# Patient Record
Sex: FEMALE | Race: BLACK OR AFRICAN AMERICAN | NOT HISPANIC OR LATINO | Employment: UNEMPLOYED | URBAN - METROPOLITAN AREA
[De-identification: names, ages, dates, MRNs, and addresses within clinical notes are randomized per-mention and may not be internally consistent; named-entity substitution may affect disease eponyms.]

---

## 2017-02-28 DIAGNOSIS — G43.109 MIGRAINE WITH AURA AND WITHOUT STATUS MIGRAINOSUS, NOT INTRACTABLE: ICD-10-CM

## 2017-02-28 NOTE — TELEPHONE ENCOUNTER
Request for medication refill:    Date of last visit at clinic: 5/12/16    Please complete refill if appropriate and CLOSE ENCOUNTER.    Closing the encounter signifies the refill is complete.    If refill has been denied, please complete the smart phrase .smirefuse and route it to the [facility] RN TRIAGE pool to inform the patient and the pharmacy.    Arianna Koch, CMA

## 2017-02-28 NOTE — TELEPHONE ENCOUNTER
[facility] phone call message- patient requesting a refill:    Full Medication Name: SUMAtriptan (IMITREX) 25 MG tablet        Pharmacy confirmed as   [facility] [id number] [address] AT SEC OF [address] [address]  Phone: [phone] Fax: [phone]    : Yes    Additional Comments: None     OK to leave a message on voice mail? Yes    Primary language: Nepalese      needed? No    Call taken on February 28, 2017 at 9:15 AM by Tamie Rocha

## 2017-03-01 RX ORDER — SUMATRIPTAN 25 MG/1
25-50 TABLET, FILM COATED ORAL
Qty: 30 TABLET | Refills: 3 | Status: SHIPPED | OUTPATIENT
Start: 2017-03-01 | End: 2017-12-04

## 2017-03-03 ENCOUNTER — OFFICE VISIT (OUTPATIENT)
Dept: FAMILY MEDICINE | Facility: CLINIC | Age: 32
End: 2017-03-03

## 2017-03-03 VITALS
SYSTOLIC BLOOD PRESSURE: 107 MMHG | HEIGHT: 64 IN | BODY MASS INDEX: 22.13 KG/M2 | TEMPERATURE: 98 F | WEIGHT: 129.6 LBS | RESPIRATION RATE: 20 BRPM | HEART RATE: 106 BPM | DIASTOLIC BLOOD PRESSURE: 73 MMHG | OXYGEN SATURATION: 100 %

## 2017-03-03 DIAGNOSIS — K21.9 GASTROESOPHAGEAL REFLUX DISEASE WITHOUT ESOPHAGITIS: Primary | ICD-10-CM

## 2017-03-03 DIAGNOSIS — R07.89 CHEST WALL PAIN: ICD-10-CM

## 2017-03-03 ASSESSMENT — ANXIETY QUESTIONNAIRES
2. NOT BEING ABLE TO STOP OR CONTROL WORRYING: NEARLY EVERY DAY
7. FEELING AFRAID AS IF SOMETHING AWFUL MIGHT HAPPEN: SEVERAL DAYS
6. BECOMING EASILY ANNOYED OR IRRITABLE: NEARLY EVERY DAY
GAD7 TOTAL SCORE: 17
3. WORRYING TOO MUCH ABOUT DIFFERENT THINGS: NEARLY EVERY DAY
5. BEING SO RESTLESS THAT IT IS HARD TO SIT STILL: MORE THAN HALF THE DAYS
1. FEELING NERVOUS, ANXIOUS, OR ON EDGE: NEARLY EVERY DAY

## 2017-03-03 ASSESSMENT — PATIENT HEALTH QUESTIONNAIRE - PHQ9: 5. POOR APPETITE OR OVEREATING: MORE THAN HALF THE DAYS

## 2017-03-03 NOTE — MR AVS SNAPSHOT
After Visit Summary   3/3/2017    Leticia Morales    MRN: [id number]           Patient Information     Date Of Birth          1985        Visit Information        Provider Department      3/3/2017 3:20 PM Henry Frost MD [facility]        Today's Diagnoses     Gastroesophageal reflux disease without esophagitis    -  1    Chest wall pain          Care Instructions    Here is the plan from today's visit    1. Gastroesophageal reflux disease without esophagitis  Use as needed for stomach symptoms  - ranitidine (ZANTAC) 150 MG tablet; Take 1 tablet (150 mg) by mouth 2 times daily  Dispense: 60 tablet; Refill: 5    2. Chest wall pain  Use ice 20 minutes three times a day as needed for 3 days  Use heat 20 minutes three times a day as needed after that  Use tylenol as needed for pain    Please call or return to clinic if your symptoms don't go away.    Thank you for coming to [facility] today.  Lab Testing:  **If you had lab testing today and your results are reassuring or normal they will be mailed to you or sent through [employer]wilr within 7 days.   **If the lab tests need quick action we will call you with the results.  The phone number we will call with results is # [phone] (home) . If this is not the best number please call our clinic and change the number.  Medication Refills:  If you need any refills please call your pharmacy and they will contact us.   If you need to  your refill at a new pharmacy, please contact the new pharmacy directly. The new pharmacy will help you get your medications transferred faster.   Scheduling:  If you have any concerns about today's visit or wish to schedule another appointment please call our office during normal business hours [phone] (8-5:00 M-F)  If a referral was made to a [facility] and you don't get a call from central scheduling please call [phone].  If a Mammogram was ordered for  you at [facility] call [phone] to schedule or change your appointment.  If you had an XRay/CT/Ultrasound/MRI ordered the number is [phone] to schedule or change your radiology appointment.   Medical Concerns:  If you have urgent medical concerns please call [phone] at any time of the day.       Heartburn/GERD   What is heartburn?   Heartburn refers to the symptoms you feel when acids in your stomach flow back into the esophagus. (The esophagus is the tube that carries food from your throat to your stomach.) This backward movement of stomach acid is called reflux. The acid can burn and irritate the esophagus, throat, and vocal cords.   Heartburn is a common problem. Despite its name, it has nothing to do with the heart.   When you have heartburn often, you may have a condition called gastroesophageal reflux disease, or GERD.   How does it occur?   At the bottom of the esophagus there is a ring of muscle called a sphincter. It acts like a valve. When you swallow food, the sphincter opens to let the food pass into the stomach. The ring then closes to keep the stomach contents from going back into the esophagus. If the sphincter is weak or too relaxed, stomach acid and food flow backward into the esophagus. Because the esophagus does not have the protective lining that the stomach has, the acid causes pain.   The sphincter muscle sometimes does not work properly if:   You are overweight.   You are pregnant.   You have a hiatal hernia (a condition in which part of the stomach protrudes through the diaphragm into the chest).   You eat too much.   You lie down soon after eating.   You wear tight clothes that push on your stomach.   Foods that may make heartburn worse are:   foods high in fat   sugar   chocolate   peppermint   onions   citrus foods such as orange juice   tomato-based foods   spicy foods   coffee and other drinks with caffeine, such as tea and ike   alcohol.   Heartburn can also be  made worse by:   taking certain medicines, such as aspirin   smoking cigarettes.   Anyone can have an attack of heartburn from overeating or eating foods that are high in acid. Most of the time heartburn is mild and lasts for a short time. There is usually not a problem when heartburn occurs just once in a while. You should see your healthcare provider if:   You have heartburn nearly every day for 2 weeks.   The heartburn comes back when the antacid wears off.   Heartburn wakes you up at night.   What are the symptoms?   The main symptom of heartburn is a burning pain in the lower chest, usually close to the bottom of the breastbone. Other symptoms you may have are:   acid or sour taste in your mouth   belching   a feeling of bloating or fullness in the stomach.   These symptoms tend to happen after very large meals and especially with activity such as bending or lifting after meals. The symptoms may be made worse by lying down or by wearing tight clothing.   Heartburn is very common during the last few months of pregnancy. The weight of the baby pushes on the stomach and can cause the sphincter to relax and let acid to flow back into the esophagus.   How is it diagnosed?   Usually heartburn can be diagnosed from your medical history.   If there is any question about the diagnosis, you may have the following tests to check for ulcers or other problems that might cause your symptoms:   barium swallow X-ray study of the esophagus   complete upper GI (gastrointestinal) barium X-ray study of the esophagus, stomach, and upper intestine   endoscopy, a procedure in which a thin flexible tube with a tiny camera is placed in your mouth and down into your stomach so your provider can see your esophagus and stomach.   How is it treated?   To help reduce the symptoms of heartburn you can:   Try not to put a lot of pressure on the sphincter muscle. Eating light meals and wearing loose clothing will help.   Lose weight if you are  overweight.   Take nonprescription antacids (tablets or liquid) after meals and at bedtime.   Raise the head of your bed or use more than one pillow so your head is higher than your stomach. This may allow gravity to help keep food from backing up.   If you find that certain foods or drinks seem to cause your symptoms or make them worse, avoid those foods.   If the simple measures described above do not relieve the symptoms, your healthcare provider may prescribe medicine. The prescription medicines help reduce stomach acid. They also help stomach emptying. A very few people who are not helped with medicines may need surgery.   Get emergency care if the following symptoms occur with the heartburn and do not go away within 15 minutes of treatment for heartburn: shortness of breath; sweating; light-headedness, weakness; or jaw, arm, back, or chest pain.   How long will the effects last?   Heartburn symptoms are usually relieved by treatment in just a few hours or less. If you are having heartburn every day, starting treatment will usually relieve the symptoms in a few days. However, the symptoms may come back from time to time, especially if you gain weight.   Heartburn can sometimes make asthma worse. If you have asthma, preventing or controlling heartburn may help control your asthma symptoms.   How can I help prevent heartburn?   The best prevention is to:   Keep a healthy weight. Lose weight if you are overweight.   Sleep with your head elevated at least 4 to 6 inches. (It's usually most comfortable to put the head of your bed on blocks.)   It may also help if you:   Wait an hour or longer after eating before you lie down. If you have to lie down after a meal, lie on your left side. Keep your head and shoulders slightly higher than the rest of your body. It's best to not eat for 2 to 3 hours before you go to bed.   Eat smaller, more frequent meals.   Avoid wearing tight clothing or belts.   Don't smoke. Smoking  "relaxes the sphincter leading to your stomach.   Avoid foods and other things that seem to cause heartburn or make it worse.   Developed by [employer].   Published by [employer].   Last modified: 2009   Last reviewed: 2008             Follow-ups after your visit        Who to contact     Please call your clinic at [phone] to:    Ask questions about your health    Make or cancel appointments    Discuss your medicines    Learn about your test results    Speak to your doctor   If you have compliments or concerns about an experience at your clinic, or if you wish to file a complaint, please contact [facility] Patient Relations at [phone] or email us at [email or web address]         Additional Information About Your Visit        [email or web address] Information     [employer] is an electronic gateway that provides easy, online access to your medical records. With [employer], you can request a clinic appointment, read your test results, renew a prescription or communicate with your care team.     To sign up for [employer] visit the website at [email or web address]   You will be asked to enter the access code listed below, as well as some personal information. Please follow the directions to create your username and password.     Your access code is: [email or web address]  Expires: 2017  4:11 PM     Your access code will  in 90 days. If you need help or a new code, please contact your [facility] or call [phone] for assistance.        Care EveryWhere ID     This is your Care EveryWhere ID. This could be used by other organizations to access your [place] medical records  [id number]        Your Vitals Were     Pulse Temperature Respirations Height Pulse Oximetry BMI (Body Mass Index)    106 98  F (36.7  C) (Oral) 20 5' 4\" (162.6 cm) 100% 22.25 kg/m2       Blood Pressure from Last 3 Encounters:   17 107/73   16 114/90   16 " 124/83    Weight from Last 3 Encounters:   03/03/17 129 lb 9.6 oz (58.8 kg)   11/24/16 133 lb (60.3 kg)   05/12/16 127 lb 3.2 oz (57.7 kg)              Today, you had the following     No orders found for display         Today's Medication Changes          These changes are accurate as of: 3/3/17  4:11 PM.  If you have any questions, ask your nurse or doctor.               Start taking these medicines.        Dose/Directions    ranitidine 150 MG tablet   Commonly known as:  ZANTAC   Used for:  Gastroesophageal reflux disease without esophagitis   Started by:  Henry Frost MD        Dose:  150 mg   Take 1 tablet (150 mg) by mouth 2 times daily   Quantity:  60 tablet   Refills:  5            Where to get your medicines      These medications were sent to [facility] [id number] [address] AT [facility]  [address] [place] [address]     Phone:  [phone]     ranitidine 150 MG tablet                Primary Care Provider Office Phone # Fax #    Mary Sargent [phone]       [facility] [address] [facility] [address]        Thank you!     Thank you for choosing [facility]  for your care. Our goal is always to provide you with excellent care. Hearing back from our patients is one way we can continue to improve our services. Please take a few minutes to complete the written survey that you may receive in the mail after your visit with us. Thank you!             Your Updated Medication List - Protect others around you: Learn how to safely use, store and throw away your medicines at www.disposemymeds.org.          This list is accurate as of: 3/3/17  4:11 PM.  Always use your most recent med list.                   Brand Name Dispense Instructions for use    benzonatate 100 MG capsule    TESSALON    30 capsule    Take 1 capsule (100 mg) by mouth 3 times daily as needed for cough       EXCEDRIN MIGRAINE PO           folic acid 1 MG tablet    FOLVITE    100 tablet    Take 1 tablet (1 mg) by mouth daily       levofloxacin 750 MG tablet    LEVAQUIN    10 tablet    Take 1 tablet (750 mg) by mouth daily       MUCINEX PO          order for DME     1 Device    Equipment being ordered: humidifier       ranitidine 150 MG tablet    ZANTAC    60 tablet    Take 1 tablet (150 mg) by mouth 2 times daily       SUMAtriptan 25 MG tablet    IMITREX    30 tablet    Take 1-2 tablets (25-50 mg) by mouth at onset of headache for migraine May repeat dose in 2 hours.  Do not exceed 200 mg in 24 hours

## 2017-03-03 NOTE — PROGRESS NOTES
"      HPI:       Leticia Morales is a 31 year old who presents for the following  Patient presents with:  Pain: pt states able to swollow with difficulty, back pain, chest, unable to breath  Rib Pain: right side, dull,pressure      ABDOMINAL PAIN     Onset:  Approximately 1 week ago    Description:   Character: Dull ache and pressure  Location: epigastric region  Radiation:  chest    Intensity: mild    Progression of Symptoms:  same and intermittent    Accompanying Signs & Symptoms:  Fever/Chills?: No   Gas/Bloating:  YES   Dysuria:No   Hematuria: No   Nausea:  YES   Vomiting:  YES  One time  Diarrhea: YES - prior to symptoms in stomach - \"food poisoning\"  Constipation: NO      History:            Trauma: No   Previous similar pain: No    Previous tests done: none    Precipitating factors:   Does the pain change with:     Food?: YES- hot and cold temps    BM?: no     Urination:no     What makes it better?:  Resolves on own with time and returns    Therapies Tried and outcome:  Honey with tea, Details:nothing    LMP:   1 week ago - within normal limits     Some caffeine use daily.  No tobacco currently, but quit.  No EtOH.       Problem, Medication and Allergy Lists were reviewed and are current.  Patient is an established patient of this clinic.         Review of Systems:   Review of Systems          Physical Exam:   Patient Vitals for the past 24 hrs:   BP Temp Temp src Pulse Resp SpO2 Height Weight   03/03/17 1540 107/73 98  F (36.7  C) Oral 106 20 100 % 5' 4\" (162.6 cm) 129 lb 9.6 oz (58.8 kg)     Body mass index is 22.25 kg/(m^2).  Vitals were reviewed and were normal     Physical Exam   Constitutional: She appears well-developed and well-nourished.   HENT:   Head: Normocephalic and atraumatic.   Eyes: Conjunctivae are normal. No scleral icterus.   Neck: Neck supple.   Cardiovascular: Normal rate, regular rhythm and normal heart sounds.    No murmur heard.  Pulmonary/Chest: Effort normal and breath sounds " normal. She exhibits tenderness (right sided anterior chest wall).   Abdominal: Soft. Normal appearance and bowel sounds are normal. She exhibits no distension and no mass. There is no hepatosplenomegaly. There is no tenderness. There is no rigidity, no rebound, no guarding and no CVA tenderness.   Lymphadenopathy:     She has no cervical adenopathy.   Skin: Skin is warm and dry. No rash noted.   Psychiatric: She has a normal mood and affect.   Nursing note and vitals reviewed.        Results:     None     Assessment and Plan     1. Gastroesophageal reflux disease without esophagitis  Likely due to eating spicey food and drinking Coke.  Recommend life style changes and trial of ranitidine (ZANTAC) 150 MG tablet; Take 1 tablet (150 mg) by mouth 2 times daily  Dispense: 60 tablet; Refill: 5    2. Chest wall pain  Unclear etiology.  Recommend ice, heat, tylenol.  Discussed natural history and expected course of condition.  Follow up if not responding as anticipated.    Options for treatment and follow-up care were reviewed with the patient. Leticia Morales  engaged in the decision making process and verbalized understanding of the options discussed and agreed with the final plan.    Henry Frost MD

## 2017-03-03 NOTE — PATIENT INSTRUCTIONS
Here is the plan from today's visit    1. Gastroesophageal reflux disease without esophagitis  Use as needed for stomach symptoms  - ranitidine (ZANTAC) 150 MG tablet; Take 1 tablet (150 mg) by mouth 2 times daily  Dispense: 60 tablet; Refill: 5    2. Chest wall pain  Use ice 20 minutes three times a day as needed for 3 days  Use heat 20 minutes three times a day as needed after that  Use tylenol as needed for pain    Please call or return to clinic if your symptoms don't go away.    Thank you for coming to [facility] today.  Lab Testing:  **If you had lab testing today and your results are reassuring or normal they will be mailed to you or sent through [employer] within 7 days.   **If the lab tests need quick action we will call you with the results.  The phone number we will call with results is # [phone] (home) . If this is not the best number please call our clinic and change the number.  Medication Refills:  If you need any refills please call your pharmacy and they will contact us.   If you need to  your refill at a new pharmacy, please contact the new pharmacy directly. The new pharmacy will help you get your medications transferred faster.   Scheduling:  If you have any concerns about today's visit or wish to schedule another appointment please call our office during normal business hours [phone] (8-5:00 M-F)  If a referral was made to a [facility] and you don't get a call from central scheduling please call [phone].  If a Mammogram was ordered for you at [facility] call [phone] to schedule or change your appointment.  If you had an XRay/CT/Ultrasound/MRI ordered the number is [phone] to schedule or change your radiology appointment.   Medical Concerns:  If you have urgent medical concerns please call [phone] at any time of the day.       Heartburn/GERD   What is heartburn?   Heartburn refers to the symptoms you feel when acids in your  stomach flow back into the esophagus. (The esophagus is the tube that carries food from your throat to your stomach.) This backward movement of stomach acid is called reflux. The acid can burn and irritate the esophagus, throat, and vocal cords.   Heartburn is a common problem. Despite its name, it has nothing to do with the heart.   When you have heartburn often, you may have a condition called gastroesophageal reflux disease, or GERD.   How does it occur?   At the bottom of the esophagus there is a ring of muscle called a sphincter. It acts like a valve. When you swallow food, the sphincter opens to let the food pass into the stomach. The ring then closes to keep the stomach contents from going back into the esophagus. If the sphincter is weak or too relaxed, stomach acid and food flow backward into the esophagus. Because the esophagus does not have the protective lining that the stomach has, the acid causes pain.   The sphincter muscle sometimes does not work properly if:   You are overweight.   You are pregnant.   You have a hiatal hernia (a condition in which part of the stomach protrudes through the diaphragm into the chest).   You eat too much.   You lie down soon after eating.   You wear tight clothes that push on your stomach.   Foods that may make heartburn worse are:   foods high in fat   sugar   chocolate   peppermint   onions   citrus foods such as orange juice   tomato-based foods   spicy foods   coffee and other drinks with caffeine, such as tea and ike   alcohol.   Heartburn can also be made worse by:   taking certain medicines, such as aspirin   smoking cigarettes.   Anyone can have an attack of heartburn from overeating or eating foods that are high in acid. Most of the time heartburn is mild and lasts for a short time. There is usually not a problem when heartburn occurs just once in a while. You should see your healthcare provider if:   You have heartburn nearly every day for 2 weeks.   The  heartburn comes back when the antacid wears off.   Heartburn wakes you up at night.   What are the symptoms?   The main symptom of heartburn is a burning pain in the lower chest, usually close to the bottom of the breastbone. Other symptoms you may have are:   acid or sour taste in your mouth   belching   a feeling of bloating or fullness in the stomach.   These symptoms tend to happen after very large meals and especially with activity such as bending or lifting after meals. The symptoms may be made worse by lying down or by wearing tight clothing.   Heartburn is very common during the last few months of pregnancy. The weight of the baby pushes on the stomach and can cause the sphincter to relax and let acid to flow back into the esophagus.   How is it diagnosed?   Usually heartburn can be diagnosed from your medical history.   If there is any question about the diagnosis, you may have the following tests to check for ulcers or other problems that might cause your symptoms:   barium swallow X-ray study of the esophagus   complete upper GI (gastrointestinal) barium X-ray study of the esophagus, stomach, and upper intestine   endoscopy, a procedure in which a thin flexible tube with a tiny camera is placed in your mouth and down into your stomach so your provider can see your esophagus and stomach.   How is it treated?   To help reduce the symptoms of heartburn you can:   Try not to put a lot of pressure on the sphincter muscle. Eating light meals and wearing loose clothing will help.   Lose weight if you are overweight.   Take nonprescription antacids (tablets or liquid) after meals and at bedtime.   Raise the head of your bed or use more than one pillow so your head is higher than your stomach. This may allow gravity to help keep food from backing up.   If you find that certain foods or drinks seem to cause your symptoms or make them worse, avoid those foods.   If the simple measures described above do not relieve  the symptoms, your healthcare provider may prescribe medicine. The prescription medicines help reduce stomach acid. They also help stomach emptying. A very few people who are not helped with medicines may need surgery.   Get emergency care if the following symptoms occur with the heartburn and do not go away within 15 minutes of treatment for heartburn: shortness of breath; sweating; light-headedness, weakness; or jaw, arm, back, or chest pain.   How long will the effects last?   Heartburn symptoms are usually relieved by treatment in just a few hours or less. If you are having heartburn every day, starting treatment will usually relieve the symptoms in a few days. However, the symptoms may come back from time to time, especially if you gain weight.   Heartburn can sometimes make asthma worse. If you have asthma, preventing or controlling heartburn may help control your asthma symptoms.   How can I help prevent heartburn?   The best prevention is to:   Keep a healthy weight. Lose weight if you are overweight.   Sleep with your head elevated at least 4 to 6 inches. (It's usually most comfortable to put the head of your bed on blocks.)   It may also help if you:   Wait an hour or longer after eating before you lie down. If you have to lie down after a meal, lie on your left side. Keep your head and shoulders slightly higher than the rest of your body. It's best to not eat for 2 to 3 hours before you go to bed.   Eat smaller, more frequent meals.   Avoid wearing tight clothing or belts.   Don't smoke. Smoking relaxes the sphincter leading to your stomach.   Avoid foods and other things that seem to cause heartburn or make it worse.   Developed by [employer].   Published by [employer].   Last modified: 2009-01-14   Last reviewed: 2008-12-02

## 2017-03-04 ASSESSMENT — PATIENT HEALTH QUESTIONNAIRE - PHQ9: SUM OF ALL RESPONSES TO PHQ QUESTIONS 1-9: 21

## 2017-03-04 ASSESSMENT — ANXIETY QUESTIONNAIRES: GAD7 TOTAL SCORE: 17

## 2017-03-19 ENCOUNTER — HOSPITAL ENCOUNTER (EMERGENCY)
Facility: CLINIC | Age: 32
Discharge: HOME | End: 2017-03-19
Attending: EMERGENCY MEDICINE | Admitting: EMERGENCY MEDICINE
Payer: COMMERCIAL

## 2017-03-19 VITALS
SYSTOLIC BLOOD PRESSURE: 112 MMHG | DIASTOLIC BLOOD PRESSURE: 74 MMHG | HEART RATE: 78 BPM | BODY MASS INDEX: 21.28 KG/M2 | RESPIRATION RATE: 16 BRPM | TEMPERATURE: 97.8 F | OXYGEN SATURATION: 100 % | WEIGHT: 124 LBS

## 2017-03-19 DIAGNOSIS — K21.9 GASTROESOPHAGEAL REFLUX DISEASE WITHOUT ESOPHAGITIS: ICD-10-CM

## 2017-03-19 LAB
ALBUMIN SERPL-MCNC: 3.4 G/DL (ref 3.4–5)
ALP SERPL-CCNC: 64 U/L (ref 40–150)
ALT SERPL W P-5'-P-CCNC: 12 U/L (ref 0–50)
ANION GAP SERPL CALCULATED.3IONS-SCNC: 7 MMOL/L (ref 3–14)
AST SERPL W P-5'-P-CCNC: 13 U/L (ref 0–45)
BASOPHILS # BLD AUTO: 0.1 10E9/L (ref 0–0.2)
BASOPHILS NFR BLD AUTO: 0.8 %
BILIRUB SERPL-MCNC: 0.2 MG/DL (ref 0.2–1.3)
BUN SERPL-MCNC: 11 MG/DL (ref 7–30)
CALCIUM SERPL-MCNC: 8.9 MG/DL (ref 8.5–10.1)
CHLORIDE SERPL-SCNC: 111 MMOL/L (ref 94–109)
CO2 SERPL-SCNC: 25 MMOL/L (ref 20–32)
CREAT SERPL-MCNC: 0.57 MG/DL (ref 0.52–1.04)
DIFFERENTIAL METHOD BLD: ABNORMAL
EOSINOPHIL # BLD AUTO: 0.2 10E9/L (ref 0–0.7)
EOSINOPHIL NFR BLD AUTO: 3.2 %
ERYTHROCYTE [DISTWIDTH] IN BLOOD BY AUTOMATED COUNT: 18.8 % (ref 10–15)
GFR SERPL CREATININE-BSD FRML MDRD: ABNORMAL ML/MIN/1.7M2
GLUCOSE SERPL-MCNC: 87 MG/DL (ref 70–99)
HCG UR QL: NEGATIVE
HCT VFR BLD AUTO: 31.6 % (ref 35–47)
HGB BLD-MCNC: 9.9 G/DL (ref 11.7–15.7)
IMM GRANULOCYTES # BLD: 0 10E9/L (ref 0–0.4)
IMM GRANULOCYTES NFR BLD: 0 %
INTERNAL QC OK POCT: YES
LIPASE SERPL-CCNC: 218 U/L (ref 73–393)
LYMPHOCYTES # BLD AUTO: 2.9 10E9/L (ref 0.8–5.3)
LYMPHOCYTES NFR BLD AUTO: 44.3 %
MCH RBC QN AUTO: 21.4 PG (ref 26.5–33)
MCHC RBC AUTO-ENTMCNC: 31.3 G/DL (ref 31.5–36.5)
MCV RBC AUTO: 68 FL (ref 78–100)
MONOCYTES # BLD AUTO: 0.9 10E9/L (ref 0–1.3)
MONOCYTES NFR BLD AUTO: 14 %
NEUTROPHILS # BLD AUTO: 2.5 10E9/L (ref 1.6–8.3)
NEUTROPHILS NFR BLD AUTO: 37.7 %
NRBC # BLD AUTO: 0 10*3/UL
NRBC BLD AUTO-RTO: 0 /100
PLATELET # BLD AUTO: 294 10E9/L (ref 150–450)
POTASSIUM SERPL-SCNC: 3.9 MMOL/L (ref 3.4–5.3)
PROT SERPL-MCNC: 7.9 G/DL (ref 6.8–8.8)
RBC # BLD AUTO: 4.63 10E12/L (ref 3.8–5.2)
SODIUM SERPL-SCNC: 143 MMOL/L (ref 133–144)
WBC # BLD AUTO: 6.6 10E9/L (ref 4–11)

## 2017-03-19 PROCEDURE — 81025 URINE PREGNANCY TEST: CPT | Performed by: EMERGENCY MEDICINE

## 2017-03-19 PROCEDURE — 25000132 ZZH RX MED GY IP 250 OP 250 PS 637: Performed by: EMERGENCY MEDICINE

## 2017-03-19 PROCEDURE — 25000125 ZZHC RX 250: Performed by: EMERGENCY MEDICINE

## 2017-03-19 PROCEDURE — 83690 ASSAY OF LIPASE: CPT | Performed by: EMERGENCY MEDICINE

## 2017-03-19 PROCEDURE — 99284 EMERGENCY DEPT VISIT MOD MDM: CPT | Mod: Z6 | Performed by: EMERGENCY MEDICINE

## 2017-03-19 PROCEDURE — 25000125 ZZHC RX 250

## 2017-03-19 PROCEDURE — 85025 COMPLETE CBC W/AUTO DIFF WBC: CPT | Performed by: EMERGENCY MEDICINE

## 2017-03-19 PROCEDURE — 36415 COLL VENOUS BLD VENIPUNCTURE: CPT | Performed by: EMERGENCY MEDICINE

## 2017-03-19 PROCEDURE — 99283 EMERGENCY DEPT VISIT LOW MDM: CPT | Performed by: EMERGENCY MEDICINE

## 2017-03-19 PROCEDURE — 80053 COMPREHEN METABOLIC PANEL: CPT | Performed by: EMERGENCY MEDICINE

## 2017-03-19 RX ORDER — PANTOPRAZOLE SODIUM 40 MG/1
40 TABLET, DELAYED RELEASE ORAL ONCE
Status: COMPLETED | OUTPATIENT
Start: 2017-03-19 | End: 2017-03-19

## 2017-03-19 RX ORDER — ONDANSETRON 4 MG/1
TABLET, ORALLY DISINTEGRATING ORAL
Status: COMPLETED
Start: 2017-03-19 | End: 2017-03-19

## 2017-03-19 RX ORDER — PANTOPRAZOLE SODIUM 40 MG/1
40 TABLET, DELAYED RELEASE ORAL DAILY
Qty: 30 TABLET | Refills: 0 | Status: SHIPPED | OUTPATIENT
Start: 2017-03-19 | End: 2017-03-29 | Stop reason: SINTOL

## 2017-03-19 RX ORDER — ONDANSETRON 4 MG/1
4 TABLET, ORALLY DISINTEGRATING ORAL EVERY 8 HOURS PRN
Qty: 10 TABLET | Refills: 0 | Status: SHIPPED | OUTPATIENT
Start: 2017-03-19 | End: 2017-03-22

## 2017-03-19 RX ORDER — ONDANSETRON 4 MG/1
4 TABLET, ORALLY DISINTEGRATING ORAL ONCE
Status: COMPLETED | OUTPATIENT
Start: 2017-03-19 | End: 2017-03-19

## 2017-03-19 RX ADMIN — PANTOPRAZOLE SODIUM 40 MG: 40 TABLET, DELAYED RELEASE ORAL at 10:33

## 2017-03-19 RX ADMIN — LIDOCAINE HYDROCHLORIDE 30 ML: 20 SOLUTION ORAL; TOPICAL at 10:08

## 2017-03-19 RX ADMIN — ONDANSETRON 4 MG: 4 TABLET, ORALLY DISINTEGRATING ORAL at 10:08

## 2017-03-19 ASSESSMENT — ENCOUNTER SYMPTOMS
NAUSEA: 1
BACK PAIN: 0
CHILLS: 1
BLOOD IN STOOL: 0
DIZZINESS: 1
ABDOMINAL PAIN: 1
VOMITING: 1

## 2017-03-19 NOTE — ED PROVIDER NOTES
History     Chief Complaint   Patient presents with     Nausea & Vomiting     Nausea and vomiting since 2/28. Saw MD on 3/3 and told she had GERD. Started on Zantac, but not getting better. Throat feels better, but pain down middle of chest not getting better. Also complains of diffuse abdominal cramping. Just started period yesterday. Menses is normal. Emesis x 3 since [place], clear. Feels dizzy. Fainted this am.      HPI  Leticia Morales is a 31 year old female with a history of chronic migraines who presents to the Emergency Department for evaluation of persistent nausea and vomiting. Patient states she has been experiencing these symptoms since 2/28/2017 and saw a primary care provider on 3/3/2017 at which point she was diagnosed with GERD and prescribed Ranitidine 150mg PO BID. Patient reports minimal improvement in condition with dedicated use of this therapy. Patient states that she is progressively becoming more dizzy due to the inability to keep meals down. Patient is also experiencing chills, mild abdominal pain and upper GI discomfort. Patient denies dark or bloody stools, back pain, or history of ulcers. Patient also denies a  history of abdominal surgeries. Patient denies sick contacts or current pregnancy. Patient is currently menstruating.     PAST MEDICAL HISTORY  Past Medical History:   Diagnosis Date     ARDS (adult respiratory distress syndrome) (H) 2010    related to H1N1 infection     H1N1 influenza 2010    prolonged ICU stay, medically induced coma     Iron deficiency anemia      Migraines      PAST SURGICAL HISTORY  Past Surgical History:   Procedure Laterality Date     THORACIC SURGERY      trach     FAMILY HISTORY  Family History   Problem Relation Age of Onset     DIABETES Father      Hypertension Father      CANCER Father      SOCIAL HISTORY  Social History   Substance Use Topics     Smoking status: Current Some Day Smoker     Packs/day: 0.03     Types: Cigarettes     Smokeless tobacco:  Never Used     Alcohol use No     MEDICATIONS  No current facility-administered medications for this encounter.      Current Outpatient Prescriptions   Medication     ondansetron (ZOFRAN ODT) 4 MG ODT tab     pantoprazole (PROTONIX) 40 MG EC tablet     ranitidine (ZANTAC) 150 MG tablet     SUMAtriptan (IMITREX) 25 MG tablet     Aspirin-Acetaminophen-Caffeine (EXCEDRIN MIGRAINE PO)     order for DME     ALLERGIES  Allergies   Allergen Reactions     Macrodantin [Nitrofurantoin Macrocrystal] Other (See Comments)     itching       I have reviewed the Medications, Allergies, Past Medical and Surgical History, and Social History in the Epic system.    Review of Systems   Constitutional: Positive for chills.   Gastrointestinal: Positive for abdominal pain, nausea and vomiting. Negative for blood in stool.   Musculoskeletal: Negative for back pain.   Neurological: Positive for dizziness.   All other systems reviewed and are negative.      Physical Exam   BP: 119/71  Heart Rate: 78  Temp: 97.8  F (36.6  C)  Resp: 16  Weight: 56.2 kg (124 lb)  SpO2: 100 %  Physical Exam   Constitutional: She is oriented to person, place, and time. She appears well-developed and well-nourished. No distress.   HENT:   Head: Normocephalic and atraumatic.   Right Ear: External ear normal.   Left Ear: External ear normal.   Nose: Nose normal.   Mouth/Throat: Oropharynx is clear and moist. No oropharyngeal exudate.   Eyes: Conjunctivae and EOM are normal. Pupils are equal, round, and reactive to light. No scleral icterus.   Neck: Normal range of motion. Neck supple.   Cardiovascular: Normal rate, regular rhythm and normal heart sounds.    Pulmonary/Chest: Effort normal and breath sounds normal.   Abdominal: Soft. Bowel sounds are normal. She exhibits no distension and no mass. There is no tenderness. There is no rebound and no guarding.   Musculoskeletal: Normal range of motion.   Neurological: She is alert and oriented to person, place, and time.    Skin: Skin is warm and dry. She is not diaphoretic.   Psychiatric: She has a normal mood and affect. Her behavior is normal. Judgment and thought content normal.   Nursing note and vitals reviewed.        ED Course     ED Course     9:59 AM  The patient was seen and examined by Dr. Tapia in Room 5.     Procedures             Labs Ordered and Resulted from Time of ED Arrival Up to the Time of Departure from the ED   CBC WITH PLATELETS DIFFERENTIAL - Abnormal; Notable for the following:        Result Value    Hemoglobin 9.9 (*)     Hematocrit 31.6 (*)     MCV 68 (*)     MCH 21.4 (*)     MCHC 31.3 (*)     RDW 18.8 (*)     All other components within normal limits   COMPREHENSIVE METABOLIC PANEL - Abnormal; Notable for the following:     Chloride 111 (*)     All other components within normal limits   HCG QUAL URINE POCT - Normal   LIPASE     No results found for this or any previous visit (from the past 24 hour(s)).    Assessments & Plan (with Medical Decision Making)   The patient presents with nausea, vomiting since 2/28/17.  She states she is not pregnant. She says Kathryn's dx her with GERD and prescribed zantac which is not helping.  She appears well.  Abdomen soft and benign.  Labs were ordered and reviewed.  Her hemoglobin is 9.9.  Records show a low hemoglobin in the past and then improved.  She denies dark or blood stools.  Her menses last for 7 days.  Comp met and lipase were normal.  UPT negative.  She was given zofran and then gi cocktail and felt better.  I am going to switch her med to protonix and stop zantac.  I also prescribed zofran. I asked that she f/u with [facility] and if not better, discuss testing for H. Pylori.  I also asked that she have her hemoglobin rechecked in clinic this week.     I have reviewed the nursing notes.    I have reviewed the findings, diagnosis, plan and need for follow up with the patient.    Discharge Medication List as of 3/19/2017 12:20 PM      START taking these  medications    Details   ondansetron (ZOFRAN ODT) 4 MG ODT tab Take 1 tablet (4 mg) by mouth every 8 hours as needed for nausea, Disp-10 tablet, R-0, Local Print      pantoprazole (PROTONIX) 40 MG EC tablet Take 1 tablet (40 mg) by mouth daily for 30 doses, Disp-30 tablet, R-0, Local Print             Final diagnoses:   Gastroesophageal reflux disease without esophagitis     [facility], am serving as a trained medical scribe to document services personally performed by Kriss Tapia MD, based on the provider's statements to me.   I, Kriss Tapia MD, was physically present and have reviewed and verified the accuracy of this note documented by Kiara Castaneda.      3/19/2017   [facility], [place], EMERGENCY DEPARTMENT     Kriss Tapia MD  03/22/17 [id number]       Kriss Tapia MD  03/22/17 7017

## 2017-03-19 NOTE — DISCHARGE INSTRUCTIONS
Stop taking zantac.     Begin taking protonix. Your first at home dose is tomorrow - you were given a dose here today.     You can take zofran for nausea and vomiting.     Please follow up with your primary care doctor for a recheck this week.  Discuss what medications you should continue to take.  Have them recheck your hemoglobin.    Seek medical attention if worsening.

## 2017-03-19 NOTE — ED AVS SNAPSHOT
[facility], Emergency Department    [address]    [facility] [address]    Phone:  [phone]    Fax:  [phone]                                       Leticia Morales   MRN: [id number]    Department:  [facility], Emergency Department   Date of Visit:  3/19/2017           After Visit Summary Signature Page     I have received my discharge instructions, and my questions have been answered. I have discussed any challenges I see with this plan with the nurse or doctor.    ..........................................................................................................................................  Patient/Patient Representative Signature      ..........................................................................................................................................  Patient Representative Print Name and Relationship to Patient    ..................................................               ................................................  Date                                            Time    ..........................................................................................................................................  Reviewed by Signature/Title    ...................................................              ..............................................  Date                                                            Time

## 2017-03-19 NOTE — ED AVS SNAPSHOT
[facility], Emergency Department    [address]    [place] [address]    Phone:  [phone]    Fax:  [phone]                                       Leticia Morales   MRN: [id number]    Department:  [facility], Emergency Department   Date of Visit:  3/19/2017           Patient Information     Date Of Birth          1985        Your diagnoses for this visit were:     Gastroesophageal reflux disease without esophagitis        You were seen by Kriss Tapia MD.        Discharge Instructions       Stop taking zantac.     Begin taking protonix. Your first at home dose is tomorrow - you were given a dose here today.     You can take zofran for nausea and vomiting.     Please follow up with your primary care doctor for a recheck this week.  Discuss what medications you should continue to take.  Have them recheck your hemoglobin.    Seek medical attention if worsening.      24 Hour Appointment Hotline       To make an appointment at any [facility] clinic, call [phone] ([phone]). If you don't have a family doctor or clinic, we will help you find one. [facility] clinics are conveniently located to serve the needs of you and your family.             Review of your medicines      START taking        Dose / Directions Last dose taken    ondansetron 4 MG ODT tab   Commonly known as:  ZOFRAN ODT   Dose:  4 mg   Quantity:  10 tablet        Take 1 tablet (4 mg) by mouth every 8 hours as needed for nausea   Refills:  0        pantoprazole 40 MG EC tablet   Commonly known as:  PROTONIX   Dose:  40 mg   Quantity:  30 tablet        Take 1 tablet (40 mg) by mouth daily for 30 doses   Refills:  0          Our records show that you are taking the medicines listed below. If these are incorrect, please call your family doctor or clinic.        Dose / Directions Last dose taken    EXCEDRIN MIGRAINE PO        Refills:  0        order for DME   Quantity:  1 Device        Equipment being ordered:  "humidifier   Refills:  0        ranitidine 150 MG tablet   Commonly known as:  ZANTAC   Dose:  150 mg   Quantity:  60 tablet        Take 1 tablet (150 mg) by mouth 2 times daily   Refills:  5        SUMAtriptan 25 MG tablet   Commonly known as:  IMITREX   Dose:  25-50 mg   Quantity:  30 tablet        Take 1-2 tablets (25-50 mg) by mouth at onset of headache for migraine May repeat dose in 2 hours.  Do not exceed 200 mg in 24 hours   Refills:  3                Prescriptions were sent or printed at these locations (2 Prescriptions)                   Other Prescriptions                Printed at Department/Unit printer (2 of 2)         ondansetron (ZOFRAN ODT) 4 MG ODT tab               pantoprazole (PROTONIX) 40 MG EC tablet                Procedures and tests performed during your visit     CBC with platelets differential    Comprehensive metabolic panel    Lipase    hCG qual urine POCT      Orders Needing Specimen Collection     None      Pending Results     No orders found from 3/17/2017 to 3/20/2017.            Pending Culture Results     No orders found from 3/17/2017 to 3/20/2017.            Thank you for choosing [facility]       Thank you for choosing [facility] for your care. Our goal is always to provide you with excellent care. Hearing back from our patients is one way we can continue to improve our services. Please take a few minutes to complete the written survey that you may receive in the mail after you visit with us. Thank you!        [employer] Information     [employer] lets you send messages to your doctor, view your test results, renew your prescriptions, schedule appointments and more. To sign up, go to [email or web address] . Click on \"Log in\" on the left side of the screen, which will take you to the Welcome page. Then click on \"Sign up Now\" on the right side of the page.     You will be asked to enter the access code listed below, as well as some personal information. Please follow the directions to " create your username and password.     Your access code is: [email or web address]  Expires: 2017  5:11 PM     Your access code will  in 90 days. If you need help or a new code, please call your [facility] or [phone].        Care EveryWhere ID     This is your Care EveryWhere ID. This could be used by other organizations to access your [facility] medical records  [id number]        After Visit Summary       This is your record. Keep this with you and show to your community pharmacist(s) and doctor(s) at your next visit.

## 2017-03-29 ENCOUNTER — OFFICE VISIT (OUTPATIENT)
Dept: FAMILY MEDICINE | Facility: CLINIC | Age: 32
End: 2017-03-29

## 2017-03-29 VITALS
WEIGHT: 127 LBS | TEMPERATURE: 98.1 F | DIASTOLIC BLOOD PRESSURE: 68 MMHG | BODY MASS INDEX: 21.8 KG/M2 | SYSTOLIC BLOOD PRESSURE: 100 MMHG | HEART RATE: 78 BPM | RESPIRATION RATE: 16 BRPM | OXYGEN SATURATION: 100 %

## 2017-03-29 DIAGNOSIS — D50.8 IRON DEFICIENCY ANEMIA SECONDARY TO INADEQUATE DIETARY IRON INTAKE: ICD-10-CM

## 2017-03-29 DIAGNOSIS — R10.13 EPIGASTRIC PAIN: Primary | ICD-10-CM

## 2017-03-29 DIAGNOSIS — K21.9 GASTROESOPHAGEAL REFLUX DISEASE WITHOUT ESOPHAGITIS: ICD-10-CM

## 2017-03-29 NOTE — PATIENT INSTRUCTIONS
Here is the plan from today's visit    1. Epigastric pain  - Please collect stool samples at home and bring back as directed  - H Pylori antigen stool; Future    2. Gastroesophageal reflux disease without esophagitis  - Continue taking Ranitidine  - H Pylori antigen stool; Future    3. Iron deficiency anemia secondary to inadequate dietary iron intake  - Please collect stool samples at home and bring back as directed  - We will begin an iron supplement once we get the results back   - Fecal colorectal cancer screen FITT; Future     Please call or return to clinic if your symptoms don't go away.    Follow up plan  Please make a clinic appointment for follow up with me ([facility]) after samples are returned  for GERD.    Thank you for coming to [facility] today.  Lab Testing:  **If you had lab testing today and your results are reassuring or normal they will be mailed to you or sent through [facility] within 7 days.   **If the lab tests need quick action we will call you with the results.  The phone number we will call with results is # [phone] (home) . If this is not the best number please call our clinic and change the number.  Medication Refills:  If you need any refills please call your pharmacy and they will contact us.   If you need to  your refill at a new pharmacy, please contact the new pharmacy directly. The new pharmacy will help you get your medications transferred faster.   Scheduling:  If you have any concerns about today's visit or wish to schedule another appointment please call our office during normal business hours [phone] (8-5:00 M-F)  If a referral was made to a [facility] and you don't get a call from central scheduling please call [phone].  If a Mammogram was ordered for you at [facility] call [phone] to schedule or change your appointment.  If you had an XRay/CT/Ultrasound/MRI ordered the number is [phone] to schedule or  change your radiology appointment.   Medical Concerns:  If you have urgent medical concerns please call [phone] at any time of the day.  If you have a medical emergency please call [phone].           Iron Deficiency Anemia  What is iron deficiency anemia?  Iron deficiency anemia is a condition in which your blood doesn t have enough red blood cells and hemoglobin. Hemoglobin is the protein in red blood cells that carries oxygen to body tissues.  How does it occur?   Iron deficiency anemia can happen if you do not have enough iron in your diet. Iron is a mineral that is important to all body cells. It is particularly important for blood cells because iron is needed to make hemoglobin. Iron is also needed to help certain chemical processes in the body.  Iron deficiency anemia can be caused by:  not having enough iron in your diet   the iron in your diet not being absorbed properly due to a problem with your digestive system   blood loss (blood loss can be obvious, for example, due to an injury or menstruation, or you can lose small amounts from a hidden source of bleeding within your body, such as an ulcer or a tumor).  Women 19 to 50 years old need 18 milligrams (mg) of iron per day. Men need 8 mg a day. Men and women over age 50 need about 8 mg a day. Women of childbearing age need twice as much iron in their diet as older women because they lose blood during menstruation. Pregnant women need extra iron for the development of the baby, so 27 mg a day is recommended for them. Most prenatal vitamin pills contain the extra iron that a pregnant woman needs.  What are the symptoms?   Iron deficiency often causes no symptoms, but when symptoms are present, they may include:  tiredness and lack of energy   headaches   sore mouth or tongue   brittle nails   shortness of breath   pale skin, gums, and nail beds   pain in the chest.  How is it diagnosed?   Your healthcare provider will probably suspect iron deficiency anemia  from your medical history and symptoms. A blood test will confirm the diagnosis. If blood loss is a possibility, your healthcare provider may check your stools for blood, or you may have special tests of your stomach and bowel.  How is it treated?   Iron supplements can be prescribed that will build up your body stores of iron. However, you may need to change your diet so that you get more iron from the food you eat. Your healthcare provider may refer you to a dietitian for advice.  If you have a bleeding problem, you will need special treatment.  Meat, fish, and poultry are excellent sources of dietary iron. It is also present in liver, eggs, green leaf vegetables, nuts, peas, beans, and whole-grain bread. A well-balanced diet contains enough iron for your daily needs.  Iron tablets may have side effects such as abdominal cramps; nausea; constipation; and dark stools. To lessen side effects, your healthcare provider will start you on a low dose of iron and slowly increase your dose to the necessary amount. He or she may suggest that you take vitamin C with the iron pills to help your body absorb the iron. Taking the iron at mealtimes can help prevent stomach and intestinal upset.  Do not take antacids and do not eat or drink any dairy products at the same time you take the iron pills. Antacids and dairy products prevent the body from absorbing iron.  Only rarely are iron shots needed.  How long will the effects last?   The symptoms will respond quickly to treatment and improve in just a few days.  How can I take care of myself?   Follow your healthcare provider's or nutritionist's advice for treating iron deficiency anemia.   Eat a well-balanced, varied diet. Eat regularly at least 3 times each day.   See your healthcare provider if you feel tired all of the time or notice any of the other symptoms of iron deficiency anemia.  How can I help prevent iron deficiency anemia?  Eating foods rich in iron and/or taking an  iron supplement will help to prevent a recurrence.  Iron in the Diet  What is iron?   Iron is a mineral that is important to all body cells. Blood cells especially need iron to make hemoglobin. The hemoglobin in blood cells brings oxygen to the body cells.  You can get iron deficiency anemia if you don t get enough iron. This means that your blood has less hemoglobin than normal. People who have iron deficiency anemia are often tired and don t have much energy.  Iron deficiency anemia may result from:  not getting enough iron from your diet (older adults with poor appetites or people on a very tight food budget are at especially high risk)   losing a lot of blood   changes during pregnancy.  How much iron do I need?   How much iron you need depends on your age and whether you are a man or a woman. The recommendations are:    GROUP                          MILLIGRAMS (mg) IRON PER DAY   ------------------------------------------------------   Children 7 to 12 months old                      11 mg   Children 1 to 3 years old                         7 mg   Children 4 to 8 years old                        10 mg   Children 9 to 13 years old                        8 mg   Females 14 to 18 years old                       15 mg   Males 14 to 18 years old                         11 mg   Males over 18 years old                           8 mg   Females 19 to 50 years old                       18 mg   Females over 50 years old                         8 mg   Pregnant females                                 27 mg   Breast-feeding females 14 to 18 years old        10 mg   Breast-feeding females 19 to 50 years old         9 mg   ------------------------------------------------------      What foods are good sources of iron?   The best way to get enough iron is to eat a healthy, well-balanced diet. Iron is found in a variety of foods. There are 2 types of iron: heme and nonheme. Heme iron is found in meat, poultry, and fish. Nonheme  iron is found in fruits, vegetables, grains, nuts, legumes, eggs, dairy products, and iron-enriched foods. The body absorbs heme iron better than nonheme iron.    FOOD               SERVING SIZE   MG IRON (APPROXIMATE)   -------------------------------------------------------   Sources of heme iron   liver, chicken         3 oz                7.2   liver, beef            3 oz                5.8   beef                   3 oz                3.0   shrimp                 3 oz                2.8   turkey, dark           3 oz                2.0   ground beef            3 oz                1.8   lamb                   3 oz                1.5   chicken, dark          3 oz                1.3   chicken, white         3 oz                1.1   turkey, white          3 oz                1.1   fish                   3 oz                1.1   pork, shoulder         3 oz                1.0   pork, loin             3 oz                0.8   tuna, white,     water packed         3 oz                0.8   Sources of nonheme iron   fortified breakfast     cereals*             1 cup       4.5 to 18   soy beans, cooked      1/2 cup             4.7   pumpkin seeds          1 oz                4.2   molasses,     blackstrap           1 tablespoon        3.5   lentils                1/2 cup             3.3   spinach, cooked        1/2 cup             3.2   bagel                  1 bagel             3.2   tofu, extra firm       3 oz                2.7   prune juice            8 oz                2.7   potato, baked     with skin            1 potato            2.7   red kidney beans       1/2 cup             2.6   green peas             1 cup               2.5   navy beans             1/2 cup             2.3   garbanzo beans         1/2 cup             2.3   black-eyed peas        1/2 cup             2.2   asparagus, cooked      1 cup               2.2   avocado                1 avocado           2.0   macaroni, enriched,     cooked                1 cup               2.0   green beans, cooked    1 cup               1.6   enriched rice,     cooked               1/2 cup             1.4   apricots, dried        6 apricots          1.2   dates                  10 dates            1.0   wheat germ, toasted    2 tablespoons       1.0   whole wheat bread      1 slice             0.9   raisins                1/4 cup             0.8   --------------------------------------------------------   * Many cereals and breads are fortified with extra iron.     Check the labels.      Heme foods that are very high in iron, such as beef and chicken liver, are also very high in cholesterol. Eat these foods in limited amounts.  Do I need an iron supplement?   If you get enough iron in your diet you don't need a supplement. Taking supplements you don t need may be harmful. Too much iron in the body can damage organs like the heart and liver.  If you have anemia, your healthcare provider may recommend an iron supplement. Iron pills can have side effects such as cramps, nausea, and constipation. To lessen side effects, your healthcare provider may start you on a low dosage of iron and slowly increase your dosage. Your provider may suggest taking vitamin C with the iron pills to help your body absorb the iron. Taking the iron at mealtimes can help prevent stomach upset. To help prevent constipation, make sure you drink enough fluid and have enough fiber in your diet. Do not take antacids or eat or drink any dairy products at the same time you take iron pills. Antacids and dairy products keep the body from absorbing all of the iron.   Extra iron may increase nausea during the first 3 months of pregnancy. If your blood count is normal, you may not need the extra iron during this time. If you are taking supplements and feel nauseated after taking the pills in the morning, try taking the pills at night before bedtime.   What foods affect the way the body absorbs iron?   Vitamin C  helps the body absorb nonheme iron. There is a lot of vitamin C in citrus fruits, tomatoes, and bell peppers. High-acid foods, such as tomato sauce, can also make it easier for the body to absorb iron. To help your body absorb nonheme iron, try combinations like spinach salad with mandarin oranges or a glass of grapefruit juice with your cereal. Also, eating heme iron with nonheme iron rich foods helps increase absorption. It is especially important to include foods that improve nonheme iron absorption if:  You have a condition that makes you lose iron (such as may happen with heavy menstruation).   You need more iron, as during pregnancy.   You have a condition that causes poor absorption, such as Crohn s and celiac disease.   You eat a vegetarian diet that includes only vegetarian nonheme sources of iron.  Things that can decrease iron absorption are drinking coffee, tea, and carbonated ike (even decaffeinated); eating a lot of dietary fiber; drinking milk; or taking a calcium supplement within 2 hours of eating nonheme iron-rich foods or taking iron supplements.    Published by [employer].  This content is reviewed periodically and is subject to change as new health information becomes available. The information is intended to inform and educate and is not a replacement for medical evaluation, advice, diagnosis or treatment by a healthcare professional.  Written by Dimas Lujan MD, for [employer].    2011 [employer] and/or its affiliates. All rights reserved.

## 2017-03-29 NOTE — MR AVS SNAPSHOT
After Visit Summary   3/29/2017    Leticia Morales    MRN: [id number]           Patient Information     Date Of Birth          1985        Visit Information        Provider Department      3/29/2017 8:00 AM Prem Frost MD [facility]        Today's Diagnoses     Epigastric pain    -  1    Gastroesophageal reflux disease without esophagitis        Iron deficiency anemia secondary to inadequate dietary iron intake          Care Instructions    Here is the plan from today's visit    1. Epigastric pain  - Please collect stool samples at home and bring back as directed  - H Pylori antigen stool; Future    2. Gastroesophageal reflux disease without esophagitis  - Continue taking Ranitidine  - H Pylori antigen stool; Future    3. Iron deficiency anemia secondary to inadequate dietary iron intake  - Please collect stool samples at home and bring back as directed  - We will begin an iron supplement once we get the results back   - Fecal colorectal cancer screen FITT; Future     Please call or return to clinic if your symptoms don't go away.    Follow up plan  Please make a clinic appointment for follow up with me (PREM FROST) after samples are returned  for GERD.    Thank you for coming to [facility] today.  Lab Testing:  **If you had lab testing today and your results are reassuring or normal they will be mailed to you or sent through [facility] within 7 days.   **If the lab tests need quick action we will call you with the results.  The phone number we will call with results is # [phone] (home) . If this is not the best number please call our clinic and change the number.  Medication Refills:  If you need any refills please call your pharmacy and they will contact us.   If you need to  your refill at a new pharmacy, please contact the new pharmacy directly. The new pharmacy will help you get your medications transferred faster.   Scheduling:  If you  have any concerns about today's visit or wish to schedule another appointment please call our office during normal business hours [phone] (8-5:00 M-F)  If a referral was made to a [facility] and you don't get a call from central scheduling please call [phone].  If a Mammogram was ordered for you at [facility] call [phone] to schedule or change your appointment.  If you had an XRay/CT/Ultrasound/MRI ordered the number is [phone] to schedule or change your radiology appointment.   Medical Concerns:  If you have urgent medical concerns please call [phone] at any time of the day.  If you have a medical emergency please call [phone].           Iron Deficiency Anemia  What is iron deficiency anemia?  Iron deficiency anemia is a condition in which your blood doesn t have enough red blood cells and hemoglobin. Hemoglobin is the protein in red blood cells that carries oxygen to body tissues.  How does it occur?   Iron deficiency anemia can happen if you do not have enough iron in your diet. Iron is a mineral that is important to all body cells. It is particularly important for blood cells because iron is needed to make hemoglobin. Iron is also needed to help certain chemical processes in the body.  Iron deficiency anemia can be caused by:  not having enough iron in your diet   the iron in your diet not being absorbed properly due to a problem with your digestive system   blood loss (blood loss can be obvious, for example, due to an injury or menstruation, or you can lose small amounts from a hidden source of bleeding within your body, such as an ulcer or a tumor).  Women 19 to 50 years old need 18 milligrams (mg) of iron per day. Men need 8 mg a day. Men and women over age 50 need about 8 mg a day. Women of childbearing age need twice as much iron in their diet as older women because they lose blood during menstruation. Pregnant women need extra iron for the development of  the baby, so 27 mg a day is recommended for them. Most prenatal vitamin pills contain the extra iron that a pregnant woman needs.  What are the symptoms?   Iron deficiency often causes no symptoms, but when symptoms are present, they may include:  tiredness and lack of energy   headaches   sore mouth or tongue   brittle nails   shortness of breath   pale skin, gums, and nail beds   pain in the chest.  How is it diagnosed?   Your healthcare provider will probably suspect iron deficiency anemia from your medical history and symptoms. A blood test will confirm the diagnosis. If blood loss is a possibility, your healthcare provider may check your stools for blood, or you may have special tests of your stomach and bowel.  How is it treated?   Iron supplements can be prescribed that will build up your body stores of iron. However, you may need to change your diet so that you get more iron from the food you eat. Your healthcare provider may refer you to a dietitian for advice.  If you have a bleeding problem, you will need special treatment.  Meat, fish, and poultry are excellent sources of dietary iron. It is also present in liver, eggs, green leaf vegetables, nuts, peas, beans, and whole-grain bread. A well-balanced diet contains enough iron for your daily needs.  Iron tablets may have side effects such as abdominal cramps; nausea; constipation; and dark stools. To lessen side effects, your healthcare provider will start you on a low dose of iron and slowly increase your dose to the necessary amount. He or she may suggest that you take vitamin C with the iron pills to help your body absorb the iron. Taking the iron at mealtimes can help prevent stomach and intestinal upset.  Do not take antacids and do not eat or drink any dairy products at the same time you take the iron pills. Antacids and dairy products prevent the body from absorbing iron.  Only rarely are iron shots needed.  How long will the effects last?   The  symptoms will respond quickly to treatment and improve in just a few days.  How can I take care of myself?   Follow your healthcare provider's or nutritionist's advice for treating iron deficiency anemia.   Eat a well-balanced, varied diet. Eat regularly at least 3 times each day.   See your healthcare provider if you feel tired all of the time or notice any of the other symptoms of iron deficiency anemia.  How can I help prevent iron deficiency anemia?  Eating foods rich in iron and/or taking an iron supplement will help to prevent a recurrence.  Iron in the Diet  What is iron?   Iron is a mineral that is important to all body cells. Blood cells especially need iron to make hemoglobin. The hemoglobin in blood cells brings oxygen to the body cells.  You can get iron deficiency anemia if you don t get enough iron. This means that your blood has less hemoglobin than normal. People who have iron deficiency anemia are often tired and don t have much energy.  Iron deficiency anemia may result from:  not getting enough iron from your diet (older adults with poor appetites or people on a very tight food budget are at especially high risk)   losing a lot of blood   changes during pregnancy.  How much iron do I need?   How much iron you need depends on your age and whether you are a man or a woman. The recommendations are:    GROUP                          MILLIGRAMS (mg) IRON PER DAY   ------------------------------------------------------   Children 7 to 12 months old                      11 mg   Children 1 to 3 years old                         7 mg   Children 4 to 8 years old                        10 mg   Children 9 to 13 years old                        8 mg   Females 14 to 18 years old                       15 mg   Males 14 to 18 years old                         11 mg   Males over 18 years old                           8 mg   Females 19 to 50 years old                       18 mg   Females over 50 years old                          8 mg   Pregnant females                                 27 mg   Breast-feeding females 14 to 18 years old        10 mg   Breast-feeding females 19 to 50 years old         9 mg   ------------------------------------------------------      What foods are good sources of iron?   The best way to get enough iron is to eat a healthy, well-balanced diet. Iron is found in a variety of foods. There are 2 types of iron: heme and nonheme. Heme iron is found in meat, poultry, and fish. Nonheme iron is found in fruits, vegetables, grains, nuts, legumes, eggs, dairy products, and iron-enriched foods. The body absorbs heme iron better than nonheme iron.    FOOD               SERVING SIZE   MG IRON (APPROXIMATE)   -------------------------------------------------------   Sources of heme iron   liver, chicken         3 oz                7.2   liver, beef            3 oz                5.8   beef                   3 oz                3.0   shrimp                 3 oz                2.8   turkey, dark           3 oz                2.0   ground beef            3 oz                1.8   lamb                   3 oz                1.5   chicken, dark          3 oz                1.3   chicken, white         3 oz                1.1   turkey, white          3 oz                1.1   fish                   3 oz                1.1   pork, shoulder         3 oz                1.0   pork, loin             3 oz                0.8   tuna, white,     water packed         3 oz                0.8   Sources of nonheme iron   fortified breakfast     cereals*             1 cup       4.5 to 18   soy beans, cooked      1/2 cup             4.7   pumpkin seeds          1 oz                4.2   molasses,     blackstrap           1 tablespoon        3.5   lentils                1/2 cup             3.3   spinach, cooked        1/2 cup             3.2   bagel                  1 bagel             3.2   tofu, extra firm       3 oz                 2.7   prune juice            8 oz                2.7   potato, baked     with skin            1 potato            2.7   red kidney beans       1/2 cup             2.6   green peas             1 cup               2.5   navy beans             1/2 cup             2.3   garbanzo beans         1/2 cup             2.3   black-eyed peas        1/2 cup             2.2   asparagus, cooked      1 cup               2.2   avocado                1 avocado           2.0   macaroni, enriched,     cooked               1 cup               2.0   green beans, cooked    1 cup               1.6   enriched rice,     cooked               1/2 cup             1.4   apricots, dried        6 apricots          1.2   dates                  10 dates            1.0   wheat germ, toasted    2 tablespoons       1.0   whole wheat bread      1 slice             0.9   raisins                1/4 cup             0.8   --------------------------------------------------------   * Many cereals and breads are fortified with extra iron.     Check the labels.      Heme foods that are very high in iron, such as beef and chicken liver, are also very high in cholesterol. Eat these foods in limited amounts.  Do I need an iron supplement?   If you get enough iron in your diet you don't need a supplement. Taking supplements you don t need may be harmful. Too much iron in the body can damage organs like the heart and liver.  If you have anemia, your healthcare provider may recommend an iron supplement. Iron pills can have side effects such as cramps, nausea, and constipation. To lessen side effects, your healthcare provider may start you on a low dosage of iron and slowly increase your dosage. Your provider may suggest taking vitamin C with the iron pills to help your body absorb the iron. Taking the iron at mealtimes can help prevent stomach upset. To help prevent constipation, make sure you drink enough fluid and have enough fiber in your diet. Do not take  antacids or eat or drink any dairy products at the same time you take iron pills. Antacids and dairy products keep the body from absorbing all of the iron.   Extra iron may increase nausea during the first 3 months of pregnancy. If your blood count is normal, you may not need the extra iron during this time. If you are taking supplements and feel nauseated after taking the pills in the morning, try taking the pills at night before bedtime.   What foods affect the way the body absorbs iron?   Vitamin C helps the body absorb nonheme iron. There is a lot of vitamin C in citrus fruits, tomatoes, and bell peppers. High-acid foods, such as tomato sauce, can also make it easier for the body to absorb iron. To help your body absorb nonheme iron, try combinations like spinach salad with mandarin oranges or a glass of grapefruit juice with your cereal. Also, eating heme iron with nonheme iron rich foods helps increase absorption. It is especially important to include foods that improve nonheme iron absorption if:  You have a condition that makes you lose iron (such as may happen with heavy menstruation).   You need more iron, as during pregnancy.   You have a condition that causes poor absorption, such as Crohn s and celiac disease.   You eat a vegetarian diet that includes only vegetarian nonheme sources of iron.  Things that can decrease iron absorption are drinking coffee, tea, and carbonated ike (even decaffeinated); eating a lot of dietary fiber; drinking milk; or taking a calcium supplement within 2 hours of eating nonheme iron-rich foods or taking iron supplements.    Published by [employer].  This content is reviewed periodically and is subject to change as new health information becomes available. The information is intended to inform and educate and is not a replacement for medical evaluation, advice, diagnosis or treatment by a healthcare professional.  Written by Dimas Lujan MD, for [employer].    2011  [facility] and/or its affiliates. All rights reserved.                     Follow-ups after your visit        Future tests that were ordered for you today     Open Future Orders        Priority Expected Expires Ordered    H Pylori antigen stool Routine  4/28/2017 3/29/2017    Fecal colorectal cancer screen FITT Routine 4/19/2017 6/21/2017 3/29/2017            Who to contact     Please call your clinic at [phone] to:    Ask questions about your health    Make or cancel appointments    Discuss your medicines    Learn about your test results    Speak to your doctor   If you have compliments or concerns about an experience at your clinic, or if you wish to file a complaint, please contact [facility] Patient Relations at [phone] or email us at [email or web address]         Additional Information About Your Visit        [employer] Healthcare Information     [employer] gives you secure access to your electronic health record. If you see a primary care provider, you can also send messages to your care team and make appointments. If you have questions, please call your primary care clinic.  If you do not have a primary care provider, please call [phone] and they will assist you.      [employer] is an electronic gateway that provides easy, online access to your medical records. With [employer] you can request a clinic appointment, read your test results, renew a prescription or communicate with your care team.     To access your existing account, please contact your [facility] or call [phone] for assistance.        Care EveryWhere ID     This is your Care EveryWhere ID. This could be used by other organizations to access your [facility] medical records  [id number]        Your Vitals Were     Pulse Temperature Respirations Last Period Pulse Oximetry BMI (Body Mass Index)    78 98.1  F (36.7  C) (Oral) 16 03/19/2017 100% 21.8 kg/m2       Blood Pressure from Last  3 Encounters:   03/29/17 100/68   03/19/17 112/74   03/03/17 107/73    Weight from Last 3 Encounters:   03/29/17 127 lb (57.6 kg)   03/19/17 124 lb (56.2 kg)   03/03/17 129 lb 9.6 oz (58.8 kg)                 Today's Medication Changes          These changes are accurate as of: 3/29/17  8:38 AM.  If you have any questions, ask your nurse or doctor.               Stop taking these medicines if you haven't already. Please contact your care team if you have questions.     pantoprazole 40 MG EC tablet   Commonly known as:  PROTONIX   Stopped by:  Henry Frost MD                    Primary Care Provider Office Phone # Fax #    [facility] [phone]       [address] [facility] [address]        Thank you!     Thank you for choosing [facility]  for your care. Our goal is always to provide you with excellent care. Hearing back from our patients is one way we can continue to improve our services. Please take a few minutes to complete the written survey that you may receive in the mail after your visit with us. Thank you!             Your Updated Medication List - Protect others around you: Learn how to safely use, store and throw away your medicines at www.disposemymeds.org.          This list is accurate as of: 3/29/17  8:38 AM.  Always use your most recent med list.                   Brand Name Dispense Instructions for use    EXCEDRIN MIGRAINE PO          order for DME     1 Device    Equipment being ordered: humidifier       ranitidine 150 MG tablet    ZANTAC    60 tablet    Take 1 tablet (150 mg) by mouth 2 times daily       SUMAtriptan 25 MG tablet    IMITREX    30 tablet    Take 1-2 tablets (25-50 mg) by mouth at onset of headache for migraine May repeat dose in 2 hours.  Do not exceed 200 mg in 24 hours

## 2017-03-29 NOTE — PROGRESS NOTES
HPI:       Leticia Morales is a 31 year old who presents for the following  Patient presents with:  Gastrophageal Reflux: ongoing      GERD/Heartburn     Onset: 03/3/16    Description:     Burning in chest:Yes Details: at the start but not anymore     Intensity: moderate    Progression of Symptoms: worsening- nausea    Accompanying Signs & Symptoms:  Does it feel like food gets stuck:no  Nausea: Yes Details: increasing  Vomiting (bloody?): Yes Details: some days  Abdominal Pain: Yes Details: twisting and sharp pain  Black-Tarry stools:Yes- greenish dark  Bloody stools: no   History:   Previous ulcers: {no    Precipitating factors:   Caffeine use: no  Alcohol use: no  NSAID/Aspirin use: no  Tobacco use: no  Worse with unsure.    Alleviating factors:  Nothing  Scared to eat food has been using boost supplement  Feeling fatigued       Therapies Tried and outcome: feels Zantac is better than Protonix    Patient was seen in the ED recently for syncope due to lack of eating. Says that she's not having pain it's more of the nausea and twisting discomfort. Patient says that she's not eating solid food at this time. Discussed anemia. Patient says that she has a bowel movement daily. Says that the medication that she received from the ED gave watery stools, she stopped taking it and the stools became more formed.        Problem, Medication and Allergy Lists were reviewed and are current.  Patient is an established patient of this clinic.         Review of Systems:   Review of Systems          Physical Exam:   Patient Vitals for the past 24 hrs:   BP Temp Temp src Pulse Resp SpO2 Weight   03/29/17 0815 100/68 98.1  F (36.7  C) Oral 78 16 100 % 127 lb (57.6 kg)     Body mass index is 21.8 kg/(m^2).  Vitals were reviewed and were normal     Physical Exam   Constitutional: She appears well-developed and well-nourished.   HENT:   Head: Normocephalic and atraumatic.   Eyes: Conjunctivae are normal. No scleral icterus.    Neck: Neck supple.   Cardiovascular: Normal rate, regular rhythm and normal heart sounds.    No murmur heard.  Pulmonary/Chest: Effort normal and breath sounds normal.   Abdominal: Soft. Normal appearance and bowel sounds are normal. She exhibits no distension and no mass. There is no hepatosplenomegaly. There is no tenderness. There is no rigidity, no rebound, no guarding and no CVA tenderness.   Lymphadenopathy:     She has no cervical adenopathy.   Skin: Skin is warm and dry. No rash noted.   Psychiatric: She has a normal mood and affect.   Nursing note and vitals reviewed.        Results:     Pending    Assessment and Plan     1. Epigastric pain  2. Gastroesophageal reflux disease without esophagitis  If positive for H pylori, treat as appropriate.  In the mean time, try to eat real food and discontinue supplements.  I am concerned some of patient's symptoms are psychiatric in nature and based on fear of nausea and pain with eating.  - H Pylori antigen stool; Future    3. Iron deficiency anemia secondary to inadequate dietary iron intake  Suspect due to poor nutritional intake, but reported a dark stool, so will check for blood.  - Fecal colorectal cancer screen FITT; Future    25 min spent in direct face to face time with this patient, greater than 50% in counseling regarding above.    Options for treatment and follow-up care were reviewed with the patient. Leticia Morales  engaged in the decision making process and verbalized understanding of the options discussed and agreed with the final plan.    Henry Frost MD

## 2017-04-03 ENCOUNTER — APPOINTMENT (OUTPATIENT)
Dept: LAB | Facility: CLINIC | Age: 32
End: 2017-04-03
Attending: PATHOLOGY
Payer: COMMERCIAL

## 2017-04-03 DIAGNOSIS — R10.13 EPIGASTRIC PAIN: ICD-10-CM

## 2017-04-03 DIAGNOSIS — D50.8 IRON DEFICIENCY ANEMIA SECONDARY TO INADEQUATE DIETARY IRON INTAKE: ICD-10-CM

## 2017-04-03 DIAGNOSIS — K21.9 GASTROESOPHAGEAL REFLUX DISEASE WITHOUT ESOPHAGITIS: ICD-10-CM

## 2017-04-04 LAB
H PYLORI AG STL QL IA: NORMAL
HEMOCCULT STL QL IA: NEGATIVE
MICRO REPORT STATUS: NORMAL
SPECIMEN SOURCE: NORMAL

## 2017-04-09 ENCOUNTER — TELEPHONE (OUTPATIENT)
Dept: FAMILY MEDICINE | Facility: CLINIC | Age: 32
End: 2017-04-09

## 2017-04-09 DIAGNOSIS — G43.109 MIGRAINE WITH AURA AND WITHOUT STATUS MIGRAINOSUS, NOT INTRACTABLE: Primary | ICD-10-CM

## 2017-04-09 RX ORDER — RIZATRIPTAN BENZOATE 5 MG/1
5-10 TABLET ORAL
Qty: 18 TABLET | Refills: 1 | Status: SHIPPED | OUTPATIENT
Start: 2017-04-09 | End: 2017-07-03

## 2017-04-10 NOTE — TELEPHONE ENCOUNTER
Patient called after hours pager regarding migraine headache that she has had since Thursday. She states she has run out of her sumatriptan and her insurance will not refill it sooner. She says she has history of migraines with aura and that it usually responds well to sumatriptan. She would like a request of an alternative medication to be filled for her tonight. She states she has taken naproxen without relief.     - Maxalt sent to pharmacy. Discussed if that does not work, patient would need to call clinic in the morning for another alternative.     ,Kendrick De La Cruz MD G3  [facility]  Family Medicine Resident  Pager [phone]

## 2017-06-01 DIAGNOSIS — Z31.69 ENCOUNTER FOR PRECONCEPTION CONSULTATION: ICD-10-CM

## 2017-06-01 RX ORDER — FOLIC ACID 1 MG/1
1 TABLET ORAL DAILY
Qty: 100 TABLET | Refills: 3 | Status: SHIPPED | OUTPATIENT
Start: 2017-06-01 | End: 2019-08-23

## 2017-06-01 NOTE — TELEPHONE ENCOUNTER
Request for medication refill:    Date of last visit at clinic: 3/29/17    Please complete refill if appropriate and CLOSE ENCOUNTER.    Closing the encounter signifies the refill is complete.    If refill has been denied, please complete the smart phrase .smirefuse and route it to the [facility] RN TRIAGE pool to inform the patient and the pharmacy.    Jade Noel

## 2017-07-03 DIAGNOSIS — G43.109 MIGRAINE WITH AURA AND WITHOUT STATUS MIGRAINOSUS, NOT INTRACTABLE: ICD-10-CM

## 2017-07-03 NOTE — TELEPHONE ENCOUNTER
Request for medication refill:    Date of last visit at clinic: 3/29/17    Please complete refill if appropriate and CLOSE ENCOUNTER.    Closing the encounter signifies the refill is complete.    If refill has been denied, please complete the smart phrase .smirefuse and route it to the [facility] RN TRIAGE pool to inform the patient and the pharmacy.    Yuki Haq, CMA

## 2017-07-05 RX ORDER — RIZATRIPTAN BENZOATE 5 MG/1
5-10 TABLET ORAL
Qty: 18 TABLET | Refills: 1 | Status: SHIPPED | OUTPATIENT
Start: 2017-07-05 | End: 2019-05-08

## 2017-10-22 ENCOUNTER — HEALTH MAINTENANCE LETTER (OUTPATIENT)
Age: 32
End: 2017-10-22

## 2017-10-29 ENCOUNTER — APPOINTMENT (OUTPATIENT)
Dept: GENERAL RADIOLOGY | Facility: CLINIC | Age: 32
End: 2017-10-29
Attending: EMERGENCY MEDICINE
Payer: COMMERCIAL

## 2017-10-29 ENCOUNTER — HOSPITAL ENCOUNTER (EMERGENCY)
Facility: CLINIC | Age: 32
Discharge: HOME | End: 2017-10-29
Attending: EMERGENCY MEDICINE | Admitting: EMERGENCY MEDICINE
Payer: COMMERCIAL

## 2017-10-29 VITALS
SYSTOLIC BLOOD PRESSURE: 114 MMHG | OXYGEN SATURATION: 100 % | WEIGHT: 133.2 LBS | TEMPERATURE: 98.6 F | RESPIRATION RATE: 18 BRPM | BODY MASS INDEX: 22.86 KG/M2 | DIASTOLIC BLOOD PRESSURE: 65 MMHG

## 2017-10-29 DIAGNOSIS — R09.81 NASAL SINUS CONGESTION: ICD-10-CM

## 2017-10-29 DIAGNOSIS — J20.9 ACUTE BRONCHITIS, UNSPECIFIED ORGANISM: ICD-10-CM

## 2017-10-29 DIAGNOSIS — R05.9 COUGH: ICD-10-CM

## 2017-10-29 LAB
ALBUMIN SERPL-MCNC: 3.2 G/DL (ref 3.4–5)
ALBUMIN UR-MCNC: 10 MG/DL
ALP SERPL-CCNC: 60 U/L (ref 40–150)
ALT SERPL W P-5'-P-CCNC: 11 U/L (ref 0–50)
ANION GAP SERPL CALCULATED.3IONS-SCNC: 9 MMOL/L (ref 3–14)
APPEARANCE UR: CLEAR
AST SERPL W P-5'-P-CCNC: 9 U/L (ref 0–45)
BASOPHILS # BLD AUTO: 0 10E9/L (ref 0–0.2)
BASOPHILS NFR BLD AUTO: 0.4 %
BILIRUB SERPL-MCNC: 0.2 MG/DL (ref 0.2–1.3)
BILIRUB UR QL STRIP: NEGATIVE
BUN SERPL-MCNC: 7 MG/DL (ref 7–30)
CALCIUM SERPL-MCNC: 8.4 MG/DL (ref 8.5–10.1)
CHLORIDE SERPL-SCNC: 110 MMOL/L (ref 94–109)
CO2 SERPL-SCNC: 23 MMOL/L (ref 20–32)
COLOR UR AUTO: YELLOW
CREAT SERPL-MCNC: 0.65 MG/DL (ref 0.52–1.04)
D DIMER PPP FEU-MCNC: 0.3 UG/ML FEU (ref 0–0.5)
DIFFERENTIAL METHOD BLD: ABNORMAL
EOSINOPHIL # BLD AUTO: 0.3 10E9/L (ref 0–0.7)
EOSINOPHIL NFR BLD AUTO: 3.3 %
ERYTHROCYTE [DISTWIDTH] IN BLOOD BY AUTOMATED COUNT: 18.6 % (ref 10–15)
GFR SERPL CREATININE-BSD FRML MDRD: >90 ML/MIN/1.7M2
GLUCOSE SERPL-MCNC: 82 MG/DL (ref 70–99)
GLUCOSE UR STRIP-MCNC: NEGATIVE MG/DL
HCG UR QL: NEGATIVE
HCT VFR BLD AUTO: 31.4 % (ref 35–47)
HGB BLD-MCNC: 9.2 G/DL (ref 11.7–15.7)
HGB UR QL STRIP: NEGATIVE
IMM GRANULOCYTES # BLD: 0 10E9/L (ref 0–0.4)
IMM GRANULOCYTES NFR BLD: 0.3 %
INR PPP: 1.08 (ref 0.86–1.14)
INTERNAL QC OK POCT: YES
KETONES UR STRIP-MCNC: NEGATIVE MG/DL
LEUKOCYTE ESTERASE UR QL STRIP: NEGATIVE
LYMPHOCYTES # BLD AUTO: 1.9 10E9/L (ref 0.8–5.3)
LYMPHOCYTES NFR BLD AUTO: 24.6 %
MCH RBC QN AUTO: 19.7 PG (ref 26.5–33)
MCHC RBC AUTO-ENTMCNC: 29.3 G/DL (ref 31.5–36.5)
MCV RBC AUTO: 67 FL (ref 78–100)
MONOCYTES # BLD AUTO: 0.9 10E9/L (ref 0–1.3)
MONOCYTES NFR BLD AUTO: 11.8 %
MUCOUS THREADS #/AREA URNS LPF: PRESENT /LPF
NEUTROPHILS # BLD AUTO: 4.7 10E9/L (ref 1.6–8.3)
NEUTROPHILS NFR BLD AUTO: 59.6 %
NITRATE UR QL: NEGATIVE
NRBC # BLD AUTO: 0 10*3/UL
NRBC BLD AUTO-RTO: 0 /100
PH UR STRIP: 6 PH (ref 5–7)
PLATELET # BLD AUTO: 411 10E9/L (ref 150–450)
POTASSIUM SERPL-SCNC: 3.4 MMOL/L (ref 3.4–5.3)
PROCALCITONIN SERPL-MCNC: <0.05 NG/ML
PROT SERPL-MCNC: 7.3 G/DL (ref 6.8–8.8)
RBC # BLD AUTO: 4.66 10E12/L (ref 3.8–5.2)
RBC #/AREA URNS AUTO: 2 /HPF (ref 0–2)
SODIUM SERPL-SCNC: 142 MMOL/L (ref 133–144)
SOURCE: ABNORMAL
SP GR UR STRIP: 1.02 (ref 1–1.03)
SQUAMOUS #/AREA URNS AUTO: <1 /HPF (ref 0–1)
UROBILINOGEN UR STRIP-MCNC: NORMAL MG/DL (ref 0–2)
WBC # BLD AUTO: 7.9 10E9/L (ref 4–11)
WBC #/AREA URNS AUTO: <1 /HPF (ref 0–2)

## 2017-10-29 PROCEDURE — 85379 FIBRIN DEGRADATION QUANT: CPT | Performed by: EMERGENCY MEDICINE

## 2017-10-29 PROCEDURE — 25000128 H RX IP 250 OP 636: Performed by: EMERGENCY MEDICINE

## 2017-10-29 PROCEDURE — 81001 URINALYSIS AUTO W/SCOPE: CPT | Performed by: EMERGENCY MEDICINE

## 2017-10-29 PROCEDURE — 96360 HYDRATION IV INFUSION INIT: CPT | Performed by: EMERGENCY MEDICINE

## 2017-10-29 PROCEDURE — 94640 AIRWAY INHALATION TREATMENT: CPT | Performed by: EMERGENCY MEDICINE

## 2017-10-29 PROCEDURE — 96361 HYDRATE IV INFUSION ADD-ON: CPT | Performed by: EMERGENCY MEDICINE

## 2017-10-29 PROCEDURE — 25000125 ZZHC RX 250: Performed by: EMERGENCY MEDICINE

## 2017-10-29 PROCEDURE — 81025 URINE PREGNANCY TEST: CPT | Performed by: EMERGENCY MEDICINE

## 2017-10-29 PROCEDURE — 99284 EMERGENCY DEPT VISIT MOD MDM: CPT | Mod: 25 | Performed by: EMERGENCY MEDICINE

## 2017-10-29 PROCEDURE — 84145 PROCALCITONIN (PCT): CPT | Performed by: EMERGENCY MEDICINE

## 2017-10-29 PROCEDURE — 85610 PROTHROMBIN TIME: CPT | Performed by: EMERGENCY MEDICINE

## 2017-10-29 PROCEDURE — 99283 EMERGENCY DEPT VISIT LOW MDM: CPT | Mod: Z6 | Performed by: EMERGENCY MEDICINE

## 2017-10-29 PROCEDURE — 71020 XR CHEST 2 VW: CPT

## 2017-10-29 PROCEDURE — 80053 COMPREHEN METABOLIC PANEL: CPT | Performed by: EMERGENCY MEDICINE

## 2017-10-29 PROCEDURE — 85025 COMPLETE CBC W/AUTO DIFF WBC: CPT | Performed by: EMERGENCY MEDICINE

## 2017-10-29 PROCEDURE — 87040 BLOOD CULTURE FOR BACTERIA: CPT | Performed by: EMERGENCY MEDICINE

## 2017-10-29 PROCEDURE — 36415 COLL VENOUS BLD VENIPUNCTURE: CPT | Performed by: EMERGENCY MEDICINE

## 2017-10-29 RX ORDER — CODEINE PHOSPHATE AND GUAIFENESIN 10; 100 MG/5ML; MG/5ML
1 SOLUTION ORAL EVERY 4 HOURS PRN
Qty: 120 ML | Refills: 0 | Status: SHIPPED | OUTPATIENT
Start: 2017-10-29 | End: 2019-05-08

## 2017-10-29 RX ORDER — DOXYCYCLINE HYCLATE 100 MG
100 TABLET ORAL 2 TIMES DAILY
Qty: 14 TABLET | Refills: 0 | Status: SHIPPED | OUTPATIENT
Start: 2017-10-29 | End: 2017-11-05

## 2017-10-29 RX ORDER — ALBUTEROL SULFATE 0.83 MG/ML
2.5 SOLUTION RESPIRATORY (INHALATION)
Status: DISCONTINUED | OUTPATIENT
Start: 2017-10-29 | End: 2017-10-29 | Stop reason: HOSPADM

## 2017-10-29 RX ORDER — BENZONATATE 100 MG/1
100 CAPSULE ORAL 3 TIMES DAILY PRN
Qty: 42 CAPSULE | Refills: 0 | Status: SHIPPED | OUTPATIENT
Start: 2017-10-29 | End: 2019-08-23

## 2017-10-29 RX ORDER — SODIUM CHLORIDE 9 MG/ML
1000 INJECTION, SOLUTION INTRAVENOUS CONTINUOUS
Status: DISCONTINUED | OUTPATIENT
Start: 2017-10-29 | End: 2017-10-29 | Stop reason: HOSPADM

## 2017-10-29 RX ADMIN — SODIUM CHLORIDE 1000 ML: 9 INJECTION, SOLUTION INTRAVENOUS at 19:24

## 2017-10-29 RX ADMIN — ALBUTEROL SULFATE 2.5 MG: 2.5 SOLUTION RESPIRATORY (INHALATION) at 18:31

## 2017-10-29 RX ADMIN — SODIUM CHLORIDE 1000 ML: 9 INJECTION, SOLUTION INTRAVENOUS at 18:30

## 2017-10-29 ASSESSMENT — ENCOUNTER SYMPTOMS
SORE THROAT: 1
COUGH: 1
FATIGUE: 1
SHORTNESS OF BREATH: 1
FEVER: 1
RHINORRHEA: 1
MYALGIAS: 1

## 2017-10-29 NOTE — ED AVS SNAPSHOT
[facility], Emergency Department    [address]    [facility] [place] [address]    Phone:  [phone]    Fax:  [phone]                                       Leticia Morales   MRN: [id number]    Department:  [facility], Emergency Department   Date of Visit:  10/29/2017           Patient Information     Date Of Birth          1985        Your diagnoses for this visit were:     Acute bronchitis, unspecified organism        You were seen by Santa Mott MD.        Discharge Instructions         Take antibiotics and cough medications as prescribed.   Your chest xray and blood tests are all normal.   Please make an appointment to follow up with Your Primary Care Provider in 2-4 days even if entirely better.      Bronchitis, Antibiotic Treatment (Adult)    Bronchitis is an infection of the air passages (bronchial tubes) in your lungs. It often occurs when you have a cold. This illness is contagious during the first few days and is spread through the air by coughing and sneezing, or by direct contact (touching the sick person and then touching your own eyes, nose, or mouth).  Symptoms of bronchitis include cough with mucus (phlegm) and low-grade fever. Bronchitis usually lasts 7 to 14 days. Mild cases can be treated with simple home remedies. More severe infection is treated with an antibiotic.  Home care  Follow these guidelines when caring for yourself at home:    If your symptoms are severe, rest at home for the first 2 to 3 days. When you go back to your usual activities, don't let yourself get too tired.    Do not smoke. Also avoid being exposed to secondhand smoke.    You may use over-the-counter medicines to control fever or pain, unless another medicine was prescribed. (Note: If you have chronic liver or kidney disease or have ever had a stomach ulcer or gastrointestinal bleeding, talk with your healthcare provider before using these medicines. Also talk to your provider if you are  taking medicine to prevent blood clots.) Aspirin should never be given to anyone younger than 18 years of age who is ill with a viral infection or fever. It may cause severe liver or brain damage.    Your appetite may be poor, so a light diet is fine. Avoid dehydration by drinking 6 to 8 glasses of fluids per day (such as water, soft drinks, sports drinks, juices, tea, or soup). Extra fluids will help loosen secretions in the nose and lungs.    Over-the-counter cough, cold, and sore-throat medicines will not shorten the length of the illness, but they may be helpful to reduce symptoms. (Note: Do not use decongestants if you have high blood pressure.)    Finish all antibiotic medicine. Do this even if you are feeling better after only a few days.  Follow-up care  Follow up with your healthcare provider, or as advised. If you had an X-ray or ECG (electrocardiogram), a specialist will review it. You will be notified of any new findings that may affect your care.  Note: If you are age 65 or older, or if you have a chronic lung disease or condition that affects your immune system, or you smoke, talk to your healthcare provider about having pneumococcal vaccinations and a yearly influenza vaccination (flu shot).  When to seek medical advice  Call your healthcare provider right away if any of these occur:    Fever of 100.4 F (38 C) or higher    Coughing up increased amounts of colored sputum    Weakness, drowsiness, headache, facial pain, ear pain, or a stiff neck  Call 911, or get immediate medical care  Contact emergency services right away if any of these occur.    Coughing up blood    Worsening weakness, drowsiness, headache, or stiff neck    Trouble breathing, wheezing, or pain with breathing  Date Last Reviewed: 9/13/2015 2000-2017 [employer]. [address] [place] [address], [place] [address]. All rights reserved. This information is not intended as a substitute for professional medical care. Always follow  your healthcare professional's instructions.          24 Hour Appointment Hotline       To make an appointment at any [facility], call [phone] ([phone]). If you don't have a family doctor or clinic, we will help you find one. [place] clinics are conveniently located to serve the needs of you and your family.             Review of your medicines      START taking        Dose / Directions Last dose taken    benzonatate 100 MG capsule   Commonly known as:  TESSALON   Dose:  100 mg   Quantity:  42 capsule        Take 1 capsule (100 mg) by mouth 3 times daily as needed for cough   Refills:  0        doxycycline 100 MG tablet   Commonly known as:  VIBRA-TABS   Dose:  100 mg   Quantity:  14 tablet        Take 1 tablet (100 mg) by mouth 2 times daily for 7 days   Refills:  0        guaiFENesin-codeine 100-10 MG/5ML Soln solution   Commonly known as:  ROBITUSSIN AC   Dose:  1 tsp.   Quantity:  120 mL        Take 5 mLs by mouth every 4 hours as needed for cough   Refills:  0          Our records show that you are taking the medicines listed below. If these are incorrect, please call your family doctor or clinic.        Dose / Directions Last dose taken    EXCEDRIN MIGRAINE PO        Refills:  0        folic acid 1 MG tablet   Commonly known as:  FOLVITE   Dose:  1 mg   Quantity:  100 tablet        Take 1 tablet (1 mg) by mouth daily   Refills:  3        IBUPROFEN PO   Dose:  800 mg        Take 800 mg by mouth   Refills:  0        order for DME   Quantity:  1 Device        Equipment being ordered: humidifier   Refills:  0        ranitidine 150 MG tablet   Commonly known as:  ZANTAC   Dose:  150 mg   Quantity:  60 tablet        Take 1 tablet (150 mg) by mouth 2 times daily   Refills:  5        rizatriptan 5 MG tablet   Commonly known as:  MAXALT   Dose:  5-10 mg   Quantity:  18 tablet        Take 1-2 tablets (5-10 mg) by mouth at onset of headache for migraine May repeat in 2 hours. Max 6 tablets/24 hours.    Refills:  1        SUMAtriptan 25 MG tablet   Commonly known as:  IMITREX   Dose:  25-50 mg   Quantity:  30 tablet        Take 1-2 tablets (25-50 mg) by mouth at onset of headache for migraine May repeat dose in 2 hours.  Do not exceed 200 mg in 24 hours   Refills:  3                Prescriptions were sent or printed at these locations (3 Prescriptions)                   Other Prescriptions                Printed at Department/Unit printer (3 of 3)         doxycycline (VIBRA-TABS) 100 MG tablet               guaiFENesin-codeine (ROBITUSSIN AC) 100-10 MG/5ML SOLN solution               benzonatate (TESSALON) 100 MG capsule                Procedures and tests performed during your visit     Blood culture    CBC with platelets differential    Comprehensive metabolic panel    D dimer quantitative    INR    Procalcitonin    UA with Microscopic    XR Chest 2 Views    hCG qual urine POCT      Orders Needing Specimen Collection     None      Pending Results     Date and Time Order Name Status Description    10/29/2017 1803 Blood culture Preliminary     10/29/2017 1803 XR Chest 2 Views Preliminary             Pending Culture Results     Date and Time Order Name Status Description    10/29/2017 1803 Blood culture Preliminary             Pending Results Instructions     If you had any lab results that were not finalized at the time of your Discharge, you can call the ED Lab Result RN at [phone]. You will be contacted by this team for any positive Lab results or changes in treatment. The nurses are available 7 days a week from 10A to 6:30P.  You can leave a message 24 hours per day and they will return your call.        Thank you for choosing [facility]       Thank you for choosing [facility] for your care. Our goal is always to provide you with excellent care. Hearing back from our patients is one way we can continue to improve our services. Please take a few minutes to complete the written survey that you may receive in  the mail after you visit with us. Thank you!        [employer] Information     [employer] gives you secure access to your electronic health record. If you see a primary care provider, you can also send messages to your care team and make appointments. If you have questions, please call your primary care clinic.  If you do not have a primary care provider, please call [phone] and they will assist you.        Care EveryWhere ID     This is your Care EveryWhere ID. This could be used by other organizations to access your [place] medical records  [id number]        Equal Access to Services     MARY ALICE OLVERA : Zaki Montesinos, bernard ling, brittney zavala, deven linton. So [facility] [phone].    ATENCIÓN: Si habla español, tiene a fregoso disposición servicios gratuitos de asistencia lingüística. Jessieame al [phone].    We comply with applicable federal civil rights laws and [place] laws. We do not discriminate on the basis of race, color, national origin, age, disability, sex, sexual orientation, or gender identity.            After Visit Summary       This is your record. Keep this with you and show to your community pharmacist(s) and doctor(s) at your next visit.

## 2017-10-29 NOTE — ED AVS SNAPSHOT
[facility], Emergency Department    [address]    [facility] [address]    Phone:  [phone]    Fax:  [phone]                                       Leticia Morales   MRN: [id number]    Department:  [facility], Emergency Department   Date of Visit:  10/29/2017           After Visit Summary Signature Page     I have received my discharge instructions, and my questions have been answered. I have discussed any challenges I see with this plan with the nurse or doctor.    ..........................................................................................................................................  Patient/Patient Representative Signature      ..........................................................................................................................................  Patient Representative Print Name and Relationship to Patient    ..................................................               ................................................  Date                                            Time    ..........................................................................................................................................  Reviewed by Signature/Title    ...................................................              ..............................................  Date                                                            Time

## 2017-10-29 NOTE — ED PROVIDER NOTES
History     Chief Complaint   Patient presents with     Chest Wall Pain     cough x 2 weeks, phlegm dark brown in color, per pt she has noted streaks of blood noted on phlegm.      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 The patient reports two weeks of URI type symptoms including body aches, fatigue, subjective fever, shortness of breath with exertion, rhinorrhea, sore throat and congestion. However, she states over the past four days her cough has worsened and is now associated with chest pain that radiates into her back. In addition, she states her cough has been productive and she has noticed streaks of blood in her brown sputum for the past four days. She has not had the flu vaccine this year. She states she does not smoke tobacco, but she is exposed to tobacco and hookah smoke at home.    Past Medical History:   Diagnosis Date     ARDS (adult respiratory distress syndrome) (H) 2010    related to H1N1 infection     H1N1 influenza 2010    prolonged ICU stay, medically induced coma     Iron deficiency anemia      Migraines        Past Surgical History:   Procedure Laterality Date     THORACIC SURGERY      trach       Family History   Problem Relation Age of Onset     DIABETES Father      Hypertension Father      CANCER Father        Social History   Substance Use Topics     Smoking status: Light Tobacco Smoker     Packs/day: 0.03     Types: Cigarettes     Smokeless tobacco: Never Used     Alcohol use No       Current Facility-Administered Medications   Medication     0.9% sodium chloride BOLUS    Followed by     0.9% sodium chloride infusion     albuterol neb solution 2.5 mg     Current Outpatient Prescriptions   Medication     IBUPROFEN PO     SUMAtriptan (IMITREX) 25 MG tablet      rizatriptan (MAXALT) 5 MG tablet     folic acid (FOLVITE) 1 MG tablet     ranitidine (ZANTAC) 150 MG tablet     Aspirin-Acetaminophen-Caffeine (EXCEDRIN MIGRAINE PO)     order for DME        Allergies   Allergen Reactions     Macrodantin [Nitrofurantoin Macrocrystal] Other (See Comments)     itching         I have reviewed the Medications, Allergies, Past Medical and Surgical History, and Social History in the Epic system.    Review of Systems   Constitutional: Positive for fatigue and fever (subjective).   HENT: Positive for congestion, rhinorrhea and sore throat.    Respiratory: Positive for cough (productive with streaks of blood) and shortness of breath (on exertion).    Cardiovascular: Positive for chest pain (associated with cough).   Musculoskeletal: Positive for myalgias.       Physical Exam   BP: 117/58  Heart Rate: 104  Temp: 98.6  F (37  C)  Resp: 16  Weight: 60.4 kg (133 lb 3.2 oz)  SpO2: 98 %      Physical Exam   Constitutional: She is oriented to person, place, and time. She appears well-developed and well-nourished.   HENT:   Head: Normocephalic and atraumatic.   Neck: Normal range of motion.   Cardiovascular: Normal rate, regular rhythm and normal heart sounds.    Pulmonary/Chest: Effort normal and breath sounds normal. No respiratory distress. She has no wheezes. She has no rales.   Dry cough   Abdominal: Soft. She exhibits no distension. There is no tenderness. There is no rebound.   Musculoskeletal: She exhibits no tenderness.   Neurological: She is alert and oriented to person, place, and time.   Skin: Skin is warm and dry.   Psychiatric: She has a normal mood and affect. Her behavior is normal. Thought content normal.       ED Course     ED Course     Procedures             Critical Care time:  none         Results for orders placed or performed during the hospital encounter of 10/29/17   XR Chest 2 Views    Narrative    CHEST TWO VIEWS  10/29/2017 6:52 PM     COMPARISON: Two view chest x-ray  11/24/2016.    HISTORY: Cough, congestion.    FINDINGS: The cardiac silhouette, pulmonary vasculature, lungs and  pleural spaces are within normal limits.      Impression    IMPRESSION: Clear lungs.   CBC with platelets differential   Result Value Ref Range    WBC 7.9 4.0 - 11.0 10e9/L    RBC Count 4.66 3.8 - 5.2 10e12/L    Hemoglobin 9.2 (L) 11.7 - 15.7 g/dL    Hematocrit 31.4 (L) 35.0 - 47.0 %    MCV 67 (L) 78 - 100 fl    MCH 19.7 (L) 26.5 - 33.0 pg    MCHC 29.3 (L) 31.5 - 36.5 g/dL    RDW 18.6 (H) 10.0 - 15.0 %    Platelet Count 411 150 - 450 10e9/L    Diff Method Automated Method     % Neutrophils 59.6 %    % Lymphocytes 24.6 %    % Monocytes 11.8 %    % Eosinophils 3.3 %    % Basophils 0.4 %    % Immature Granulocytes 0.3 %    Nucleated RBCs 0 0 /100    Absolute Neutrophil 4.7 1.6 - 8.3 10e9/L    Absolute Lymphocytes 1.9 0.8 - 5.3 10e9/L    Absolute Monocytes 0.9 0.0 - 1.3 10e9/L    Absolute Eosinophils 0.3 0.0 - 0.7 10e9/L    Absolute Basophils 0.0 0.0 - 0.2 10e9/L    Abs Immature Granulocytes 0.0 0 - 0.4 10e9/L    Absolute Nucleated RBC 0.0    Comprehensive metabolic panel   Result Value Ref Range    Sodium 142 133 - 144 mmol/L    Potassium 3.4 3.4 - 5.3 mmol/L    Chloride 110 (H) 94 - 109 mmol/L    Carbon Dioxide 23 20 - 32 mmol/L    Anion Gap 9 3 - 14 mmol/L    Glucose 82 70 - 99 mg/dL    Urea Nitrogen 7 7 - 30 mg/dL    Creatinine 0.65 0.52 - 1.04 mg/dL    GFR Estimate >90 >60 mL/min/1.7m2    GFR Estimate If Black >90 >60 mL/min/1.7m2    Calcium 8.4 (L) 8.5 - 10.1 mg/dL    Bilirubin Total 0.2 0.2 - 1.3 mg/dL    Albumin 3.2 (L) 3.4 - 5.0 g/dL    Protein Total 7.3 6.8 - 8.8 g/dL    Alkaline Phosphatase 60 40 - 150 U/L    ALT 11 0 - 50 U/L    AST 9 0 - 45 U/L   Procalcitonin   Result Value Ref Range    Procalcitonin <0.05 ng/ml   UA with Microscopic   Result Value Ref Range    Color Urine Yellow     Appearance Urine Clear     Glucose Urine Negative NEG^Negative mg/dL    Bilirubin Urine Negative NEG^Negative     Ketones Urine Negative NEG^Negative mg/dL    Specific Gravity Urine 1.020 1.003 - 1.035    Blood Urine Negative NEG^Negative    pH Urine 6.0 5.0 - 7.0 pH    Protein Albumin Urine 10 (A) NEG^Negative mg/dL    Urobilinogen mg/dL Normal 0.0 - 2.0 mg/dL    Nitrite Urine Negative NEG^Negative    Leukocyte Esterase Urine Negative NEG^Negative    Source Midstream Urine     WBC Urine <1 0 - 2 /HPF    RBC Urine 2 0 - 2 /HPF    Squamous Epithelial /HPF Urine <1 0 - 1 /HPF    Mucous Urine Present (A) NEG^Negative /LPF   INR   Result Value Ref Range    INR 1.08 0.86 - 1.14   D dimer quantitative   Result Value Ref Range    D Dimer 0.3 0.0 - 0.50 ug/ml FEU   hCG qual urine POCT   Result Value Ref Range    HCG Qual Urine Negative neg    Internal QC OK Yes    Blood culture   Result Value Ref Range    Specimen Description Blood Left Arm     Special Requests Aerobic and anaerobic bottles received     Culture Micro No growth after 1 hour      Medications   0.9% sodium chloride BOLUS (0 mLs Intravenous Stopped 10/29/17 1928)            Labs Ordered and Resulted from Time of ED Arrival Up to the Time of Departure from the ED - No data to display         Assessments & Plan (with Medical Decision Making)  31 year old female who presents to the ER due to two weeks of ongoing cough, congestion, and productive phlegm. Patient states over the past few days her phlegm with red blood. Patient denies any shortness of breath at rest. She states she is a little dyspenic when she walks large amounts. Patient here was well appearing on exam. She did have her persistent cough, but was sating well and was in no acute distress. Patient's lung exam was negative. Due to her prior history of ARDS, we obtained labs that were essentially negative. Patient had a negative pro calcitonin. She had a negative D dimer. Her UA and UCG were negative. Her white count was stable at 7.9. Her hemoglobin was at her baseline of 9. No signs of dehydration in  electrolytes. I obtained a chest x-ray that was also negative. I feel that patient's symptoms are likely from bronchitis. I recommend that she take oral antibiotics and prescription strength cough medicine. I discussed this plan of care with the patient. Patient agrees with plan of care and is stable for discharge.       I have reviewed the nursing notes.    I have reviewed the findings, diagnosis, plan and need for follow up with the patient.    New Prescriptions    No medications on file       Final diagnoses:   Acute bronchitis, unspecified organism   I, Sukhwinder Shaikh, am serving as a trained medical scribe to document services personally performed by Santa Mott MD, based on the provider's statements to me.      ISanta MD, was physically present and have reviewed and verified the accuracy of this note documented by Sukhwinder Shaikh.       10/29/2017   [facility], [place], EMERGENCY DEPARTMENT     Santa Mott MD  10/29/17 [id number]

## 2017-10-30 NOTE — DISCHARGE INSTRUCTIONS
Take antibiotics and cough medications as prescribed.   Your chest xray and blood tests are all normal.   Please make an appointment to follow up with Your Primary Care Provider in 2-4 days even if entirely better.      Bronchitis, Antibiotic Treatment (Adult)    Bronchitis is an infection of the air passages (bronchial tubes) in your lungs. It often occurs when you have a cold. This illness is contagious during the first few days and is spread through the air by coughing and sneezing, or by direct contact (touching the sick person and then touching your own eyes, nose, or mouth).  Symptoms of bronchitis include cough with mucus (phlegm) and low-grade fever. Bronchitis usually lasts 7 to 14 days. Mild cases can be treated with simple home remedies. More severe infection is treated with an antibiotic.  Home care  Follow these guidelines when caring for yourself at home:    If your symptoms are severe, rest at home for the first 2 to 3 days. When you go back to your usual activities, don't let yourself get too tired.    Do not smoke. Also avoid being exposed to secondhand smoke.    You may use over-the-counter medicines to control fever or pain, unless another medicine was prescribed. (Note: If you have chronic liver or kidney disease or have ever had a stomach ulcer or gastrointestinal bleeding, talk with your healthcare provider before using these medicines. Also talk to your provider if you are taking medicine to prevent blood clots.) Aspirin should never be given to anyone younger than 18 years of age who is ill with a viral infection or fever. It may cause severe liver or brain damage.    Your appetite may be poor, so a light diet is fine. Avoid dehydration by drinking 6 to 8 glasses of fluids per day (such as water, soft drinks, sports drinks, juices, tea, or soup). Extra fluids will help loosen secretions in the nose and lungs.    Over-the-counter cough, cold, and sore-throat medicines will not shorten the  length of the illness, but they may be helpful to reduce symptoms. (Note: Do not use decongestants if you have high blood pressure.)    Finish all antibiotic medicine. Do this even if you are feeling better after only a few days.  Follow-up care  Follow up with your healthcare provider, or as advised. If you had an X-ray or ECG (electrocardiogram), a specialist will review it. You will be notified of any new findings that may affect your care.  Note: If you are age 65 or older, or if you have a chronic lung disease or condition that affects your immune system, or you smoke, talk to your healthcare provider about having pneumococcal vaccinations and a yearly influenza vaccination (flu shot).  When to seek medical advice  Call your healthcare provider right away if any of these occur:    Fever of 100.4 F (38 C) or higher    Coughing up increased amounts of colored sputum    Weakness, drowsiness, headache, facial pain, ear pain, or a stiff neck  Call 911, or get immediate medical care  Contact emergency services right away if any of these occur.    Coughing up blood    Worsening weakness, drowsiness, headache, or stiff neck    Trouble breathing, wheezing, or pain with breathing  Date Last Reviewed: 9/13/2015 2000-2017 [employer]. [address] [place] [address], [place] [address]. All rights reserved. This information is not intended as a substitute for professional medical care. Always follow your healthcare professional's instructions.

## 2017-11-04 LAB
BACTERIA SPEC CULT: NO GROWTH
Lab: NORMAL
SPECIMEN SOURCE: NORMAL

## 2017-11-16 DIAGNOSIS — K21.9 GASTROESOPHAGEAL REFLUX DISEASE WITHOUT ESOPHAGITIS: ICD-10-CM

## 2017-11-16 NOTE — TELEPHONE ENCOUNTER
Request for medication refill:    Date of last visit at clinic: 3-29-17    Please complete refill if appropriate and CLOSE ENCOUNTER.    Closing the encounter signifies the refill is complete.    If refill has been denied, please complete the smart phrase .smirefuse and route it to the [facility] RN TRIAGE pool to inform the patient and the pharmacy.    Josefina Lowe MA

## 2017-12-04 DIAGNOSIS — G43.109 MIGRAINE WITH AURA AND WITHOUT STATUS MIGRAINOSUS, NOT INTRACTABLE: ICD-10-CM

## 2017-12-04 RX ORDER — SUMATRIPTAN 25 MG/1
25-50 TABLET, FILM COATED ORAL
Qty: 9 TABLET | Refills: 1 | Status: SHIPPED | OUTPATIENT
Start: 2017-12-04 | End: 2018-08-17

## 2017-12-04 NOTE — TELEPHONE ENCOUNTER
Request for medication refill:    Date of last visit at clinic: 03/29/2017    Please complete refill if appropriate and CLOSE ENCOUNTER.    Closing the encounter signifies the refill is complete.    If refill has been denied, please complete the smart phrase .smirefuse and route it to the [facility] RN TRIAGE pool to inform the patient and the pharmacy.    Alonso Diana, CMA

## 2018-08-17 DIAGNOSIS — G43.109 MIGRAINE WITH AURA AND WITHOUT STATUS MIGRAINOSUS, NOT INTRACTABLE: ICD-10-CM

## 2018-08-17 NOTE — TELEPHONE ENCOUNTER

## 2018-08-20 RX ORDER — SUMATRIPTAN 25 MG/1
25 TABLET, FILM COATED ORAL
Qty: 9 TABLET | Refills: 0 | Status: SHIPPED | OUTPATIENT
Start: 2018-08-20 | End: 2019-05-08

## 2018-08-20 NOTE — TELEPHONE ENCOUNTER
Has not been seen in > 1 year.  Refill Imitrex x 1 only.  Needs to be seen before further refills.

## 2019-05-06 ENCOUNTER — HOSPITAL ENCOUNTER (EMERGENCY)
Facility: CLINIC | Age: 34
Discharge: HOME | End: 2019-05-06
Attending: EMERGENCY MEDICINE | Admitting: EMERGENCY MEDICINE
Payer: COMMERCIAL

## 2019-05-06 ENCOUNTER — APPOINTMENT (OUTPATIENT)
Dept: CT IMAGING | Facility: CLINIC | Age: 34
End: 2019-05-06
Attending: EMERGENCY MEDICINE
Payer: COMMERCIAL

## 2019-05-06 ENCOUNTER — OFFICE VISIT (OUTPATIENT)
Dept: FAMILY MEDICINE | Facility: CLINIC | Age: 34
End: 2019-05-06
Payer: COMMERCIAL

## 2019-05-06 VITALS
WEIGHT: 131.3 LBS | DIASTOLIC BLOOD PRESSURE: 88 MMHG | OXYGEN SATURATION: 98 % | SYSTOLIC BLOOD PRESSURE: 122 MMHG | HEART RATE: 77 BPM | RESPIRATION RATE: 14 BRPM | BODY MASS INDEX: 22.54 KG/M2 | TEMPERATURE: 97 F

## 2019-05-06 DIAGNOSIS — G43.909 MIGRAINE WITHOUT STATUS MIGRAINOSUS, NOT INTRACTABLE, UNSPECIFIED MIGRAINE TYPE: ICD-10-CM

## 2019-05-06 DIAGNOSIS — D50.9 IRON DEFICIENCY ANEMIA, UNSPECIFIED IRON DEFICIENCY ANEMIA TYPE: ICD-10-CM

## 2019-05-06 DIAGNOSIS — G43.119 INTRACTABLE MIGRAINE WITH AURA WITHOUT STATUS MIGRAINOSUS: Primary | ICD-10-CM

## 2019-05-06 DIAGNOSIS — E55.9 VITAMIN D DEFICIENCY: ICD-10-CM

## 2019-05-06 LAB
% GRANULOCYTES: 65.1 %G (ref 40–75)
GRANULOCYTES #: 4.3 K/UL (ref 1.6–8.3)
HCT VFR BLD AUTO: 32.1 % (ref 35–47)
HEMOGLOBIN: 9.3 G/DL (ref 11.7–15.7)
LYMPHOCYTES # BLD AUTO: 1.7 K/UL (ref 0.8–5.3)
LYMPHOCYTES NFR BLD AUTO: 26.4 %L (ref 20–48)
MCH RBC QN AUTO: 20.4 PG (ref 26.5–35)
MCHC RBC AUTO-ENTMCNC: 29 G/DL (ref 32–36)
MCV RBC AUTO: 70.3 FL (ref 78–100)
MID #: 0.6 K/UL (ref 0–2.2)
MID %: 8.5 %M (ref 0–20)
PLATELET # BLD AUTO: 322 K/UL (ref 150–450)
RBC # BLD AUTO: 4.57 M/UL (ref 3.8–5.2)
TSH SERPL DL<=0.005 MIU/L-ACNC: 1.12 MU/L (ref 0.4–4)
WBC # BLD AUTO: 6.6 K/UL (ref 4–11)

## 2019-05-06 PROCEDURE — 25000128 H RX IP 250 OP 636

## 2019-05-06 PROCEDURE — 25000131 ZZH RX MED GY IP 250 OP 636 PS 637: Performed by: EMERGENCY MEDICINE

## 2019-05-06 PROCEDURE — 99284 EMERGENCY DEPT VISIT MOD MDM: CPT | Mod: Z6 | Performed by: EMERGENCY MEDICINE

## 2019-05-06 PROCEDURE — 96372 THER/PROPH/DIAG INJ SC/IM: CPT | Performed by: EMERGENCY MEDICINE

## 2019-05-06 PROCEDURE — 25000132 ZZH RX MED GY IP 250 OP 250 PS 637: Performed by: EMERGENCY MEDICINE

## 2019-05-06 PROCEDURE — 70450 CT HEAD/BRAIN W/O DYE: CPT

## 2019-05-06 PROCEDURE — 99284 EMERGENCY DEPT VISIT MOD MDM: CPT | Mod: 25 | Performed by: EMERGENCY MEDICINE

## 2019-05-06 RX ORDER — KETOROLAC TROMETHAMINE 30 MG/ML
60 INJECTION, SOLUTION INTRAMUSCULAR; INTRAVENOUS ONCE
Status: COMPLETED | OUTPATIENT
Start: 2019-05-06 | End: 2019-05-06

## 2019-05-06 RX ORDER — HYDROXYZINE HYDROCHLORIDE 50 MG/1
50 TABLET, FILM COATED ORAL ONCE
Status: COMPLETED | OUTPATIENT
Start: 2019-05-06 | End: 2019-05-06

## 2019-05-06 RX ORDER — HYDROMORPHONE HYDROCHLORIDE 1 MG/ML
0.5 INJECTION, SOLUTION INTRAMUSCULAR; INTRAVENOUS; SUBCUTANEOUS ONCE
Status: COMPLETED | OUTPATIENT
Start: 2019-05-06 | End: 2019-05-06

## 2019-05-06 RX ORDER — HYDROMORPHONE HYDROCHLORIDE 1 MG/ML
INJECTION, SOLUTION INTRAMUSCULAR; INTRAVENOUS; SUBCUTANEOUS
Status: COMPLETED
Start: 2019-05-06 | End: 2019-05-06

## 2019-05-06 RX ORDER — ONDANSETRON 4 MG/1
4 TABLET, ORALLY DISINTEGRATING ORAL ONCE
Status: COMPLETED | OUTPATIENT
Start: 2019-05-06 | End: 2019-05-06

## 2019-05-06 RX ORDER — SUMATRIPTAN 50 MG/1
100 TABLET, FILM COATED ORAL
Qty: 2 TABLET | Refills: 0 | Status: SHIPPED | OUTPATIENT
Start: 2019-05-06 | End: 2019-05-07

## 2019-05-06 RX ORDER — HYDROXYZINE HYDROCHLORIDE 25 MG/1
25 TABLET, FILM COATED ORAL ONCE
Status: COMPLETED | OUTPATIENT
Start: 2019-05-06 | End: 2019-05-06

## 2019-05-06 RX ADMIN — ONDANSETRON 4 MG: 4 TABLET, ORALLY DISINTEGRATING ORAL at 17:15

## 2019-05-06 RX ADMIN — HYDROXYZINE HYDROCHLORIDE 25 MG: 25 TABLET ORAL at 19:24

## 2019-05-06 RX ADMIN — KETOROLAC TROMETHAMINE 60 MG: 30 INJECTION, SOLUTION INTRAMUSCULAR; INTRAVENOUS at 08:47

## 2019-05-06 RX ADMIN — Medication 1 MG: at 17:29

## 2019-05-06 RX ADMIN — HYDROMORPHONE HYDROCHLORIDE 1 MG: 1 INJECTION, SOLUTION INTRAMUSCULAR; INTRAVENOUS; SUBCUTANEOUS at 17:29

## 2019-05-06 ASSESSMENT — ENCOUNTER SYMPTOMS
CHILLS: 0
HEADACHES: 1
DYSURIA: 0
CONFUSION: 0
SORE THROAT: 0
DIARRHEA: 0
NECK STIFFNESS: 0
FEVER: 0
ABDOMINAL PAIN: 0
MYALGIAS: 0
WHEEZING: 0
ARTHRALGIAS: 0
APPETITE CHANGE: 0
AGITATION: 0
HEADACHES: 1
SHORTNESS OF BREATH: 0
COUGH: 0
PHOTOPHOBIA: 1
NECK PAIN: 0

## 2019-05-06 NOTE — PROGRESS NOTES
Preceptor Attestation:   Patient seen and discussed with the resident. Assessment and plan reviewed with resident and agreed upon.   Supervising Physician:  [facility]

## 2019-05-06 NOTE — PATIENT INSTRUCTIONS
Here is the plan from today's visit    1. Intractable migraine with aura without status migrainosus  - ketorolac (TORADOL) injection 60 mg  - CBC with Diff Plt ([facility])  - Vitamin D Level  - TSH with free T4 reflex  - SUMAtriptan (IMITREX) 50 MG tablet; Take 2 tablets (100 mg) by mouth at onset of headache for migraine  Dispense: 2 tablet; Refill: 0    2. Vitamin D deficiency  3. Iron deficiency anemia, unspecified iron deficiency anemia type  - CBC with Diff Plt ([facility])  - Vitamin D Level  - TSH with free T4 reflex      Please call or return to clinic if your symptoms don't go away.    Follow up plan  Please make a clinic appointment for follow up with me (RONALD MCGREGOR) next week.    Thank you for coming to [facility] today.  Lab Testing:  **If you had lab testing today and your results are reassuring or normal they will be mailed to you or sent through [facility] within 7 days.   **If the lab tests need quick action we will call you with the results.  The phone number we will call with results is # [phone] (home) . If this is not the best number please call our clinic and change the number.  Medication Refills:  If you need any refills please call your pharmacy and they will contact us.   If you need to  your refill at a new pharmacy, please contact the new pharmacy directly. The new pharmacy will help you get your medications transferred faster.   Scheduling:  If you have any concerns about today's visit or wish to schedule another appointment please call our office during normal business hours [phone] (8-5:00 M-F)  If a referral was made to a [facility] and you don't get a call from central scheduling please call [phone].  If a Mammogram was ordered for you at [facility] call [phone] to schedule or change your appointment.  If you had an XRay/CT/Ultrasound/MRI ordered the number is [phone] to schedule or change your radiology  appointment.   Medical Concerns:  If you have urgent medical concerns please call [phone] at any time of the day.    Colin Arzola MD    Neurology Referral  [phone]  Hi there, pt scheduled 5/15 in neuro      Rico Noel [facility]  Care Coordinator

## 2019-05-06 NOTE — PROGRESS NOTES
azeb Kelly Romero Morales is a 33 year old  who presents for   Chief Complaint   Patient presents with     Headache     x 2 months     Migraine Follow-Up    Headache symptoms:  Worsened in terms of frequency    Frequency: weekly     Duration of headaches: lasts 24-72 hours    Able to do normal daily activities/work with headaches: No not during severe migraines    Rescue/Relief medication:sumatriptan (Imitrex)              Effectiveness: minor relief    Preventative medication: None, had been on amitryptiline in past but did not help    Neurologic complications: Has aura with visual changes, when severe she reports her left visual field 'becomes red'    In the past 4 weeks, how often have you gone to Urgent Care or the emergency room because of your headaches?  0     Takes her sister's 50 mg sumatriptan, improves but does not resolve, and worsens again in a few hours.      Used to use hookah regularly, quit 3 months ago    February had upper respiratory symptoms    LMP:  Due in 2 days. Migraines worsen with hormones.    Had major depressive episode from October through march, was not even picking up the phone to speak with family.  No suicidal thoughts but was having difficulty leaving her house.  Has not been on medications as she reports her family/culture views it as a failing and she worries about reactions.      +++++++    Problem, Medication and Allergy Lists were reviewed and updated if needed..    Patient is an established patient of this clinic..         Review of Systems:   Review of Systems   Constitutional: Negative for appetite change, chills and fever.   HENT: Negative for congestion and sore throat.    Eyes: Positive for photophobia and visual disturbance. Negative for redness.   Respiratory: Negative for cough, shortness of breath and wheezing.    Cardiovascular: Negative for chest pain and leg swelling.   Gastrointestinal: Positive for nausea and vomiting. Negative for abdominal pain  and diarrhea.   Genitourinary: Negative for dysuria and vaginal discharge.   Musculoskeletal: Negative for arthralgias and myalgias.   Skin: Negative for rash.   Neurological: Positive for dizziness and headaches. Negative for syncope.   Psychiatric/Behavioral: Positive for dysphoric mood. Negative for agitation, confusion and suicidal ideas.   All other systems reviewed and are negative.           Physical Exam:   There were no vitals filed for this visit.  There is no height or weight on file to calculate BMI.  Vital signs normal except for mild tachycardia, values not entered by PCS     Physical Exam   Constitutional: She is oriented to person, place, and time. She appears well-developed and well-nourished. She appears distressed.   HENT:   Head: Normocephalic and atraumatic.   Right Ear: Tympanic membrane normal.   Left Ear: Tympanic membrane normal.   Mouth/Throat: Uvula is midline, oropharynx is clear and moist and mucous membranes are normal. No oropharyngeal exudate.   Eyes: Pupils are equal, round, and reactive to light. EOM are normal. Right eye exhibits no discharge. Left eye exhibits no discharge.   Some mild horizontal nystagmus on lateral gaze, extinguishable   Neck: Normal range of motion. Neck supple.   Cardiovascular: Normal rate and regular rhythm.   No murmur heard.  Pulmonary/Chest: Effort normal and breath sounds normal. No respiratory distress. She has no wheezes. She has no rales.   Abdominal: Soft. There is no tenderness.   Musculoskeletal: She exhibits no edema.   Neurological: She is alert and oriented to person, place, and time. She displays normal reflexes. No cranial nerve deficit or sensory deficit. She exhibits normal muscle tone. Coordination normal.   Skin: Skin is warm and dry. Capillary refill takes less than 2 seconds. She is not diaphoretic. No erythema.   Psychiatric: Her speech is normal and behavior is normal. Judgment and thought content normal. Cognition and memory are  normal. She exhibits a depressed mood.       Results:      Results from this visit  Results for orders placed or performed in visit on 05/06/19   CBC with Diff Plt ([facility])   Result Value Ref Range    WBC 6.6 4.0 - 11.0 K/uL    Lymphocytes # 1.7 0.8 - 5.3 K/uL    % Lymphocytes 26.4 20.0 - 48.0 %L    Mid # 0.6 0.0 - 2.2 K/uL    Mid % 8.5 0.0 - 20.0 %M    GRANULOCYTES # 4.3 1.6 - 8.3 K/uL    % Granulocytes 65.1 40.0 - 75.0 %G    RBC 4.57 3.80 - 5.20 M/uL    Hemoglobin 9.3 (L) 11.7 - 15.7 g/dL    Hematocrit 32.1 (L) 35.0 - 47.0 %    MCV 70.3 (L) 78.0 - 100.0 fL    MCH 20.4 (L) 26.5 - 35.0 pg    MCHC 29.0 (L) 32.0 - 36.0 g/dL    Platelets 322.0 150.0 - 450.0 K/uL   TSH with free T4 reflex   Result Value Ref Range    TSH 1.12 0.40 - 4.00 mU/L       Assessment and Plan        1. Intractable migraine with aura without status migrainosus  Migraines have increased in frequency and intensity, lasting up to 72 hours with minimal response to triptans.  On no controller medication. She has photo and phonophobia, nausea with some dizziness and vertigo symptoms.  Could have vestibular migraine symptoms as well, but vertigo was mild and will focus on treating migraine itself first, if has these symptoms in future may discuss treatment for vestibular migraines.  She appears to have some hormonal component to migraines as well but overall she has had worsening intensity regardless of menstrual cycle.  No focal deficits, no visual field deficit, eye exam and cranial nerve testing wnl.  She does report some redness and darkening of vision in left eye when migraine is at its worst, but resolves completely when migraine gone.  No need for urgent head imaging but may benefit from head imaging in near future due to worsening migraines and symptoms.  Also likely worsened with fasting for Ramadan, encouraged breaking fast and water intake.  - No relief with 60 mg toradol  - Has not responded to Sumatriptan 50 mg at home, will trial 100 mg  at once,  - If not improved she will go to emergency room for IVF and further treatment, possible head imaging while there as had worsening migraines with new visual changes reported.  - Requested neurology referral  - Discussed controller medications, she was previously on amitryptiline but did not find beneficial.  She does not wish to try any medication that is anti-depressant although she does need depression treatment as well.  - Would start beta blocker therapy for migraine prophylaxis, she declined starting today but will return for further discussion next week she states  - ketorolac (TORADOL) injection 60 mg  - CBC with Diff Plt ([facility])  - Vitamin D Level  - TSH with free T4 reflex  - SUMAtriptan (IMITREX) 50 MG tablet; Take 2 tablets (100 mg) by mouth at onset of headache for migraine  Dispense: 2 tablet; Refill: 0  - NEUROLOGY ADULT REFERRAL - INTERNAL    2. Vitamin D deficiency  3. Iron deficiency anemia, unspecified iron deficiency anemia type  Retesting today, her anemia could likely make migraine symptoms worse.  She reports menses have been regular with no increase in bleeding, she never was treated in the past for her iron deficiency anemia.  - Ferrous gluconate ordered  - CBC with Diff Plt ([facility])  - Vitamin D Level  - TSH with free T4 reflex       There are no discontinued medications.    Options for treatment and follow-up care were reviewed with the patient. Leticia Morales  engaged in the decision making process and verbalized understanding of the options discussed and agreed with the final plan.    Colin Arzola MD

## 2019-05-06 NOTE — ED AVS SNAPSHOT
[facility], [place], Emergency Department  [address]  [facility] [address]  Phone:  [phone]  Fax:  [phone]                                    Leticia Morales   MRN: [id number]    Department:  [facility], Emergency Department   Date of Visit:  5/6/2019           After Visit Summary Signature Page    I have received my discharge instructions, and my questions have been answered. I have discussed any challenges I see with this plan with the nurse or doctor.    ..........................................................................................................................................  Patient/Patient Representative Signature      ..........................................................................................................................................  Patient Representative Print Name and Relationship to Patient    ..................................................               ................................................  Date                                   Time    ..........................................................................................................................................  Reviewed by Signature/Title    ...................................................              ..............................................  Date                                               Time          22EPIC Rev 08/18

## 2019-05-06 NOTE — LETTER
[facility], [place], EMERGENCY DEPARTMENT  [address]  [facility] [place] [address]  [phone]      May 6, 2019    Leitcia Morales  [address]  [place] [address]  [phone] (home)     : 1985      To Whom it may concern:    Leticia Morales was seen in our Emergency Department today, May 6, 2019 for an injury that was reported to be work related.      For the next 1 days she should not work    The employee might be taking medication so that they cannot operate moving machinery or perform activities that require balancing or working above ground.    Sincerely,    Carroll Yoon MD

## 2019-05-06 NOTE — DISCHARGE INSTRUCTIONS
Is very important to follow-up with your primary care doctor to see what you can be getting headaches and to see if any other tests or treatment will be needed if fevers nausea vomiting more symptoms or if any concerns develop please return to emergency department soon as possible      Please make an appointment to follow up with Neurology Clinic (phone: [phone]) as soon as possible even if entirely better.

## 2019-05-06 NOTE — ED PROVIDER NOTES
[facility] [place] EMERGENCY DEPARTMENT ([facility])    5/06/19       History     Chief Complaint   Patient presents with     Headache     went [facility], was given Toradol IM but it was not effective. Pt was then told to come to ER for further migraine med management     The history is provided by the patient and medical records.     Leticia Morales is a 33 year old female who presents to the Emergency Department for evaluation of headache.  Patient reports that she has chronic migraines.  She reports that the timeframe in between her migraines has shortened over time as they used to be once a month and that they are now lasting longer per episode as well, such that they are now nearly continuous.  She states that she has not had a CT in the past and it does not have a preferred pain regimen for dealing with her migraines. The patient preferred the lights off, so some photophobia is evident.  No other complaints at this time.    I have reviewed the Medications, Allergies, Past Medical and Surgical History, and Social History in the [facility] system.    Past Medical History:   Diagnosis Date     ARDS (adult respiratory distress syndrome) (H) 2010    related to H1N1 infection     H1N1 influenza 2010    prolonged ICU stay, medically induced coma     Iron deficiency anemia      Migraines        Past Surgical History:   Procedure Laterality Date     THORACIC SURGERY      trach       Family History   Problem Relation Age of Onset     Diabetes Father      Hypertension Father      Cancer Father        Social History     Tobacco Use     Smoking status: Light Tobacco Smoker     Packs/day: 0.03     Types: Cigarettes     Smokeless tobacco: Never Used   Substance Use Topics     Alcohol use: No       Current Facility-Administered Medications   Medication     ketorolac (TORADOL) injection 60 mg     Current Outpatient Medications   Medication     acetaminophen-codeine (TYLENOL #3) 300-30 MG tablet      benzonatate (TESSALON) 100 MG capsule     folic acid (FOLVITE) 1 MG tablet     guaiFENesin-codeine (ROBITUSSIN AC) 100-10 MG/5ML SOLN solution     Aspirin-Acetaminophen-Caffeine (EXCEDRIN MIGRAINE PO)     IBUPROFEN PO     order for DME     ranitidine (ZANTAC) 150 MG tablet     rizatriptan (MAXALT) 5 MG tablet     SUMAtriptan (IMITREX) 25 MG tablet     SUMAtriptan (IMITREX) 50 MG tablet        Allergies   Allergen Reactions     Macrodantin [Nitrofurantoin Macrocrystal] Other (See Comments)     itching        Review of Systems   Eyes: Positive for photophobia.   Musculoskeletal: Negative for neck pain and neck stiffness.   Neurological: Positive for headaches ( Acute on chronic migraines).   All other systems reviewed and are negative.    Physical Exam   BP: 130/55  Pulse: 80  Heart Rate: 79  Temp: 98.8  F (37.1  C)  Resp: 16  Weight: 59.6 kg (131 lb 4.8 oz)  SpO2: 95 %      Physical Exam   Constitutional: No distress.   HENT:   Head: Atraumatic.   Mouth/Throat: Oropharynx is clear and moist. No oropharyngeal exudate.   Eyes: Pupils are equal, round, and reactive to light. No scleral icterus.   Neck:   There is no meningismus   Cardiovascular: Normal heart sounds and intact distal pulses.   Pulmonary/Chest: Breath sounds normal. No respiratory distress.   Abdominal: Soft. Bowel sounds are normal. There is no tenderness.   Musculoskeletal: She exhibits no edema or tenderness.   Neurological:   Nonfocal exam.  Cranial nerves appear to be intact.  There is mild discomfort however.   Skin: Skin is warm. No rash noted. She is not diaphoretic.   Vitals reviewed.    ED Course   4:50 PM  The patient was seen and examined by Carroll Yoon MD in Room ED17.     History physical examination and based on the fact that she is never had a CT scan before I plan to get a CT to rule out any significant pathology if negative I plan to discharge to home with follow-up    Procedures  NONE       Critical Care time:  none             Labs  Ordered and Resulted from Time of ED Arrival Up to the Time of Departure from the ED - No data to display         Assessments & Plan (with Medical Decision Making)   Is a 33-year-old female that comes into the emergency department complaining of a headache patient does have history of chronic migraines she reports that these headaches have been becoming more frequent.  This is not the worst headache of her life but she does not have a fever and she does not have any neck pain.  Based on history physical examination and presentation I plan to get a CT scan of the brain since she is never been scanned and I plan to disposition with follow-up.    Addendum 1844 hrs.  She is doing well she is in no acute distress her headache has improved dramatically CT scan of the brain is negative for any acute disease and clinically there is no meningitis or any significant abnormality based on history and physical examination I plan to treat as outpatient    I have reviewed the nursing notes.    I have reviewed the findings, diagnosis, plan and need for follow up with the patient.       Medication List      Started    acetaminophen-codeine 300-30 MG tablet  Commonly known as:  TYLENOL #3  1 tablet, Oral, EVERY 6 HOURS PRN            Final diagnoses:   Migraine without status migrainosus, not intractable, unspecified migraine type     I, Iftikhar Hollis, am serving as a trained medical scribe to document services personally performed by Carroll Yoon MD, based on the provider's statements to me.   I, Carroll Yoon MD, was physically present and have reviewed and verified the accuracy of this note documented by Iftikhar Hollis.     5/6/2019   [facility], [place], EMERGENCY DEPARTMENT     Carroll Yoon MD  05/06/19 1127

## 2019-05-07 LAB — DEPRECATED CALCIDIOL+CALCIFEROL SERPL-MC: 14 UG/L (ref 20–75)

## 2019-05-07 RX ORDER — FERROUS GLUCONATE 324(38)MG
324 TABLET ORAL
Qty: 90 TABLET | Refills: 1 | Status: SHIPPED | OUTPATIENT
Start: 2019-05-07 | End: 2019-05-08

## 2019-05-07 ASSESSMENT — ENCOUNTER SYMPTOMS
NAUSEA: 1
VOMITING: 1
DIZZINESS: 1
DYSPHORIC MOOD: 1
EYE REDNESS: 0
PHOTOPHOBIA: 1

## 2019-05-07 NOTE — ED NOTES
Patient was feeling a little dizzy as she was walking ed.  Sat her down and brought her back.  REassessed her.bp 122/80. Hr 77.  Gave her a snack and more fluids.  Feels okay to go home now.

## 2019-05-08 ENCOUNTER — OFFICE VISIT (OUTPATIENT)
Dept: FAMILY MEDICINE | Facility: CLINIC | Age: 34
End: 2019-05-08
Payer: COMMERCIAL

## 2019-05-08 VITALS
HEART RATE: 75 BPM | WEIGHT: 132 LBS | SYSTOLIC BLOOD PRESSURE: 118 MMHG | HEIGHT: 65 IN | DIASTOLIC BLOOD PRESSURE: 81 MMHG | BODY MASS INDEX: 21.99 KG/M2 | OXYGEN SATURATION: 98 %

## 2019-05-08 DIAGNOSIS — G43.111 INTRACTABLE MIGRAINE WITH AURA WITH STATUS MIGRAINOSUS: Primary | ICD-10-CM

## 2019-05-08 DIAGNOSIS — F32.A DEPRESSION, UNSPECIFIED DEPRESSION TYPE: ICD-10-CM

## 2019-05-08 RX ORDER — VENLAFAXINE HYDROCHLORIDE 75 MG/1
75 TABLET, EXTENDED RELEASE ORAL DAILY
Qty: 30 TABLET | Refills: 3 | Status: SHIPPED | OUTPATIENT
Start: 2019-05-08 | End: 2019-08-23

## 2019-05-08 RX ORDER — METOCLOPRAMIDE 10 MG/1
10 TABLET ORAL 2 TIMES DAILY PRN
Qty: 20 TABLET | Refills: 3 | Status: SHIPPED | OUTPATIENT
Start: 2019-05-08 | End: 2020-06-04

## 2019-05-08 RX ORDER — CHOLECALCIFEROL (VITAMIN D3) 50 MCG
1 TABLET ORAL DAILY
Qty: 90 TABLET | Refills: 3 | Status: SHIPPED | OUTPATIENT
Start: 2019-05-08 | End: 2019-10-16

## 2019-05-08 RX ORDER — ZOLMITRIPTAN 5 MG/1
5 TABLET, FILM COATED ORAL
Qty: 10 TABLET | Refills: 3 | Status: SHIPPED | OUTPATIENT
Start: 2019-05-08 | End: 2019-08-23

## 2019-05-08 ASSESSMENT — MIFFLIN-ST. JEOR: SCORE: 1301.69

## 2019-05-08 NOTE — NURSING NOTE
Prior to medication administration, I verified the patient identity using patient's name and date of birth. Patient was instructed to report any adverse reaction to me immediately.    I administered  to Leticia Morales.    For injections only, was entire vial of medication used? Yes    Did the patient bring this medication to the clinic to be administered? No    Name of provider who requested the medication administration: Dr. Arzola  Name of provider on site (faculty or community preceptor) at the time of performing the medication administration: Shae    Date of next administration: NA  Date of next office visit with provider to renew medication plan (must be seen annually):    Rosanna Bolton cma

## 2019-05-13 NOTE — PROGRESS NOTES
ASSESSMENT/PLAN:       ICD-10-CM    1. Intractable migraine with aura with status migrainosus G43.111 metoclopramide (REGLAN) 10 MG tablet     ZOLMitriptan (ZOMIG) 5 MG tablet     venlafaxine (EFFEXOR-ER) 75 MG 24 hr tablet   2. Depression, unspecified depression type F32.9        Will try to adjust medications and see if she sees some benefit.  Will try effexor for prophylaxis and Zomig+Ibuprofen+Reglan as an abortive.  Will be seeing neuro.  Warned about possible bipolar sx's.    >25 minutes spent with pt with >50% talking about migraine and depression.      SUBJECTIVE:   Leticia Morales is a 33 year old female who presents to clinic today for the following health issues:  1.  Migraine--was in the ER.  Has had a continuous migraine for month.  Headache does improve with imitrex and ibuprofen but doesn't resolve.  No unusual symptoms other than how intractable it has been.  Had negative CT scan.  Has neurology appointment but not for a week or so.  Is interested in trying an prophylactic agent and some different medications.  Talked about the various options.  Says she has some underlying depression and anxiety.  Wants to avoid B-blocker is they could make her depression worse.  Did not respond to amitriptyline.  Some FH of possible bipolar as well.  Is interested in Effexor for possibly helping both depression and may benefit migraines.      Problem list and histories reviewed & adjusted, as indicated.  Additional history: as documented    Patient Active Problem List   Diagnosis     Health Care Home     Migraine with aura     Vitamin D deficiency     Schizoaffective disorder (H)     PTSD (post-traumatic stress disorder)     Adjustment disorder with depressed mood     Cognitive deficits     Depression with anxiety     Past Surgical History:   Procedure Laterality Date     THORACIC SURGERY      trach       Social History     Tobacco Use     Smoking status: Light Tobacco Smoker     Packs/day: 0.03     Types:  "Cigarettes     Smokeless tobacco: Never Used   Substance Use Topics     Alcohol use: No     Family History   Problem Relation Age of Onset     Diabetes Father      Hypertension Father      Cancer Father          Current Outpatient Medications   Medication Sig Dispense Refill     Aspirin-Acetaminophen-Caffeine (EXCEDRIN MIGRAINE PO)        ferrous gluconate (FERGON) 324 (38 Fe) MG tablet Take 1 tablet (324 mg) by mouth daily (with breakfast) 90 tablet 1     IBUPROFEN PO Take 800 mg by mouth       metoclopramide (REGLAN) 10 MG tablet Take 1 tablet (10 mg) by mouth 2 times daily as needed (migraine) 20 tablet 3     ranitidine (ZANTAC) 150 MG tablet Take 1 tablet (150 mg) by mouth 2 times daily 60 tablet 11     SUMAtriptan (IMITREX) 50 MG tablet Take 2 tablets (100 mg) by mouth at onset of headache for migraine 2 tablet 0     venlafaxine (EFFEXOR-ER) 75 MG 24 hr tablet Take 1 tablet (75 mg) by mouth daily 30 tablet 3     vitamin D3 (CHOLECALCIFEROL) 2000 units tablet Take 1 tablet by mouth daily 90 tablet 3     ZOLMitriptan (ZOMIG) 5 MG tablet Take 1 tablet (5 mg) by mouth at onset of headache for migraine 10 tablet 3     benzonatate (TESSALON) 100 MG capsule Take 1 capsule (100 mg) by mouth 3 times daily as needed for cough (Patient not taking: Reported on 5/8/2019) 42 capsule 0     folic acid (FOLVITE) 1 MG tablet Take 1 tablet (1 mg) by mouth daily (Patient not taking: Reported on 5/8/2019) 100 tablet 3     Allergies   Allergen Reactions     Macrodantin [Nitrofurantoin Macrocrystal] Other (See Comments)     itching       Reviewed and updated as needed this visit by clinical staff  Tobacco  Allergies       Reviewed and updated as needed this visit by Provider         ROS:  Constitutional, HEENT, cardiovascular, pulmonary, gi and gu systems are negative, except as otherwise noted.    OBJECTIVE:     /81   Pulse 75   Ht 1.638 m (5' 4.5\")   Wt 59.9 kg (132 lb)   LMP 05/08/2019   SpO2 98%   BMI 22.31 kg/m  "   Body mass index is 22.31 kg/m .  GENERAL: healthy, alert and no distress  EYES: Eyes grossly normal to inspection, PERRL and conjunctivae and sclerae normal  CV: regular rate and rhythm, normal S1 S2, no S3 or S4, no murmur, click or rub, no peripheral edema and peripheral pulses strong  MS: no gross musculoskeletal defects noted, no edema  NEURO: Normal strength and tone, mentation intact and speech normal    Diagnostic Test Results:  Results for orders placed or performed during the hospital encounter of 05/06/19   CT Head w/o Contrast    Narrative    CT SCAN OF THE HEAD WITHOUT CONTRAST   5/6/2019 5:59 PM     HISTORY: Headache, acute, normal neuro exam.    TECHNIQUE:  Axial images of the head and coronal reformations without  IV contrast material.  Radiation dose for this scan was reduced using  automated exposure control, adjustment of the mA and/or kV according  to patient size, or iterative reconstruction technique.    COMPARISON: None.    FINDINGS:  The ventricles are normal in size, shape and configuration.   The brain parenchyma and subarachnoid spaces are normal. There is no  evidence of intracranial hemorrhage, mass, acute infarct or anomaly.     The visualized portions of the sinuses and mastoids appear normal.  There is no evidence of trauma.      Impression    IMPRESSION: Normal CT scan of the head.    MD Sinan VELAZQUEZ MD  [facility]

## 2019-05-14 ENCOUNTER — OFFICE VISIT (OUTPATIENT)
Dept: FAMILY MEDICINE | Facility: CLINIC | Age: 34
End: 2019-05-14
Payer: COMMERCIAL

## 2019-05-14 VITALS
OXYGEN SATURATION: 100 % | BODY MASS INDEX: 22.11 KG/M2 | HEART RATE: 69 BPM | DIASTOLIC BLOOD PRESSURE: 64 MMHG | SYSTOLIC BLOOD PRESSURE: 101 MMHG | RESPIRATION RATE: 14 BRPM | TEMPERATURE: 97.9 F | WEIGHT: 130.8 LBS

## 2019-05-14 DIAGNOSIS — F41.8 DEPRESSION WITH ANXIETY: ICD-10-CM

## 2019-05-14 DIAGNOSIS — G43.111 INTRACTABLE MIGRAINE WITH AURA WITH STATUS MIGRAINOSUS: Primary | ICD-10-CM

## 2019-05-14 NOTE — PATIENT INSTRUCTIONS
Try 4-7-8 breathing.  Inhale for 4 seconds, hold for 7 seconds, exhale for 8 seconds.  Talk to the neurologist about daily headache and trying a prophylactic medicine like Lisinopril or Propranolol.

## 2019-05-15 ENCOUNTER — OFFICE VISIT (OUTPATIENT)
Dept: NEUROLOGY | Facility: CLINIC | Age: 34
End: 2019-05-15
Payer: COMMERCIAL

## 2019-05-15 VITALS
RESPIRATION RATE: 15 BRPM | DIASTOLIC BLOOD PRESSURE: 78 MMHG | SYSTOLIC BLOOD PRESSURE: 114 MMHG | HEART RATE: 81 BPM | WEIGHT: 131 LBS | OXYGEN SATURATION: 100 % | BODY MASS INDEX: 21.83 KG/M2 | TEMPERATURE: 98 F | HEIGHT: 65 IN

## 2019-05-15 DIAGNOSIS — D64.9 LOW HEMOGLOBIN: ICD-10-CM

## 2019-05-15 DIAGNOSIS — G43.111 INTRACTABLE MIGRAINE WITH AURA WITH STATUS MIGRAINOSUS: Primary | ICD-10-CM

## 2019-05-15 PROBLEM — G43.709 CHRONIC MIGRAINE WITHOUT AURA WITHOUT STATUS MIGRAINOSUS, NOT INTRACTABLE: Status: ACTIVE | Noted: 2017-07-17

## 2019-05-15 PROBLEM — K21.9 GASTROESOPHAGEAL REFLUX DISEASE WITHOUT ESOPHAGITIS: Status: ACTIVE | Noted: 2017-07-17

## 2019-05-15 RX ORDER — SUMATRIPTAN 20 MG/1
1 SPRAY NASAL PRN
Qty: 6 EACH | Refills: 3 | Status: SHIPPED | OUTPATIENT
Start: 2019-05-15 | End: 2019-07-15

## 2019-05-15 ASSESSMENT — PATIENT HEALTH QUESTIONNAIRE - PHQ9: SUM OF ALL RESPONSES TO PHQ QUESTIONS 1-9: 0

## 2019-05-15 ASSESSMENT — PAIN SCALES - GENERAL: PAINLEVEL: SEVERE PAIN (7)

## 2019-05-15 ASSESSMENT — MIFFLIN-ST. JEOR: SCORE: 1297.15

## 2019-05-15 NOTE — PATIENT INSTRUCTIONS
Plan:  A trial of Emgality for migraine headache prevention. Call our Clinic at [phone] and ask for appointment with General Neurology RN Coordinator to help with Emgality first injection.   Sumatriptan nasal 20 mg in one nostril at intense  migraine onset and may repeat in 2 hours as needed. Max 40 mg in 24 hours. Limit use to no more than 2 days per week  Vitamin B2 (riboflavin) 400 mg daily for headache prevention  Hydration and rest  Headache log for headache frequency and severity and imitrex or other OTC acute analgesics use  Follow up in 3 months after starting Emgality for migraine prevention    Low hemoglobin-follow up with your PCP.   Vitamin B12 and MMA recommended  and can be done per PCP.        Patient Education     Galcanezumab injection  Brand Name: Emgality  What is this medicine?  GALCANEZUMAB (gal ka ANTONY ue mab) is used to prevent migraine headaches.  How should I use this medicine?  This medicine is for injection under the skin. You will be taught how to prepare and give this medicine. Use exactly as directed. Take your medicine at regular intervals. Do not take your medicine more often than directed.  It is important that you put your used needles and syringes in a special sharps container. Do not put them in a trash can. If you do not have a sharps container, call your pharmacist or healthcare provider to get one.  Talk to your pediatrician regarding the use of this medicine in children. Special care may be needed.  What side effects may I notice from receiving this medicine?  Side effects that you should report to your doctor or health care professional as soon as possible:    allergic reactions like skin rash, itching or hives, swelling of the face, lips, or tongue  Side effects that usually do not require medical attention (report these to your doctor or health care professional if they continue or are bothersome):    pain, redness, or irritation at site where injected  What may  interact with this medicine?  Interactions are not expected.  What if I miss a dose?  If you miss a dose, take it as soon as you can. If it is almost time for your next dose, take only that dose. Do not take double or extra doses.  Where should I keep my medicine?  Keep out of the reach of children.    You will be instructed on how to store this medicine. Throw away any unused medicine after the expiration date on the label.  What should I tell my health care provider before I take this medicine?  They need to know if you have any of these conditions:    an unusual or allergic reaction to galcanezumab, other medicines, foods, dyes, or preservatives    pregnant or trying to get pregnant    breast-feeding  What should I watch for while using this medicine?  Tell your doctor or healthcare professional if your symptoms do not start to get better or if they get worse.  NOTE:This sheet is a summary. It may not cover all possible information. If you have questions about this medicine, talk to your doctor, pharmacist, or health care provider. Copyright  2018 [employer]           Patient Education     Patient Education    Sumatriptan Nasal spray, solution    Sumatriptan Succinate Oral tablet    Sumatriptan Succinate Solution for injection    Sumatriptan Transdermal patch - iontophorectic  Sumatriptan Nasal spray, solution  What is this medicine?  SUMATRIPTAN (lane ma TRIP tan) is used to treat migraines with or without aura. An aura is a strange feeling or visual disturbance that warns you of an attack. It is not used to prevent migraines.  This medicine may be used for other purposes; ask your health care provider or pharmacist if you have questions.  What should I tell my health care provider before I take this medicine?  They need to know if you have any of these conditions:    bowel disease or colitis    diabetes    family history of heart disease    fast or irregular heart beat    heart or blood vessel disease, angina  (chest pain), or previous heart attack    high blood pressure    high cholesterol    history of stroke, transient ischemic attacks (TIAs or mini-strokes), or intracranial bleeding    kidney or liver disease    overweight    poor circulation    postmenopausal or surgical removal of uterus and ovaries    Raynaud's disease    seizure disorder    an unusual or allergic reaction to sumatriptan, other medicines, foods, dyes, or preservatives    pregnant or trying to get pregnant    breast-feeding  How should I use this medicine?  This medicine is for use in the nose. Follow the directions on the prescription label. This medicine is taken at the first symptoms of a migraine. It is not for everyday use. Do not take your medicine more often than directed.  Talk to your pediatrician regarding the use of this medicine in children. Special care may be needed.  Overdosage: If you think you have taken too much of this medicine contact a poison control center or emergency room at once.  NOTE: This medicine is only for you. Do not share this medicine with others.  What if I miss a dose?  This does not apply; this medicine is not for regular use.  What may interact with this medicine?  Do not take this medicine with any of the following medicines:    amphetamine or cocaine    dihydroergotamine, ergotamine, ergoloid mesylates, methysergide, or ergot-type medication - do not take within 24 hours of taking sumatriptan.    feverfew    MAOIs like Carbex, Eldepryl, Marplan, Nardil, and Parnate - do not take sumatriptan within 2 weeks of stopping MAOI therapy.    other migraine medicines like almotriptan, eletriptan, naratriptan, rizatriptan, zolmitriptan - do not take within 24 hours of taking sumatriptan.    tryptophan  This medicine may also interact with the following medications:    lithium    medicines for mental depression, anxiety or mood problems    medicines for weight loss such as dexfenfluramine, dextroamphetamine,  fenfluramine, or sibutramine    [place]'s wort  This list may not describe all possible interactions. Give your health care provider a list of all the medicines, herbs, non-prescription drugs, or dietary supplements you use. Also tell them if you smoke, drink alcohol, or use illegal drugs. Some items may interact with your medicine.  What should I watch for while using this medicine?  Only take this medicine for a migraine headache. Take it if you get warning symptoms or at the start of a migraine attack. It is not for regular use to prevent migraine attacks.  You may get drowsy or dizzy. Do not drive, use machinery, or do anything that needs mental alertness until you know how this medicine affects you. To reduce dizzy or fainting spells, do not sit or stand up quickly, especially if you are an older patient. Alcohol can increase drowsiness, dizziness and flushing. Avoid alcoholic drinks.  Smoking cigarettes may increase the risk of heart-related side effects from using this medicine.  If you take migraine medicines for 10 or more days a month, your migraines may get worse. Keep a diary of headache days and medicine use. Contact your healthcare professional if your migraine attacks occur more frequently.  What side effects may I notice from receiving this medicine?  Side effects that you should report to your doctor or health care professional as soon as possible:    allergic reactions like skin rash, itching or hives, swelling of the face, lips, or tongue    fast, slow, or irregular heart beat    hallucinations    increased or decreased blood pressure    seizures    severe stomach pain and cramping, bloody diarrhea    signs and symptoms of a blood clot such as breathing problems; changes in vision; chest pain; severe, sudden headache; pain, swelling, warmth in the leg; trouble speaking; sudden numbness or weakness of the face, arm or leg    tingling, pain, or numbness in the face, hands or feet  Side effects that  usually do not require medical attention (report to your doctor or health care professional if they continue or are bothersome):    bad taste    drowsiness    feeling warm, flushing, or redness of the face    headache    muscle cramps, pain    nausea, vomiting    unusually weak or tired  This list may not describe all possible side effects. Call your doctor for medical advice about side effects. You may report side effects to FDA at 2-244-YXI-0129.  Where should I keep my medicine?  Keep out of the reach of children.  Store at room temperature between 2 and 30 degrees C (36 and 86 degrees F). Throw away any unused medicine after the expiration date.  NOTE:This sheet is a summary. It may not cover all possible information. If you have questions about this medicine, talk to your doctor, pharmacist, or health care provider. Copyright  2016 [employer]

## 2019-05-15 NOTE — PROGRESS NOTES
ASSESSMENT/PLAN:     Patient Instructions   Try 4-7-8 breathing.  Inhale for 4 seconds, hold for 7 seconds, exhale for 8 seconds.  Talk to the neurologist about daily headache and trying a prophylactic medicine like Lisinopril or Propranolol.        ICD-10-CM    1. Intractable migraine with aura with status migrainosus G43.111    2. Depression with anxiety F41.8        Encouraged patient to see neurology and continue to work on relaxation methods and consider therapy for stress/anxiety management. Patient declines at this time.        SUBJECTIVE:   Letciia Morales is a 33 year old female who presents to clinic today for the following health issues:  1. Intractable migraine--patient states she is doing a little better.  Still significant headache but less aura symptoms and less debilitating.  Still taking medications daily with some relief.  Says she doesn't think she can tolerate the Effexor and would like to stop.  Has previously failed Amitriptyline which had significant side effects.  Unable to get Zomig since it was not covered.  Would consider Propranolol but says she will wait to talk to neurology which is coming up.  Is trying yoga for relaxation.  2.  Anxiety--Effexor is making this worse which we talked about being a possibility in the short term.  Patient finds this intolerable and would like to stop.  Denies any history of schizoaffective disorder or any family history of bipolar disorder.  Says she is not sure how these made it into her chart.  Anxiety is particularly bothersome around menstrual  Periods.        Problem list and histories reviewed & adjusted, as indicated.  Additional history: as documented    Patient Active Problem List   Diagnosis     Health Care Home     Migraine with aura     Vitamin D deficiency     Schizoaffective disorder (H)     PTSD (post-traumatic stress disorder)     Adjustment disorder with depressed mood     Cognitive deficits     Depression with anxiety     Chronic  migraine without aura without status migrainosus, not intractable     Gastroesophageal reflux disease without esophagitis     Past Surgical History:   Procedure Laterality Date     THORACIC SURGERY      trach       Social History     Tobacco Use     Smoking status: Former Smoker     Types: Hookah     Smokeless tobacco: Never Used   Substance Use Topics     Alcohol use: No     Family History   Problem Relation Age of Onset     Diabetes Father      Hypertension Father      Cancer Father          Current Outpatient Medications   Medication Sig Dispense Refill     Aspirin-Acetaminophen-Caffeine (EXCEDRIN MIGRAINE PO)        ferrous gluconate (FERGON) 324 (38 Fe) MG tablet Take 1 tablet (324 mg) by mouth daily (with breakfast) 90 tablet 1     IBUPROFEN PO Take 800 mg by mouth       metoclopramide (REGLAN) 10 MG tablet Take 1 tablet (10 mg) by mouth 2 times daily as needed (migraine) 20 tablet 3     ranitidine (ZANTAC) 150 MG tablet Take 1 tablet (150 mg) by mouth 2 times daily 60 tablet 11     SUMAtriptan (IMITREX) 50 MG tablet Take 2 tablets (100 mg) by mouth at onset of headache for migraine 2 tablet 0     venlafaxine (EFFEXOR-ER) 75 MG 24 hr tablet Take 1 tablet (75 mg) by mouth daily (Patient not taking: Reported on 5/15/2019) 30 tablet 3     vitamin D3 (CHOLECALCIFEROL) 2000 units tablet Take 1 tablet by mouth daily 90 tablet 3     ZOLMitriptan (ZOMIG) 5 MG tablet Take 1 tablet (5 mg) by mouth at onset of headache for migraine (Patient not taking: Reported on 5/15/2019) 10 tablet 3     benzonatate (TESSALON) 100 MG capsule Take 1 capsule (100 mg) by mouth 3 times daily as needed for cough (Patient not taking: Reported on 5/8/2019) 42 capsule 0     folic acid (FOLVITE) 1 MG tablet Take 1 tablet (1 mg) by mouth daily (Patient not taking: Reported on 5/8/2019) 100 tablet 3     galcanezumab-gnlm (EMGALITY) 120 MG/ML injection Inject 2 mLs (240 mg) Subcutaneous once for 1 dose 2 mL 0     galcanezumab-gnlm (EMGALITY) 120  MG/ML injection Inject 1 mL (120 mg) Subcutaneous every 28 days 1 mL 11     SUMAtriptan (IMITREX) 20 MG/ACT nasal spray Spray 1 spray in nostril as needed for migraine (may repeat in 2 hours as needed. Max 40 mg in 24 hours) 6 each 3     Allergies   Allergen Reactions     Macrodantin [Nitrofurantoin Macrocrystal] Other (See Comments)     itching       Reviewed and updated as needed this visit by clinical staff  Tobacco  Allergies  Meds       Reviewed and updated as needed this visit by Provider         ROS:  Constitutional, HEENT, cardiovascular, pulmonary, gi and gu systems are negative, except as otherwise noted.    OBJECTIVE:     /64   Pulse 69   Temp 97.9  F (36.6  C)   Resp 14   Wt 59.3 kg (130 lb 12.8 oz)   LMP 05/08/2019   SpO2 100%   BMI 22.11 kg/m    Body mass index is 22.11 kg/m .  GENERAL: healthy, alert and no distress  RESP: lungs clear to auscultation - no rales, rhonchi or wheezes  CV: regular rate and rhythm, normal S1 S2, no S3 or S4, no murmur, click or rub, no peripheral edema and peripheral pulses strong  ABDOMEN: soft, nontender, no hepatosplenomegaly, no masses and bowel sounds normal  MS: no gross musculoskeletal defects noted, no edema    Diagnostic Test Results:  Results for orders placed or performed during the hospital encounter of 05/06/19   CT Head w/o Contrast    Narrative    CT SCAN OF THE HEAD WITHOUT CONTRAST   5/6/2019 5:59 PM     HISTORY: Headache, acute, normal neuro exam.    TECHNIQUE:  Axial images of the head and coronal reformations without  IV contrast material.  Radiation dose for this scan was reduced using  automated exposure control, adjustment of the mA and/or kV according  to patient size, or iterative reconstruction technique.    COMPARISON: None.    FINDINGS:  The ventricles are normal in size, shape and configuration.   The brain parenchyma and subarachnoid spaces are normal. There is no  evidence of intracranial hemorrhage, mass, acute infarct or  anomaly.     The visualized portions of the sinuses and mastoids appear normal.  There is no evidence of trauma.      Impression    IMPRESSION: Normal CT scan of the head.    MD Sinan VELAZQUEZ MD  [facility]

## 2019-05-15 NOTE — LETTER
"5/15/2019       RE: Leticia Morales  [address]  [place] [address]     Dear Colleague,    Thank you for referring your patient, Leticia Morales, to the [facility] at [facility]. Please see a copy of my visit note below.    Re: Leticia Morales      MRN# [id number]  YOB: 1985  Date of Visit:5/15/2019    OUTPATIENT NEUROLOGY VISIT NOTE    Chief Complaint:  Headache evaluation    History of Present Illness  Leticia Morales is a 33-year-old female presents to the clinic today for headache evaluation    Headache History:    Onset History:  For about 8 years. Sister gets \"migraine\" headaches     Current Headache Pattern:      Frequency (How many headache days per month?): mild to moderate headache daily and intense headache weekly for the past 3 months. Prior to that headaches were around patient's menstrual cycle but not currently     Duration of Headache: 8-72 or more hours     Aura: \"wavy lines\" for 20 minutes before headache  and \"dark brown red \" vision with headache     Associated Symptoms:  nausea, light sensitivity, sound sensitivity and vision changes \"one eye goes\" blind, tingling in the fingers, fatigue feeling        Description of Headache Pain & Location:  Left side or right side and throbbing or pulsing or pounding     Level of Pain as headache is startin/10      Level of Pain during the worst headache:  10/10      Do headaches interfere with or prevent usual activities or diminish your productivity at home or work?  Yes - cannot work     Treatments Tried:  prescription medication: sumatriptan takes almost daily -helps with the pain but has to repeat it again  Ibuprofen   metoclopromide as needed for headache  Sumatriptan   venlafaxin has started and stopped last week due to side effects\" emotionally unstable\" and \"crying for no reason\"   Amitriptyline-side effects    Have you needed to utilize the " "Emergency Room to treat your headache symptoms?  If so, how often and when was the last time used:  Yes - 5/6/2019    Received dilaudid, ondansetron, hydroxyzine in the ED -helped but headache reoccurred but lesser intensity    Are Headaches worsening over time?  Yes  And head CT in the ED on 5/6/2019    What makes your headaches worse or triggers your headaches? Menstrual period in the past, weather changes,     Caffeine -limited but drinks soda 1/2-1 can per day, drinks tea at times  Water-\"pleanty\" of water  Sleeping is better now and reports that she sleeps 4-6 hours  Appetite decreased due to headaches and does not eat meat    Denies history of head or neck trauma,  vertigo, loss of consciousness, seizure, double vision, hearing difficulty, speech or swallowing difficulty, weakness or numbness in face, arms or legs, urinary or bowel incontinence, coordination problems or gait difficulty, fever or chills.    History of PTSD and depression -does not see anyone currently and does not feel its getting worse currently-worse in the winter      Neurodiagnostic Testing  CT head on 5/6/2019  CT SCAN OF THE HEAD WITHOUT CONTRAST   5/6/2019 5:59 PM       FINDINGS:  The ventricles are normal in size, shape and configuration.   The brain parenchyma and subarachnoid spaces are normal. There is no  evidence of intracranial hemorrhage, mass, acute infarct or anomaly.      The visualized portions of the sinuses and mastoids appear normal.  There is no evidence of trauma.                                                                      IMPRESSION: Normal CT scan of the head.     Labs reviewed  Results for ANJEL VALVERDE (MRN [id number]) as of 5/15/2019 08:53   Ref. Range 5/6/2019 16:12   TSH Latest Ref Range: 0.40 - 4.00 mU/L 1.12     Results for ANJEL VALVERDE (MRN [id number]) as of 5/15/2019 08:53   Ref. Range 5/6/2019 15:58   WBC Latest Ref Range: 4.0 - 11.0 K/uL 6.6   Hemoglobin Latest Ref Range: 11.7 - " 15.7 g/dL 9.3 (L)   Hematocrit Latest Ref Range: 35.0 - 47.0 % 32.1 (L)   Platelets Latest Ref Range: 150.0 - 450.0 K/uL 322.0   RBC Latest Ref Range: 3.80 - 5.20 M/uL 4.57   MCV Latest Ref Range: 78.0 - 100.0 fL 70.3 (L)   MCH Latest Ref Range: 26.5 - 35.0 pg 20.4 (L)   MCHC Latest Ref Range: 32.0 - 36.0 g/dL 29.0 (L)   % Lymphocytes Latest Ref Range: 20.0 - 48.0 %L 26.4   % Granulocytes Latest Ref Range: 40.0 - 75.0 %G 65.1   Mid % Latest Ref Range: 0.0 - 20.0 %M 8.5   GRANULOCYTES # Latest Ref Range: 1.6 - 8.3 K/uL 4.3   Lymphocytes # Latest Ref Range: 0.8 - 5.3 K/uL 1.7   Mid # Latest Ref Range: 0.0 - 2.2 K/uL 0.6       Past Medical History reviewed and verified with the patient  ICU hospitalization for H1N1 respiratory distress    Past Surgical History reviewed and verified with the patient  Past Surgical History:   Procedure Laterality Date     THORACIC SURGERY      trach     Family History reviewed and verified with the patient  Migraine headache -sister  Social History:  Moved to [place] the age of 12. 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, in process of finishing [employer] Degree in English and History, lives with her family -mom, dad and little sister  Social History     Tobacco Use     Smoking status: Former Smoker     Types: Hookah     Smokeless tobacco: Never Used   Substance Use Topics     Alcohol use: No    reviewed and verified with the patient     Allergies   Allergen Reactions     Macrodantin [Nitrofurantoin Macrocrystal] Other (See Comments)     itching       Current Outpatient Medications   Medication Sig Dispense Refill     ferrous gluconate (FERGON) 324 (38 Fe) MG tablet Take 1 tablet (324 mg) by mouth daily (with breakfast) 90 tablet 1     IBUPROFEN PO Take 800 mg by mouth       metoclopramide (REGLAN) 10 MG tablet Take 1 tablet (10 mg) by mouth 2 times daily as needed (migraine) 20 tablet 3     ranitidine (ZANTAC) 150 MG tablet Take 1 tablet (150 mg) by mouth 2 times daily 60 tablet 11      "SUMAtriptan (IMITREX) 50 MG tablet Take 2 tablets (100 mg) by mouth at onset of headache for migraine 2 tablet 0     vitamin D3 (CHOLECALCIFEROL) 2000 units tablet Take 1 tablet by mouth daily 90 tablet 3     Aspirin-Acetaminophen-Caffeine (EXCEDRIN MIGRAINE PO)        benzonatate (TESSALON) 100 MG capsule Take 1 capsule (100 mg) by mouth 3 times daily as needed for cough (Patient not taking: Reported on 5/8/2019) 42 capsule 0     folic acid (FOLVITE) 1 MG tablet Take 1 tablet (1 mg) by mouth daily (Patient not taking: Reported on 5/8/2019) 100 tablet 3     venlafaxine (EFFEXOR-ER) 75 MG 24 hr tablet Take 1 tablet (75 mg) by mouth daily (Patient not taking: Reported on 5/15/2019) 30 tablet 3     ZOLMitriptan (ZOMIG) 5 MG tablet Take 1 tablet (5 mg) by mouth at onset of headache for migraine (Patient not taking: Reported on 5/15/2019) 10 tablet 3   reviewed and verified with the patient    Review of Systems:  A 12-point ROS including constitutional, eyes, ENT, respiratory, cardiovascular, gastroenterology, genitourinary, integumentary, musculoskeletal, neurology, hematology and psychiatric were all reviewed with the patient and completed at the Neuroscience Services Question kamilla and as mentioned in the HPI.     General Exam:   /78   Pulse 81   Temp 98  F (36.7  C) (Oral)   Resp 15   Ht 1.638 m (5' 4.5\")   Wt 59.4 kg (131 lb)   LMP 05/08/2019   SpO2 100%   BMI 22.14 kg/m     GEN: Awake, NAD; good eye contact, responses appropriately   HEENT: Head atraumatic/Normocephalic. Scalp normal. Pupils equally round, 4 mm, reactive to light and accommodation, sclera and conjunctiva normal. Fundoscopic examination reveals normal vessels no papilledema.   Neck: Easily moveable without resistance  Heart: S1/S2 appreciated, RRR, no m/r/g, no carotid bruits  Lungs:Lungs are clear to auscultation bilaterally, no wheezes or crackles.   Neurological Examination:  The patient is alert and oriented times four. Has good " attention and concentration.  Speech is fluent without dysarthria.   Cranial nerves:  CN I deferred.   CN II: Intact and full visual fields to confrontation bilaterally. CN III, IV, VI: EOM intact. There is no nystagmus. Has conjugated gaze. Intact direct and consensual pupillary light reflexes.   CN V: Intact and symmetrical to facial sensation in the V1 through V3 bilaterally.   CN VII: Intact and symmetrical eyebrow and lid raise and eyelid closure, smiles and frown.   CN VIII: Intact to finger rub bilaterally.   CN IX and X: The palates elevates symmetrical. The uvula is midline.   CN XII: The tongue protrudes midline with no atrophy or fasciculations.   Motor exam: The patient has a normal bulk and tone throughout. There is no atrophy, fasciculations, clonus, or abnormal movements appreciated.   Strength Exam:  5/5 strength at shoulder abduction, elbow flexion or extension, wrist flexion or extension, finger abduction, , hip flexion and extension, knee flexion and extension, and dorsiflexion and plantarflexion bilaterally.   Sensation is intact to light touch  throughout. Reflexes are 2+ and symmetrical at biceps, triceps, brachioradialis, decreased patellar  and Achilles.   Negative Babinski with downgoing toes bilaterally.   Coordination reveals finger-nose-finger, rapid alternating movements, finger tapping, and toe tapping with normal speed and accuracy.   Station and gait is normal. There is no ataxia. Can walk on the toes, heels, and tandem walk without difficulty. Has no drift and a negative Romberg. Kita's negative        Assessment and Plan:  Headache worsening for 3 months. 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   Patient has history of depression and venlafaxine was tried and reported side effects of mood changes and palpitation.   Discussed topiramate trial for migraine prevention but it does not be preferred  due to patient's reported decreased appetite already  Patient already tried amitriptyline but no relief with headaches and side effects of drowsiness.     Plan:  A trial of Emgality for migraine headache prevention. Call our Clinic at [phone] and ask for appointment with General Neurology RN Coordinator to help with Emgality first injection.   Sumatriptan nasal 20 mg in one nostril at intense  migraine onset and may repeat in 2 hours as needed. Max 40 mg in 24 hours. Limit use to no more than 2 days per week  Vitamin B2 (riboflavin) 400 mg daily for headache prevention  Hydration and rest  Headache log for headache frequency and severity and imitrex or other OTC acute analgesics use  Follow up in 3 months after starting Emgality for migraine prevention    Low hemoglobin-follow up with your PCP.   Vitamin B12 and MMA recommended and can be done per PCP.     I discussed all my recommendation with Leticia Morales. The patient verbalizes understanding and comfortable with the plan. The patient has our clinic phone number to call with any questions or concerns. All of the patient's questions were answered from the best of my current knowledge.     Thank you for letting me be a part of the treatment team for Leticia Morales    Time spent with pt answering questions, discussing findings, counseling and coordinating care was more than 50% the appointment time, 58  minutes.     CONNIE Garcia, CNP  [facility]

## 2019-05-15 NOTE — NURSING NOTE
Chief Complaint   Patient presents with     Headache     [facility] NEW HEADACHE- MIGRAINE CONSULTATION       James Olivera, EMT

## 2019-05-20 ENCOUNTER — TELEPHONE (OUTPATIENT)
Dept: NEUROLOGY | Facility: CLINIC | Age: 34
End: 2019-05-20

## 2019-05-20 NOTE — TELEPHONE ENCOUNTER
"Prior Authorization Retail Medication Request    Medication/Dose: Immitrex 20 mg nasal spray  ICD code (if different than what is on RX):  Chronic migraine  Previously Tried and Failed:  Treatments Tried:  prescription medication: sumatriptan takes almost daily -helps with the pain but has to repeat it again  Ibuprofen   metoclopromide as needed for headache  Sumatriptan   venlafaxin has started and stopped last week due to side effects\" emotionally unstable\" and \"crying for no reason\"   Amitriptyline-side effects       Rationale:  Headache worsening for 3 months. 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   Patient has history of depression and venlafaxine was tried and reported side effects of mood changes and palpitation.   Discussed topiramate trial for migraine prevention but it does not be a good due to patient's reported decreased appetite already  Patient already tried amitriptyline but no relief with headaches and side effects of drowsiness      Insurance Name:  [employer] [employer]  Insurance ID:    [id number]   Pharmacy Information (if different than what is on RX)  Name:    Phone:    "

## 2019-05-20 NOTE — TELEPHONE ENCOUNTER
"Prior Authorization Retail Medication Request    Medication/Dose: Emgality 240 loading dose then 120 mg every 28 days  ICD code (if different than what is on RX):  Chronic Migraine  Previously Tried and Failed:  Treatments Tried:  prescription medication: sumatriptan takes almost daily -helps with the pain but has to repeat it again  Ibuprofen   metoclopromide as needed for headache  Sumatriptan   venlafaxin has started and stopped last week due to side effects\" emotionally unstable\" and \"crying for no reason\"   Amitriptyline-side effects    Rationale:  Headache worsening for 3 months. 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   Patient has history of depression and venlafaxine was tried and reported side effects of mood changes and palpitation.   Discussed topiramate trial for migraine prevention but it does not be a good due to patient's reported decreased appetite already  Patient already tried amitriptyline but no relief with headaches and side effects of drowsiness      Insurance Name: [facility]  Insurance ID: [id number]      Pharmacy Information (if different than what is on RX)  Name:    Phone:    "

## 2019-05-21 NOTE — TELEPHONE ENCOUNTER
Central Prior Authorization Team   Phone: [phone]      PA Initiation    Medication: SUMAtriptan (IMITREX) 20 MG/ACT nasal spray-PA Pending  Insurance Company: [employer] - Phone [phone] Fax [phone]  Pharmacy Filling the Rx: [facility] [id number] [address] [place] [facility] [address] [facility] [place]  Filling Pharmacy Phone: [phone]  Filling Pharmacy Fax: [phone]  Start Date: 5/21/2019

## 2019-05-21 NOTE — TELEPHONE ENCOUNTER
Prior Authorization Not Needed per Insurance    Medication: SUMAtriptan (IMITREX) 20 MG/ACT nasal spray-PA Not Needed  Insurance Company: [employer] - Phone [phone] Fax [phone]  Expected CoPay:      Pharmacy Filling the Rx: [facility] [id number] [address] AT [address] [facility]  Pharmacy Notified: Yes  Patient Notified: No    Received call from [phone] stating [facility] pharmacy should be using is [phone]. Called pharmacy and they were able to process script.

## 2019-05-28 ENCOUNTER — TELEPHONE (OUTPATIENT)
Dept: NEUROLOGY | Facility: CLINIC | Age: 34
End: 2019-05-28

## 2019-05-28 NOTE — TELEPHONE ENCOUNTER
Prior Authorization Retail Medication Request    Medication/Dose: Sumatriptan 20MG Nasal Spray  ICD code :  G43.109  Previously Tried and Failed: amitriptyline   Rationale: No relief with headaches and side effects of drowsiness.     Insurance Name:  [facility] [facility] [employer] [facility] Ph: [phone]   Insurance ID:  [id number]       Pharmacy Information  Name: [facility],[address]  Phone: [phone]

## 2019-05-28 NOTE — PROGRESS NOTES
"Re: Leticia Morales      MRN# [id number]  YOB: 1985  Date of Visit:5/15/2019    OUTPATIENT NEUROLOGY VISIT NOTE    Chief Complaint:  Headache evaluation    History of Present Illness  Leticia Morales is a 33-year-old female presents to the clinic today for headache evaluation    Headache History:    Onset History:  For about 8 years. Sister gets \"migraine\" headaches     Current Headache Pattern:      Frequency (How many headache days per month?): mild to moderate headache daily and intense headache weekly for the past 3 months. Prior to that headaches were around patient's menstrual cycle but not currently     Duration of Headache: 8-72 or more hours     Aura: \"wavy lines\" for 20 minutes before headache  and \"dark brown red \" vision with headache     Associated Symptoms:  nausea, light sensitivity, sound sensitivity and vision changes \"one eye goes\" blind, tingling in the fingers, fatigue feeling        Description of Headache Pain & Location:  Left side or right side and throbbing or pulsing or pounding     Level of Pain as headache is startin/10      Level of Pain during the worst headache:  10/10      Do headaches interfere with or prevent usual activities or diminish your productivity at home or work?  Yes - cannot work     Treatments Tried:  prescription medication: sumatriptan takes almost daily -helps with the pain but has to repeat it again  Ibuprofen   metoclopromide as needed for headache  Sumatriptan   venlafaxin has started and stopped last week due to side effects\" emotionally unstable\" and \"crying for no reason\"   Amitriptyline-side effects    Have you needed to utilize the Emergency Room to treat your headache symptoms?  If so, how often and when was the last time used:  Yes - 2019    Received dilaudid, ondansetron, hydroxyzine in the ED -helped but headache reoccurred but lesser intensity    Are Headaches worsening over time?  Yes  And head CT in the ED on " "5/6/2019    What makes your headaches worse or triggers your headaches? Menstrual period in the past, weather changes,     Caffeine -limited but drinks soda 1/2-1 can per day, drinks tea at times  Water-\"pleanty\" of water  Sleeping is better now and reports that she sleeps 4-6 hours  Appetite decreased due to headaches and does not eat meat    Denies history of head or neck trauma,  vertigo, loss of consciousness, seizure, double vision, hearing difficulty, speech or swallowing difficulty, weakness or numbness in face, arms or legs, urinary or bowel incontinence, coordination problems or gait difficulty, fever or chills.    History of PTSD and depression -does not see anyone currently and does not feel its getting worse currently-worse in the winter      Neurodiagnostic Testing  CT head on 5/6/2019  CT SCAN OF THE HEAD WITHOUT CONTRAST   5/6/2019 5:59 PM       FINDINGS:  The ventricles are normal in size, shape and configuration.   The brain parenchyma and subarachnoid spaces are normal. There is no  evidence of intracranial hemorrhage, mass, acute infarct or anomaly.      The visualized portions of the sinuses and mastoids appear normal.  There is no evidence of trauma.                                                                      IMPRESSION: Normal CT scan of the head.     Labs reviewed  Results for ANJEL VALVERDE (MRN [id number]) as of 5/15/2019 08:53   Ref. Range 5/6/2019 16:12   TSH Latest Ref Range: 0.40 - 4.00 mU/L 1.12     Results for ANJEL VALVERDE (MRN [id number]) as of 5/15/2019 08:53   Ref. Range 5/6/2019 15:58   WBC Latest Ref Range: 4.0 - 11.0 K/uL 6.6   Hemoglobin Latest Ref Range: 11.7 - 15.7 g/dL 9.3 (L)   Hematocrit Latest Ref Range: 35.0 - 47.0 % 32.1 (L)   Platelets Latest Ref Range: 150.0 - 450.0 K/uL 322.0   RBC Latest Ref Range: 3.80 - 5.20 M/uL 4.57   MCV Latest Ref Range: 78.0 - 100.0 fL 70.3 (L)   MCH Latest Ref Range: 26.5 - 35.0 pg 20.4 (L)   MCHC Latest Ref Range: " 32.0 - 36.0 g/dL 29.0 (L)   % Lymphocytes Latest Ref Range: 20.0 - 48.0 %L 26.4   % Granulocytes Latest Ref Range: 40.0 - 75.0 %G 65.1   Mid % Latest Ref Range: 0.0 - 20.0 %M 8.5   GRANULOCYTES # Latest Ref Range: 1.6 - 8.3 K/uL 4.3   Lymphocytes # Latest Ref Range: 0.8 - 5.3 K/uL 1.7   Mid # Latest Ref Range: 0.0 - 2.2 K/uL 0.6       Past Medical History reviewed and verified with the patient  ICU hospitalization for H1N1 respiratory distress    Past Surgical History reviewed and verified with the patient  Past Surgical History:   Procedure Laterality Date     THORACIC SURGERY      trach     Family History reviewed and verified with the patient  Migraine headache -sister  Social History:  Moved to [place] the age of 12. 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, in process of finishing B[employer] Degree in English and History, lives with her family -mom, dad and little sister  Social History     Tobacco Use     Smoking status: Former Smoker     Types: Hookah     Smokeless tobacco: Never Used   Substance Use Topics     Alcohol use: No    reviewed and verified with the patient     Allergies   Allergen Reactions     Macrodantin [Nitrofurantoin Macrocrystal] Other (See Comments)     itching       Current Outpatient Medications   Medication Sig Dispense Refill     ferrous gluconate (FERGON) 324 (38 Fe) MG tablet Take 1 tablet (324 mg) by mouth daily (with breakfast) 90 tablet 1     IBUPROFEN PO Take 800 mg by mouth       metoclopramide (REGLAN) 10 MG tablet Take 1 tablet (10 mg) by mouth 2 times daily as needed (migraine) 20 tablet 3     ranitidine (ZANTAC) 150 MG tablet Take 1 tablet (150 mg) by mouth 2 times daily 60 tablet 11     SUMAtriptan (IMITREX) 50 MG tablet Take 2 tablets (100 mg) by mouth at onset of headache for migraine 2 tablet 0     vitamin D3 (CHOLECALCIFEROL) 2000 units tablet Take 1 tablet by mouth daily 90 tablet 3     Aspirin-Acetaminophen-Caffeine (EXCEDRIN MIGRAINE PO)        benzonatate (TESSALON) 100  "MG capsule Take 1 capsule (100 mg) by mouth 3 times daily as needed for cough (Patient not taking: Reported on 5/8/2019) 42 capsule 0     folic acid (FOLVITE) 1 MG tablet Take 1 tablet (1 mg) by mouth daily (Patient not taking: Reported on 5/8/2019) 100 tablet 3     venlafaxine (EFFEXOR-ER) 75 MG 24 hr tablet Take 1 tablet (75 mg) by mouth daily (Patient not taking: Reported on 5/15/2019) 30 tablet 3     ZOLMitriptan (ZOMIG) 5 MG tablet Take 1 tablet (5 mg) by mouth at onset of headache for migraine (Patient not taking: Reported on 5/15/2019) 10 tablet 3   reviewed and verified with the patient    Review of Systems:  A 12-point ROS including constitutional, eyes, ENT, respiratory, cardiovascular, gastroenterology, genitourinary, integumentary, musculoskeletal, neurology, hematology and psychiatric were all reviewed with the patient and completed at the Neuroscience Services Question nary and as mentioned in the HPI.     General Exam:   /78   Pulse 81   Temp 98  F (36.7  C) (Oral)   Resp 15   Ht 1.638 m (5' 4.5\")   Wt 59.4 kg (131 lb)   LMP 05/08/2019   SpO2 100%   BMI 22.14 kg/m    GEN: Awake, NAD; good eye contact, responses appropriately   HEENT: Head atraumatic/Normocephalic. Scalp normal. Pupils equally round, 4 mm, reactive to light and accommodation, sclera and conjunctiva normal. Fundoscopic examination reveals normal vessels no papilledema.   Neck: Easily moveable without resistance  Heart: S1/S2 appreciated, RRR, no m/r/g, no carotid bruits  Lungs:Lungs are clear to auscultation bilaterally, no wheezes or crackles.   Neurological Examination:  The patient is alert and oriented times four. Has good attention and concentration.  Speech is fluent without dysarthria.   Cranial nerves:  CN I deferred.   CN II: Intact and full visual fields to confrontation bilaterally. CN III, IV, VI: EOM intact. There is no nystagmus. Has conjugated gaze. Intact direct and consensual pupillary light reflexes.   CN " V: Intact and symmetrical to facial sensation in the V1 through V3 bilaterally.   CN VII: Intact and symmetrical eyebrow and lid raise and eyelid closure, smiles and frown.   CN VIII: Intact to finger rub bilaterally.   CN IX and X: The palates elevates symmetrical. The uvula is midline.   CN XII: The tongue protrudes midline with no atrophy or fasciculations.   Motor exam: The patient has a normal bulk and tone throughout. There is no atrophy, fasciculations, clonus, or abnormal movements appreciated.   Strength Exam:  5/5 strength at shoulder abduction, elbow flexion or extension, wrist flexion or extension, finger abduction, , hip flexion and extension, knee flexion and extension, and dorsiflexion and plantarflexion bilaterally.   Sensation is intact to light touch  throughout. Reflexes are 2+ and symmetrical at biceps, triceps, brachioradialis, decreased patellar  and Achilles.   Negative Babinski with downgoing toes bilaterally.   Coordination reveals finger-nose-finger, rapid alternating movements, finger tapping, and toe tapping with normal speed and accuracy.   Station and gait is normal. There is no ataxia. Can walk on the toes, heels, and tandem walk without difficulty. Has no drift and a negative Romberg. Kita's negative        Assessment and Plan:  Headache worsening for 3 months. 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   Patient has history of depression and venlafaxine was tried and reported side effects of mood changes and palpitation.   Discussed topiramate trial for migraine prevention but it does not be preferred due to patient's reported decreased appetite already  Patient already tried amitriptyline but no relief with headaches and side effects of drowsiness.     Plan:  A trial of Emgality for migraine headache prevention. Call our Clinic at [phone] and ask for appointment with General Neurology RN  Coordinator to help with Emgality first injection.   Sumatriptan nasal 20 mg in one nostril at intense  migraine onset and may repeat in 2 hours as needed. Max 40 mg in 24 hours. Limit use to no more than 2 days per week  Vitamin B2 (riboflavin) 400 mg daily for headache prevention  Hydration and rest  Headache log for headache frequency and severity and imitrex or other OTC acute analgesics use  Follow up in 3 months after starting Emgality for migraine prevention    Low hemoglobin-follow up with your PCP.   Vitamin B12 and MMA recommended and can be done per PCP.     I discussed all my recommendation with Leticia Morales. The patient verbalizes understanding and comfortable with the plan. The patient has our clinic phone number to call with any questions or concerns. All of the patient's questions were answered from the best of my current knowledge.     Thank you for letting me be a part of the treatment team for Leticia Morales      Time spent with pt answering questions, discussing findings, counseling and coordinating care was more than 50% the appointment time, 58  minutes.         CONNIE Garcia, CNP  [facility]

## 2019-06-03 NOTE — TELEPHONE ENCOUNTER
[facility] Prior Authorization Team   Phone: [phone]  Fax: [phone]      PA Initiation    Medication: Emgality 240 loading dose then 120 mg every 28 days - initiated  Insurance Company: [employer] - Phone [phone] Fax [phone]  Pharmacy Filling the Rx: [facility] - [place], [place] - [facility] [address]  Filling Pharmacy Phone:    Filling Pharmacy Fax:    Start Date: 6/3/2019

## 2019-06-04 NOTE — TELEPHONE ENCOUNTER
Central Prior Authorization Team   Phone: [phone]      PRIOR AUTHORIZATION DENIED    Medication: Emgality 240 loading dose then 120 mg every 28 days - Denied    Denial Date:  06/04/2019    Denial Rational: Must have documentation pt has not tried and failed antidepressant and Topamax since joining [employer] in 2017.     Appeal Information: Will document once received. Denied by phone.

## 2019-06-05 ENCOUNTER — TELEPHONE (OUTPATIENT)
Dept: NEUROLOGY | Facility: CLINIC | Age: 34
End: 2019-06-05

## 2019-06-05 NOTE — TELEPHONE ENCOUNTER
Spoke with pt to discuss the denial of Emgality. She is agreeable to doing a trial of topiramate. Message sent to coordinators to schedule a follow up with Silke to discuss this.

## 2019-07-15 ENCOUNTER — TELEPHONE (OUTPATIENT)
Dept: FAMILY MEDICINE | Facility: CLINIC | Age: 34
End: 2019-07-15

## 2019-07-15 DIAGNOSIS — G43.111 INTRACTABLE MIGRAINE WITH AURA WITH STATUS MIGRAINOSUS: ICD-10-CM

## 2019-07-15 RX ORDER — SUMATRIPTAN 20 MG/1
1 SPRAY NASAL PRN
Qty: 6 EACH | Refills: 3 | Status: SHIPPED | OUTPATIENT
Start: 2019-07-15 | End: 2019-08-23

## 2019-07-15 NOTE — TELEPHONE ENCOUNTER
Refill request routed to [facility]'s prescribing provider to address if appropriate.    Allan Baca RN

## 2019-07-15 NOTE — TELEPHONE ENCOUNTER
Verify that the refill encounter hasn't been started Yes    [facility] phone call message- patient requesting a refill:    Full Medication Name: SUMAtriptan (IMITREX) 20 MG/ACT nasal spray    Dose: Spray 1 spray in nostril as needed for migraine (may repeat in 2 hours as needed. Max 40 mg in 24 hours) - Nasal     Pharmacy confirmed as   [id number] - [address]  [address] [facility] [address]  Phone: [phone] Fax: [phone]    : Yes    Medication tab checked to see if medication has been sent  Yes    Additional Comments: Patient requesting refill of medication, has not had in about two months. Author informed patient that she may need to be seen before medication can be refilled per clinic policy, to which patient stated she did not want to come into clinic. Please advise.      OK to leave a message on voice mail? Yes    Advised patient refill may take up to 2 business days? Yes    Primary language: Beninese      needed? No    Call taken on July 15, 2019 at 4:43 PM by Leatha Posada to P [facility]

## 2019-07-17 NOTE — TELEPHONE ENCOUNTER
Refill request for Imitrex-approved and sent electronically to preferred pharmacy on file, patient notified.    Allan Baca RN

## 2019-08-23 ENCOUNTER — OFFICE VISIT (OUTPATIENT)
Dept: FAMILY MEDICINE | Facility: CLINIC | Age: 34
End: 2019-08-23
Payer: COMMERCIAL

## 2019-08-23 VITALS
TEMPERATURE: 97.8 F | OXYGEN SATURATION: 98 % | RESPIRATION RATE: 16 BRPM | SYSTOLIC BLOOD PRESSURE: 116 MMHG | HEART RATE: 72 BPM | WEIGHT: 134.8 LBS | DIASTOLIC BLOOD PRESSURE: 79 MMHG | BODY MASS INDEX: 23.01 KG/M2 | HEIGHT: 64 IN

## 2019-08-23 DIAGNOSIS — R10.13 ABDOMINAL PAIN, EPIGASTRIC: ICD-10-CM

## 2019-08-23 DIAGNOSIS — G43.109 MIGRAINE WITH AURA AND WITHOUT STATUS MIGRAINOSUS, NOT INTRACTABLE: ICD-10-CM

## 2019-08-23 DIAGNOSIS — K21.9 GASTROESOPHAGEAL REFLUX DISEASE WITHOUT ESOPHAGITIS: ICD-10-CM

## 2019-08-23 DIAGNOSIS — N94.6 DYSMENORRHEA: Primary | ICD-10-CM

## 2019-08-23 RX ORDER — SUMATRIPTAN 50 MG/1
100 TABLET, FILM COATED ORAL
Qty: 2 TABLET | Refills: 0 | Status: SHIPPED | OUTPATIENT
Start: 2019-08-23 | End: 2019-10-16

## 2019-08-23 RX ORDER — OMEGA-3 FATTY ACIDS/FISH OIL 300-1000MG
800 CAPSULE ORAL EVERY 8 HOURS PRN
Qty: 30 CAPSULE | Refills: 3 | Status: SHIPPED | OUTPATIENT
Start: 2019-08-23 | End: 2020-06-04

## 2019-08-23 RX ORDER — ONDANSETRON 4 MG/1
4 TABLET, ORALLY DISINTEGRATING ORAL EVERY 8 HOURS PRN
Qty: 30 TABLET | Refills: 1 | Status: SHIPPED | OUTPATIENT
Start: 2019-08-23 | End: 2020-06-04

## 2019-08-23 ASSESSMENT — MIFFLIN-ST. JEOR: SCORE: 1306.45

## 2019-08-23 NOTE — PROGRESS NOTES
HPI       Leticia Morales is a 33 year old  who presents for   Chief Complaint   Patient presents with     RECHECK     Got Sumatripin for migraine and it is not working at all      Abdominal Pain     Been going on for about 2 weeks, upper stomach pain with fatigue, did throw up last friday but bowels have been good and soft, she does go poop everytime she pees about 4-5 times a day. Would like to get tested for H. pylori, sister was diagnosed 2 months ago.     Refill Request     All vitamins and Ibuprofen and Zantac     Migraines  Sees neurology for migraines. Was prescribed nasal sumatriptan - doesn't help. Becomes nauseaus. Previously on PO sumatriptan. Couldn't get injection mab med as wasn't covered by insurance. OTC meds no longer works.  Now headaches every day. Tried preventative meds - gets abdominal pain, shakes, so stopped. Has not yet picked up B2 per neurology recs. Currently no headache at this moment. Headaches have been same characteristic as before.  Neuro f/u appt to be made - need to do MRI first. Will plan to do.    Abdominal pain  Crampy pain, epigastric. Wonders if she is sensitive to gluten, but other than abdominal pain no other symptoms. Sister had H. Pylori testing positive.    Refills  Ibuprofen, only takes twice every 3 weeks - menstrual cramping.    Problem, Medication and Allergy Lists were reviewed and updated if needed..    Patient is an established patient of this clinic..         Review of Systems:   Review of Systems   HENT: Negative for congestion.    Eyes: Negative for photophobia, redness and visual disturbance.   Gastrointestinal: Positive for abdominal pain and nausea. Negative for vomiting.   Neurological: Positive for headaches. Negative for weakness.            Physical Exam:     Vitals:    08/23/19 1647   BP: 116/79   BP Location: Left arm   Patient Position: Sitting   Cuff Size: Adult Regular   Pulse: 72   Resp: 16   Temp: 97.8  F (36.6  C)   TempSrc: Oral  "  SpO2: 98%   Weight: 61.1 kg (134 lb 12.8 oz)   Height: 1.626 m (5' 4\")     Body mass index is 23.14 kg/m .  Vitals were reviewed and were normal     Physical Exam   Constitutional: She is oriented to person, place, and time. She appears well-developed and well-nourished. No distress.   HENT:   Head: Normocephalic and atraumatic.   Eyes: Conjunctivae and EOM are normal.   Neck: Normal range of motion.   Cardiovascular: Normal rate.   Pulmonary/Chest: Effort normal. No respiratory distress.   Musculoskeletal: Normal range of motion.   Neurological: She is alert and oriented to person, place, and time. No cranial nerve deficit.   Skin: Skin is warm. She is not diaphoretic.   Psychiatric: She has a normal mood and affect. Her behavior is normal. Judgment and thought content normal.         Results:   No testing ordered today    Assessment and Plan        Leticia was seen today for recheck, abdominal pain and refill request.    Diagnoses and all orders for this visit:    Migraine with aura without status migrainosus  Resume prior PO sumatriptan. Advised should look more into prophylactic meds given daily migraines - will call to schedule MRI as recommended by neuro and plan neurology f/u. Advised to  B2. If change in headache characteristics or severity, to call or return and if unable to see neurology in reasonable time we can trial ppx meds here.  -     SUMAtriptan (IMITREX) 50 MG tablet; Take 2 tablets (100 mg) by mouth at onset of headache for migraine  -     ondansetron (ZOFRAN-ODT) 4 MG ODT tab; Take 1 tablet (4 mg) by mouth every 8 hours as needed for nausea    Dysmenorrhea  -     ibuprofen (ADVIL/MOTRIN) 200 MG capsule; Take 4 capsules (800 mg) by mouth every 8 hours as needed for pain    Gastroesophageal reflux disease without esophagitis  -     ranitidine (ZANTAC) 150 MG tablet; Take 1 tablet (150 mg) by mouth 2 times daily    Abdominal pain, epigastric  -     Helicobacter pylori Antigen Stool; " Future           There are no discontinued medications.    Options for treatment and follow-up care were reviewed with the patient. Leticia Morales  engaged in the decision making process and verbalized understanding of the options discussed and agreed with the final plan.    Rosa Beck MD

## 2019-08-23 NOTE — PROGRESS NOTES
Preceptor Attestation:   Patient seen and discussed with the resident. Assessment and plan reviewed with resident and agreed upon.   Supervising Physician:  DO [facility]

## 2019-08-24 ASSESSMENT — ENCOUNTER SYMPTOMS
PHOTOPHOBIA: 0
ABDOMINAL PAIN: 1
EYE REDNESS: 0
VOMITING: 0
WEAKNESS: 0
NAUSEA: 1
HEADACHES: 1

## 2019-08-27 ASSESSMENT — PATIENT HEALTH QUESTIONNAIRE - PHQ9: SUM OF ALL RESPONSES TO PHQ QUESTIONS 1-9: 14

## 2019-10-03 NOTE — PROGRESS NOTES
Physical Note          HPI       Concerns today:     Going to [place], [place], [place]    Emgality - shots not covered  Nasal sumatriptan doesn't work.  Takes PO. Sometimes tingly  Not done B2.    HPV - interested.  Smoke hookah - helps with migraines  Wants to quit.    Patient Active Problem List   Diagnosis     Health Care Home     Migraine with aura     Vitamin D deficiency     History of psychosis in setting of severe medical illness     PTSD (post-traumatic stress disorder)     Adjustment disorder with depressed mood     Cognitive deficits     Depression with anxiety     Chronic migraine without aura without status migrainosus, not intractable     Gastroesophageal reflux disease without esophagitis       Past Medical History:   Diagnosis Date     ARDS (adult respiratory distress syndrome) (H) 2010    related to H1N1 infection     H1N1 influenza 2010    prolonged ICU stay, medically induced coma     Iron deficiency anemia      Migraines        Hx of complicated H1N1 infection in 2010 resulting in ARDS and prolonged ICU stay, medically induced coma.  Onset of migraine, auditory hallucinations, cognitive defects following this episode. AH since resolved - had been given possible schizoaffective dx in past s/p neuropsych testing.  Migraines persisting.    12/21/13 - neuropsych evaluation (scanned media 7/9/2014)       Family History   Problem Relation Age of Onset     Diabetes Father      Hypertension Father      Cancer Father               Review of Systems:     Review of Systems:  CONSTITUTIONAL: NEGATIVE for fever, chills, change in weight  INTEGUMENTARY/SKIN: NEGATIVE for worrisome rashes, moles or lesions  EYES: NEGATIVE for vision changes or irritation  ENT/MOUTH: NEGATIVE for ear, mouth and throat problems  RESP: NEGATIVE for significant cough or SOB  BREAST: NEGATIVE for masses, tenderness or discharge  CV: NEGATIVE for chest pain, palpitations or peripheral edema  GI: NEGATIVE for nausea, abdominal  pain, heartburn, or change in bowel habits  : NEGATIVE for frequency, dysuria, or hematuria  MUSCULOSKELETAL: NEGATIVE for significant arthralgias or myalgia  NEURO: NEGATIVE for weakness, dizziness or paresthesias ***  ENDOCRINE: NEGATIVE for temperature intolerance, skin/hair changes  HEME/ALLERGY: NEGATIVE for bleeding problems  PSYCHIATRIC: NEGATIVE for changes in mood or affect  Sleep:   Do you snore most or the night (as reported by a family member)? No  Do you feel sleepy or extremely tired during most of the day? No             Social History     Social History     Socioeconomic History     Marital status: Single     Spouse name: Not on file     Number of children: Not on file     Years of education: Not on file     Highest education level: Not on file   Occupational History     Not on file   Social Needs     Financial resource strain: Not on file     Food insecurity:     Worry: Not on file     Inability: Not on file     Transportation needs:     Medical: Not on file     Non-medical: Not on file   Tobacco Use     Smoking status: Light Tobacco Smoker     Types: Hookah     Smokeless tobacco: Never Used   Substance and Sexual Activity     Alcohol use: No     Drug use: No     Sexual activity: Never   Lifestyle     Physical activity:     Days per week: Not on file     Minutes per session: Not on file     Stress: Not on file   Relationships     Social connections:     Talks on phone: Not on file     Gets together: Not on file     Attends Restorationism service: Not on file     Active member of club or organization: Not on file     Attends meetings of clubs or organizations: Not on file     Relationship status: Not on file     Intimate partner violence:     Fear of current or ex partner: Not on file     Emotionally abused: Not on file     Physically abused: Not on file     Forced sexual activity: Not on file   Other Topics Concern     Parent/sibling w/ CABG, MI or angioplasty before 65F 55M? Not Asked   Social History  "Narrative     Not on file       Marital Status: Getting    Who lives in your household? Parents    Has anyone hurt you physically, for example by pushing, hitting, slapping or kicking you or forcing you to have sex? {[facility] DENIES:874436::\"Denies\"}  Do you feel threatened or controlled by a partner, ex-partner or anyone in your life? {[facility] DENIES:843440::\"Denies\"}    Sexual Health     Sexual concerns: No   STI History: Neg  Pregnancy History:   LMP Patient's last menstrual period was 10/13/2019 (approximate).  10/9-10/14. Heaviest on 3rd days.   Last Pap Smear Date: None - will consider in future.  Abnormal Pap History: None      Recommended Screening     {[facility] USPSTF LEVEL A:402594}  {[facility] USPSTF LEVEL B:862662}           Physical Exam:     Vitals: /74   Pulse 71   Temp 97.9  F (36.6  C) (Oral)   Ht 1.61 m (5' 3.39\")   Wt 60.6 kg (133 lb 9.6 oz)   LMP 10/13/2019 (Approximate)   SpO2 98%   BMI 23.38 kg/m    BMI= Body mass index is 23.38 kg/m .   {EXAM - FEMALE- zebra stripe:277349::\"GENERAL: healthy, alert and no distress\",\"EYES: Eyes grossly normal to inspection, extraocular movements - intact, and PERRL\",\"HENT: ear canals- normal; TMs- normal; Nose- normal; Mouth- no ulcers, no lesions\",\"NECK: no tenderness, no adenopathy, no asymmetry, no masses, no stiffness; thyroid- normal to palpation\",\"RESP: lungs clear to auscultation - no rales, no rhonchi, no wheezes\",\"BREAST: no masses, no tenderness, no nipple discharge, no palpable axillary masses or adenopathy\",\"CV: regular rates and rhythm, normal S1 S2, no S3 or S4 and no murmur, no click or rub -\",\"ABDOMEN: soft, no tenderness, no  hepatosplenomegaly, no masses, normal bowel sounds\",\"MS: extremities- no gross deformities noted, no edema\",\"SKIN: no suspicious lesions, no rashes\",\"NEURO: strength and tone- normal, sensory exam- grossly normal, mentation- intact, speech- normal, reflexes- symmetric\",\"BACK: no CVA tenderness, no paralumbar " "tenderness\",\"- female: cervix- normal, adnexae- normal; uterus- normal, no masses, no discharge\",\"RECTAL- female: no masses, no hemorrhoids\",\"PSYCH: Alert and oriented times 3; speech- coherent , normal rate and volume; able to articulate logical thoughts, able to abstract reason, no tangential thoughts, no hallucinations or delusions, affect- normal\",\"LYMPHATICS: ant. cervical- normal, post. cervical- normal, axillary- normal, supraclavicular- normal, inguinal- normal\"}      Assessment and Plan      Leticia was seen today for physical and imm/inj.    Diagnoses and all orders for this visit:    Need for prophylactic vaccination and inoculation against influenza  -     Fluzone quad, preserve-free/prefilled  0.5ml, 6+ months [63390]    Gastroesophageal reflux disease without esophagitis    Vitamin D deficiency    Migraine with aura and without status migrainosus, not intractable          {[facility] FEMALE ASSESSMENT:851768::\"1. Health Care Maintenance: Normal Physical Exam\"}      1. {[facility] FEMALE PLAN 1:896311}  2.Contraception: {[facility] CONTRACEPTION:887387}    Options for treatment and follow-up care were reviewed with the patient . Leticia Morales and/or guardian engaged in the decision making process and verbalized understanding of the options discussed and agreed with the final plan.    Rosa Beck MD    "

## 2019-10-04 ENCOUNTER — TELEPHONE (OUTPATIENT)
Dept: FAMILY MEDICINE | Facility: CLINIC | Age: 34
End: 2019-10-04

## 2019-10-04 DIAGNOSIS — K21.9 GASTROESOPHAGEAL REFLUX DISEASE WITHOUT ESOPHAGITIS: ICD-10-CM

## 2019-10-04 NOTE — TELEPHONE ENCOUNTER
[facility] phone call message- medication clarification/question:    Full Medication Name: ranitidine (ZANTAC) 150 MG tablet      Dose:     Question/Clarification needed: Patient is wondering what she should take instead of this. Was hoping to get a call back today, but was told that might not happen.     Pharmacy confirmed as     [id number] - [place], [place] - [address] AT [facility] [address]  [address] [facility] [address]  Phone: [phone] Fax: [phone]  : Yes    Please leave ONLY preferred pharmacy    OK to leave a message on voice mail? Yes    Advised patient that RN would call back within 3 hours, unless emergent.    Primary language: Guinean      needed? No    Call taken on October 4, 2019 at 4:57 PM by Liz Browne    Route to [facility] ELIDA

## 2019-10-04 NOTE — TELEPHONE ENCOUNTER
Called patient to follow [id number] was written on 08/23/2019 with 11 refills, patient was notified to contact the clinic due to Current Ranitidine FDA situation, notified that message will be routed to PCP to address the need to continue or alternative medication similar to Zantac, Patient verbalized understanding, will continue to follow up.    Allan Baca RN

## 2019-10-07 NOTE — TELEPHONE ENCOUNTER
"Called patient to follow and notify, provider message, unable to get hold of patient, left message to call back the clinic.    When patient returns phone call, please relay message, per PCP, \" the reported risk (Ranitidine) is unknown, and the amounts of the substance NDMA is likely small. Pt should ask her pharmacy about where they purchase their medications from and the risk associated. We can switch to a different medication if she wishes, I would be happy to place an order for alternatives\", MD Kiran.    Allan Baca RN    "

## 2019-10-07 NOTE — TELEPHONE ENCOUNTER
Patient returned phone call and was informed of the message. Patient is out of that medication and would like a new medication to be prescribed and to be sent to the [facility] pharmacy on [address].  Mattie Mullen CMA

## 2019-10-16 ENCOUNTER — OFFICE VISIT (OUTPATIENT)
Dept: FAMILY MEDICINE | Facility: CLINIC | Age: 34
End: 2019-10-16
Payer: COMMERCIAL

## 2019-10-16 VITALS
BODY MASS INDEX: 23.67 KG/M2 | HEIGHT: 63 IN | DIASTOLIC BLOOD PRESSURE: 74 MMHG | OXYGEN SATURATION: 98 % | WEIGHT: 133.6 LBS | TEMPERATURE: 97.9 F | SYSTOLIC BLOOD PRESSURE: 114 MMHG | HEART RATE: 71 BPM

## 2019-10-16 DIAGNOSIS — K21.9 GASTROESOPHAGEAL REFLUX DISEASE WITHOUT ESOPHAGITIS: ICD-10-CM

## 2019-10-16 DIAGNOSIS — E55.9 VITAMIN D DEFICIENCY: ICD-10-CM

## 2019-10-16 DIAGNOSIS — G43.109 MIGRAINE WITH AURA AND WITHOUT STATUS MIGRAINOSUS, NOT INTRACTABLE: ICD-10-CM

## 2019-10-16 DIAGNOSIS — H61.23 BILATERAL IMPACTED CERUMEN: ICD-10-CM

## 2019-10-16 DIAGNOSIS — Z23 NEED FOR PROPHYLACTIC VACCINATION AND INOCULATION AGAINST INFLUENZA: ICD-10-CM

## 2019-10-16 DIAGNOSIS — Z71.84 TRAVEL ADVICE ENCOUNTER: ICD-10-CM

## 2019-10-16 DIAGNOSIS — Z00.00 ROUTINE GENERAL MEDICAL EXAMINATION AT A HEALTH CARE FACILITY: Primary | ICD-10-CM

## 2019-10-16 RX ORDER — SUMATRIPTAN 50 MG/1
100 TABLET, FILM COATED ORAL
Qty: 2 TABLET | Refills: 0 | Status: SHIPPED | OUTPATIENT
Start: 2019-10-16 | End: 2019-11-01

## 2019-10-16 RX ORDER — CHOLECALCIFEROL (VITAMIN D3) 50 MCG
1 TABLET ORAL DAILY
Qty: 90 TABLET | Refills: 3 | Status: SHIPPED | OUTPATIENT
Start: 2019-10-16 | End: 2020-07-20

## 2019-10-16 RX ORDER — FAMOTIDINE 20 MG/1
20 TABLET, FILM COATED ORAL 2 TIMES DAILY
Qty: 60 TABLET | Refills: 3 | Status: SHIPPED | OUTPATIENT
Start: 2019-10-16 | End: 2020-02-26

## 2019-10-16 RX ORDER — RIBOFLAVIN (VITAMIN B2) 400 MG
1 TABLET ORAL DAILY
Qty: 90 TABLET | Refills: 3 | Status: SHIPPED | OUTPATIENT
Start: 2019-10-16 | End: 2021-07-15

## 2019-10-16 ASSESSMENT — MIFFLIN-ST. JEOR: SCORE: 1291.26

## 2019-10-16 NOTE — PROGRESS NOTES
Physical Note          HPI       Concerns today:     Going to [place], [place], [place] in Dec/Jan. Wants to make sure shots updated    Migraines ongoing. Tried to be on emgality per neuro but shots not covered by insurance  Nasal sumatriptan doesn't work.  Takes PO sumatriptan. Sometimes tingly feelings with this  Not done B2.    HPV - interested in vaccination. Wants to read more  Never sexually active  Interested in contraceptive options - getting  Nov 15th.    Smoke hookah - helps with migraines  Wants to quit (because getting ).    Patient Active Problem List   Diagnosis     Health Care Home     Migraine with aura     Vitamin D deficiency     History of psychosis in setting of severe medical illness     PTSD (post-traumatic stress disorder)     Adjustment disorder with depressed mood     Cognitive deficits     Depression with anxiety     Chronic migraine without aura without status migrainosus, not intractable     Gastroesophageal reflux disease without esophagitis       Past Medical History:   Diagnosis Date     ARDS (adult respiratory distress syndrome) (H) 2010    related to H1N1 infection     H1N1 influenza 2010    prolonged ICU stay, medically induced coma     Iron deficiency anemia      Migraines             Family History   Problem Relation Age of Onset     Diabetes Father      Hypertension Father      Cancer Father               Review of Systems:     Review of Systems:  CONSTITUTIONAL: NEGATIVE for fever, chills, change in weight  INTEGUMENTARY/SKIN: NEGATIVE for worrisome rashes, moles or lesions  EYES: NEGATIVE for vision changes or irritation  ENT/MOUTH: NEGATIVE for ear, mouth and throat problems  RESP: NEGATIVE for significant cough or SOB  BREAST: NEGATIVE for masses, tenderness or discharge  CV: NEGATIVE for chest pain, palpitations or peripheral edema  GI: NEGATIVE for nausea, abdominal pain, heartburn, or change in bowel habits  : NEGATIVE for frequency, dysuria, or  hematuria  MUSCULOSKELETAL: NEGATIVE for significant arthralgias or myalgia  NEURO: NEGATIVE for weakness, dizziness. Some paresthesias when using sumatriptan  ENDOCRINE: NEGATIVE for temperature intolerance, skin/hair changes  HEME/ALLERGY: NEGATIVE for bleeding problems  PSYCHIATRIC: NEGATIVE for changes in mood or affect  Sleep:   Do you snore most or the night (as reported by a family member)? No  Do you feel sleepy or extremely tired during most of the day? No             Social History     Social History     Socioeconomic History     Marital status: Single     Spouse name: Not on file     Number of children: Not on file     Years of education: Not on file     Highest education level: Not on file   Occupational History     Not on file   Social Needs     Financial resource strain: Not on file     Food insecurity:     Worry: Not on file     Inability: Not on file     Transportation needs:     Medical: Not on file     Non-medical: Not on file   Tobacco Use     Smoking status: Light Tobacco Smoker     Types: Hookah     Smokeless tobacco: Never Used   Substance and Sexual Activity     Alcohol use: No     Drug use: No     Sexual activity: Never   Lifestyle     Physical activity:     Days per week: Not on file     Minutes per session: Not on file     Stress: Not on file   Relationships     Social connections:     Talks on phone: Not on file     Gets together: Not on file     Attends Congregation service: Not on file     Active member of club or organization: Not on file     Attends meetings of clubs or organizations: Not on file     Relationship status: Not on file     Intimate partner violence:     Fear of current or ex partner: Not on file     Emotionally abused: Not on file     Physically abused: Not on file     Forced sexual activity: Not on file   Other Topics Concern     Parent/sibling w/ CABG, MI or angioplasty before 65F 55M? Not Asked   Social History Narrative     Not on file       Marital Status: Getting  "   Who lives in your household? Parents    Has anyone hurt you physically, for example by pushing, hitting, slapping or kicking you or forcing you to have sex? Denies  Do you feel threatened or controlled by a partner, ex-partner or anyone in your life? Denies    Sexual Health     Sexual concerns: No   STI History: Neg  Pregnancy History:   LMP Patient's last menstrual period was 10/09/2019 (exact date).  10/9-10/14. Heaviest on 3rd days.   Last Pap Smear Date: None - will consider in future.  Abnormal Pap History: None      Recommended Screening     Pap/HPV cotest every 5 years for women 30-65   Recommended and patient will consider testing in future.         Physical Exam:     Vitals: /74   Pulse 71   Temp 97.9  F (36.6  C) (Oral)   Ht 1.61 m (5' 3.39\")   Wt 60.6 kg (133 lb 9.6 oz)   LMP 10/09/2019 (Exact Date)   SpO2 98%   BMI 23.38 kg/m    BMI= Body mass index is 23.38 kg/m .   GENERAL: healthy, alert and no distress  EYES: Eyes grossly normal to inspection, extraocular movements - intact, and PERRL  HENT: ear canals- normal; cerumen impaction bilaterally - disimpacted, TMs- normal; Nose- normal; Mouth- no ulcers, no lesions  NECK: no tenderness, no adenopathy, no asymmetry, no masses, no stiffness; thyroid- normal to palpation  RESP: lungs clear to auscultation - no rales, no rhonchi, no wheezes  CV: regular rates and rhythm, normal S1 S2, no S3 or S4 and no murmur, no click or rub -  ABDOMEN: soft, no tenderness, no  hepatosplenomegaly, no masses, normal bowel sounds  MS: extremities- no gross deformities noted, no edema  SKIN: no suspicious lesions, no rashes  NEURO: strength and tone- normal, sensory exam- grossly normal, mentation- intact, speech- normal, reflexes- symmetric  BACK: no CVA tenderness, no paralumbar tenderness  - female: cervix- normal, adnexae- normal; uterus- normal, no masses, no discharge  PSYCH: Alert and oriented times 3; speech- coherent , normal " rate and volume; able to articulate logical thoughts, able to abstract reason, no tangential thoughts, no hallucinations or delusions, affect- normal  LYMPHATICS: ant. cervical- normal, post. cervical- normal,     Assessment and Plan      Leticia was seen today for physical and imm/inj.    Routine general medical examination  Health Care Maintenance: Normal Physical Exam  Wants update on all shots if able.  Hx of complicated H1N1 infection in 2010 resulting in ARDS and prolonged ICU stay, medically induced coma. After this, onset of migraine, auditory hallucinations, cognitive defects, PTSD, OCD. All of these except migraines have since resolved (had been given possible schizoaffective dx in past s/p neuropsych testing.) 12/21/13 - neuropsych evaluation (scanned media 7/9/2014).  Shots given today:  -     Fluzone quad, preserve-free/prefilled  0.5ml, 6+ months [69249]  Shots in future:  - PPSV-23 (would qualify given heavy hookah smoking)  - HPV (patient interested, never sexually active, will read more and return)  - Travel immunizations per below    Travel advice encounter  Travelling to [place], [place], [place] in Dec/Jan.  HepA immune based on past serology.  Other indicated immunizations include cholera, typhoid, rabies, and potentially others.  Referral to  travel clinic to obtain.    Migraine with aura and without status migrainosus, not intractable  Discussed hookah cessation, as well as starting B2 400 mg daily for prophylaxis. PO sumatriptan works well at this time. If ongoing issues would return to neurology.  -     SUMAtriptan (IMITREX) 50 MG tablet; Take 2 tablets (100 mg) by mouth at onset of headache for migraine  -     Riboflavin 400 MG TABS; Take 1 tablet by mouth daily    Gastroesophageal reflux disease without esophagitis  H2RAs effective, and not PPIs. Switched from prior zantac to pepcid given distribution halt.  -     famotidine (PEPCID) 20 MG tablet; Take 1 tablet (20 mg) by mouth 2 times  daily    Vitamin D deficiency  -     vitamin D3 (CHOLECALCIFEROL) 2000 units (50 mcg) tablet; Take 1 tablet (2,000 Units) by mouth daily    Bilateral impacted cerumen  -     REMOVE IMPACTED CERUMEN    Contraception: desired in near future, not needed at this exact moment (abstinence). Getting  Nov 15. Patient desires to talk more about options at next visit.      Options for treatment and follow-up care were reviewed with the patient . Leticia Morales and/or guardian engaged in the decision making process and verbalized understanding of the options discussed and agreed with the final plan.    Rosa Beck MD    RTC in 2 weeks  - Discuss hookah cessation  - PPSV-23, HPV shots  - Contraception

## 2019-10-16 NOTE — PATIENT INSTRUCTIONS
For travel to [place], [place], [place]  Please check travel clinic for the vaccines you may need (typhoid, cholera, rabies)    [facility]  [address] [facility]  [place] [address]  [phone]   Fax: [phone]    [email or web address]    Return to discuss ways to stop smoking hookah, HPV shot, contraception

## 2019-10-16 NOTE — PROGRESS NOTES
Preceptor Attestation:   Patient seen, evaluated and discussed with the resident. I have verified the content of the note, which accurately reflects my assessment of the patient and the plan of care.   Supervising Physician:  Meera Wu MD

## 2019-11-01 DIAGNOSIS — G43.109 MIGRAINE WITH AURA AND WITHOUT STATUS MIGRAINOSUS, NOT INTRACTABLE: ICD-10-CM

## 2019-11-01 NOTE — TELEPHONE ENCOUNTER
Refill request for SUMAtriptan (IMITREX) 50 MG tablet, routed to PCP to address/advise if appropriate, (quantity), last filled on 10/16/2019, Pt last seen in clinic on 10/16/2019, will continue to follow up.    Allan Baca RN

## 2019-11-01 NOTE — TELEPHONE ENCOUNTER
Verify that the refill encounter hasn't been started Yes    [facility] phone call message- patient requesting a refill:    Full Medication Name: SUMAtriptan (IMITREX) 50 MG tablet    Dose: Sig - Route: Take 2 tablets (100 mg) by mouth at onset of headache for migraine - Oral     Pharmacy confirmed as   [id number] - [place], [place] - [address]  [address] [facility] [address]  Phone: [phone] Fax: [phone]      : Yes    Medication tab checked to see if medication has been sent  Yes    Additional Comments: Patient requesting a refill of the medication. Patient is out of medication.     OK to leave a message on voice mail? Yes    Advised patient refill may take up to 2 business days? Yes    Primary language: South African      needed? No    Call taken on November 1, 2019 at 12:27 PM by Janelle Willis    Route to [facility] MED REFILL

## 2019-11-03 RX ORDER — SUMATRIPTAN 50 MG/1
100 TABLET, FILM COATED ORAL
Qty: 15 TABLET | Refills: 0 | Status: SHIPPED | OUTPATIENT
Start: 2019-11-03 | End: 2020-02-26

## 2019-12-26 ENCOUNTER — TELEPHONE (OUTPATIENT)
Dept: FAMILY MEDICINE | Facility: CLINIC | Age: 34
End: 2019-12-26

## 2019-12-26 NOTE — TELEPHONE ENCOUNTER
Left voicemail to call direct line for assistance with scheduling.    Jade Noel CMA  [facility] Coordinator

## 2020-02-24 ENCOUNTER — HEALTH MAINTENANCE LETTER (OUTPATIENT)
Age: 35
End: 2020-02-24

## 2020-02-26 ENCOUNTER — MYC REFILL (OUTPATIENT)
Dept: FAMILY MEDICINE | Facility: CLINIC | Age: 35
End: 2020-02-26

## 2020-02-26 DIAGNOSIS — G43.109 MIGRAINE WITH AURA AND WITHOUT STATUS MIGRAINOSUS, NOT INTRACTABLE: ICD-10-CM

## 2020-02-26 DIAGNOSIS — K21.9 GASTROESOPHAGEAL REFLUX DISEASE WITHOUT ESOPHAGITIS: ICD-10-CM

## 2020-02-26 NOTE — TELEPHONE ENCOUNTER

## 2020-02-26 NOTE — TELEPHONE ENCOUNTER
Received refill request for SUMAtriptan (IMITREX) 50 MG tablet, Rx-script failed clinic refill protocol, request routed to PCP to address/advise if appropriate.    Allan Baca RN     Dr. Blanco ([facility])

## 2020-02-27 ENCOUNTER — TELEPHONE (OUTPATIENT)
Dept: FAMILY MEDICINE | Facility: CLINIC | Age: 35
End: 2020-02-27

## 2020-02-27 RX ORDER — SUMATRIPTAN 50 MG/1
100 TABLET, FILM COATED ORAL
Qty: 15 TABLET | Refills: 0 | Status: SHIPPED | OUTPATIENT
Start: 2020-02-27 | End: 2020-05-31

## 2020-02-27 RX ORDER — FAMOTIDINE 20 MG/1
20 TABLET, FILM COATED ORAL 2 TIMES DAILY
Qty: 60 TABLET | Refills: 3 | Status: SHIPPED | OUTPATIENT
Start: 2020-02-27 | End: 2020-05-31

## 2020-05-31 ENCOUNTER — MYC REFILL (OUTPATIENT)
Dept: FAMILY MEDICINE | Facility: CLINIC | Age: 35
End: 2020-05-31

## 2020-05-31 DIAGNOSIS — G43.109 MIGRAINE WITH AURA AND WITHOUT STATUS MIGRAINOSUS, NOT INTRACTABLE: ICD-10-CM

## 2020-05-31 DIAGNOSIS — K21.9 GASTROESOPHAGEAL REFLUX DISEASE WITHOUT ESOPHAGITIS: ICD-10-CM

## 2020-06-01 RX ORDER — SUMATRIPTAN 50 MG/1
100 TABLET, FILM COATED ORAL
Qty: 15 TABLET | Refills: 0 | Status: SHIPPED | OUTPATIENT
Start: 2020-06-01 | End: 2020-06-02

## 2020-06-01 RX ORDER — FAMOTIDINE 20 MG/1
20 TABLET, FILM COATED ORAL 2 TIMES DAILY
Qty: 60 TABLET | Refills: 3 | Status: SHIPPED | OUTPATIENT
Start: 2020-06-01 | End: 2020-06-02

## 2020-06-02 ENCOUNTER — TELEPHONE (OUTPATIENT)
Dept: FAMILY MEDICINE | Facility: CLINIC | Age: 35
End: 2020-06-02

## 2020-06-02 DIAGNOSIS — G43.109 MIGRAINE WITH AURA AND WITHOUT STATUS MIGRAINOSUS, NOT INTRACTABLE: ICD-10-CM

## 2020-06-02 DIAGNOSIS — K21.9 GASTROESOPHAGEAL REFLUX DISEASE WITHOUT ESOPHAGITIS: ICD-10-CM

## 2020-06-02 RX ORDER — SUMATRIPTAN 50 MG/1
100 TABLET, FILM COATED ORAL
Qty: 15 TABLET | Refills: 0 | Status: SHIPPED | OUTPATIENT
Start: 2020-06-02 | End: 2020-06-02

## 2020-06-02 RX ORDER — FAMOTIDINE 20 MG/1
20 TABLET, FILM COATED ORAL 2 TIMES DAILY
Qty: 60 TABLET | Refills: 3 | Status: SHIPPED | OUTPATIENT
Start: 2020-06-02 | End: 2020-06-02

## 2020-06-02 RX ORDER — FAMOTIDINE 20 MG/1
20 TABLET, FILM COATED ORAL 2 TIMES DAILY
Qty: 60 TABLET | Refills: 3 | Status: SHIPPED | OUTPATIENT
Start: 2020-06-02 | End: 2020-06-04

## 2020-06-02 RX ORDER — SUMATRIPTAN 50 MG/1
100 TABLET, FILM COATED ORAL
Qty: 15 TABLET | Refills: 0 | Status: SHIPPED | OUTPATIENT
Start: 2020-06-02 | End: 2020-07-14

## 2020-06-03 ENCOUNTER — TELEPHONE (OUTPATIENT)
Dept: FAMILY MEDICINE | Facility: CLINIC | Age: 35
End: 2020-06-03

## 2020-06-03 DIAGNOSIS — K21.9 GASTROESOPHAGEAL REFLUX DISEASE WITHOUT ESOPHAGITIS: Primary | ICD-10-CM

## 2020-06-03 NOTE — TELEPHONE ENCOUNTER
[facility] phone call message- medication clarification/question:    Full Medication Name: famotidine (PEPCID) 20 MG tablet      Dose: Route: Take 1 tablet (20 mg) by mouth 2 times daily - Oral     Question/Clarification needed: Demetrius from the pharmacy called stating the medication is on back order and does not know when it will be available. He would like to know what other medication you would like to use.     Pharmacy confirmed as     [place], [place] - [address]  [address]  [address]  [facility] [address]  Phone: [phone] Fax: [phone] Alternate Fax: [phone], [phone], [phone]  : Yes    Please leave ONLY preferred pharmacy    OK to leave a message on voice mail? Yes    Advised patient that RN would call back within 3 hours, unless emergent.    Primary language: [place] Timorese      needed? No    Call taken on Mis 3, 2020 at 2:26 PM by April Alexandra    Route to [facility]

## 2020-06-04 ENCOUNTER — VIRTUAL VISIT (OUTPATIENT)
Dept: FAMILY MEDICINE | Facility: CLINIC | Age: 35
End: 2020-06-04
Payer: COMMERCIAL

## 2020-06-04 ENCOUNTER — TELEPHONE (OUTPATIENT)
Dept: FAMILY MEDICINE | Facility: CLINIC | Age: 35
End: 2020-06-04

## 2020-06-04 DIAGNOSIS — G44.201 INTRACTABLE TENSION-TYPE HEADACHE, UNSPECIFIED CHRONICITY PATTERN: ICD-10-CM

## 2020-06-04 DIAGNOSIS — Z20.822 COVID-19 RULED OUT: Primary | ICD-10-CM

## 2020-06-04 DIAGNOSIS — K21.9 GASTROESOPHAGEAL REFLUX DISEASE WITHOUT ESOPHAGITIS: ICD-10-CM

## 2020-06-04 DIAGNOSIS — R50.9 FEVER AND CHILLS: ICD-10-CM

## 2020-06-04 DIAGNOSIS — R05.9 COUGH: ICD-10-CM

## 2020-06-04 PROCEDURE — 99213 OFFICE O/P EST LOW 20 MIN: CPT | Mod: 95 | Performed by: FAMILY MEDICINE

## 2020-06-04 RX ORDER — IBUPROFEN 800 MG/1
800 TABLET, FILM COATED ORAL EVERY 8 HOURS PRN
Qty: 90 TABLET | Refills: 0 | Status: SHIPPED | OUTPATIENT
Start: 2020-06-04 | End: 2021-07-16

## 2020-06-04 RX ORDER — ACETAMINOPHEN 500 MG
1000 TABLET ORAL EVERY 6 HOURS PRN
Qty: 180 TABLET | Refills: 0 | Status: SHIPPED | OUTPATIENT
Start: 2020-06-04
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
## 2020-06-04 NOTE — PATIENT INSTRUCTIONS
Your symptoms show that you may have coronavirus (COVID-19). This illness can cause fever, cough and trouble breathing. Many people get a mild case and get better on their own. Some people can get very sick.     Not all patients are tested for COVID-19. If you need to be tested, your care team will let you know.     How can I protect others?    Without a test, we can't know for sure that you have COVID-19. For safety, it's very important to follow these rules.    Stay home and away from others (self-isolate) until:    At least 10 days have passed since your symptoms started. And     You've had no fever--and no medicine that reduces fever--for 3 full days (72 hours). And      Your other symptoms have resolved (gotten better).     During this time:    Stay in your own room (and use your own bathroom), if you can.    Stay away from others in your home. No hugging, kissing or shaking hands.    No visitors.    Don't go to work, school or anywhere else.     Clean  high touch  surfaces often (doorknobs, counters, handles, etc.). Use a household cleaning spray or wipes.    Cover your mouth and nose with a mask, tissue or wash cloth to avoid spreading germs.    Wash your hands and face often. Use soap and water.    For more tips, go to https://www.cdc.gov/coronavirus/2019-ncov/downloads/10Things.pdf.    How can I take care of myself?    1. Get lots of rest. Drink extra fluids (unless a doctor has told you not to).     2. Take Tylenol (acetaminophen) for fever or pain. If you have liver or kidney problems, ask your family doctor if it's okay to take Tylenol.     Adults can take either:     650 mg (two 325 mg pills) every 4 to 6 hours, or     1,000 mg (two 500 mg pills) every 8 hours as needed.     Note: Don't take more than 3,000 mg in one day.   Acetaminophen is found in many medicines (both prescribed and over-the-counter medicines). Read all labels to be sure you don't take too much.   For children, check the Tylenol  bottle for the right dose. The dose is based on the child's age or weight.    3. If you have other health problems (like cancer, heart failure, an organ transplant or severe kidney disease): Call your specialty clinic if you don't feel better in the next 2 days.    4. Know when to call 911: If your breathing is so bad that it keeps you from doing normal activities, call 911 or go to the emergency room. Tell them that you've been staying home and may have COVID-19.      What are the symptoms of COVID-19?     The most common symptoms are cough, fever and trouble breathing.     Less common symptoms include body aches, chills, diarrhea (loose, watery poops), fatigue (feeling very tired), headache, runny nose, sore throat and loss of smell.     COVID-19 can cause severe coughing (bronchitis) and lung infection (pneumonia).    How does it spread?     The virus may spread when a person coughs or sneezes into the air. The virus can travel about 6 feet this way, and it can live on surfaces.      Common  (household disinfectants) will kill the virus.    Who is at risk?  Anyone can catch COVID-19 if they're around someone who has the virus.    How can others protect themselves?     Stay away from people who have COVID-19 (or symptoms of COVID-19).    Wash hands often with soap and water. Or, use hand  with at least 60% alcohol.    Avoid touching the eyes, nose or mouth.     Wear a face mask when you go out in public, when sick or when caring for a sick person.      For more about COVID-19 and caring for yourself at home, please visit the CDC website at https://www.cdc.gov/coronavirus/2019-ncov/about/steps-when-sick.html.     To learn about care at [facility], go to [email or web address].    Below are the COVID-19 hotlines at the [place] [facility] ([facility]). Interpreters are available.     For health questions: Call 887-519-0564 or 1-306.197.1339 (7 a.m. to 7  p.m.)    For questions about schools and childcare: Call 017-372-7935 or 1-799.242.2535 (7 a.m. to 7 p.m.)

## 2020-06-04 NOTE — TELEPHONE ENCOUNTER
I see patient did [facility] self triage yesterday, [facility] was advised as she might need covid19 screening.    Symptoms per self triage:  Muscle pain, headache, new loss of taste or smell.    Does not appear she did [facility].    Per demographics, does not require .    Plan to advise virtual visit.    Attempted to call patient at mobile number, rang many times, no answer or voicemail, unable to leave a message.    Attempted to call patient at home number, left message on voicemail; patient was instructed to return call to [facility] RN directly on the RN call back line at [phone]     Sheree Mccrary, RODRIGUEZ  [facility]
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
## 2020-06-04 NOTE — TELEPHONE ENCOUNTER
Appropriate to schedule patient into virtual urgent care as she needs evaluation and they can do that, refer for covid testing if needed, and provide a work note.    Ronak Rosas MD

## 2020-06-04 NOTE — TELEPHONE ENCOUNTER
Called and scheduled patient.    Patient stated understanding and agreeable with the plan of care. Qian Patino,RN,BSN, [facility].

## 2020-06-04 NOTE — PROGRESS NOTES
"Leticia Morales is a 34 year old female who is being evaluated via a billable telephone visit.      The patient has been notified of following:     \"This telephone visit will be conducted via a call between you and your physician/provider. We have found that certain health care needs can be provided without the need for a physical exam.  This service lets us provide the care you need with a short phone conversation.  If a prescription is necessary we can send it directly to your pharmacy.  If lab work is needed we can place an order for that and you can then stop by our lab to have the test done at a later time.    Telephone visits are billed at different rates depending on your insurance coverage. During this emergency period, for some insurers they may be billed the same as an in-person visit.  Please reach out to your insurance provider with any questions.    If during the course of the call the physician/provider feels a telephone visit is not appropriate, you will not be charged for this service.\"    Patient has given verbal consent for Telephone visit?  Yes    What phone number would you like to be contacted at? [phone]    How would you like to obtain your AVS? Misti Mauro     Leticia Morales is a 34 year old female who presents via phone visit today for the following health issues:    HPI  Patient needs to be tested for coronavirus before returning to work.  Patient reports for the past 2 days she has been having fever, chills, headache, feeling fatigued and tired.  She denies cough, denies shortness of breath, denies diarrhea or vomiting she has no skin rashes.  She has no loss of taste or smell.    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  However, she is not aware of being exposed to anyone with COVID symptoms.  She is not aware of anyone sick around her.  No sickness at home.  No history of travel.    Patient reports history of migraine headaches she does take Imitrex " however does not helping her symptoms.    She also reports history of acid reflux.    Patient Active Problem List   Diagnosis     Health Care Home     Migraine with aura     Vitamin D deficiency     History of psychosis in setting of severe medical illness     PTSD (post-traumatic stress disorder)     Adjustment disorder with depressed mood     Cognitive deficits     Depression with anxiety     Chronic migraine without aura without status migrainosus, not intractable     Gastroesophageal reflux disease without esophagitis     Past Surgical History:   Procedure Laterality Date     THORACIC SURGERY      trach       Social History     Tobacco Use     Smoking status: Light Tobacco Smoker     Types: Hookah     Smokeless tobacco: Never Used   Substance Use Topics     Alcohol use: No     Family History   Problem Relation Age of Onset     Diabetes Father      Hypertension Father      Cancer Father          Current Outpatient Medications   Medication Sig Dispense Refill     acetaminophen (TYLENOL) 500 MG tablet Take 2 tablets (1,000 mg) by mouth every 6 hours as needed for mild pain 180 tablet 0     Aspirin-Acetaminophen-Caffeine (EXCEDRIN MIGRAINE PO)        ibuprofen (ADVIL/MOTRIN) 800 MG tablet Take 1 tablet (800 mg) by mouth every 8 hours as needed for moderate pain 90 tablet 0     omeprazole (PRILOSEC) 20 MG DR capsule Take 1 capsule (20 mg) by mouth daily 90 capsule 3     Riboflavin 400 MG TABS Take 1 tablet by mouth daily 90 tablet 3     SUMAtriptan (IMITREX) 50 MG tablet Take 2 tablets (100 mg) by mouth at onset of headache for migraine 15 tablet 0     vitamin D3 (CHOLECALCIFEROL) 2000 units (50 mcg) tablet Take 1 tablet (2,000 Units) by mouth daily 90 tablet 3     Allergies   Allergen Reactions     Macrodantin [Nitrofurantoin Macrocrystal] Other (See Comments)     itching     Recent Labs   Lab Test 05/06/19  1612 10/29/17  1820 03/19/17  1015 04/11/13  1659   ALT  --  11 12 21   CR  --  0.65 0.57 0.68   GFRESTIMATED   --  >90 >90  Non  GFR Calc   >90   GFRESTBLACK  --  >90 >90   GFR Calc   >90   POTASSIUM  --  3.4 3.9 4.2   TSH 1.12  --   --   --         Reviewed and updated as needed this visit by Provider         Review of Systems   Constitutional, HEENT, cardiovascular, pulmonary, gi and gu systems are negative, except as otherwise noted.       Objective   Reported vitals:  There were no vitals taken for this visit.   healthy, alert and no distress  PSYCH: Alert and oriented times 3; coherent speech, normal   rate and volume, able to articulate logical thoughts, able   to abstract reason, no tangential thoughts, no hallucinations   or delusions  Her affect is normal and pleasant  RESP: No cough, no audible wheezing, able to talk in full sentences  Remainder of exam unable to be completed due to telephone visits    Diagnostic Test Results:  Labs reviewed in Epic  Orders Placed This Encounter   Procedures     Symptomatic COVID-19 Virus (Coronavirus) by PCR         Assessment/Plan:  1. COVID-19 ruled out  Advised patient to stay home, isolate herself.  Try to avoid crowds, try to avoid her family.  Try to use a different bathroom and a bedroom, wear a mask all the time.  Discussed with pt. To stay home, off work , until her results comes back.  And if it comes back positive then she should be home until she is 72 hours if no symptom, and at least  10 days been passed since she started having symptoms  - Symptomatic COVID-19 Virus (Coronavirus) by PCR; Future    2. Cough    - Symptomatic COVID-19 Virus (Coronavirus) by PCR; Future  - acetaminophen (TYLENOL) 500 MG tablet; Take 2 tablets (1,000 mg) by mouth every 6 hours as needed for mild pain  Dispense: 180 tablet; Refill: 0  - ibuprofen (ADVIL/MOTRIN) 800 MG tablet; Take 1 tablet (800 mg) by mouth every 8 hours as needed for moderate pain  Dispense: 90 tablet; Refill: 0    3. Intractable tension-type headache, unspecified chronicity pattern    -  Symptomatic COVID-19 Virus (Coronavirus) by PCR; Future  - acetaminophen (TYLENOL) 500 MG tablet; Take 2 tablets (1,000 mg) by mouth every 6 hours as needed for mild pain  Dispense: 180 tablet; Refill: 0  - ibuprofen (ADVIL/MOTRIN) 800 MG tablet; Take 1 tablet (800 mg) by mouth every 8 hours as needed for moderate pain  Dispense: 90 tablet; Refill: 0    4. Gastroesophageal reflux disease without esophagitis    - omeprazole (PRILOSEC) 20 MG DR capsule; Take 1 capsule (20 mg) by mouth daily  Dispense: 90 capsule; Refill: 3    5. Fever and chills    - acetaminophen (TYLENOL) 500 MG tablet; Take 2 tablets (1,000 mg) by mouth every 6 hours as needed for mild pain  Dispense: 180 tablet; Refill: 0  - ibuprofen (ADVIL/MOTRIN) 800 MG tablet; Take 1 tablet (800 mg) by mouth every 8 hours as needed for moderate pain  Dispense: 90 tablet; Refill: 0    No follow-ups on file.      Phone call duration:  15 minutes      Patient Instructions   Your symptoms show that you may have coronavirus (COVID-19). This illness can cause fever, cough and trouble breathing. Many people get a mild case and get better on their own. Some people can get very sick.     Not all patients are tested for COVID-19. If you need to be tested, your care team will let you know.     How can I protect others?    Without a test, we can't know for sure that you have COVID-19. For safety, it's very important to follow these rules.    Stay home and away from others (self-isolate) until:    At least 10 days have passed since your symptoms started. And     You've had no fever--and no medicine that reduces fever--for 3 full days (72 hours). And      Your other symptoms have resolved (gotten better).     During this time:    Stay in your own room (and use your own bathroom), if you can.    Stay away from others in your home. No hugging, kissing or shaking hands.    No visitors.    Don't go to work, school or anywhere else.     Clean  high touch  surfaces often  (doorknobs, counters, handles, etc.). Use a household cleaning spray or wipes.    Cover your mouth and nose with a mask, tissue or wash cloth to avoid spreading germs.    Wash your hands and face often. Use soap and water.    For more tips, go to https://www.cdc.gov/coronavirus/2019-ncov/downloads/10Things.pdf.    How can I take care of myself?    1. Get lots of rest. Drink extra fluids (unless a doctor has told you not to).     2. Take Tylenol (acetaminophen) for fever or pain. If you have liver or kidney problems, ask your family doctor if it's okay to take Tylenol.     Adults can take either:     650 mg (two 325 mg pills) every 4 to 6 hours, or     1,000 mg (two 500 mg pills) every 8 hours as needed.     Note: Don't take more than 3,000 mg in one day.   Acetaminophen is found in many medicines (both prescribed and over-the-counter medicines). Read all labels to be sure you don't take too much.   For children, check the Tylenol bottle for the right dose. The dose is based on the child's age or weight.    3. If you have other health problems (like cancer, heart failure, an organ transplant or severe kidney disease): Call your specialty clinic if you don't feel better in the next 2 days.    4. Know when to call 911: If your breathing is so bad that it keeps you from doing normal activities, call 911 or go to the emergency room. Tell them that you've been staying home and may have COVID-19.      What are the symptoms of COVID-19?     The most common symptoms are cough, fever and trouble breathing.     Less common symptoms include body aches, chills, diarrhea (loose, watery poops), fatigue (feeling very tired), headache, runny nose, sore throat and loss of smell.     COVID-19 can cause severe coughing (bronchitis) and lung infection (pneumonia).    How does it spread?     The virus may spread when a person coughs or sneezes into the air. The virus can travel about 6 feet this way, and it can live on surfaces.       Common  (household disinfectants) will kill the virus.    Who is at risk?  Anyone can catch COVID-19 if they're around someone who has the virus.    How can others protect themselves?     Stay away from people who have COVID-19 (or symptoms of COVID-19).    Wash hands often with soap and water. Or, use hand  with at least 60% alcohol.    Avoid touching the eyes, nose or mouth.     Wear a face mask when you go out in public, when sick or when caring for a sick person.      For more about COVID-19 and caring for yourself at home, please visit the CDC website at https://www.cdc.gov/coronavirus/2019-ncov/about/steps-when-sick.html.     To learn about care at [facility], go to [email or web address].    Below are the COVID-19 hotlines at the [place] Department of Health ([facility]). Interpreters are available.     For health questions: Call 127-597-2574 or 1-849.548.5212 (7 a.m. to 7 p.m.)    For questions about schools and childcare: Call 411-757-2323 or 1-712.251.1324 (7 a.m. to 7 p.m.)      Marc Dye MD

## 2020-06-10 DIAGNOSIS — Z20.822 COVID-19 RULED OUT: ICD-10-CM

## 2020-06-10 DIAGNOSIS — G44.201 INTRACTABLE TENSION-TYPE HEADACHE, UNSPECIFIED CHRONICITY PATTERN: ICD-10-CM

## 2020-06-10 DIAGNOSIS — R05.9 COUGH: ICD-10-CM

## 2020-06-10 PROCEDURE — U0003 INFECTIOUS AGENT DETECTION BY NUCLEIC ACID (DNA OR RNA); SEVERE ACUTE RESPIRATORY SYNDROME CORONAVIRUS 2 (SARS-COV-2) (CORONAVIRUS DISEASE [COVID-19]), AMPLIFIED PROBE TECHNIQUE, MAKING USE OF HIGH THROUGHPUT TECHNOLOGIES AS DESCRIBED BY CMS-2020-01-R: HCPCS | Mod: 90 | Performed by: FAMILY MEDICINE

## 2020-06-10 PROCEDURE — 99000 SPECIMEN HANDLING OFFICE-LAB: CPT | Performed by: FAMILY MEDICINE

## 2020-06-10 NOTE — LETTER
June 12, 2020        Leticia Morales  [address]   [place] [address]    This letter provides a written record that you were tested for COVID-19 on 6/10/20.   Your result was negative.    This means that we didn t find the virus that causes COVID-19 in your sample. A test may show negative when you do actually have the virus. This can happen when the virus is in the early stages of infection, before you feel illness symptoms.    Even if you don t have symptoms, they may still appear. For safety, it s very important to follow these rules.    Keep yourself away from others (self-isolation):      Stay home. Don t go to work, school or anywhere else.     Stay in your own room (and use your own bathroom), if you can.    Stay away from others in your home. No hugging, kissing or shaking hands. No visitors.    Clean  high touch  surfaces often (doorknobs, counters, handles, etc.). Use a household cleaning spray or wipes.    Cover your mouth and nose with a mask, tissue or washcloth to avoid spreading germs.    Wash your hands and face often with soap and water.    Stay in self-isolation until you meet ALL of the guidelines below:    1. You have had no fever for at least 72 hours (that is 3 full days of no fever without the use of medicine that reduces fevers), AND  2. other symptoms (such as cough, shortness of breath) have gotten better, AND  3. at least 10 days have passed since your symptoms first appeared.    Going back to work  Check with your employer for any guidelines to follow for going back to work.    Employers: This document serves as formal notice that your employee tested negative for COVID-19, as of the testing date shown above.    For questions regarding this letter or your Negative COVID-19 result, call [phone] between 8A to 6:30P (M-F) and 10A to 6:30P (weekends).

## 2020-06-11 LAB
SARS-COV-2 RNA SPEC QL NAA+PROBE: NOT DETECTED
SPECIMEN SOURCE: NORMAL

## 2020-07-14 DIAGNOSIS — G43.109 MIGRAINE WITH AURA AND WITHOUT STATUS MIGRAINOSUS, NOT INTRACTABLE: ICD-10-CM

## 2020-07-14 RX ORDER — SUMATRIPTAN 50 MG/1
100 TABLET, FILM COATED ORAL
Qty: 15 TABLET | Refills: 0 | Status: SHIPPED | OUTPATIENT
Start: 2020-07-14 | End: 2020-07-20

## 2020-07-14 NOTE — TELEPHONE ENCOUNTER

## 2020-07-20 ENCOUNTER — MYC REFILL (OUTPATIENT)
Dept: FAMILY MEDICINE | Facility: CLINIC | Age: 35
End: 2020-07-20

## 2020-07-20 DIAGNOSIS — G43.109 MIGRAINE WITH AURA AND WITHOUT STATUS MIGRAINOSUS, NOT INTRACTABLE: ICD-10-CM

## 2020-07-20 DIAGNOSIS — K21.9 GASTROESOPHAGEAL REFLUX DISEASE WITHOUT ESOPHAGITIS: ICD-10-CM

## 2020-07-20 RX ORDER — SUMATRIPTAN 50 MG/1
100 TABLET, FILM COATED ORAL
Qty: 15 TABLET | Refills: 0 | Status: SHIPPED | OUTPATIENT
Start: 2020-07-20 | End: 2020-10-14

## 2020-08-04 ENCOUNTER — NURSE TRIAGE (OUTPATIENT)
Dept: NURSING | Facility: CLINIC | Age: 35
End: 2020-08-04

## 2020-08-04 ENCOUNTER — VIRTUAL VISIT (OUTPATIENT)
Dept: FAMILY MEDICINE | Facility: OTHER | Age: 35
End: 2020-08-04

## 2020-08-04 DIAGNOSIS — Z20.822 SUSPECTED COVID-19 VIRUS INFECTION: Primary | ICD-10-CM

## 2020-08-04 NOTE — PROGRESS NOTES
"Date: 2020 15:26:37  Clinician: Juanito Beltre  Clinician NPI: [id number]  Patient: Leticia Morales  Patient : 1985  Patient Address: [address] [place] [address]  Patient Phone: [phone]  Visit Protocol: URI  Patient Summary:  Leticia is a 34 year old ( : 1985 ) female who initiated a Visit for COVID-19 (Coronavirus) evaluation and screening. When asked the question \"Please sign me up to receive news, health information and promotions. \", Leticia responded \"No\".    When asked when her symptoms started, Leticia reported that she does not have any symptoms.   She denies having recent facial or sinus surgery in the past 60 days and taking antibiotic medication in the past month.    Pertinent COVID-19 (Coronavirus) information  In the past 14 days, Leticia has not worked in a congregate living setting.   She either works or volunteers as a healthcare worker or a , or works or volunteers in a healthcare facility. She does not provide direct patient care. 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 She lives with a healthcare worker.   Leticia has had a close contact with a laboratory-confirmed COVID-19 patient in the last 14 days. She was not exposed at her work. Additional information about contact with COVID-19 (Coronavirus) patient as reported by the patient (free text): My family have tested positive for COVID-19   Pertinent medical history  Leticia does not get yeast infections when she takes antibiotics.   Leticia needs a return to work/school note.   Weight: 126 lbs   Leticia smokes or uses smokeless tobacco.   She denies pregnancy and denies breastfeeding. She has menstruated in the past month.   Weight: 126 lbs    MEDICATIONS: sumatriptan-naproxen oral, ALLERGIES: Microcyn  Clinician Response:  Dear Leticia,   Your symptoms show that you may have coronavirus " "(COVID-19). This illness can cause fever, cough and trouble breathing. Many people get a mild case and get better on their own. Some people can get very sick.  What should I do?  We would like to test you for this virus.   1. Please call [phone] to schedule your visit. Explain that you were [facility] to have a COVID-19 test. Be ready to share your [facility] visit ID number.  The following will serve as your written order for this COVID Test, ordered by me, for the indication of suspected COVID [Z20.828]: The test will be ordered in [facility], our electronic health record, after you are scheduled. It will show as ordered and authorized by Terry Mratinez MD.  Order: COVID-19 (Coronavirus) PCR for SYMPTOMATIC testing from [facility].      2. When it's time for your COVID test:  Stay at least 6 feet away from others. (If someone will drive you to your test, stay in the backseat, as far away from the  as you can.)   Cover your mouth and nose with a mask, tissue or washcloth.  Go straight to the testing site. Don't make any stops on the way there or back.      3.Starting now: Stay home and away from others (self-isolate) until:   You've had no fever---and no medicine that reduces fever---for one full day (24 hours). And...   Your other symptoms have gotten better. For example, your cough or breathing has improved. And...   At least 10 days have passed since your symptoms started.       During this time, don't leave the house except for testing or medical care.   Stay in your own room, even for meals. Use your own bathroom if you can.   Stay away from others in your home. No hugging, kissing or shaking hands. No visitors.  Don't go to work, school or anywhere else.    Clean \"high touch\" surfaces often (doorknobs, counters, handles, etc.). Use a household cleaning spray or wipes. You'll find a full list of  on the EPA website: www.epa.gov/pesticide-registration/list-n-disinfectants-use-against-sars-cov-2.   " Cover your mouth and nose with a mask, tissue or washcloth to avoid spreading germs.  Wash your hands and face often. Use soap and water.  Caregivers in these groups are at risk for severe illness due to COVID-19:  o People 65 years and older  o People who live in a nursing home or long-term care facility  o People with chronic disease (lung, heart, cancer, diabetes, kidney, liver, immunologic)  o People who have a weakened immune system, including those who:   Are in cancer treatment  Take medicine that weakens the immune system, such as corticosteroids  Had a bone marrow or organ transplant  Have an immune deficiency  Have poorly controlled HIV or AIDS  Are obese (body mass index of 40 or higher)  Smoke regularly   o Caregivers should wear gloves while washing dishes, handling laundry and cleaning bedrooms and bathrooms.  o Use caution when washing and drying laundry: Don't shake dirty laundry, and use the warmest water setting that you can.  o For more tips, go to www.cdc.gov/coronavirus/2019-ncov/downloads/10Things.pdf.    4.Sign up for [employer]. We know it's scary to hear that you might have COVID-19. We want to track your symptoms to make sure you're okay over the next 2 weeks. Please look for an email from [employer]---this is a free, online program that we'll use to keep in touch. To sign up, follow the link in the email. Learn more at [email or web address]  How can I take care of myself?   Get lots of rest. Drink extra fluids (unless a doctor has told you not to).   Take Tylenol (acetaminophen) for fever or pain. If you have liver or kidney problems, ask your family doctor if it's okay to take Tylenol.   Adults can take either:    650 mg (two 325 mg pills) every 4 to 6 hours, or...   1,000 mg (two 500 mg pills) every 8 hours as needed.    Note: Don't take more than 3,000 mg in one day. Acetaminophen is found in many medicines (both prescribed and over-the-counter medicines). Read all  labels to be sure you don't take too much.   For children, check the Tylenol bottle for the right dose. The dose is based on the child's age or weight.    If you have other health problems (like cancer, heart failure, an organ transplant or severe kidney disease): Call your specialty clinic if you don't feel better in the next 2 days.       Know when to call 911. Emergency warning signs include:    Trouble breathing or shortness of breath Pain or pressure in the chest that doesn't go away Feeling confused like you haven't felt before, or not being able to wake up Bluish-colored lips or face.  Where can I get more information?   [facility] -- About COVID-19: [email or web address]   CDC -- What to Do If You're Sick: www.cdc.gov/coronavirus/2019-ncov/about/steps-when-sick.html   [facility] -- Ending Home Isolation: www.cdc.gov/coronavirus/2019-ncov/hcp/disposition-in-home-patients.html   [facility] -- Caring for Someone: www.cdc.gov/coronavirus/2019-ncov/if-you-are-sick/care-for-someone.html   [facility] -- Interim Guidance for Hospital Discharge to Home: www.[facility].[place].us/diseases/coronavirus/hcp/hospdischarge.pdf   [facility] clinical trials (COVID-19 research studies): clinicalaffairs.[facility]-clinical-trials    Below are the COVID-19 hotlines at the [facility] ([facility]). Interpreters are available.    For health questions: Call 914-140-9735 or 1-662.453.9362 (7 a.m. to 7 p.m.) For questions about schools and childcare: Call 571-097-8800 or 1-124.887.1507 (7 a.m. to 7 p.m.)    Diagnosis: Contact with and (suspected) exposure to other viral communicable diseases  Diagnosis ICD: Z20.828

## 2020-08-04 NOTE — TELEPHONE ENCOUNTER
Leticia states that her family was tested for covid and is positive.  Leticia is wanting to get tested.   Denies symptoms.  Leticia feels she may be asymptomatic.  [facility]      Reason for Disposition    COVID-19 Testing, questions about    Additional Information    Negative: SEVERE difficulty breathing (e.g., struggling for each breath, speaks in single words)    Negative: Difficult to awaken or acting confused (e.g., disoriented, slurred speech)    Negative: Bluish (or gray) lips or face now    Negative: Shock suspected (e.g., cold/pale/clammy skin, too weak to stand, low BP, rapid pulse)    Negative: Sounds like a life-threatening emergency to the triager    Negative: SEVERE or constant chest pain or pressure (Exception: mild central chest pain, present only when coughing)    Negative: MODERATE difficulty breathing (e.g., speaks in phrases, SOB even at rest, pulse 100-120)    Negative: Patient sounds very sick or weak to the triager    Negative: MILD difficulty breathing (e.g., minimal/no SOB at rest, SOB with walking, pulse <100)    Negative: Chest pain or pressure    Negative: Fever > 103 F (39.4 C)    Negative: [1] Fever > 101 F (38.3 C) AND [2] age > 60    Negative: [1] Fever > 100.0 F (37.8 C) AND [2] bedridden (e.g., nursing home patient, CVA, chronic illness, recovering from surgery)    Negative: HIGH RISK patient (e.g., age > 64 years, diabetes, heart or lung disease, weak immune system)    Negative: Fever present > 3 days (72 hours)    Negative: [1] Fever returns after gone for over 24 hours AND [2] symptoms worse or not improved    Negative: [1] Continuous (nonstop) coughing interferes with work or school AND [2] no improvement using cough treatment per protocol    Negative: [1] COVID-19 infection suspected by caller or triager AND [2] mild symptoms (cough, fever, or others) AND [3] no complications or SOB    Negative: Cough present > 3 weeks    Negative: [1] COVID-19  diagnosed by positive lab test AND [2] mild symptoms (e.g., cough, fever, others) AND [3] no complications or SOB    Negative: [1] COVID-19 diagnosed by HCP (doctor, NP or PA) AND [2] mild symptoms (e.g., cough, fever, others) AND [3] no complications or SOB    Negative: COVID-19 Home Isolation, questions about    Protocols used: CORONAVIRUS (COVID-19) DIAGNOSED OR [id number]-LW 5.16.20

## 2020-08-06 DIAGNOSIS — Z20.822 SUSPECTED COVID-19 VIRUS INFECTION: ICD-10-CM

## 2020-08-06 PROCEDURE — U0003 INFECTIOUS AGENT DETECTION BY NUCLEIC ACID (DNA OR RNA); SEVERE ACUTE RESPIRATORY SYNDROME CORONAVIRUS 2 (SARS-COV-2) (CORONAVIRUS DISEASE [COVID-19]), AMPLIFIED PROBE TECHNIQUE, MAKING USE OF HIGH THROUGHPUT TECHNOLOGIES AS DESCRIBED BY CMS-2020-01-R: HCPCS | Performed by: FAMILY MEDICINE

## 2020-08-07 LAB
SARS-COV-2 RNA SPEC QL NAA+PROBE: ABNORMAL
SPECIMEN SOURCE: ABNORMAL

## 2020-08-08 ENCOUNTER — TELEPHONE (OUTPATIENT)
Dept: LAB | Facility: CLINIC | Age: 35
End: 2020-08-08

## 2020-08-08 NOTE — TELEPHONE ENCOUNTER
Coronavirus (COVID-19) Notification    Reason for call  Notify of POSITIVE  COVID-19 lab result, assess symptoms,  review [facility] recommendations    Lab Result   Lab test for 2019-nCoV rRt-PCR or SARS-COV-2 PCR  Oropharyngeal AND/OR nasopharyngeal swabs were POSITIVE for 2019-nCoV RNA [OR] SARS-COV-2 RNA (COVID-19) RNA     We have been unable to reach Patient by phone at this time to notify of their Positive COVID-19 result.  Left voicemail message requesting a call back to [phone] [facility] for results.        POSITIVE COVID-19 Letter sent.    [Name]  Fidelia Grullon RN

## 2020-08-31 ENCOUNTER — TELEPHONE (OUTPATIENT)
Dept: FAMILY MEDICINE | Facility: CLINIC | Age: 35
End: 2020-08-31
Payer: COMMERCIAL

## 2020-08-31 NOTE — TELEPHONE ENCOUNTER
[facility] phone call message- general phone call:    Reason for call: Patient stated they tested positive for covid on 8/6 and have been quarantined ever since. patient stated that her employer will not let her return to work until she speaks to a nurse at her primary clinic. Please advise.    Action Desired: call back from RN     Return call needed: Yes    OK to leave a message on voice mail? Yes    Advised patient response may take up to 2 business days: Yes    Primary language: Citizen of [place]      needed? No    Call taken on August 31, 2020 at 3:01 PM by Leatha Damon    Route to [facility] TRIAGE

## 2020-08-31 NOTE — TELEPHONE ENCOUNTER
RN reached out to patient to discuss her questions. Patient states she was diagnosed with COVID on 8/6/20 via [facility] and is still having symptoms. Patient states she still has body aches, cough, and loss of taste. Denies fever. Patient's work told her to call PCP to determine when she can return to work. RN offered appointment to discuss symptoms with provider but patient declined.    RN informed patient of current CDC guidelines that patients stay home and away from others (self-isolate) until:  ? At least 10 days have passed since your symptoms started. And   ? You've had no fever--and no medicine that reduces fever--for 1 full day (24 hours). And   ? Your other symptoms have resolved (gotten better).      Patient requesting letter form Dr. Beck with these recommendations be sent to her via [employer] so she can give it to her work. Routing to PCP to advise.   Erin Guy RN

## 2020-09-04 ENCOUNTER — OFFICE VISIT (OUTPATIENT)
Dept: FAMILY MEDICINE | Facility: CLINIC | Age: 35
End: 2020-09-04
Payer: COMMERCIAL

## 2020-09-04 VITALS — OXYGEN SATURATION: 100 % | HEART RATE: 100 BPM | TEMPERATURE: 99 F

## 2020-09-04 DIAGNOSIS — Z20.822 ENCOUNTER FOR LABORATORY TESTING FOR COVID-19 VIRUS: Primary | ICD-10-CM

## 2020-09-04 NOTE — PROGRESS NOTES
Leticia is a 34 year old  who presents for   Patient presents with:  Covid 19 Testing      Assessment and Plan      1. Encounter for laboratory testing for COVID-19 virus  Patient still having some COVID/ post COVID symptoms will inform her of the results  She should not return to work until symptoms are resolved of lessened and COVID negative.    Patient does report using my chart will use that to communicate with her.  - Symptomatic COVID-19 Virus (Coronavirus) by PCR; Future  - Symptomatic COVID-19 Virus (Coronavirus) by PCR   COVID instructions given to patient.  Return if symptoms worsen or fail to improve, for Recheck Depression.    There are no discontinued medications.      Simon Richmond MD         [facility]       Leticia is a 34 year old  who presents for   Patient presents with:  Covid 19 Testing        Visit [facility]  1. Here for COVID retesting  Patient reports that she was diagnosed August 5 and her family was diagnosed before her during her episode of COVID she was reports being quite ill had severe cough fever myalgias and fatigue.  All her symptoms have decreased though she still has some altered taste feels fatigued still some achiness and a mild cough has some shortness of breath with exercise.  She denies productive cough denies recent fever and she does report that her symptoms are improving though slowly.  On history depression though denies active depression at this time.          Problem, Medication and Allergy Lists were reviewed and updated if needed..  Patient is an established patient of this clinic.            Review of Systems:   Review of Systems  6 point review of symptoms was otherwise negative except as noted in HPI         Physical Exam:     Vitals:    09/04/20 1703   Pulse: 100   Temp: 99  F (37.2  C)   SpO2: 100%     There is no height or weight on file to calculate BMI.  Vitals were reviewed and were normal     Physical Exam  Vitals signs and nursing note reviewed.   Constitutional:        General: She is not in acute distress.     Appearance: She is well-developed. She is not diaphoretic.   HENT:      Nose: Nose normal.      Mouth/Throat:      Mouth: Mucous membranes are moist.   Cardiovascular:      Rate and Rhythm: Normal rate.   Skin:     General: Skin is warm and dry.   Neurological:      Mental Status: She is alert and oriented to person, place, and time.   Psychiatric:         Behavior: Behavior normal.         Thought Content: Thought content normal.           Results:   Results are ordered and pending    Options for treatment and follow-up care were reviewed with the patient. Leticia Morales  engaged in the decision making process and verbalized understanding of the options discussed and agreed with the final plan.  Simon Richmond MD  [facility]

## 2020-09-05 LAB
SARS-COV-2 RNA SPEC QL NAA+PROBE: NOT DETECTED
SPECIMEN SOURCE: NORMAL

## 2020-10-01 ENCOUNTER — TELEPHONE (OUTPATIENT)
Dept: FAMILY MEDICINE | Facility: CLINIC | Age: 35
End: 2020-10-01

## 2020-10-01 NOTE — LETTER
[facility]  [address],  [address]  [place] [address]  Phone: [phone]  Fax: [phone]    October 1, 2020        Leticia Morales  [address]  [place] [address]          To whom it may concern:    RE: Leticia Kelly has had a negative COVID-19 testing on 9/4/20, and it has been > 10 days since symptom onset, and > 3 days since significant improvement in symptoms and resolution of fever. In accordance with CDC guidelines, she is able to return to work without restrictions.    Please contact me for questions or concerns.      Sincerely,      Garcia Muñoz, DO

## 2020-10-01 NOTE — TELEPHONE ENCOUNTER
Patient is requesting to have letter written to return to work on 10/12. Patient has been out of work due to their past positive covid test. Patient has only been experiencing exhaustion and thinks she should be okay by 10/12. Please call back with questions, okay to LVM.    Jessica Torres, Senior Patient Representative/

## 2020-10-01 NOTE — LETTER
[facility]  [address],  [address]  [facility] [address]  Phone: [phone]  Fax: [phone]    October 1, 2020        Leticia Morales  [address]  [place] [address]          To whom it may concern:    RE: Leticia Kelly has had a negative COVID-19 testing on 9/4/20, and it has been > 10 days since symptom onset, and > 3 days since significant improvement in symptoms and resolution of fever. In accordance with CDC guidelines, she is able to return to work without restrictions.    Please contact me for questions or concerns.      Sincerely,      Garcia Muñoz, DO

## 2020-10-13 DIAGNOSIS — G43.109 MIGRAINE WITH AURA AND WITHOUT STATUS MIGRAINOSUS, NOT INTRACTABLE: ICD-10-CM

## 2020-10-13 NOTE — TELEPHONE ENCOUNTER
Verify that the refill encounter hasn't been started Yes    [facility] phone call message- patient requesting a refill:    Full Medication Name: SUMAtriptan (IMITREX) 50 MG tablet    Dose: see chart     Pharmacy confirmed as   [id number] - [place], [place] - [address] AT [facility] [address]  [address]  [facility] [address]  Phone: [phone] Fax: [phone]  : Yes    Medication tab checked to see if medication has been sent  Yes    Additional Comments: Patient is out of med.     OK to leave a message on voice mail? Yes    Advised patient refill may take up to 2 business days? Yes    Primary language: Chilean      needed? No    Call taken on October 13, 2020 at 3:55 PM by Orly Cervantes    Route to [facility] MED REFILL

## 2020-10-13 NOTE — TELEPHONE ENCOUNTER
"Patient requesting refill of sumatriptan 50 mg tablet. Patient had office visit 9/4/20 for COVID swabbing otherwise has not been seen since 10/16/19. RN routing to PCP to order if appropriate. Will route high priority as patient is out of medication.   Erin Guy RN      Request for medication refill:    Providers if patient needs an appointment and you are willing to give a one month supply please refill for one month and  send a letter/MyChart using \".SMILLIMITEDREFILL\" .smillimited and route chart to \"P [facility] \" (Giving one month refill in non controlled medications is strongly recommended before denial)    If refill has been denied, meaning absolutely no refills without visit, please complete the smart phrase \".smirxrefuse\" and route it to the \"P [facility] MED REFILLS\"  pool to inform the patient and the pharmacy.    Erin Guy RN        "

## 2020-10-14 RX ORDER — SUMATRIPTAN 50 MG/1
100 TABLET, FILM COATED ORAL
Qty: 15 TABLET | Refills: 0 | Status: SHIPPED | OUTPATIENT
Start: 2020-10-14 | End: 2020-11-06

## 2020-11-06 DIAGNOSIS — G43.109 MIGRAINE WITH AURA AND WITHOUT STATUS MIGRAINOSUS, NOT INTRACTABLE: ICD-10-CM

## 2020-11-06 RX ORDER — SUMATRIPTAN 50 MG/1
100 TABLET, FILM COATED ORAL
Qty: 15 TABLET | Refills: 0 | Status: SHIPPED | OUTPATIENT
Start: 2020-11-06 | End: 2021-01-12

## 2020-11-06 NOTE — TELEPHONE ENCOUNTER
"Request for medication refill:  SUMAtriptan (IMITREX) 50 MG tablet    Providers if patient needs an appointment and you are willing to give a one month supply please refill for one month and  send a letter/MyChart using \".SMILLIMITEDREFILL\" .smillimited and route chart to \"P [facility] \" (Giving one month refill in non controlled medications is strongly recommended before denial)    If refill has been denied, meaning absolutely no refills without visit, please complete the smart phrase \".smirxrefuse\" and route it to the \"P [facility] MED REFILLS\"  pool to inform the patient and the pharmacy.    Dinorah Michaud MA        "

## 2020-12-13 ENCOUNTER — HEALTH MAINTENANCE LETTER (OUTPATIENT)
Age: 35
End: 2020-12-13

## 2020-12-15 ENCOUNTER — OFFICE VISIT (OUTPATIENT)
Dept: FAMILY MEDICINE | Facility: CLINIC | Age: 35
End: 2020-12-15
Payer: COMMERCIAL

## 2020-12-15 VITALS
HEART RATE: 100 BPM | SYSTOLIC BLOOD PRESSURE: 110 MMHG | DIASTOLIC BLOOD PRESSURE: 72 MMHG | TEMPERATURE: 98.6 F | OXYGEN SATURATION: 99 %

## 2020-12-15 DIAGNOSIS — R10.84 ABDOMINAL PAIN, GENERALIZED: ICD-10-CM

## 2020-12-15 DIAGNOSIS — Z20.822 COVID-19 RULED OUT: Primary | ICD-10-CM

## 2020-12-15 DIAGNOSIS — R19.7 DIARRHEA, UNSPECIFIED TYPE: ICD-10-CM

## 2020-12-15 PROCEDURE — 99214 OFFICE O/P EST MOD 30 MIN: CPT | Mod: GC | Performed by: STUDENT IN AN ORGANIZED HEALTH CARE EDUCATION/TRAINING PROGRAM

## 2020-12-15 RX ORDER — LOPERAMIDE HYDROCHLORIDE 2 MG/1
2 TABLET ORAL 4 TIMES DAILY PRN
Qty: 20 TABLET | Refills: 0 | Status: SHIPPED | OUTPATIENT
Start: 2020-12-15 | End: 2021-07-15

## 2020-12-15 RX ORDER — PROMETHAZINE HYDROCHLORIDE 25 MG/1
25 TABLET ORAL EVERY 6 HOURS PRN
Qty: 12 TABLET | Refills: 0 | Status: SHIPPED | OUTPATIENT
Start: 2020-12-15 | End: 2021-07-15

## 2020-12-15 ASSESSMENT — ENCOUNTER SYMPTOMS
BLOOD IN STOOL: 0
DIARRHEA: 1
SHORTNESS OF BREATH: 0
DYSURIA: 0
UNEXPECTED WEIGHT CHANGE: 0
NAUSEA: 1
COUGH: 0
ARTHRALGIAS: 0
MYALGIAS: 0
VOMITING: 1
ABDOMINAL PAIN: 1
WEAKNESS: 0
CONSTIPATION: 0
HEMATURIA: 0
FEVER: 0
HEADACHES: 0
WHEEZING: 0

## 2020-12-15 NOTE — PATIENT INSTRUCTIONS
1. Keep hydrated first before having foods. Diluted powerade or gatorade. Try taking a phenergan before trying to hydrate for the first couple of days.     2. If diarrhea is persisting with phenergan start the imodium     3. If you develop fevers, pain is worsening or you notice blood in your diarrhea call us back     4. A low dose of caffeine (50-100mg) could help if some of this is caffeine withdrawal     5. If not better by Friday we could consider doing a scan on you    Thank you for coming to [facility].    **If you had lab testing today and your results are reassuring or normal they will be be mailed to you or sent to your MyChart and you will be notified by email within 7 days.   **If the lab tests need quick action we will call you with the results.  The phone number we will call with results is # [phone] (home)    (home) . If this is not the best number please call our clinic and change the number.  **If any referrals were ordered today you should be getting a call in the next week.  If you don't hear about this within a week please call one of the following numbers   If your referral is for [facility] please call [phone]  If your referral is to outside [facility] please call the clinic number ([phone]) and ask for your team care coordinator.  If you need any refills please call your pharmacy and they will contact us.  If you have any concerns about today's visit or wish to schedule another appointment  please call our office during normal business hours  [phone] (8-5:00 M-F)  If you have urgent medical concerns please call [phone] at any time of the day.  If you have a medical emergency please call [phone]    Again thank you for choosing [facility] and please let us know how we can best partner with you to improve your and your family's health.

## 2020-12-15 NOTE — PROGRESS NOTES
HPI       Leticia Morales is a 34 year old  who presents for   Chief Complaint   Patient presents with     Diarrhea       5 days or so of progressive bilateral abdominal pain. About 2-3 days in then started having watery diarrhea. Nausea prominent but no vomiting. Thought could been food poisoning but not getting better. Has headaches w zofran in the past.  Has a hx of migraines and has a headache now as well consistent w prior migraines.     Also has cut out coffee this past week      Was COVID + in August.       No chance pregnant, LMP 2.5 weeks ago.  Not sexuallty active In months     Problem, Medication and Allergy Lists were reviewed and updated if needed..    Patient is an established patient of this clinic..         Review of Systems:   Review of Systems   Constitutional: Negative for fever and unexpected weight change.   Respiratory: Negative for cough, shortness of breath and wheezing.    Cardiovascular: Negative for chest pain.   Gastrointestinal: Positive for abdominal pain, diarrhea, nausea and vomiting. Negative for blood in stool and constipation.   Genitourinary: Negative for dysuria and hematuria.   Musculoskeletal: Negative for arthralgias and myalgias.   Neurological: Negative for weakness and headaches.            Physical Exam:     Vitals:    12/15/20 1305   BP: 110/72   Pulse: 100   Temp: 98.6  F (37  C)   SpO2: 99%     There is no height or weight on file to calculate BMI.  Vitals were reviewed and were normal     Physical Exam  Constitutional:       General: She is not in acute distress.     Appearance: Normal appearance. She is well-developed.   HENT:      Head: Normocephalic and atraumatic.      Comments: Dry mucous membranes  Eyes:      Conjunctiva/sclera: Conjunctivae normal.   Cardiovascular:      Rate and Rhythm: Normal rate and regular rhythm.      Heart sounds: Normal heart sounds.      Comments: Near tachycardic at time of exam HR 95  Pulmonary:      Effort: Pulmonary  effort is normal. No respiratory distress.   Abdominal:      General: Bowel sounds are normal. There is no distension.      Palpations: Abdomen is soft.      Tenderness: There is no abdominal tenderness.      Comments: hyderdynamic bowels, tender diffusely in bilateral lower quadrants, no rebound, negative psoas   Musculoskeletal: Normal range of motion.   Skin:     General: Skin is warm.      Capillary Refill: Capillary refill takes less than 2 seconds.      Findings: No rash.   Neurological:      Mental Status: She is alert and oriented to person, place, and time.           Results:   No testing ordered today    Assessment and Plan      Likely gastritis with possible contribution of acute caffeine withdrawal, but abdominal pain out of proportion compared to other symptoms for pure caffeine withdrawal. Signs of mild dehydration on exam. Will treat nausea with phenergan, small doses of caffeine trial, immodium for diarrhea. Push hydration over foods and eating bland. Low concern for STI cause of pain. Bilateral lower quadrant and afebrile make appendicitis less likely. If not able to stay hydrated, feeling worse on Friday would consider labwork/imaging, pelvic exam.       1. COVID-19 ruled out    - Symptomatic COVID-19 Virus (Coronavirus) by PCR; Future  - Symptomatic COVID-19 Virus (Coronavirus) by PCR    2. Abdominal pain, generalized    - promethazine (PHENERGAN) 25 MG tablet; Take 1 tablet (25 mg) by mouth every 6 hours as needed for nausea  Dispense: 12 tablet; Refill: 0  - loperamide (IMODIUM A-D) 2 MG tablet; Take 1 tablet (2 mg) by mouth 4 times daily as needed for diarrhea  Dispense: 20 tablet; Refill: 0    3. Diarrhea, unspecified type    - promethazine (PHENERGAN) 25 MG tablet; Take 1 tablet (25 mg) by mouth every 6 hours as needed for nausea  Dispense: 12 tablet; Refill: 0  - loperamide (IMODIUM A-D) 2 MG tablet; Take 1 tablet (2 mg) by mouth 4 times daily as needed for diarrhea  Dispense: 20 tablet;  Refill: 0       There are no discontinued medications.    Options for treatment and follow-up care were reviewed with the patient. Leticia Morales  engaged in the decision making process and verbalized understanding of the options discussed and agreed with the final plan.    Matty Victoria MD

## 2020-12-15 NOTE — PROGRESS NOTES
Preceptor Attestation:   Patient seen, evaluated and discussed with the resident. I have verified the content of the note, which accurately reflects my assessment of the patient and the plan of care.   Supervising Physician:  Nevin Markham MD

## 2020-12-18 DIAGNOSIS — R10.9 ABDOMINAL PAIN, UNSPECIFIED ABDOMINAL LOCATION: Primary | ICD-10-CM

## 2020-12-18 RX ORDER — DICYCLOMINE HYDROCHLORIDE 10 MG/1
10 CAPSULE ORAL
Qty: 20 CAPSULE | Refills: 0 | Status: SHIPPED | OUTPATIENT
Start: 2020-12-18 | End: 2021-07-15

## 2021-01-11 DIAGNOSIS — G43.109 MIGRAINE WITH AURA AND WITHOUT STATUS MIGRAINOSUS, NOT INTRACTABLE: ICD-10-CM

## 2021-01-11 NOTE — TELEPHONE ENCOUNTER

## 2021-01-12 RX ORDER — SUMATRIPTAN 50 MG/1
100 TABLET, FILM COATED ORAL
Qty: 15 TABLET | Refills: 0 | Status: SHIPPED | OUTPATIENT
Start: 2021-01-12 | End: 2021-02-08

## 2021-02-08 DIAGNOSIS — G43.109 MIGRAINE WITH AURA AND WITHOUT STATUS MIGRAINOSUS, NOT INTRACTABLE: ICD-10-CM

## 2021-02-08 RX ORDER — SUMATRIPTAN 50 MG/1
100 TABLET, FILM COATED ORAL
Qty: 15 TABLET | Refills: 0 | Status: SHIPPED | OUTPATIENT
Start: 2021-02-08 | End: 2021-03-04

## 2021-02-08 NOTE — TELEPHONE ENCOUNTER
"Request for medication refill:    Sumatriptan 50 MG    Providers if patient needs an appointment and you are willing to give a one month supply please refill for one month and  send a letter/MyChart using \".SMILLIMITEDREFILL\" .smillimited and route chart to \"P [facility] \" (Giving one month refill in non controlled medications is strongly recommended before denial)    If refill has been denied, meaning absolutely no refills without visit, please complete the smart phrase \".smirxrefuse\" and route it to the \"P [facility] MED REFILLS\"  pool to inform the patient and the pharmacy.    Laura Bedolla, CMA        "

## 2021-03-04 ENCOUNTER — VIRTUAL VISIT (OUTPATIENT)
Dept: FAMILY MEDICINE | Facility: CLINIC | Age: 36
End: 2021-03-04
Payer: COMMERCIAL

## 2021-03-04 DIAGNOSIS — G43.109 MIGRAINE WITH AURA AND WITHOUT STATUS MIGRAINOSUS, NOT INTRACTABLE: ICD-10-CM

## 2021-03-04 DIAGNOSIS — E55.9 VITAMIN D DEFICIENCY: ICD-10-CM

## 2021-03-04 PROCEDURE — 99441 PR PHYSICIAN TELEPHONE EVALUATION 5-10 MIN: CPT | Mod: TEL | Performed by: FAMILY MEDICINE

## 2021-03-04 RX ORDER — SUMATRIPTAN 50 MG/1
100 TABLET, FILM COATED ORAL
Qty: 15 TABLET | Refills: 0 | Status: SHIPPED | OUTPATIENT
Start: 2021-03-04 | End: 2021-04-12

## 2021-03-04 RX ORDER — CHOLECALCIFEROL (VITAMIN D3) 50 MCG
1 TABLET ORAL DAILY
Qty: 90 TABLET | Refills: 3 | Status: SHIPPED | OUTPATIENT
Start: 2021-03-04 | End: 2021-07-15

## 2021-03-04 RX ORDER — TOPIRAMATE 25 MG/1
25 TABLET, FILM COATED ORAL AT BEDTIME
Qty: 30 TABLET | Refills: 1 | Status: SHIPPED | OUTPATIENT
Start: 2021-03-04 | End: 2021-05-06

## 2021-03-04 NOTE — PROGRESS NOTES
Family Medicine Telephone Visit Note     Chief Complaint   Patient presents with     Medication Follow-up     wants refills on imatrex and vitamin D and was notified from her pharmacy that she had to make an appointment to get reflls      Refill Request     imatrex and vitamin D             HPI   Patients name: Leticia  Appointment start time:  11:50    35y F p/f refill on abortive migraine medication. Has had migraine symptoms since yesterday. +phono/photophobia, nausea, HA. Gets migraines 2-3x/week, sumatriptan has not been enough to cover all migraines in a month. Tried amitriptyline before but experienced drowsiness. Referred to neuro who recommended aimovig but was not covered.      Current Outpatient Medications   Medication Sig Dispense Refill     acetaminophen (TYLENOL) 500 MG tablet Take 2 tablets (1,000 mg) by mouth every 6 hours as needed for mild pain 180 tablet 0     ibuprofen (ADVIL/MOTRIN) 800 MG tablet Take 1 tablet (800 mg) by mouth every 8 hours as needed for moderate pain 90 tablet 0     omeprazole (PRILOSEC) 20 MG DR capsule Take 1 capsule (20 mg) by mouth daily 90 capsule 3     SUMAtriptan (IMITREX) 50 MG tablet Take 2 tablets (100 mg) by mouth at onset of headache for migraine 15 tablet 0     topiramate (TOPAMAX) 25 MG tablet Take 1 tablet (25 mg) by mouth At Bedtime 30 tablet 1     vitamin D3 (CHOLECALCIFEROL) 50 mcg (2000 units) tablet Take 1 tablet (50 mcg) by mouth daily 90 tablet 3     Aspirin-Acetaminophen-Caffeine (EXCEDRIN MIGRAINE PO)        dicyclomine (BENTYL) 10 MG capsule Take 1 capsule (10 mg) by mouth 4 times daily (before meals and nightly) (Patient not taking: Reported on 3/4/2021) 20 capsule 0     loperamide (IMODIUM A-D) 2 MG tablet Take 1 tablet (2 mg) by mouth 4 times daily as needed for diarrhea (Patient not taking: Reported on 3/4/2021) 20 tablet 0     promethazine (PHENERGAN) 25 MG tablet Take 1 tablet (25 mg) by mouth every 6 hours as needed for nausea (Patient not taking:  "Reported on 3/4/2021) 12 tablet 0     Riboflavin 400 MG TABS Take 1 tablet by mouth daily (Patient not taking: Reported on 3/4/2021) 90 tablet 3     Allergies   Allergen Reactions     Macrodantin [Nitrofurantoin Macrocrystal] Other (See Comments)     itching              Review of Systems:     Constitutional, HEENT, cardiovascular, pulmonary, gi and gu systems are negative, except as otherwise noted.         Physical Exam:     There were no vitals taken for this visit.  Estimated body mass index is 23.38 kg/m  as calculated from the following:    Height as of 10/16/19: 1.61 m (5' 3.39\").    Weight as of 10/16/19: 60.6 kg (133 lb 9.6 oz).    Exam:  Constitutional: healthy, alert and mild distress  Psychiatric: mentation appears normal and affect normal/bright          Assessment and Plan   1. Vitamin D deficiency  - vitamin D3 (CHOLECALCIFEROL) 50 mcg (2000 units) tablet; Take 1 tablet (50 mcg) by mouth daily  Dispense: 90 tablet; Refill: 3    2. Migraine with aura and without status migrainosus, not intractable  Refilled sumatriptan  Discussed other supportive care  Will try topamax for prophylactic treatment as migraines are occurring 2-3x/week  - SUMAtriptan (IMITREX) 50 MG tablet; Take 2 tablets (100 mg) by mouth at onset of headache for migraine  Dispense: 15 tablet; Refill: 0  - topiramate (TOPAMAX) 25 MG tablet; Take 1 tablet (25 mg) by mouth At Bedtime  Dispense: 30 tablet; Refill: 1    Refilled medications that would be required in the next 3 months.     After Visit Information:  Patient chose to view AVS via [employer]    No follow-ups on file.    Appointment end time: 11:57  This is a telephone visit that took 7 minutes.      Clinician location:  [facility] RENATA Davis DO    "

## 2021-04-12 ENCOUNTER — TELEPHONE (OUTPATIENT)
Dept: FAMILY MEDICINE | Facility: CLINIC | Age: 36
End: 2021-04-12

## 2021-04-12 DIAGNOSIS — G43.109 MIGRAINE WITH AURA AND WITHOUT STATUS MIGRAINOSUS, NOT INTRACTABLE: ICD-10-CM

## 2021-04-12 NOTE — TELEPHONE ENCOUNTER
[facility] phone call message- medication clarification/question:    Full Medication Name: SUMAtriptan (IMITREX) 50 MG tablet    Question/Clarification needed: prescription was send today patient states pharmacy was closed would you please re send [facility] [address] phone number is [phone]     Pharmacy confirmed as   [id number] - [place], [place] - [address]  [address] [facility] [address]  Phone: [phone] Fax: [phone]    [id number] - [place], [place] - [address]  [address]  [facility] [address]  Phone: [phone] Fax: [phone]    [facility] [place], [place] - [address]  [address]  [address]  [facility] [address]  Phone: [phone] Fax: [phone] Alternate Fax: [phone], [phone], [phone]  : No:    Please leave ONLY preferred pharmacy    OK to leave a message on voice mail? Yes    Advised patient that RN would call back within 3 hours, unless emergent.    Primary language: Hungarian      needed? No    Call taken on April 12, 2021 at 4:29 PM by Lorenzo Butt    Route to [facility]

## 2021-04-13 RX ORDER — SUMATRIPTAN 50 MG/1
100 TABLET, FILM COATED ORAL
Qty: 15 TABLET | Refills: 0 | Status: SHIPPED | OUTPATIENT
Start: 2021-04-13 | End: 2021-04-28

## 2021-04-13 NOTE — TELEPHONE ENCOUNTER
RN attempted to reach patient- LM to contact clinic. If patient calls back, please inform that [facility] should be able to fill at any location that she wants she just has to request the transfer.   Erin Guy, RN

## 2021-04-13 NOTE — TELEPHONE ENCOUNTER
Patient requesting to have prescription sent to [facility]. Patient stated that [facility] is not open.

## 2021-04-21 ENCOUNTER — OFFICE VISIT (OUTPATIENT)
Dept: FAMILY MEDICINE | Facility: CLINIC | Age: 36
End: 2021-04-21
Payer: COMMERCIAL

## 2021-04-21 VITALS — HEART RATE: 69 BPM | TEMPERATURE: 98 F | OXYGEN SATURATION: 97 %

## 2021-04-21 DIAGNOSIS — Z20.822 ENCOUNTER FOR LABORATORY TESTING FOR COVID-19 VIRUS: Primary | ICD-10-CM

## 2021-04-21 LAB
SARS-COV-2 RNA RESP QL NAA+PROBE: NORMAL
SPECIMEN SOURCE: NORMAL

## 2021-04-21 PROCEDURE — 99212 OFFICE O/P EST SF 10 MIN: CPT | Mod: CS | Performed by: STUDENT IN AN ORGANIZED HEALTH CARE EDUCATION/TRAINING PROGRAM

## 2021-04-21 PROCEDURE — U0003 INFECTIOUS AGENT DETECTION BY NUCLEIC ACID (DNA OR RNA); SEVERE ACUTE RESPIRATORY SYNDROME CORONAVIRUS 2 (SARS-COV-2) (CORONAVIRUS DISEASE [COVID-19]), AMPLIFIED PROBE TECHNIQUE, MAKING USE OF HIGH THROUGHPUT TECHNOLOGIES AS DESCRIBED BY CMS-2020-01-R: HCPCS | Performed by: FAMILY MEDICINE

## 2021-04-21 PROCEDURE — U0005 INFEC AGEN DETEC AMPLI PROBE: HCPCS | Performed by: FAMILY MEDICINE

## 2021-04-21 NOTE — PROGRESS NOTES
Assessment & Plan     Encounter for laboratory testing for COVID-19 virus  Testing completed for work. Patient had an indirect exposure, and has had hx of COVID infection as well as 1 of 2 doses of Moderna. Very low risk.  - Asymptomatic COVID-19 Virus (Coronavirus) by PCR        Rosa Beck MD  [facility] RENATA Kelly is a 35 year old who presents for the following health issues     HPI   Had 4/2 Moderna 1st shot  And had COVID earlier in 2020.  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 No direct contact with that person.    Works with immunocompromised people    Review of Systems   Constitutional, HEENT, cardiovascular, pulmonary, gi and gu systems are negative, except as otherwise noted.      Objective    There were no vitals taken for this visit.  There is no height or weight on file to calculate BMI.  Physical Exam  Constitutional:       General: She is not in acute distress.     Appearance: She is well-developed. She is not diaphoretic.   HENT:      Head: Normocephalic and atraumatic.   Eyes:      Conjunctiva/sclera: Conjunctivae normal.   Neck:      Musculoskeletal: Normal range of motion.   Cardiovascular:      Rate and Rhythm: Normal rate.   Pulmonary:      Effort: Pulmonary effort is normal. No respiratory distress.   Musculoskeletal: Normal range of motion.   Neurological:      General: No focal deficit present.      Mental Status: She is alert.                ----- Service Performed and Documented by Resident or Fellow ------

## 2021-04-21 NOTE — LETTER
[facility]  [address]  [address]  [facility] [address]  Phone: [phone]  Fax: [phone]    April 21, 2021        Leticia Morales  [address]  [place] [place] [facility] [address]          To whom it may concern:    RE: Leticia Morales    Patient was seen and treated today at our clinic and missed work.  Patient may return to work 4/26/2021.    Please contact me for questions or concerns.      Sincerely,        Rosa Beck MD

## 2021-04-22 LAB
LABORATORY COMMENT REPORT: NORMAL
SARS-COV-2 RNA RESP QL NAA+PROBE: NEGATIVE
SPECIMEN SOURCE: NORMAL

## 2021-04-27 DIAGNOSIS — G43.109 MIGRAINE WITH AURA AND WITHOUT STATUS MIGRAINOSUS, NOT INTRACTABLE: ICD-10-CM

## 2021-04-27 NOTE — TELEPHONE ENCOUNTER
"Request for medication refill:  SUMAtriptan (IMITREX) 50 MG tablet    Providers if patient needs an appointment and you are willing to give a one month supply please refill for one month and  send a letter/MyChart using \".SMILLIMITEDREFILL\" .smillimited and route chart to \"[facility] \" (Giving one month refill in non controlled medications is strongly recommended before denial)    If refill has been denied, meaning absolutely no refills without visit, please complete the smart phrase \".smirxrefuse\" and route it to the \"[facility] MED REFILLS\"  pool to inform the patient and the pharmacy.    Dinorah Michaud MA        "

## 2021-04-28 RX ORDER — SUMATRIPTAN 50 MG/1
100 TABLET, FILM COATED ORAL
Qty: 15 TABLET | Refills: 0 | Status: SHIPPED | OUTPATIENT
Start: 2021-04-28 | End: 2021-06-01

## 2021-04-28 NOTE — PROGRESS NOTES
Preceptor Attestation:   Patient seen, evaluated and discussed with the resident. I have verified the content of the note, which accurately reflects my assessment of the patient and the plan of care.   Supervising Physician:  Barbara Davis, DO

## 2021-05-04 DIAGNOSIS — G43.109 MIGRAINE WITH AURA AND WITHOUT STATUS MIGRAINOSUS, NOT INTRACTABLE: ICD-10-CM

## 2021-05-04 NOTE — TELEPHONE ENCOUNTER
"Request for medication refill: Topiramate     Providers if patient needs an appointment and you are willing to give a one month supply please refill for one month and  send a letter/MyChart using \".SMILLIMITEDREFILL\" .smillimited and route chart to \"P [facility] \" (Giving one month refill in non controlled medications is strongly recommended before denial)    If refill has been denied, meaning absolutely no refills without visit, please complete the smart phrase \".smirxrefuse\" and route it to the \"P [facility] MED REFILLS\"  pool to inform the patient and the pharmacy.    Rosanna Bolton, CMA        "

## 2021-05-06 RX ORDER — TOPIRAMATE 25 MG/1
25 TABLET, FILM COATED ORAL AT BEDTIME
Qty: 30 TABLET | Refills: 1 | Status: SHIPPED | OUTPATIENT
Start: 2021-05-06 | End: 2021-07-14

## 2021-06-01 ENCOUNTER — RECORDS - HEALTHEAST (OUTPATIENT)
Dept: ADMINISTRATIVE | Facility: CLINIC | Age: 36
End: 2021-06-01

## 2021-06-01 DIAGNOSIS — G43.109 MIGRAINE WITH AURA AND WITHOUT STATUS MIGRAINOSUS, NOT INTRACTABLE: ICD-10-CM

## 2021-06-01 RX ORDER — SUMATRIPTAN 50 MG/1
100 TABLET, FILM COATED ORAL
Qty: 15 TABLET | Refills: 0 | Status: SHIPPED | OUTPATIENT
Start: 2021-06-01 | End: 2021-07-01

## 2021-06-01 NOTE — TELEPHONE ENCOUNTER
"Request for medication refill: Imitrex    Providers if patient needs an appointment and you are willing to give a one month supply please refill for one month and  send a letter/MyChart using \".SMILLIMITEDREFILL\" .smillimited and route chart to \"P [facility] \" (Giving one month refill in non controlled medications is strongly recommended before denial)    If refill has been denied, meaning absolutely no refills without visit, please complete the smart phrase \".smirxrefuse\" and route it to the \"P [facility] MED REFILLS\"  pool to inform the patient and the pharmacy.    Rosanna Bolton, CMA        "

## 2021-06-15 ENCOUNTER — HOSPITAL ENCOUNTER (EMERGENCY)
Facility: CLINIC | Age: 36
Discharge: HOME | End: 2021-06-15
Attending: EMERGENCY MEDICINE | Admitting: EMERGENCY MEDICINE
Payer: COMMERCIAL

## 2021-06-15 ENCOUNTER — APPOINTMENT (OUTPATIENT)
Dept: CT IMAGING | Facility: CLINIC | Age: 36
End: 2021-06-15
Attending: EMERGENCY MEDICINE
Payer: COMMERCIAL

## 2021-06-15 ENCOUNTER — OFFICE VISIT (OUTPATIENT)
Dept: FAMILY MEDICINE | Facility: CLINIC | Age: 36
End: 2021-06-15
Payer: COMMERCIAL

## 2021-06-15 VITALS
HEART RATE: 78 BPM | DIASTOLIC BLOOD PRESSURE: 78 MMHG | WEIGHT: 125 LBS | BODY MASS INDEX: 21.87 KG/M2 | SYSTOLIC BLOOD PRESSURE: 101 MMHG | OXYGEN SATURATION: 98 % | RESPIRATION RATE: 16 BRPM | TEMPERATURE: 98.5 F

## 2021-06-15 VITALS
RESPIRATION RATE: 18 BRPM | SYSTOLIC BLOOD PRESSURE: 112 MMHG | DIASTOLIC BLOOD PRESSURE: 72 MMHG | TEMPERATURE: 98.2 F | OXYGEN SATURATION: 99 % | HEART RATE: 79 BPM

## 2021-06-15 DIAGNOSIS — G43.501 PERSISTENT MIGRAINE AURA WITHOUT CEREBRAL INFARCTION AND WITH STATUS MIGRAINOSUS, NOT INTRACTABLE: ICD-10-CM

## 2021-06-15 DIAGNOSIS — K21.9 GASTROESOPHAGEAL REFLUX DISEASE WITHOUT ESOPHAGITIS: ICD-10-CM

## 2021-06-15 DIAGNOSIS — Z00.00 HEALTHCARE MAINTENANCE: ICD-10-CM

## 2021-06-15 DIAGNOSIS — G43.111 INTRACTABLE MIGRAINE WITH AURA WITH STATUS MIGRAINOSUS: Primary | ICD-10-CM

## 2021-06-15 LAB
ALBUMIN SERPL-MCNC: 3.6 G/DL (ref 3.4–5)
ALP SERPL-CCNC: 59 U/L (ref 40–150)
ALT SERPL W P-5'-P-CCNC: 14 U/L (ref 0–50)
ANION GAP SERPL CALCULATED.3IONS-SCNC: 6 MMOL/L (ref 3–14)
AST SERPL W P-5'-P-CCNC: 14 U/L (ref 0–45)
BASOPHILS # BLD AUTO: 0 10E9/L (ref 0–0.2)
BASOPHILS NFR BLD AUTO: 0.8 %
BILIRUB SERPL-MCNC: 0.4 MG/DL (ref 0.2–1.3)
BUN SERPL-MCNC: 10 MG/DL (ref 7–30)
CALCIUM SERPL-MCNC: 8.9 MG/DL (ref 8.5–10.1)
CHLORIDE SERPL-SCNC: 110 MMOL/L (ref 94–109)
CO2 SERPL-SCNC: 25 MMOL/L (ref 20–32)
CREAT SERPL-MCNC: 0.94 MG/DL (ref 0.52–1.04)
DIFFERENTIAL METHOD BLD: ABNORMAL
EOSINOPHIL # BLD AUTO: 0.3 10E9/L (ref 0–0.7)
EOSINOPHIL NFR BLD AUTO: 6.2 %
ERYTHROCYTE [DISTWIDTH] IN BLOOD BY AUTOMATED COUNT: 15.2 % (ref 10–15)
GFR SERPL CREATININE-BSD FRML MDRD: 79 ML/MIN/{1.73_M2}
GLUCOSE SERPL-MCNC: 80 MG/DL (ref 70–99)
HCG SERPL QL: NEGATIVE
HCT VFR BLD AUTO: 37.6 % (ref 35–47)
HGB BLD-MCNC: 11.9 G/DL (ref 11.7–15.7)
IMM GRANULOCYTES # BLD: 0 10E9/L (ref 0–0.4)
IMM GRANULOCYTES NFR BLD: 0.4 %
LIPASE SERPL-CCNC: 160 U/L (ref 73–393)
LYMPHOCYTES # BLD AUTO: 1.7 10E9/L (ref 0.8–5.3)
LYMPHOCYTES NFR BLD AUTO: 35.1 %
MCH RBC QN AUTO: 24.8 PG (ref 26.5–33)
MCHC RBC AUTO-ENTMCNC: 31.6 G/DL (ref 31.5–36.5)
MCV RBC AUTO: 79 FL (ref 78–100)
MONOCYTES # BLD AUTO: 0.6 10E9/L (ref 0–1.3)
MONOCYTES NFR BLD AUTO: 12.8 %
NEUTROPHILS # BLD AUTO: 2.2 10E9/L (ref 1.6–8.3)
NEUTROPHILS NFR BLD AUTO: 44.7 %
NRBC # BLD AUTO: 0 10*3/UL
NRBC BLD AUTO-RTO: 0 /100
PLATELET # BLD AUTO: 311 10E9/L (ref 150–450)
POTASSIUM SERPL-SCNC: 3.7 MMOL/L (ref 3.4–5.3)
PROT SERPL-MCNC: 7.6 G/DL (ref 6.8–8.8)
RBC # BLD AUTO: 4.79 10E12/L (ref 3.8–5.2)
SODIUM SERPL-SCNC: 141 MMOL/L (ref 133–144)
WBC # BLD AUTO: 4.8 10E9/L (ref 4–11)

## 2021-06-15 PROCEDURE — 250N000011 HC RX IP 250 OP 636: Performed by: EMERGENCY MEDICINE

## 2021-06-15 PROCEDURE — 71260 CT THORAX DX C+: CPT

## 2021-06-15 PROCEDURE — 85025 COMPLETE CBC W/AUTO DIFF WBC: CPT | Performed by: EMERGENCY MEDICINE

## 2021-06-15 PROCEDURE — 258N000003 HC RX IP 258 OP 636: Performed by: EMERGENCY MEDICINE

## 2021-06-15 PROCEDURE — 83690 ASSAY OF LIPASE: CPT | Performed by: EMERGENCY MEDICINE

## 2021-06-15 PROCEDURE — 93010 ELECTROCARDIOGRAM REPORT: CPT | Performed by: EMERGENCY MEDICINE

## 2021-06-15 PROCEDURE — 80053 COMPREHEN METABOLIC PANEL: CPT | Performed by: EMERGENCY MEDICINE

## 2021-06-15 PROCEDURE — 99285 EMERGENCY DEPT VISIT HI MDM: CPT | Mod: 25 | Performed by: EMERGENCY MEDICINE

## 2021-06-15 PROCEDURE — 84703 CHORIONIC GONADOTROPIN ASSAY: CPT | Performed by: EMERGENCY MEDICINE

## 2021-06-15 PROCEDURE — 250N000009 HC RX 250: Performed by: EMERGENCY MEDICINE

## 2021-06-15 PROCEDURE — 93005 ELECTROCARDIOGRAM TRACING: CPT | Performed by: EMERGENCY MEDICINE

## 2021-06-15 PROCEDURE — 99207 PR NO CHARGE INJECTABLE MED/DRUG: CPT | Performed by: STUDENT IN AN ORGANIZED HEALTH CARE EDUCATION/TRAINING PROGRAM

## 2021-06-15 PROCEDURE — 90715 TDAP VACCINE 7 YRS/> IM: CPT | Performed by: STUDENT IN AN ORGANIZED HEALTH CARE EDUCATION/TRAINING PROGRAM

## 2021-06-15 PROCEDURE — 96375 TX/PRO/DX INJ NEW DRUG ADDON: CPT | Performed by: EMERGENCY MEDICINE

## 2021-06-15 PROCEDURE — 90471 IMMUNIZATION ADMIN: CPT | Mod: 59 | Performed by: STUDENT IN AN ORGANIZED HEALTH CARE EDUCATION/TRAINING PROGRAM

## 2021-06-15 PROCEDURE — 70450 CT HEAD/BRAIN W/O DYE: CPT

## 2021-06-15 PROCEDURE — 99214 OFFICE O/P EST MOD 30 MIN: CPT | Mod: 25 | Performed by: STUDENT IN AN ORGANIZED HEALTH CARE EDUCATION/TRAINING PROGRAM

## 2021-06-15 PROCEDURE — 96361 HYDRATE IV INFUSION ADD-ON: CPT | Performed by: EMERGENCY MEDICINE

## 2021-06-15 PROCEDURE — 96374 THER/PROPH/DIAG INJ IV PUSH: CPT | Mod: 59 | Performed by: EMERGENCY MEDICINE

## 2021-06-15 RX ORDER — PROPARACAINE HYDROCHLORIDE 5 MG/ML
1 SOLUTION/ DROPS OPHTHALMIC ONCE
Status: DISCONTINUED | OUTPATIENT
Start: 2021-06-15 | End: 2021-06-15 | Stop reason: HOSPADM

## 2021-06-15 RX ORDER — DEXAMETHASONE SODIUM PHOSPHATE 10 MG/ML
10 INJECTION, SOLUTION INTRAMUSCULAR; INTRAVENOUS ONCE
Status: COMPLETED | OUTPATIENT
Start: 2021-06-15 | End: 2021-06-15

## 2021-06-15 RX ORDER — DIPHENHYDRAMINE HYDROCHLORIDE 50 MG/ML
12.5 INJECTION INTRAMUSCULAR; INTRAVENOUS ONCE
Status: COMPLETED | OUTPATIENT
Start: 2021-06-15 | End: 2021-06-15

## 2021-06-15 RX ORDER — KETOROLAC TROMETHAMINE 30 MG/ML
30 INJECTION, SOLUTION INTRAMUSCULAR; INTRAVENOUS ONCE
Status: COMPLETED | OUTPATIENT
Start: 2021-06-15 | End: 2021-06-15

## 2021-06-15 RX ORDER — IOPAMIDOL 755 MG/ML
100 INJECTION, SOLUTION INTRAVASCULAR ONCE
Status: COMPLETED | OUTPATIENT
Start: 2021-06-15 | End: 2021-06-15

## 2021-06-15 RX ADMIN — IOPAMIDOL 62 ML: 755 INJECTION, SOLUTION INTRAVENOUS at 15:04

## 2021-06-15 RX ADMIN — DEXAMETHASONE SODIUM PHOSPHATE 10 MG: 10 INJECTION, SOLUTION INTRAMUSCULAR; INTRAVENOUS at 15:22

## 2021-06-15 RX ADMIN — KETOROLAC TROMETHAMINE 30 MG: 30 INJECTION, SOLUTION INTRAMUSCULAR; INTRAVENOUS at 12:16

## 2021-06-15 RX ADMIN — PROCHLORPERAZINE EDISYLATE 10 MG: 5 INJECTION INTRAMUSCULAR; INTRAVENOUS at 15:29

## 2021-06-15 RX ADMIN — DIPHENHYDRAMINE HYDROCHLORIDE 12.5 MG: 50 INJECTION, SOLUTION INTRAMUSCULAR; INTRAVENOUS at 15:24

## 2021-06-15 RX ADMIN — SODIUM CHLORIDE 1000 ML: 9 INJECTION, SOLUTION INTRAVENOUS at 14:15

## 2021-06-15 RX ADMIN — SODIUM CHLORIDE 55 ML: 9 INJECTION, SOLUTION INTRAVENOUS at 15:06

## 2021-06-15 NOTE — PROGRESS NOTES
Assessment & Plan     Intractable migraine with aura with status migrainosus  Patient with chronic migraines since H1N1 infection in 2009, presenting with symptoms consistent with acute migraine. Was unable to take abortive medication until two days into symptoms and has subsequently had a hard time getting symptoms under control. Has tried numerous migraine medications with side effects (topiramate, amitriptyline, Zofran) and has been referred to Neurology for consideration of Aimovig though apparently there were issues with insurance coverage. Regarding her acute symptoms, IM toradol 30 mg given in clinic without any improvement in her symptoms. At this point I do think she warrants ER visit due to inability to tolerate oral medications and likely dehydration contributing. Patient expressed understanding and agreement with this plan and planned to go directly to the ER from this visit. She has a safe way to get to the ER and declined my offer to call an ambulance. She should follow up for further discussion regarding preventative medication given her frequency of migraines, consider re-consult with Neurology.  Also briefly discussed possible evaluation for OMT (has never had).   - ketorolac (TORADOL) injection 30 mg  - Reevaluate need for preventative migraine Rx, consider alternative strategies (OMT, botox)    Healthcare maintenance  - TDAP VACCINE (Adacel, Boostrix)  [[id number]]    Return in about 2 weeks (around 6/29/2021) for in person, Follow up migraines.    Yashira Cuevas MD  [facility] RENATA Kelly is a 35 year old with history of migraines with aura, GERD, PTSD, depression with anxiety who presents for the following health issues     HPI     Migraine  Patient states she developed a migraine on Friday (4 days ago) but was unable to take sumatriptan 300 mg until 2 days later- did relieve migraine for 3 hours (ran out of sumatriptan). Took OTC excedrin until then. 3  "days ago developed nonbilious nonbloody vomiting (\"very acidic\") with throat burning. Unable to eat without vomiting and zofran made headache worse and didn't help with nausea. Has been taking prilosec and tums with minimal relief. Called RN triage yesterday - advised BRAT diet, encouraging fluids, take migraine medications with food. Discussed risk of dehydration from minimal eating or drinking and to consider evaluation in UC, patient preferred in person visit today.    Patient reports she gets migraines 2-3x/week, sumatriptan has not been enough to cover all migraines in a month. Tried amitriptyline before but experienced drowsiness. Referred to neuro who recommended aimovig but was not covered. Added topiramate 3/2021 which made her more nauseous. Was again referred back to Neurology (referral placed 4/12/2021, status pend) - I do not see any Neurology notes on chart review.     Onset/Duration: 4 days   Description  Location: left frontal   Character: throbbing pain  Wake with headaches: no  Able to do daily activities when headache present: no   Intensity:  severe  Progression of Symptoms:  improving  Accompanying signs and symptoms:  Stiff neck: no  Neck or upper back pain: no  Sinus or URI symptoms no   Fever: no  Nausea or vomiting: YES- last episode of vomiting yesterday 3pm, has not tried taking anything PO due to fear of vomiting   Dizziness: YES- off balance   Numbness/tingling: no  Weakness: no  Visual changes: full loss of vision in eye- L eye lasted 5 minutes, has happened with migraines in the past   History  Head trauma: no injuries, Migraines since coma 2/2 H1N1 in 2009  Daily pain medication use: YES currently   Previous tests for headaches: YES CT head 5/2019  Neurologist evaluation: YES- does not recall who or when  Precipitating or Alleviating factors (light/sound/sleep/caffeine): none  Therapies tried and outcome: Ibuprofen (Advil, Motrin), Excedrin and Imitrex              Outcome - usually " effective  Frequent/daily pain medication use: no (only over the past 4 days)    Review of Systems    ROS: 10 point ROS neg other than the symptoms noted above in the HPI.       Objective    /78   Pulse 78   Temp 98.5  F (36.9  C) (Oral)   Resp 16   Wt 56.7 kg (125 lb)   LMP 06/14/2021 (Exact Date)   SpO2 98%   Breastfeeding No   BMI 21.87 kg/m    Body mass index is 21.87 kg/m .  Physical Exam  Constitutional:       General: She is not in acute distress.     Appearance: She is well-developed. She is not diaphoretic.   Cardiovascular:      Rate and Rhythm: Normal rate.   Pulmonary:      Effort: Pulmonary effort is normal. No respiratory distress.   Neurological:      General: No focal deficit present.      Mental Status: She is alert.      Comments: Limited due to symptoms worsening with eye movements and light   Psychiatric:         Mood and Affect: Mood normal.          No labs obtained in clinic.

## 2021-06-15 NOTE — ED TRIAGE NOTES
Pt states one week hx of migraine HA with no relief from her prescribed meds.  Pt went to the clinic today and was given an inj and states her HA is not better and now feels dizzy.

## 2021-06-15 NOTE — LETTER
Mis 15, 2021      To Whom It May Concern:      Leticia Morales was seen in our clinic today, 06/15/21.  I expect her condition to improve over the next 2-3 days.  She may return to work when improved.    Sincerely,        Yashira Cuevas MD

## 2021-06-15 NOTE — DISCHARGE INSTRUCTIONS
Thank you for coming to the [facility] Emergency Department.     Please follow up with your primary care clinic in the next week with any return of headache. You have also been referred to Neurology Headache Clinic for reassessment of prophylactic medications to decrease how often you have migraine headaches.     Return to the ER for return of vomiting, dehydration or unability to swallow

## 2021-06-15 NOTE — PATIENT INSTRUCTIONS
Go to the ER -- likely need migraine medications IV and IV fluids    Follow up to discuss preventative migraine medication and consider possible re-consult with Neurology.

## 2021-06-15 NOTE — NURSING NOTE
Clinic Administered Medication Documentation      Injectable Medication Documentation    Patient was given Ketorolac Tromethamine (Toradol). Prior to medication administration, verified patients identity using patient s name and date of birth. Please see MAR and medication order for additional information. Patient instructed to remain in clinic for 15 minutes.      Was entire vial of medication used? Yes  Vial/Syringe: Single dose vial  Expiration Date:  11/2022  Was this medication supplied by the patient? No    Name of provider who requested the medication administration: Dr. Cuevas  Name of provider on site (faculty or community preceptor) at the time of performing the medication administration: Dr. Maribel Green, [facility]

## 2021-06-16 LAB — INTERPRETATION ECG - MUSE: NORMAL

## 2021-06-16 NOTE — PROGRESS NOTES
Preceptor Attestation:   Patient seen, evaluated and discussed with the resident. I have verified the content of the note, which accurately reflects my assessment of the patient and the plan of care.   Supervising Physician:  Amado López MD

## 2021-07-12 ENCOUNTER — TELEPHONE (OUTPATIENT)
Dept: NEUROLOGY | Facility: CLINIC | Age: 36
End: 2021-07-12

## 2021-07-12 NOTE — TELEPHONE ENCOUNTER
Pt had a migraine attack today. She was unable to come to her visit in clinic today due to migraine. Rescheduled pt for video visit tomorrow 7/12 at noon with Za Eubanks RN

## 2021-07-12 NOTE — TELEPHONE ENCOUNTER
Health Call Center    Phone Message    May a detailed message be left on voicemail: yes     Reason for Call: Symptoms or Concerns     If patient has red-flag symptoms, warm transfer to triage line    Current symptom or concern: Migraine    Symptoms have been present for: 8 hour(s)    Has patient previously been seen for this? Yes    By Silke Velasco    Are there any new or worsening symptoms? Yes: Pt reports extreme migraine this morning that caused her to miss her appt with Silke Velasco.    Pt rescheduled for 8/23/21, but wanted to report this occurrence.        Action Taken: Message routed to:  [facility] Neurology    Travel Screening: Not Applicable

## 2021-07-13 ENCOUNTER — VIRTUAL VISIT (OUTPATIENT)
Dept: NEUROLOGY | Facility: CLINIC | Age: 36
End: 2021-07-13
Payer: COMMERCIAL

## 2021-07-13 DIAGNOSIS — G43.711 INTRACTABLE CHRONIC MIGRAINE WITHOUT AURA AND WITH STATUS MIGRAINOSUS: ICD-10-CM

## 2021-07-13 DIAGNOSIS — R20.0 NUMBNESS AND TINGLING OF RIGHT ARM: ICD-10-CM

## 2021-07-13 DIAGNOSIS — G44.86 CERVICOGENIC HEADACHE: Primary | ICD-10-CM

## 2021-07-13 DIAGNOSIS — R20.2 NUMBNESS AND TINGLING OF RIGHT ARM: ICD-10-CM

## 2021-07-13 DIAGNOSIS — G43.109 MIGRAINE WITH AURA AND WITHOUT STATUS MIGRAINOSUS, NOT INTRACTABLE: ICD-10-CM

## 2021-07-13 PROCEDURE — 99215 OFFICE O/P EST HI 40 MIN: CPT | Mod: 95 | Performed by: NURSE PRACTITIONER

## 2021-07-13 NOTE — LETTER
7/13/2021       RE: Leticia Morales  [address]  [place] [address]     Dear Colleague,    Thank you for referring your patient, Leticia Morales, to the [facility] [place] at [facility]. Please see a copy of my visit note below.    Leticia is a 35 year old who is being evaluated via a billable video visit.      How would you like to obtain your AVS? MyChart  If the video visit is dropped, the invitation should be resent by: Text to cell phone: [phone]  Will anyone else be joining your video visit? No      Video Start Time: MIGRAINE DISABILITY ASSESSMENT (MIDAS)    On how many days in the last 3 months did you miss work or school because of your headaches?  18    How many days in the last 3 months was your productivity at work or school reduced by half or more because of your headaches? (Do not include days you counted in question 1 where you missed work or school.)  10    On how many days in the last 3 months did you not do household work (such as housework, home repairs and maintenance, shopping, caring for children and relatives) because of your headaches?  30    How many days in the last 3 months was your productivity in household work reduced by half or more because of your headaches? (Do not include days you counted in question 3 where you did not do household work).  30    On how many days in the last 3 months did you miss family, social, or lesiure activities because of your headaches?  90    MIDAS Total Score:     On how many days in the last 3 months did you have a headache? (If a headache lasted more than 1 day, count each day.)   80    On a scale of 0 - 10, on average how painful were these headaches (where 0 = no pain at all, and 10 = pain as bad as it can be.)  9  Video-Visit Details    Type of service:  Video Visit    Start 12:05 PM    Video End Time:12:44 PM    Originating Location (pt. Location):  "Home    Distant Location (provider location):  [facility] [place]     Platform used for Video Visit: Angie     CC:  Headache follow up  Interval History:  Initial Headache Clinic visit on 5/15/2019. Unfortunately patient did not returned for headache management follow up until today.   Patient reports that she has been seeing a PCP for headaches.   Emgality was recommended but denied by insurance in 2019.  Headaches are getting worse about 20 headaches days per month in the past several month  And duration all day with severe migraine headaches and with a severe nausea and not able to get off the bed.   No vision changes. Headaches affect patient's daily living.   Last ED visit on 6/15/2021 and note reviewed   Treated with 1L 0.9NS, 10mg IV compazine, 12.5mg IV benadryl and 10mg IV decadron.   CBC and CMP unremarkable. Lipase normal.   HCG negative.  CT head -without intracerebral hemorrhage    Received IM toradol prior to arrival to the ED.   Headache right sided and neck stiffness for the past 4 days. Has been using hot/cold -helps a little bit and has been doing exercise.   She has been taking topiramate and it has not been helpful at 25 mg   Treatments Tried:  sumatriptan takes almost daily and PCP increased to 100 mg   Ibuprofen   metoclopromide as needed for headache  venlafaxin has started and stopped last week due to side effects\" emotionally unstable\" and \"crying for no reason\"   Amitriptyline-side effects  Topiramate     FH of migraine headaches    Currently takes care of her father who has dementia.     Past Medical History reviewed   Migraine headaches  Depression  ICU hospitalization for H1N1 respiratory distress  Reports COVID19 positive in July 2020 and recovered    Headache today 7/10 on the numeric pain scale  Limited neck movements today and painful, right UE numbness -recommended cervical MRI for question cervicogenic/radiculopathy   Patient is alert and no in apparent " acute distress,  mentation appears normal, judgement and insight intact, normal speech.    PMH, allergies and current prescription medications reviewed      A/P:   Chronic migraine and possible acute analgesics overuse headache. Reports neck stiffness and right arm weakness from elbow to the neck-new   Video exam limited and no apparent weakness but limited neck movements and appears to be possible cervical dystonia acute. Chronic migraine with a cervicogenic symptoms-patient appears to be a good candidate for a Botox trial with chronic migraine protocol.     Plan:  A trial of flexeril for a limited time to help with neck movements. May cause drowsiness.   Cervical MRI for neck stiffness and right UE weakness -for any structural abnormalities  Headache treatment   Stop sumatriptan   Increase topiramate by taking 50 mg at bedtime for one week, than take 75 mg at bedtime for one week than 100 mg at bedtime if tolerated.   Rescue treatment -rizatriptan as needed. Do not take with sumatriptan. Limit use to no more than 9 days per month.   Stop ibuprofen   Botox PA   Follow up in 2-3 weeks or sooner if needed in person visit after cervical MRI         DATA: Head CT reviewed    CT OF THE HEAD WITHOUT CONTRAST 6/15/2021 3:18 PM      COMPARISON: None.     HISTORY:  Altered mental status.     TECHNIQUE: Axial CT images of the head from the skull base to the  vertex were acquired without IV contrast.     FINDINGS: The ventricles and basal cisterns are within normal limits  in configuration. There is no midline shift. There are no extra-axial  fluid collections. Gray-white differentiation is well maintained.     No intracranial hemorrhage, mass or recent infarct.     The visualized paranasal sinuses are well-aerated. There is no  mastoiditis. There are no fractures of the visualized bones.                                                                      IMPRESSION:  Normal head CT.        Radiation dose for this scan was  reduced using automated exposure  control, adjustment of the mA and/or kV according to patient size, or  iterative reconstruction technique     GÓMEZ ORTIZ MD    I discussed all my recommendations with Leticia Morales who verbalizes understanding and comfortable with the plan.  All of patient's questions were answered from the best of my knowledge.  Patient is in agreement with the plan.     41 minutes spent on the date of the encounter doing chart review, history and exam, documentation and further activities as noted above    CONNIE Garcia, CNP [facility]  Headache certified  [facility]                        Again, thank you for allowing me to participate in the care of your patient.      Sincerely,    CONNIE Meeks CNP

## 2021-07-13 NOTE — PROGRESS NOTES
Leticia is a 35 year old who is being evaluated via a billable video visit.      How would you like to obtain your AVS? MyChart  If the video visit is dropped, the invitation should be resent by: Text to cell phone: [phone]  Will anyone else be joining your video visit? No      Video Start Time: MIGRAINE DISABILITY ASSESSMENT (MIDAS)    On how many days in the last 3 months did you miss work or school because of your headaches?  18    How many days in the last 3 months was your productivity at work or school reduced by half or more because of your headaches? (Do not include days you counted in question 1 where you missed work or school.)  10    On how many days in the last 3 months did you not do household work (such as housework, home repairs and maintenance, shopping, caring for children and relatives) because of your headaches?  30    How many days in the last 3 months was your productivity in household work reduced by half or more because of your headaches? (Do not include days you counted in question 3 where you did not do household work).  30    On how many days in the last 3 months did you miss family, social, or lesiure activities because of your headaches?  90    MIDAS Total Score:     On how many days in the last 3 months did you have a headache? (If a headache lasted more than 1 day, count each day.)   80    On a scale of 0 - 10, on average how painful were these headaches (where 0 = no pain at all, and 10 = pain as bad as it can be.)  9  Video-Visit Details    Type of service:  Video Visit    Start 12:05 PM    Video End Time:12:44 PM    Originating Location (pt. Location): Home    Distant Location (provider location):  [facility] [place]     Platform used for Video Visit: Angie     CC:  Headache follow up  Interval History:  Initial Headache Clinic visit on 5/15/2019. Unfortunately patient did not returned for headache management follow up until today.   Patient reports  "that she has been seeing a PCP for headaches.   Emgality was recommended but denied by insurance in 2019.  Headaches are getting worse about 20 headaches days per month in the past several month  And duration all day with severe migraine headaches and with a severe nausea and not able to get off the bed.   No vision changes. Headaches affect patient's daily living.   Last ED visit on 6/15/2021 and note reviewed   Treated with 1L 0.9NS, 10mg IV compazine, 12.5mg IV benadryl and 10mg IV decadron.   CBC and CMP unremarkable. Lipase normal.   HCG negative.  CT head -without intracerebral hemorrhage    Received IM toradol prior to arrival to the ED.   Headache right sided and neck stiffness for the past 4 days. Has been using hot/cold -helps a little bit and has been doing exercise.   She has been taking topiramate and it has not been helpful at 25 mg   Treatments Tried:  sumatriptan takes almost daily and PCP increased to 100 mg   Ibuprofen   metoclopromide as needed for headache  venlafaxin has started and stopped last week due to side effects\" emotionally unstable\" and \"crying for no reason\"   Amitriptyline-side effects  Topiramate     FH of migraine headaches    Currently takes care of her father who has dementia.     Past Medical History reviewed   Migraine headaches  Depression  ICU hospitalization for H1N1 respiratory distress  Reports COVID19 positive in July 2020 and recovered    Headache today 7/10 on the numeric pain scale  Limited neck movements today and painful, right UE numbness -recommended cervical MRI for question cervicogenic/radiculopathy   Patient is alert and no in apparent acute distress,  mentation appears normal, judgement and insight intact, normal speech.    PMH, allergies and current prescription medications reviewed      A/P:   Chronic migraine and possible acute analgesics overuse headache. Reports neck stiffness and right arm weakness from elbow to the neck-new   Video exam limited and no " apparent weakness but limited neck movements and appears to be possible cervical dystonia acute. Chronic migraine with a cervicogenic symptoms-patient appears to be a good candidate for a Botox trial with chronic migraine protocol.     Plan:  A trial of flexeril for a limited time to help with neck movements. May cause drowsiness.   Cervical MRI for neck stiffness and right UE weakness -for any structural abnormalities  Headache treatment   Stop sumatriptan   Increase topiramate by taking 50 mg at bedtime for one week, than take 75 mg at bedtime for one week than 100 mg at bedtime if tolerated.   Rescue treatment -rizatriptan as needed. Do not take with sumatriptan. Limit use to no more than 9 days per month.   Stop ibuprofen   Botox PA   Follow up in 2-3 weeks or sooner if needed in person visit after cervical MRI         DATA: Head CT reviewed    CT OF THE HEAD WITHOUT CONTRAST 6/15/2021 3:18 PM      COMPARISON: None.     HISTORY:  Altered mental status.     TECHNIQUE: Axial CT images of the head from the skull base to the  vertex were acquired without IV contrast.     FINDINGS: The ventricles and basal cisterns are within normal limits  in configuration. There is no midline shift. There are no extra-axial  fluid collections. Gray-white differentiation is well maintained.     No intracranial hemorrhage, mass or recent infarct.     The visualized paranasal sinuses are well-aerated. There is no  mastoiditis. There are no fractures of the visualized bones.                                                                      IMPRESSION:  Normal head CT.        Radiation dose for this scan was reduced using automated exposure  control, adjustment of the mA and/or kV according to patient size, or  iterative reconstruction technique     GÓMEZ ORTIZ MD    I discussed all my recommendations with Leticia Morales who verbalizes understanding and comfortable with the plan.  All of patient's questions were answered  from the best of my knowledge.  Patient is in agreement with the plan.     41 minutes spent on the date of the encounter doing chart review, history and exam, documentation and further activities as noted above    CONNIE Garcia, CNP [facility]  Headache certified  [facility]

## 2021-07-14 ENCOUNTER — HOSPITAL ENCOUNTER (EMERGENCY)
Facility: CLINIC | Age: 36
Discharge: HOME | End: 2021-07-14
Attending: EMERGENCY MEDICINE
Payer: COMMERCIAL

## 2021-07-14 ENCOUNTER — APPOINTMENT (OUTPATIENT)
Dept: GENERAL RADIOLOGY | Facility: CLINIC | Age: 36
End: 2021-07-14
Attending: EMERGENCY MEDICINE
Payer: COMMERCIAL

## 2021-07-14 ENCOUNTER — APPOINTMENT (OUTPATIENT)
Dept: MRI IMAGING | Facility: CLINIC | Age: 36
End: 2021-07-14
Attending: EMERGENCY MEDICINE
Payer: COMMERCIAL

## 2021-07-14 DIAGNOSIS — M54.2 NECK PAIN: ICD-10-CM

## 2021-07-14 DIAGNOSIS — G44.86 CERVICOGENIC HEADACHE: ICD-10-CM

## 2021-07-14 DIAGNOSIS — M62.838 TRAPEZIUS MUSCLE SPASM: ICD-10-CM

## 2021-07-14 LAB
ANION GAP SERPL CALCULATED.3IONS-SCNC: 6 MMOL/L (ref 3–14)
BASOPHILS # BLD AUTO: 0 10E3/UL (ref 0–0.2)
BASOPHILS NFR BLD AUTO: 1 %
BUN SERPL-MCNC: 8 MG/DL (ref 7–30)
CALCIUM SERPL-MCNC: 8.8 MG/DL (ref 8.5–10.1)
CHLORIDE BLD-SCNC: 112 MMOL/L (ref 94–109)
CO2 SERPL-SCNC: 23 MMOL/L (ref 20–32)
CREAT SERPL-MCNC: 0.73 MG/DL (ref 0.52–1.04)
CRP SERPL-MCNC: 26 MG/L (ref 0–8)
D DIMER PPP FEU-MCNC: 0.49 UG/ML FEU (ref 0–0.5)
EOSINOPHIL # BLD AUTO: 0.2 10E3/UL (ref 0–0.7)
EOSINOPHIL NFR BLD AUTO: 3 %
ERYTHROCYTE [DISTWIDTH] IN BLOOD BY AUTOMATED COUNT: 15.9 % (ref 10–15)
ERYTHROCYTE [SEDIMENTATION RATE] IN BLOOD BY WESTERGREN METHOD: 30 MM/HR (ref 0–20)
GFR SERPL CREATININE-BSD FRML MDRD: >90 ML/MIN/1.73M2
GLUCOSE BLD-MCNC: 85 MG/DL (ref 70–99)
HCT VFR BLD AUTO: 35.8 % (ref 35–47)
HGB BLD-MCNC: 11.5 G/DL (ref 11.7–15.7)
IMM GRANULOCYTES # BLD: 0 10E3/UL
IMM GRANULOCYTES NFR BLD: 0 %
LYMPHOCYTES # BLD AUTO: 1.8 10E3/UL (ref 0.8–5.3)
LYMPHOCYTES NFR BLD AUTO: 28 %
MCH RBC QN AUTO: 25 PG (ref 26.5–33)
MCHC RBC AUTO-ENTMCNC: 32.1 G/DL (ref 31.5–36.5)
MCV RBC AUTO: 78 FL (ref 78–100)
MONOCYTES # BLD AUTO: 0.7 10E3/UL (ref 0–1.3)
MONOCYTES NFR BLD AUTO: 11 %
NEUTROPHILS # BLD AUTO: 3.7 10E3/UL (ref 1.6–8.3)
NEUTROPHILS NFR BLD AUTO: 57 %
NRBC # BLD AUTO: 0 10E3/UL
NRBC BLD AUTO-RTO: 0 /100
PLATELET # BLD AUTO: 302 10E3/UL (ref 150–450)
POTASSIUM BLD-SCNC: 3.5 MMOL/L (ref 3.4–5.3)
RBC # BLD AUTO: 4.6 10E6/UL (ref 3.8–5.2)
SODIUM SERPL-SCNC: 141 MMOL/L (ref 133–144)
TROPONIN I SERPL-MCNC: <0.015 UG/L (ref 0–0.04)
TSH SERPL DL<=0.005 MIU/L-ACNC: 1.15 MU/L (ref 0.4–4)
WBC # BLD AUTO: 6.4 10E3/UL (ref 4–11)

## 2021-07-14 PROCEDURE — 99285 EMERGENCY DEPT VISIT HI MDM: CPT | Performed by: EMERGENCY MEDICINE

## 2021-07-14 PROCEDURE — 93010 ELECTROCARDIOGRAM REPORT: CPT | Performed by: EMERGENCY MEDICINE

## 2021-07-14 PROCEDURE — 72072 X-RAY EXAM THORAC SPINE 3VWS: CPT

## 2021-07-14 PROCEDURE — 99285 EMERGENCY DEPT VISIT HI MDM: CPT | Mod: 25,27 | Performed by: EMERGENCY MEDICINE

## 2021-07-14 PROCEDURE — 255N000002 HC RX 255 OP 636: Performed by: EMERGENCY MEDICINE

## 2021-07-14 PROCEDURE — 36592 COLLECT BLOOD FROM PICC: CPT | Performed by: EMERGENCY MEDICINE

## 2021-07-14 PROCEDURE — A9585 GADOBUTROL INJECTION: HCPCS | Performed by: EMERGENCY MEDICINE

## 2021-07-14 PROCEDURE — 72156 MRI NECK SPINE W/O & W/DYE: CPT

## 2021-07-14 PROCEDURE — 250N000013 HC RX MED GY IP 250 OP 250 PS 637: Performed by: EMERGENCY MEDICINE

## 2021-07-14 PROCEDURE — 93005 ELECTROCARDIOGRAM TRACING: CPT | Performed by: EMERGENCY MEDICINE

## 2021-07-14 PROCEDURE — 85004 AUTOMATED DIFF WBC COUNT: CPT | Performed by: EMERGENCY MEDICINE

## 2021-07-14 PROCEDURE — 85379 FIBRIN DEGRADATION QUANT: CPT | Performed by: EMERGENCY MEDICINE

## 2021-07-14 PROCEDURE — 71046 X-RAY EXAM CHEST 2 VIEWS: CPT

## 2021-07-14 PROCEDURE — 99285 EMERGENCY DEPT VISIT HI MDM: CPT | Mod: 25 | Performed by: EMERGENCY MEDICINE

## 2021-07-14 PROCEDURE — C9803 HOPD COVID-19 SPEC COLLECT: HCPCS | Performed by: EMERGENCY MEDICINE

## 2021-07-14 PROCEDURE — 80048 BASIC METABOLIC PNL TOTAL CA: CPT | Performed by: EMERGENCY MEDICINE

## 2021-07-14 PROCEDURE — 85652 RBC SED RATE AUTOMATED: CPT | Performed by: EMERGENCY MEDICINE

## 2021-07-14 PROCEDURE — 84484 ASSAY OF TROPONIN QUANT: CPT | Performed by: EMERGENCY MEDICINE

## 2021-07-14 PROCEDURE — 86140 C-REACTIVE PROTEIN: CPT | Performed by: EMERGENCY MEDICINE

## 2021-07-14 PROCEDURE — 84443 ASSAY THYROID STIM HORMONE: CPT | Performed by: EMERGENCY MEDICINE

## 2021-07-14 RX ORDER — IBUPROFEN 800 MG/1
800 TABLET, FILM COATED ORAL ONCE
Status: COMPLETED | OUTPATIENT
Start: 2021-07-14 | End: 2021-07-14

## 2021-07-14 RX ORDER — RIZATRIPTAN BENZOATE 5 MG/1
5-10 TABLET, ORALLY DISINTEGRATING ORAL
Qty: 18 TABLET | Refills: 6 | Status: SHIPPED | OUTPATIENT
Start: 2021-07-14 | End: 2021-07-15

## 2021-07-14 RX ORDER — DIAZEPAM 10 MG
10 TABLET ORAL ONCE
Status: COMPLETED | OUTPATIENT
Start: 2021-07-14 | End: 2021-07-14

## 2021-07-14 RX ORDER — GADOBUTROL 604.72 MG/ML
7.5 INJECTION INTRAVENOUS ONCE
Status: COMPLETED | OUTPATIENT
Start: 2021-07-14 | End: 2021-07-14

## 2021-07-14 RX ORDER — CYCLOBENZAPRINE HCL 5 MG
5-10 TABLET ORAL
Qty: 20 TABLET | Refills: 1 | Status: SHIPPED | OUTPATIENT
Start: 2021-07-14 | End: 2021-07-14

## 2021-07-14 RX ORDER — TOPIRAMATE 25 MG/1
TABLET, FILM COATED ORAL
Qty: 120 TABLET | Refills: 3 | Status: ON HOLD | OUTPATIENT
Start: 2021-07-14 | End: 2021-07-24

## 2021-07-14 RX ORDER — CYCLOBENZAPRINE HCL 5 MG
5-10 TABLET ORAL
Qty: 20 TABLET | Refills: 1 | Status: SHIPPED | OUTPATIENT
Start: 2021-07-14 | End: 2021-08-03

## 2021-07-14 RX ADMIN — DIAZEPAM 10 MG: 10 TABLET ORAL at 21:59

## 2021-07-14 RX ADMIN — GADOBUTROL 5.6 ML: 604.72 INJECTION INTRAVENOUS at 16:50

## 2021-07-14 RX ADMIN — IBUPROFEN 800 MG: 800 TABLET, FILM COATED ORAL at 18:34

## 2021-07-14 ASSESSMENT — ENCOUNTER SYMPTOMS
VOMITING: 0
HEADACHES: 1
BACK PAIN: 1
FACIAL SWELLING: 0
WEAKNESS: 0
NUMBNESS: 1
SPEECH DIFFICULTY: 0
SHORTNESS OF BREATH: 0
COUGH: 0
FACIAL ASYMMETRY: 0
CONFUSION: 0
NECK PAIN: 1
SORE THROAT: 0
NAUSEA: 0
TROUBLE SWALLOWING: 0
FEVER: 0
NECK STIFFNESS: 1

## 2021-07-14 NOTE — ED PROVIDER NOTES
[facility] EMERGENCY DEPARTMENT ([facility])  7/14/21  History     Chief Complaint   Patient presents with     Neck Pain     Started last saturday with a headache initially then some numbness on her right side that turn into a stiffneck.     BRIGITTE Morales is a 35 year old female with a past medical history significant for chronic migraines, GERD, PTSD who presents to the Emergency Department for evaluation of neck pain.  Reports having a migraine headache last Saturday which later progressed to right arm numbness last evening.  This morning she noted worsening neck pain and stiffness and pain in her upper back.  The pain is worse when she swallows or moves her head.  She denies having a sore throat or difficulty breathing but describes the pain as muscle pain in her neck that is exacerbated by swallowing.  She also reports intermittent chest pain.  She denies having right arm weakness and the numbness has since resolved.  The right arm pain feels like shooting, sharp, lightening type pain.  It radiates down the right side of her neck and shoulder to her right arm.  She states that the pain improves with range of motion at the shoulder.  She currently does not have a headache.  She denies fever.  Patient states that her doctor has ordered an MRI of the spine for evaluation of these symptoms but she is unable to wait until August for this to be completed.  She denies any coughing or choking, vision changes, weakness, slurred speech, facial droop.  Patient denies recent surgery or infection, IV drug use, neck injury.        Past Medical History  Past Medical History:   Diagnosis Date     ARDS (adult respiratory distress syndrome) (H) 2010    related to H1N1 infection     H1N1 influenza 2010    prolonged ICU stay, medically induced coma     Iron deficiency anemia      Migraines      Past Surgical History:   Procedure Laterality Date     THORACIC SURGERY      trach     acetaminophen (TYLENOL) 500 MG  tablet  cyclobenzaprine (FLEXERIL) 5 MG tablet  ibuprofen (ADVIL/MOTRIN) 800 MG tablet  Aspirin-Acetaminophen-Caffeine (EXCEDRIN MIGRAINE PO)  dicyclomine (BENTYL) 10 MG capsule  loperamide (IMODIUM A-D) 2 MG tablet  omeprazole (PRILOSEC) 20 MG DR capsule  promethazine (PHENERGAN) 25 MG tablet  Riboflavin 400 MG TABS  rizatriptan (MAXALT-MLT) 5 MG ODT  SUMAtriptan (IMITREX) 50 MG tablet  topiramate (TOPAMAX) 25 MG tablet  vitamin D3 (CHOLECALCIFEROL) 50 mcg (2000 units) tablet      Allergies   Allergen Reactions     Macrodantin [Nitrofurantoin Macrocrystal] Other (See Comments)     itching     Family History  Family History   Problem Relation Age of Onset     Diabetes Father      Hypertension Father      Cancer Father      Social History   Social History     Tobacco Use     Smoking status: Former Smoker     Types: Hookah     Smokeless tobacco: Never Used   Substance Use Topics     Alcohol use: No     Drug use: No      Past medical history, past surgical history, medications, allergies, family history, and social history were reviewed with the patient. No additional pertinent items.       Review of Systems   Constitutional: Negative for fever.   HENT: Negative for facial swelling, sore throat and trouble swallowing.    Eyes: Negative for visual disturbance.   Respiratory: Negative for cough and shortness of breath.    Cardiovascular: Positive for chest pain.   Gastrointestinal: Negative for nausea and vomiting.   Musculoskeletal: Positive for back pain, neck pain and neck stiffness.   Neurological: Positive for numbness and headaches. Negative for facial asymmetry, speech difficulty and weakness.   Psychiatric/Behavioral: Negative for confusion.   All other systems reviewed and are negative.    A complete review of systems was performed with pertinent positives and negatives noted in the HPI, and all other systems negative.    Physical Exam   BP: 117/68  Pulse: 89  Temp: (!) 96.1  F (35.6  C)  Resp: 16  Weight: 56.7  kg (125 lb)  SpO2: 96 %  Physical Exam  Vitals and nursing note reviewed.   Constitutional:       General: She is in acute distress.      Appearance: Normal appearance. She is well-developed and normal weight. She is not ill-appearing or diaphoretic.   HENT:      Head: Normocephalic and atraumatic.      Nose: Nose normal.      Mouth/Throat:      Mouth: Mucous membranes are moist.      Pharynx: Oropharynx is clear. No oropharyngeal exudate or posterior oropharyngeal erythema.   Eyes:      General: No scleral icterus.     Extraocular Movements: Extraocular movements intact.      Conjunctiva/sclera: Conjunctivae normal.      Pupils: Pupils are equal, round, and reactive to light.   Neck:      Thyroid: No thyroid mass.      Trachea: Trachea and phonation normal. No tracheal tenderness or abnormal tracheal secretions.   Cardiovascular:      Rate and Rhythm: Normal rate.   Pulmonary:      Effort: Pulmonary effort is normal. No respiratory distress.      Breath sounds: No stridor.   Abdominal:      General: There is no distension.   Musculoskeletal:         General: No deformity.      Cervical back: Neck supple. No edema, erythema, rigidity or crepitus. Pain with movement, spinous process tenderness and muscular tenderness present. Decreased range of motion.   Lymphadenopathy:      Cervical: No cervical adenopathy.   Skin:     General: Skin is warm and dry.   Neurological:      Mental Status: She is alert and oriented to person, place, and time.   Psychiatric:         Behavior: Behavior normal.         ED Course      Procedures            EKG Interpretation:      Interpreted by Sherly Larkin MD  Time reviewed: 1851  Symptoms at time of EKG: neck pain   Rhythm: sinus bradycardia  Rate: Bradycardia and 50-60  Axis: Normal  Ectopy: none  Conduction: normal  ST Segments/ T Waves: No acute ischemic changes  Q Waves: none  Comparison to prior: Similar appearance with normal rate    Clinical Impression: non-specific EKG and  sinus bradycardia                   Results for orders placed or performed during the hospital encounter of 07/14/21   XR Thoracic Spine 3 Views     Status: None    Narrative    EXAM: XR THORACIC SPINE 3 VIEWS  LOCATION: [facility]  DATE/TIME: 7/14/2021 7:36 PM    INDICATION: Upper back and neck pain.  COMPARISON: None.  TECHNIQUE: CR Thoracic Spine.      Impression    IMPRESSION: Anatomic alignment thoracic spine. Vertebral body heights and intervertebral disc heights appear normal. No acute thoracic spine fracture identified. Pedicles appear intact throughout. Visualized lungs are clear. Heart size normal.    XR Chest 2 Views     Status: None    Narrative    CHEST TWO VIEWS 7/14/2021 7:49 PM     HISTORY: Chest pain.    COMPARISON: October 29, 2017       Impression    IMPRESSION: There are no acute infiltrates. The cardiac silhouette is  not enlarged. Pulmonary vasculature is unremarkable.    SUSAN BURNETTE MD         SYSTEM ID:  [id number]   TSH with free T4 reflex     Status: Normal   Result Value Ref Range    TSH 1.15 0.40 - 4.00 mU/L   Basic metabolic panel     Status: Abnormal   Result Value Ref Range    Sodium 141 133 - 144 mmol/L    Potassium 3.5 3.4 - 5.3 mmol/L    Chloride 112 (H) 94 - 109 mmol/L    Carbon Dioxide (CO2) 23 20 - 32 mmol/L    Anion Gap 6 3 - 14 mmol/L    Urea Nitrogen 8 7 - 30 mg/dL    Creatinine 0.73 0.52 - 1.04 mg/dL    Calcium 8.8 8.5 - 10.1 mg/dL    Glucose 85 70 - 99 mg/dL    GFR Estimate >90 >60 mL/min/1.73m2   D dimer quantitative     Status: Normal   Result Value Ref Range    D-Dimer Quantitative 0.49 0.00 - 0.50 ug/mL FEU    Narrative    This D-dimer assay is intended for use in conjunction with a clinical pretest probability assessment model to exclude pulmonary embolism (PE) and deep venous thrombosis (DVT) in outpatients suspected of PE or DVT. The cut-off value is 0.50 ug/mL FEU.   Troponin I     Status: Normal   Result Value Ref Range    Troponin I <0.015 0.000 -  0.045 ug/L   CBC with platelets and differential     Status: Abnormal   Result Value Ref Range    WBC Count 6.4 4.0 - 11.0 10e3/uL    RBC Count 4.60 3.80 - 5.20 10e6/uL    Hemoglobin 11.5 (L) 11.7 - 15.7 g/dL    Hematocrit 35.8 35.0 - 47.0 %    MCV 78 78 - 100 fL    MCH 25.0 (L) 26.5 - 33.0 pg    MCHC 32.1 31.5 - 36.5 g/dL    RDW 15.9 (H) 10.0 - 15.0 %    Platelet Count 302 150 - 450 10e3/uL    % Neutrophils 57 %    % Lymphocytes 28 %    % Monocytes 11 %    % Eosinophils 3 %    % Basophils 1 %    % Immature Granulocytes 0 %    NRBCs per 100 WBC 0 <1 /100    Absolute Neutrophils 3.7 1.6 - 8.3 10e3/uL    Absolute Lymphocytes 1.8 0.8 - 5.3 10e3/uL    Absolute Monocytes 0.7 0.0 - 1.3 10e3/uL    Absolute Eosinophils 0.2 0.0 - 0.7 10e3/uL    Absolute Basophils 0.0 0.0 - 0.2 10e3/uL    Absolute Immature Granulocytes 0.0 <=0.0 10e3/uL    Absolute NRBCs 0.0 10e3/uL   EKG 12-lead, tracing only     Status: None (Preliminary result)   Result Value Ref Range    Systolic Blood Pressure  mmHg    Diastolic Blood Pressure  mmHg    Ventricular Rate 59 BPM    Atrial Rate 59 BPM    MS Interval 108 ms    QRS Duration 74 ms     ms    QTc 429 ms    P Axis 46 degrees    R AXIS 43 degrees    T Axis 46 degrees    Interpretation ECG Click View Image link to view waveform and result    CBC with platelets differential     Status: Abnormal    Narrative    The following orders were created for panel order CBC with platelets differential.  Procedure                               Abnormality         Status                     ---------                               -----------         ------                     CBC with platelets and d...[[id number]]  Abnormal            Final result                 Please view results for these tests on the individual orders.     Medications   gadobutrol (GADAVIST) injection 7.5 mL (5.6 mLs Intravenous Given 7/14/21 1650)   ibuprofen (ADVIL/MOTRIN) tablet 800 mg (800 mg Oral Given 7/14/21 2477)   diazepam  (VALIUM) tablet 10 mg (10 mg Oral Given 7/14/21 5167)        Assessments & Plan (with Medical Decision Making)   Leticia Morales is a 35 year old female with a past medical history significant for chronic migraines, GERD, PTSD who presents to the Emergency Department for evaluation of neck pain.     Ddx: Cervical radiculopathy, myelitis, discitis, prevertebral abscess, epidural abscess, spinal stenosis, neuroforaminal stenosis, neck mass, muscle spasm, torticollis, trapezius muscle spasm    Patient extremely uncomfortable on arrival.  She intends to drive herself home so she decided that she would take ibuprofen as opposed to calling a ride to get something stronger such as Valium or oxycodone.  She has reproducible pain with palpation of the trapezius muscles in the thoracic spine as well as the cervical spine and sternocleidomastoid on the right.  She also has tenderness over the midline of both the cervical and thoracic spines.  She has normal range of motion in the right shoulder.  No dysphonia or stridor.  She reports exacerbation of the neck and back muscular pain with swallowing but is a sore throat or choking sensation.  No risk factors for spinal infection.  No infectious symptoms.  No weakness of the right arm but she does report recent numbness and describes a type of radicular pain.       Per review of patient's virtual visit with neurology yesterday, patient was diagnosed with chronic migraines possible acute analgesic overuse.  They also considered a possible cervical dystonia.  They felt the patient was a good candidate for Botox to treat chronic migraine with cervicogenic symptoms.  They prescribed the patient a course of Flexeril to see if that would help with her neck movements.  They also ordered a cervical MRI and increased patient's dose of topiramate for migraine prophylaxis.  They instructed patient to take rizatriptan instead of sumatriptan for rescue.    I ordered MRI of the cervical  spine with and without contrast.    Labs notable for normal white count, hemoglobin 11.5 which is stable from last month.  EKG shows sinus bradycardia without acute ischemic changes.  Patient does report pleurisy so I added on troponin and D-dimer.  Troponin negative, D-dimer negative.    Patient's pain is somewhat improved after the ibuprofen.  She was able to tolerate oral intake.  TSH resulted normal.  Chest x-ray clear.  Thoracic spine x-ray also normal.    The patient's cervical MRI was still pending.  I called radiology to inquire about the read and the technician noted that it had not been pushed over to radiology for review.  I updated the patient on the status of her MRI.  At this time, the patient preferred to discharge home and did not want to wait for the results.  She would like to  look out for the results on MyChart and follow-up with her primary care provider for interpretation.  I discharged her with a prescription for Flexeril.  At that time the patient had arranged to have a ride home and wanted something else for pain so I gave her 10 mg Valium.  Instructed to follow-up with neurology for further work-up including evaluation for potential Cullen injections.  Detailed return precautions provided.        Addendum:    10:20 PM Radiology called back with read on MRI C spine. It shows prevertebral fluid collection c/f early infection/abscess. Patient had already left the department.    10:25 PM I reached patient on her cell phone.  She was almost home at that time.  I informed her of the results of the MRI and my concern for a possible infection.  The patient will need to be evaluated by neurosurgery which is not on the [facility].  Therefore, I recommended the patient return to the emergency department on the [facility].  I gave the patient the address and instructed her to go soon as possible so that she could be started on IV antibiotics and seen by neurosurgery.  The patient verbalized  understanding.  I also called and gave handoff to the [place] emergency medicine doctor who will place patient on the expected board.      I have reviewed the nursing notes. I have reviewed the findings, diagnosis, plan and need for follow up with the patient.    Discharge Medication List as of 7/14/2021 10:00 PM          Final diagnoses:   Neck pain   Trapezius muscle spasm     I, Cherelle King, am serving as a trained medical scribe to document services personally performed by Sherly Larkin MD, based on the provider's statements to me.     I, Sherly Larkin MD, was physically present and have reviewed and verified the accuracy of this note documented by Cherelle King.    --  Sherly Larkin MD  [facility] EMERGENCY DEPARTMENT  7/14/2021     Sherly Larkin MD  07/14/21 3807

## 2021-07-14 NOTE — ED TRIAGE NOTES
Patient taking Imitrex for  Headache, she follows up with a neurologist that wanted her to get an MRI for her  neck. Patient also reporting new onset Dysphagia that started last Monday. Patient attempted to get an MRI scheduled but the only available spot is not until aug and patient couldn't wait that long.

## 2021-07-14 NOTE — LETTER
July 14, 2021      To Whom It May Concern:      Leticia Morales was seen in our Emergency Department today, 07/14/21.  I expect her condition to improve over the next 2 days.  She may return to work/school when improved.    Sincerely,        Adry Walker RN

## 2021-07-15 ENCOUNTER — HOSPITAL ENCOUNTER (INPATIENT)
Facility: CLINIC | Age: 36
LOS: 9 days | Discharge: HOME | End: 2021-07-24
Attending: EMERGENCY MEDICINE | Admitting: STUDENT IN AN ORGANIZED HEALTH CARE EDUCATION/TRAINING PROGRAM
Payer: COMMERCIAL

## 2021-07-15 ENCOUNTER — APPOINTMENT (OUTPATIENT)
Dept: MRI IMAGING | Facility: CLINIC | Age: 36
End: 2021-07-15
Payer: COMMERCIAL

## 2021-07-15 VITALS
DIASTOLIC BLOOD PRESSURE: 71 MMHG | TEMPERATURE: 96.1 F | BODY MASS INDEX: 21.87 KG/M2 | OXYGEN SATURATION: 100 % | RESPIRATION RATE: 16 BRPM | WEIGHT: 125 LBS | SYSTOLIC BLOOD PRESSURE: 120 MMHG | HEART RATE: 79 BPM

## 2021-07-15 DIAGNOSIS — G43.109 MIGRAINE WITH AURA AND WITHOUT STATUS MIGRAINOSUS, NOT INTRACTABLE: ICD-10-CM

## 2021-07-15 DIAGNOSIS — G43.711 INTRACTABLE CHRONIC MIGRAINE WITHOUT AURA AND WITH STATUS MIGRAINOSUS: ICD-10-CM

## 2021-07-15 DIAGNOSIS — R90.89 ABNORMAL BRAIN MRI: Primary | ICD-10-CM

## 2021-07-15 DIAGNOSIS — R60.0 LOCALIZED EDEMA: ICD-10-CM

## 2021-07-15 DIAGNOSIS — R93.7 ABNORMAL MRI, CERVICAL SPINE: ICD-10-CM

## 2021-07-15 DIAGNOSIS — M54.6 PAIN IN THORACIC SPINE: ICD-10-CM

## 2021-07-15 DIAGNOSIS — G43.101 MIGRAINE WITH AURA AND WITH STATUS MIGRAINOSUS, NOT INTRACTABLE: ICD-10-CM

## 2021-07-15 DIAGNOSIS — Z20.822 CONTACT WITH AND (SUSPECTED) EXPOSURE TO COVID-19: ICD-10-CM

## 2021-07-15 DIAGNOSIS — G43.709 CHRONIC MIGRAINE WITHOUT AURA WITHOUT STATUS MIGRAINOSUS, NOT INTRACTABLE: ICD-10-CM

## 2021-07-15 PROBLEM — M46.40 DISCITIS, UNSPECIFIED SPINAL REGION: Status: ACTIVE | Noted: 2021-07-15

## 2021-07-15 LAB
APTT PPP: 36 SECONDS (ref 22–38)
ATRIAL RATE - MUSE: 59 BPM
CRP SERPL-MCNC: 25 MG/L (ref 0–8)
DEPRECATED S PYO AG THROAT QL EIA: NEGATIVE
DIASTOLIC BLOOD PRESSURE - MUSE: NORMAL MMHG
ERYTHROCYTE [SEDIMENTATION RATE] IN BLOOD BY WESTERGREN METHOD: 32 MM/HR (ref 0–20)
GROUP A STREP BY PCR: NOT DETECTED
HCG SERPL QL: NEGATIVE
INTERPRETATION ECG - MUSE: NORMAL
P AXIS - MUSE: 46 DEGREES
PR INTERVAL - MUSE: 108 MS
QRS DURATION - MUSE: 74 MS
QT - MUSE: 434 MS
QTC - MUSE: 429 MS
R AXIS - MUSE: 43 DEGREES
SARS-COV-2 RNA RESP QL NAA+PROBE: NEGATIVE
SYSTOLIC BLOOD PRESSURE - MUSE: NORMAL MMHG
T AXIS - MUSE: 46 DEGREES
VENTRICULAR RATE- MUSE: 59 BPM

## 2021-07-15 PROCEDURE — 250N000011 HC RX IP 250 OP 636: Performed by: EMERGENCY MEDICINE

## 2021-07-15 PROCEDURE — 120N000002 HC R&B MED SURG/OB UMMC

## 2021-07-15 PROCEDURE — 99222 1ST HOSP IP/OBS MODERATE 55: CPT | Mod: AI | Performed by: STUDENT IN AN ORGANIZED HEALTH CARE EDUCATION/TRAINING PROGRAM

## 2021-07-15 PROCEDURE — 258N000003 HC RX IP 258 OP 636: Performed by: STUDENT IN AN ORGANIZED HEALTH CARE EDUCATION/TRAINING PROGRAM

## 2021-07-15 PROCEDURE — 84703 CHORIONIC GONADOTROPIN ASSAY: CPT | Performed by: EMERGENCY MEDICINE

## 2021-07-15 PROCEDURE — 36592 COLLECT BLOOD FROM PICC: CPT | Performed by: EMERGENCY MEDICINE

## 2021-07-15 PROCEDURE — 96361 HYDRATE IV INFUSION ADD-ON: CPT | Performed by: EMERGENCY MEDICINE

## 2021-07-15 PROCEDURE — 86140 C-REACTIVE PROTEIN: CPT | Performed by: STUDENT IN AN ORGANIZED HEALTH CARE EDUCATION/TRAINING PROGRAM

## 2021-07-15 PROCEDURE — 250N000013 HC RX MED GY IP 250 OP 250 PS 637: Performed by: STUDENT IN AN ORGANIZED HEALTH CARE EDUCATION/TRAINING PROGRAM

## 2021-07-15 PROCEDURE — 250N000009 HC RX 250: Performed by: STUDENT IN AN ORGANIZED HEALTH CARE EDUCATION/TRAINING PROGRAM

## 2021-07-15 PROCEDURE — 87040 BLOOD CULTURE FOR BACTERIA: CPT | Performed by: EMERGENCY MEDICINE

## 2021-07-15 PROCEDURE — 87880 STREP A ASSAY W/OPTIC: CPT | Performed by: EMERGENCY MEDICINE

## 2021-07-15 PROCEDURE — 96375 TX/PRO/DX INJ NEW DRUG ADDON: CPT | Performed by: EMERGENCY MEDICINE

## 2021-07-15 PROCEDURE — 250N000011 HC RX IP 250 OP 636: Performed by: STUDENT IN AN ORGANIZED HEALTH CARE EDUCATION/TRAINING PROGRAM

## 2021-07-15 PROCEDURE — 70553 MRI BRAIN STEM W/O & W/DYE: CPT

## 2021-07-15 PROCEDURE — 87651 STREP A DNA AMP PROBE: CPT | Performed by: EMERGENCY MEDICINE

## 2021-07-15 PROCEDURE — A9585 GADOBUTROL INJECTION: HCPCS | Performed by: STUDENT IN AN ORGANIZED HEALTH CARE EDUCATION/TRAINING PROGRAM

## 2021-07-15 PROCEDURE — 85652 RBC SED RATE AUTOMATED: CPT | Performed by: EMERGENCY MEDICINE

## 2021-07-15 PROCEDURE — 86140 C-REACTIVE PROTEIN: CPT | Performed by: EMERGENCY MEDICINE

## 2021-07-15 PROCEDURE — 85730 THROMBOPLASTIN TIME PARTIAL: CPT | Performed by: EMERGENCY MEDICINE

## 2021-07-15 PROCEDURE — U0005 INFEC AGEN DETEC AMPLI PROBE: HCPCS | Performed by: EMERGENCY MEDICINE

## 2021-07-15 PROCEDURE — 96366 THER/PROPH/DIAG IV INF ADDON: CPT | Performed by: EMERGENCY MEDICINE

## 2021-07-15 PROCEDURE — 96365 THER/PROPH/DIAG IV INF INIT: CPT | Mod: 59 | Performed by: EMERGENCY MEDICINE

## 2021-07-15 PROCEDURE — 70553 MRI BRAIN STEM W/O & W/DYE: CPT | Mod: 26 | Performed by: RADIOLOGY

## 2021-07-15 PROCEDURE — 99255 IP/OBS CONSLTJ NEW/EST HI 80: CPT | Mod: GC | Performed by: INTERNAL MEDICINE

## 2021-07-15 PROCEDURE — 255N000002 HC RX 255 OP 636: Performed by: STUDENT IN AN ORGANIZED HEALTH CARE EDUCATION/TRAINING PROGRAM

## 2021-07-15 RX ORDER — KETOROLAC TROMETHAMINE 15 MG/ML
15 INJECTION, SOLUTION INTRAMUSCULAR; INTRAVENOUS ONCE
Status: COMPLETED | OUTPATIENT
Start: 2021-07-15 | End: 2021-07-15

## 2021-07-15 RX ORDER — LIDOCAINE 40 MG/G
CREAM TOPICAL
Status: DISCONTINUED | OUTPATIENT
Start: 2021-07-15 | End: 2021-07-24 | Stop reason: HOSPADM

## 2021-07-15 RX ORDER — SODIUM CHLORIDE, SODIUM LACTATE, POTASSIUM CHLORIDE, CALCIUM CHLORIDE 600; 310; 30; 20 MG/100ML; MG/100ML; MG/100ML; MG/100ML
INJECTION, SOLUTION INTRAVENOUS CONTINUOUS
Status: DISCONTINUED | OUTPATIENT
Start: 2021-07-15 | End: 2021-07-15

## 2021-07-15 RX ORDER — OXYMETAZOLINE HYDROCHLORIDE 0.05 G/100ML
2 SPRAY NASAL 2 TIMES DAILY
Status: DISCONTINUED | OUTPATIENT
Start: 2021-07-15 | End: 2021-07-17

## 2021-07-15 RX ORDER — ONDANSETRON 4 MG/1
4 TABLET, ORALLY DISINTEGRATING ORAL EVERY 6 HOURS PRN
Status: DISCONTINUED | OUTPATIENT
Start: 2021-07-15 | End: 2021-07-20

## 2021-07-15 RX ORDER — TOPIRAMATE 50 MG/1
50 TABLET, FILM COATED ORAL AT BEDTIME
Status: COMPLETED | OUTPATIENT
Start: 2021-07-15 | End: 2021-07-21

## 2021-07-15 RX ORDER — GADOBUTROL 604.72 MG/ML
7.5 INJECTION INTRAVENOUS ONCE
Status: COMPLETED | OUTPATIENT
Start: 2021-07-15 | End: 2021-07-15

## 2021-07-15 RX ORDER — TOPIRAMATE 25 MG/1
TABLET, FILM COATED ORAL
Qty: 120 TABLET | Refills: 3 | OUTPATIENT
Start: 2021-07-15

## 2021-07-15 RX ORDER — ACETAMINOPHEN 325 MG/1
975 TABLET ORAL 3 TIMES DAILY PRN
Status: DISCONTINUED | OUTPATIENT
Start: 2021-07-15 | End: 2021-07-16

## 2021-07-15 RX ORDER — POLYETHYLENE GLYCOL 3350 17 G/17G
17 POWDER, FOR SOLUTION ORAL DAILY
Status: DISCONTINUED | OUTPATIENT
Start: 2021-07-15 | End: 2021-07-24 | Stop reason: HOSPADM

## 2021-07-15 RX ORDER — ONDANSETRON 2 MG/ML
4 INJECTION INTRAMUSCULAR; INTRAVENOUS EVERY 6 HOURS PRN
Status: DISCONTINUED | OUTPATIENT
Start: 2021-07-15 | End: 2021-07-20

## 2021-07-15 RX ORDER — IBUPROFEN 800 MG/1
800 TABLET, FILM COATED ORAL ONCE
Status: COMPLETED | OUTPATIENT
Start: 2021-07-15 | End: 2021-07-15

## 2021-07-15 RX ORDER — CALCIUM CARBONATE 500 MG/1
500-1000 TABLET, CHEWABLE ORAL 3 TIMES DAILY PRN
Status: DISCONTINUED | OUTPATIENT
Start: 2021-07-15 | End: 2021-07-24 | Stop reason: HOSPADM

## 2021-07-15 RX ORDER — CYCLOBENZAPRINE HCL 5 MG
5-10 TABLET ORAL
Status: DISCONTINUED | OUTPATIENT
Start: 2021-07-15 | End: 2021-07-24 | Stop reason: HOSPADM

## 2021-07-15 RX ADMIN — ACETAMINOPHEN 975 MG: 325 TABLET, FILM COATED ORAL at 08:38

## 2021-07-15 RX ADMIN — TOPIRAMATE 50 MG: 50 TABLET ORAL at 21:25

## 2021-07-15 RX ADMIN — CYCLOBENZAPRINE 10 MG: 5 TABLET, FILM COATED ORAL at 21:25

## 2021-07-15 RX ADMIN — IBUPROFEN 800 MG: 800 TABLET, FILM COATED ORAL at 13:06

## 2021-07-15 RX ADMIN — SODIUM CHLORIDE, POTASSIUM CHLORIDE, SODIUM LACTATE AND CALCIUM CHLORIDE: 600; 310; 30; 20 INJECTION, SOLUTION INTRAVENOUS at 05:04

## 2021-07-15 RX ADMIN — KETOROLAC TROMETHAMINE 15 MG: 15 INJECTION, SOLUTION INTRAMUSCULAR; INTRAVENOUS at 04:35

## 2021-07-15 RX ADMIN — OXYMETAZOLINE HYDROCHLORIDE 2 SPRAY: 0.05 SPRAY NASAL at 21:26

## 2021-07-15 RX ADMIN — MEROPENEM 2 G: 1 INJECTION, POWDER, FOR SOLUTION INTRAVENOUS at 22:53

## 2021-07-15 RX ADMIN — GADOBUTROL 7.5 ML: 604.72 INJECTION INTRAVENOUS at 18:39

## 2021-07-15 RX ADMIN — CALCIUM CARBONATE (ANTACID) CHEW TAB 500 MG 1000 MG: 500 CHEW TAB at 17:02

## 2021-07-15 ASSESSMENT — MIFFLIN-ST. JEOR: SCORE: 1252

## 2021-07-15 NOTE — CONSULTS
[facility]       NEUROSURGERY CONSULTATION NOTE    This consultation was requested by Dr. Jacques from the ED service.      Reason for Consultation: prevertebral fluid collection    HPI:  35-year-old female, not on anticoagulation or antiplatelet therapy, with a past medical history significant for ARDS secondary to H1 N1 influenza, chronic headache, PTSD and anemia presents to the [facility] emergency room with a prevertebral fluid collection found on MRI.  The patient reports that Friday night she took a medication for her migraine and Saturday morning she woke up with right-sided shoulder and arm pain, which was accompanied with numbness and tingling.  All weekend she tried to use Tylenol and ibuprofen alternating in combination with cold and heat packs but did not get any relief so she decided to present to the [facility] emergency room.  She also has episodes where she felt like she had a fever or chills, but did not measure her temperature.  When seen by neurosurgery, she denies any numbness or tingling in her arms and legs.  She also does not experience any weakness.  She intermittently has episodes of lightening bolt kind of pain shooting into her arms, right more than left.  She also feels that her gait is rather unsteady, not particularly because of weakness, but more like because she feels imbalance.  She also endorses neck pain on palpation down to her scapulas.  There was no recent sick contacts, no night sweats but she does endorse loss of 8 pounds over the last 40 days.  She is changing her diet mostly because she is intermittently having loss of appetite. No recent trauma to her head, neck or extremities.  She also denies any medications or medical history of immunosuppression.      No recent nausea, vomiting, chest pain, shortness of breath, and denies headaches, weakness, LOC, numbness/weakness/paresthesias in extremities, changes in sensation, taste, smell,  nor trouble speaking or other neurologic symptoms.    PAST MEDICAL HISTORY:   Past Medical History:   Diagnosis Date     ARDS (adult respiratory distress syndrome) (H) 2010    related to H1N1 infection     H1N1 influenza 2010    prolonged ICU stay, medically induced coma     Iron deficiency anemia      Migraines        PAST SURGICAL HISTORY:   Past Surgical History:   Procedure Laterality Date     THORACIC SURGERY      trach       FAMILY HISTORY:   Family History   Problem Relation Age of Onset     Diabetes Father      Hypertension Father      Cancer Father        SOCIAL HISTORY:   Social History     Tobacco Use     Smoking status: Former Smoker     Types: Hookah     Smokeless tobacco: Never Used   Substance Use Topics     Alcohol use: No       MEDICATIONS:  Current Outpatient Medications   Medication Sig Dispense Refill     acetaminophen (TYLENOL) 500 MG tablet Take 2 tablets (1,000 mg) by mouth every 6 hours as needed for mild pain 180 tablet 0     Aspirin-Acetaminophen-Caffeine (EXCEDRIN MIGRAINE PO)  (Patient not taking: Reported on 7/13/2021)       cyclobenzaprine (FLEXERIL) 5 MG tablet Take 1-2 tablets (5-10 mg) by mouth nightly as needed for muscle spasms (do not drive) 20 tablet 1     dicyclomine (BENTYL) 10 MG capsule Take 1 capsule (10 mg) by mouth 4 times daily (before meals and nightly) (Patient not taking: Reported on 3/4/2021) 20 capsule 0     ibuprofen (ADVIL/MOTRIN) 800 MG tablet Take 1 tablet (800 mg) by mouth every 8 hours as needed for moderate pain 90 tablet 0     loperamide (IMODIUM A-D) 2 MG tablet Take 1 tablet (2 mg) by mouth 4 times daily as needed for diarrhea (Patient not taking: Reported on 3/4/2021) 20 tablet 0     omeprazole (PRILOSEC) 20 MG DR capsule Take 1 capsule (20 mg) by mouth daily 90 capsule 3     promethazine (PHENERGAN) 25 MG tablet Take 1 tablet (25 mg) by mouth every 6 hours as needed for nausea (Patient not taking: Reported on 3/4/2021) 12 tablet 0     Riboflavin 400 MG  "TABS Take 1 tablet by mouth daily (Patient not taking: Reported on 3/4/2021) 90 tablet 3     rizatriptan (MAXALT-MLT) 5 MG ODT Take 1-2 tablets (5-10 mg) by mouth at onset of headache for migraine (may repeat in 2 hours as needed. Max 30 mg in 24 hours) 18 tablet 6     SUMAtriptan (IMITREX) 50 MG tablet Take 2 tablets (100 mg) by mouth at onset of headache for migraine 15 tablet 0     topiramate (TOPAMAX) 25 MG tablet Take two tabs at bedtime for one week, than take three tabs at bedtime for one week, than take four tabs at bedtime if tolerated 120 tablet 3     vitamin D3 (CHOLECALCIFEROL) 50 mcg (2000 units) tablet Take 1 tablet (50 mcg) by mouth daily (Patient not taking: Reported on 6/15/2021) 90 tablet 3       Allergies:  Allergies   Allergen Reactions     Macrodantin [Nitrofurantoin Macrocrystal] Other (See Comments)     itching       ROS: 10 point ROS of systems including Constitutional, Eyes, Respiratory, Cardiovascular, Gastroenterology, Genitourinary, Integumentary, Muscularskeletal, Psychiatric were all negative except for pertinent positives noted in my HPI.    Physical exam:   Blood pressure 104/77, pulse 68, temperature 97.8  F (36.6  C), temperature source Oral, resp. rate 16, height 1.626 m (5' 4\"), weight 56.7 kg (125 lb), SpO2 100 %, not currently breastfeeding.  General: awake and alert  HEENT: atraumatic  PULM: breathing comfortably on room air  MSK: Midline tenderness to palpation cervical neck down to interscapular spine  NEUROLOGIC:  -- Awake; Alert; oriented x 3  -- Follows commands briskly  -- +repetition, calculation, and naming  -- Speech fluent, spontaneous. No aphasia or dysarthria.  -- no gaze preference. No apparent hemineglect.  Cranial Nerves:  -- PERRL 3-2mm bilat and brisk, extraocular movements intact  -- face symmetrical, tongue midline  -- sensory V1-V3 intact bilaterally  -- palate elevates symmetrically, uvula midline  -- hearing grossly intact bilat  -- Trapezii 5/5 strength " bilat symmetric  -- Cerebellar: Finger nose finger without dysmetria, intact rapid alternating motions bilaterally    Motor:  Normal bulk / tone; no tremor, rigidity, or bradykinesia.  No muscle wasting or fasciculations  No Pronator Drift     Delt Bi Tri Hand Flexion/  Extension Iliopsoas Quadriceps Hamstrings Tibialis Anterior Gastroc    C5 C6 C7 C8/T1 L2 L3 L4-S1 L4 S1   R 5 5 5 5 5 5 5 5 5   L 5 5 5 5 5 5 5 5 5     Sensory:  intact to LT x 4 extremities       Reflexes: no clonus       Bi BR Nimesh Pat Bab     C5-6 C6 UMN L2-4 UMN   R 3+ 3+ Norm 2+ Norm   L 3+ 3+ Norm 2+ Norm      Gait: deferred      IMAGING:  MRI c-spine 7/15/21:   CONCLUSION:  1. Thin prevertebral edema/fluid and enhancement throughout the visualized cervical spine and upper thoracic spine as detailed above. No deedee peripherally enhancing abscess. Findings are favored to be infectious. It appears to be centered about the   anterior C6-C7 disc space where there is mild irregularity and bony spurring. This suggests the possibility of early discitis at the C6-C7 level. The disc space and endplates at this level are otherwise normal. There is no abnormal epidural enhancement.     2. Mild likely reactive cervical adenopathy.      LABS:   Last Comprehensive Metabolic Panel:  Sodium   Date Value Ref Range Status   07/14/2021 141 133 - 144 mmol/L Final   06/15/2021 141 133 - 144 mmol/L Final     Potassium   Date Value Ref Range Status   07/14/2021 3.5 3.4 - 5.3 mmol/L Final   06/15/2021 3.7 3.4 - 5.3 mmol/L Final     Chloride   Date Value Ref Range Status   07/14/2021 112 (H) 94 - 109 mmol/L Final   06/15/2021 110 (H) 94 - 109 mmol/L Final     Carbon Dioxide   Date Value Ref Range Status   06/15/2021 25 20 - 32 mmol/L Final     Carbon Dioxide (CO2)   Date Value Ref Range Status   07/14/2021 23 20 - 32 mmol/L Final     Anion Gap   Date Value Ref Range Status   07/14/2021 6 3 - 14 mmol/L Final   06/15/2021 6 3 - 14 mmol/L Final     Glucose   Date Value  Ref Range Status   07/14/2021 85 70 - 99 mg/dL Final   06/15/2021 80 70 - 99 mg/dL Final     Urea Nitrogen   Date Value Ref Range Status   07/14/2021 8 7 - 30 mg/dL Final   06/15/2021 10 7 - 30 mg/dL Final     Creatinine   Date Value Ref Range Status   07/14/2021 0.73 0.52 - 1.04 mg/dL Final   06/15/2021 0.94 0.52 - 1.04 mg/dL Final     GFR Estimate   Date Value Ref Range Status   07/14/2021 >90 >60 mL/min/1.73m2 Final     Comment:     As of July 11, 2021, eGFR is calculated by the CKD-EPI creatinine equation, without race adjustment. eGFR can be influenced by muscle mass, exercise, and diet. The reported eGFR is an estimation only and is only applicable if the renal function is stable.   06/15/2021 79 >60 mL/min/[1.73_m2] Final     Comment:     Non  GFR Calc  Starting 12/18/2018, serum creatinine based estimated GFR (eGFR) will be   calculated using the Chronic Kidney Disease Epidemiology Collaboration   (CKD-EPI) equation.       Calcium   Date Value Ref Range Status   07/14/2021 8.8 8.5 - 10.1 mg/dL Final   06/15/2021 8.9 8.5 - 10.1 mg/dL Final     Lab Results   Component Value Date    WBC 6.4 07/14/2021    WBC 4.8 06/15/2021     Lab Results   Component Value Date    RBC 4.60 07/14/2021    RBC 4.79 06/15/2021     Lab Results   Component Value Date    HGB 11.5 07/14/2021    HGB 11.9 06/15/2021     Lab Results   Component Value Date    HCT 35.8 07/14/2021    HCT 37.6 06/15/2021     Lab Results   Component Value Date    MCV 78 07/14/2021    MCV 79 06/15/2021     Lab Results   Component Value Date    MCH 25.0 07/14/2021    MCH 24.8 06/15/2021     Lab Results   Component Value Date    MCHC 32.1 07/14/2021    MCHC 31.6 06/15/2021     Lab Results   Component Value Date    RDW 15.9 07/14/2021    RDW 15.2 06/15/2021     Lab Results   Component Value Date     07/14/2021     06/15/2021     INR   Date Value Ref Range Status   10/29/2017 1.08 0.86 - 1.14 Final      PTT   Date Value Ref Range  Status   11/15/2009 43 (H) 22 - 37 sec Final     aPTT   Date Value Ref Range Status   07/15/2021 36 22 - 38 Seconds Final     Comment:     Effective 7/11/2021, the reference range for this assay has changed.      CRP Inflammation   Date Value Ref Range Status   07/15/2021 25.0 (H) 0.0 - 8.0 mg/L Final   03/23/2013 6.4 0.0 - 8.0 mg/L Final     Erythrocyte Sedimentation Rate   Date Value Ref Range Status   07/15/2021 32 (H) 0 - 20 mm/hr Final         ASSESSMENT:  35 years old female presents with prevertebral fluid collection of unknown origin.  While this might be of infectious origin (CRP and ESR elevated), other differential such as autoimmune or neoplasm can be considered as well.    In addition to the assessment of diagnoses detailed above, this 35 year old female patient admitted from the Emergency Department has the following conditions contributing to the complexity of their medical care:    Anemia      RECOMMENDATIONS:  - No neurosurgical intervention indicated at this time   - COVID test  - consider ID consult  - consider ENT consult  - Infectious source work-up  - consider holding off on abx for better yield from possible intervention; however, if patient develops systemic signs, might start abx  - follow-up clx (obtained in ED)  - Serial neuro exams   - Supplemental oxygen PRN  - NPO   - mIVF  - Continue to monitor for fevers and/or signs of infection  - Glucose < 180  - Continue glucose checks  - Platelets > 100,000  - INR < 1.5  - Hemoglobin > 8  - DVT: SCDs while in bed      Jose Zapata MD, PhD  Neurosurgery Resident, PGY-3    The patient was discussed with Dr. Wylie, neurosurgery chief resident, and Dr. Mosley, neurosurgery staff.

## 2021-07-15 NOTE — CONSULTS
[facility]SULT[facility]     Patient:  Leticia Morales   YOB: 1985  Date of Visit:  07/15/2021  Date of Admission: 7/15/2021  Consult Requester:Natalee López DO          Assessment and Recommendations:   ID Problem List:  - Headache, right arm numbness/ tingling to rule out prevertebral abscess and/or parapharyngeal/retropharyngeal space infection.  - Acute odynophagia 2/2 to parapharyngeal/retropharyngeal space infection  - Chronic migraines s/p analgesia overuse    Recommendations:  - Consult to Neuroradiology for possible prevertebral fluid aspiration. Obtain for culture/ sensitivity before beginning of antibiotics  - Monitoring of fever and airway obstruction signs    Discussion:  Leticia Morales is a 34 yo female patient with a past medical history of chronic migraines (>10 visits to the ED/year), GERD, PTSD who presents to the ED on 07/14/2021 with a 5-day history of sudden moderate holoacranial  headache (6/10) that started in the morning and neck pain (8/10) that radiates to the right arm, upper and middle back by the evening. An MRI (07/14) ordered on the ED showed prevertebral fluid collection c/f early infection/abscess..    She describes a shooting and sharping pain that is associated with tingling and numbness on the right upper extremity that lasted for 4 hours. Some chills and increased temperature sensation over the past days. Nothing makes it better (pain medication: tylenol/ibuprofen, hot/cold pad) and massages and movements make it worse. She denies weakness but complies inability to completely raise her right arm due to pain as well as mild chest pain and shortness of breath and restricted range of motion (latero-lateral, up and down). Patient also complies of front and back neck pain which made her swallowing very difficult especially for soft solids and liquids that started on Monday, in addition to presence of acid reflux due to pills intake without  eating. Weight loss is present for the past month (weight loss of 9 pounds over 1 month). Patient denies sick contacts, any travel recently, IV drug use, recent cold/flu/sinusitus and trauma. She also denies cough, night sweats, voice muffling, stridor and neck swelling.    Patient hemodynamically stable, in mild distress, alert and oriented with resolved numbness and tingling from right arm and a mild headache (2/10) during examination. Additionally, there is significant pain during palpation (gentle tap on her spine produced significant pain from the thoracic spine up to cervical, mainly below scapulas), restricted range of motion (latero-lateral and up and down), without significant neurologic signs at the moment. Moderate pain during mouth opening. In regard to her labs, WBC within normal values (WBC 6.4 on 07/15), mild anemia, mild elevation of CRP (26 on 07/15) and ESR (30 on 07/15) with electrolytes and Cr unremarkable. A negative rapid Strep A screen and PCR, and a blood culture (07/15) with pending results. A CXR (07/15) showed rule out acute mediastinitis and a thoracic spine xray (07/15) showed no fracture.     Patient does not have significant trismus, she is afebrile and without systemic illness and associated stridor and    Airway obstruction, we will wait for neuroradiology evaluation for possibility of obtaining culture/ sensitivity/beta-2-transferrin (endogenous marker of CSF leakage) analysis before beginning of antibiotics.    Thank you for the consult. ID will continue to follow with you.     Ellen Frazier MD   Pager: [phone]-  [facility]  07/15/2021     Attestation:  This patient has been seen and evaluated by me.  I discussed this patient with  resident Dr Bush PGY1  and agree with the findings and plan in this note. I also personally edited this note to reflect my findings. I have reviewed today's vital signs, medications, labs and imaging.    Leticia Morales is a 36 yo  "female with hx of chronic migraines, GERD, PTSD, former smoker ,with more frequent headaches recently. She took Sumatriptan and Ibuprofen 800 mg PO  every 8 hrs alternating with Acetaminophen a few days in a week. She had a CT chest/abdomen/ pelvis w contrast on 6/15/21 for nausea, vomiting and difficult swallowing . Result showed No acute findings in the chest, abdomen or pelvis. No evidence of a bowel obstruction or inflammatory process.She says she had a lot of gagging/ rectching at that time.     she had increased posterior neck pain when she swallows food/ liquid since Saturday 7/10/21 and had been taking small bites and little fluid. Neck pain radiated to right arm with numbness and tingling sensation. she is right handed. no trauma / injury to her back/ arms. she had subjective fever and chills.     She presented to ER [facility] on 7/14/21 with neck pain and MRI cervical spine w wo contrast was obtained and showed \"Thin prevertebral edema/fluid and enhancement throughout the visualized cervical spine and upper thoracic spine as detailed above. No deedee peripherally enhancing abscess. Findings are favored to be infectious. It appears to be centered about the anterior C6-C7 disc space where there is mild irregularity and bony spurring. This suggests the possibility of early discitis at the C6-C7 level. The disc space and endplates at this level are otherwise normal. There is no abnormal epidural enhancement.  Mild likely reactive cervical adenopathy.\" Patient left the ER before the result was available. Patient was notified of the result and presented to [facility] for further management.     She feels better when seen in ER today. she was able to swallow a pill with sips of water but had posterior neck pain . no sSOB, sinus pain/ dental pain/ ear pain. has epigastric pain. no nausea, vomitimg, diarrhea.      She works as a  at a private clinic. she lives at home with her parents. her father has " "dementia and mother is \"sick\"  and has osteoporosis. she feels stressed at home but feels safe.     She was seen by Neurosurgery and found no indication for neursurgeical intervention. ENT saw patient earlier and also found no acute intervention and suggested IV antibiotics . spoke with primary team and IR was consulted and reviewed imagings and said \"No safe percutaneous access into the prevertebral collection in the cervical spine. No obvious discitis at the C6-C7 level based on his review of the imaging. Nothing to offer from neuroradiology standpoint. \"    O/E:   Gen: alert, oriented, no acute distress, woke up from sleep. tired but cooperative  Neck : supple , no cervical lymphadenopathy , posterior neck - tender to palpation   Lungs : clear bilateral   Cardiac: S1S2 reg no murmurs   Abdomen : BS + mild tenderness in epigastrium  Extremities : no edema  Neuro: alert, oriented, looks stressed. normal strength in upper extremities bilateral. mildly decreased strength in lower extremities.    Spine : tender in cervical and upper thoracic spine     1. Odynophagia/ dysphagia - to both solid/ liquid intake( since 7/10/21 - Saturday)   2. Headaches / migraine headaches - worse and more frequent recently (took Ibuprofen 800 mg 3x/daily PRN alternating with acetaminophen)   3. Cervical and upper thoracic spine tenderness with swallowing , radiates to right shoulder and right arm - numbness/ tingling sensation  4. History of  GERD   5. Epigastric tenderness   6. Nausea, vomiting, retching/ gagging in June 2021  7. abnormal MRI cervical spine 7/14/21 - Thin prevertebral edema/fluid and enhancement throughout the visualized cervical spine and upper thoracic spine No deedee peripherally enhancing abscess. Findings are favored to be infectious. It appears to be centered about the anterior C6-C7 disc space where there is mild irregularity and bony spurring. This suggests the possibility of early discitis at the C6-C7 level. " "The disc space and endplates at this level are otherwise normal. There is no abnormal epidural enhancement.  Mild likely reactive cervical adenopathy.\"     Recommendations:   - patient is not septic , hemodynamically stable. feels better with pain meds . OK to hold antibiotics until further work-up   - recommend GI consult to evaluate odynophagia/ dysphagia/ epigastric pain. high dose of Ibuprofen use with recent hx of gagging, nausea, vomiting. concern for any upper esophageal tear . posterior neck pain with swallowing . consider  barium swallow/ EGD    - MRI brain w wo contrast to evaluate for more frequent headaches and abnormal prNo safe percutaneous access into the prevertebral collection in the cervical spine. No obvious discitis at the C6-C7 level based on his review of the imaging. Nothing to offer from neuroradiology standpoint. prevertebral edema/fluid  - psychosocial stress/ PTSD   - if patient develops a fever / becomes septic, will need to start empiric antibiotic. - Meropenem 2 g IV q8hr   - follow-up blood cxs  - discussed plan with Primary Team resident .     ID will follow    Francis Chow MD,M.Med.Sc.  Infectious Diseases  Pager: [phone]                                     ________________________________________________________________    Consult Question: Prevertebral fluid collection to rule out prevertebral abscess and possibility of antibiotic treatment  Admission Diagnosis: Discitis, unspecified spinal region [M46.40]         History of Present Illness:   History obtained from review of chart and discussion with patient.     Leticia Morales is a 35 year old female with a PMH of significant chronic migraine, GERD, PTSD who came to the ED on 07/14/2021 with a 5-day history of headache, neck pain, swallowing troubles and numbness/tingling on he right upper extremity and had an MRI with positive findings of prevertebral fluid collection. ID was consulted today to rule out " prevertebral abscess and initiate antibiotic therapy.          Review of Systems:   10 point review of systems was negative except for noted as above in HPI.     ROS:  Constitutional: no fever, no chills, weight loss (9 pounds over 1 month with current of 137), no night sweats. Feeling tire and with headache (2/10)  Neuro: no HA, No focal weakness. Tingling and numbness absent on right arm. No visual changes, facial droop, drooling.  HEENT: no acute blurry vision, no earache, no ear discharge, sore throat, no lymphadenopathy.   CVS: mild chest pain during inspiration, heart palpitation, no syncope or presyncope  Lungs: no dyspnea, no chest pain, no cough. Mild shortness of breath.  GI: no N/V, no diarrhea, no constipation, no abdominal pain  : no dysuria, no hesitancy, no incontinence  Skin: no rash, no Itching, no abscesses  Allergy/immunology: no allergic reaction   Musculoskeletal: no muscle pain, no joint pain, no joint/neck swelling, significant limited ROM of neck movements (latero-lateral, up-down)             Past Medical History:     Past Medical History:   Diagnosis Date     ARDS (adult respiratory distress syndrome) (H) 2010    related to H1N1 infection     H1N1 influenza 2010    prolonged ICU stay, medically induced coma     Iron deficiency anemia      Migraines             Past Surgical History:     Past Surgical History:   Procedure Laterality Date     THORACIC SURGERY      trach            Family History:   Reviewed and non-contributory.   Family History   Problem Relation Age of Onset     Diabetes Father      Hypertension Father      Cancer Father             Social History:     Social History     Tobacco Use     Smoking status: Former Smoker     Types: Hookah     Smokeless tobacco: Never Used   Substance Use Topics     Alcohol use: No     History   Sexual Activity     Sexual activity: Never            Current Medications:       [START ON 7/28/2021] Botulinum Toxin Type A  155 Units Intramuscular  Q90 Days     omeprazole  20 mg Oral Daily     oxymetazoline  2 spray Both Nostrils BID     polyethylene glycol  17 g Oral Daily     sodium chloride (PF)  3 mL Intracatheter Q8H     topiramate  50 mg Oral At Bedtime            Allergies:     Allergies   Allergen Reactions     Macrodantin [Nitrofurantoin Macrocrystal] Other (See Comments)     itching            Physical Exam:   Vitals were reviewed  Temp:  [97.8  F (36.6  C)] 97.8  F (36.6  C)  Pulse:  [62-85] 67  Resp:  [16] 16  BP: (104-131)/() 116/75  SpO2:  [100 %] 100 %    Vitals:    07/15/21 0015   Weight: 56.7 kg (125 lb)       Constitutional: Adult female seen mildly distress, antalgic position, in NAD. Awake, alert, interactive.  HEENT: NC/AT, EOMI, sclera clear, conjunctiva normal, OP with MMM. No trismus. Mild pain while opening mouth. No ear discharge, no ear pain.   Respiratory: No increased work of breathing, CTAB, no crackles or wheezing.  Cardiovascular: RRR, no murmur noted. No peripheral edema.   GI: Normal bowel sounds, soft, non-distended and non-tender.  Skin: Warm, dry, well-perfused. No bruising, bleeding, rashes, or lesions on limited exam.  Musculoskeletal: Extremities grossly normal, non-tender, no edema. Limited right upper extremity and neck ROM in bed.   Neurologic: A&O. Answers questions appropriately, speech normal. Moves all extremities spontaneously. CNII-XII without abnormalities.   Neuropsychiatric: Calm. Affect appropriate to situation.  Vascular access:  Peripheral catheter on placed CDI on right arm without local inflammatory signs, non-tender, no surrounding erythema.         Laboratory Data:     Microbiology:  Culture Micro   Date Value Ref Range Status   10/29/2017 No growth  Final   04/15/2015 Normal skin delia  Final   05/24/2012 No Beta Streptococcus isolated  Final   11/22/2011   Final    10 to 50,000 colonies/mL Mixed gram positive delia  Multiple species present, probable perineal contamination.  Susceptibility  testing not routinely done   11/25/2009 No growth  Final   11/25/2009 No growth  Final   11/25/2009 Light growth Candida tropicalis  Final   11/25/2009 No growth  Final   11/24/2009 No growth  Final   11/23/2009 Light growth Candida tropicalis  Final   11/23/2009 No growth  Final   11/23/2009 No growth  Final   11/23/2009 No growth  Final   11/21/2009 Light growth Candida tropicalis  Final   11/21/2009 No growth  Final   11/21/2009 No growth  Final   11/21/2009 No growth  Final   11/19/2009 Normal delia  Final   11/19/2009 10 to 50,000 colonies/mL Mixed gram positive delia  Final     Comment:     Multiple species present, probable perineal contamination.  Susceptibility testing not routinely done   11/19/2009 No growth  Final   11/19/2009 No growth  Final   11/16/2009   Final    Light growth Candida albicans / dubliniensis For non-significant sites, Candida     Comment:      albicans is not differentiated from Dacia dubliniensis. This is the   national   standard.   11/15/2009 No growth  Final   11/15/2009 No growth  Final   11/14/2009 No growth after 30 days  Final   11/14/2009 Light growth Candida albicans / dubliniensis  Final     Comment:     For non-significant sites, Candida albicans is not differentiated from Dacia   dubliniensis. This is the national standard.  No additional fungi cultured after 4 weeks incubation   11/14/2009 No growth  Final   11/14/2009   Final    Culture received and in progress. Assayed at [facility]     Comment:      [place] [address]  Positive AFB results are called as soon as detected.  Final report to follow in 7 to 8 weeks.  Culture negative for acid fast bacilli   11/14/2009 Moderate growth Coagulase negative Staphylococcus  Final   11/14/2009 No growth  Final   11/14/2009 No growth  Final   11/11/2009 No growth after 28 days  Final   11/11/2009 Moderate growth Candida tropicalis  Final     Comment:     No additional fungi cultured after 4 weeks incubation    11/11/2009 Moderate growth Candida tropicalis  Final   11/11/2009   Final    Culture negative for acid fast bacilli Assayed at [facility]     Comment:      [place] [address]   11/11/2009 No growth  Final   11/10/2009   Final    Heavy growth Dacia albicans / dubliniensis For non-significant sites, Candida     Comment:      albicans is not differentiated from Dacia dubliniensis. This is the   national   standard.   11/08/2009 Light growth Candida tropicalis  Final   11/08/2009 No growth after 14 days  Final   11/08/2009 No growth  Final   11/08/2009 No growth  Final   11/08/2009   Final    >100,000 colonies/mL Candida albicans / dubliniensis For non-significant sites,     Comment:      Candida albicans is not differentiated from Dacia dubliniensis. This is the   national standard.   11/08/2009 No growth  Final   11/08/2009 No growth  Final   11/04/2009 Moderate growth Candida tropicalis  Final   10/31/2009 No growth  Final   10/31/2009 No growth  Final   10/31/2009 No growth  Final   10/31/2009 No growth  Final   10/31/2009 No growth  Final   10/31/2009 No growth  Final   10/29/2009 No growth after 29 days  Final   10/29/2009   Final    Moderate growth Candida albicans / dubliniensis For non-significant sites,     Comment:      Candida albicans is not differentiated from Dacia dubliniensis. This is the   national standard.  No additional fungi cultured after 4 weeks incubation   10/29/2009 No growth  Final   10/29/2009 Culture received and in progress.  Final     Comment:     Positive AFB results are called as soon as detected.  Final report to follow in 7 to 8 weeks.  Culture negative for acid fast bacilli   10/29/2009   Final    No Salmonella, Shigella, Campylobacter or E coli 0157 isolated.   10/28/2009 No growth  Final   10/28/2009 No growth  Final   10/28/2009 No growth  Final   10/28/2009 No growth  Final   10/27/2009 No growth  Final   10/27/2009 No growth  Final   08/31/2009 No Beta  Streptococcus isolated  Final       Inflammatory Markers    Recent Labs   Lab Test 07/15/21  0037 07/14/21  1835   SED 32* 30*   CRP 25.0* 26.0*       Metabolic Studies       Recent Labs   Lab Test 07/14/21  1835 06/15/21  1405 10/29/17  1820 03/19/17  1015    141 142 143   POTASSIUM 3.5 3.7 3.4 3.9   CHLORIDE 112* 110* 110* 111*   CO2 23 25 23 25   ANIONGAP 6 6 9 7   BUN 8 10 7 11   CR 0.73 0.94 0.65 0.57   GFRESTIMATED >90 79 >90 >90  Non African American GFR Calc     GLC 85 80 82 87   SAMUEL 8.8 8.9 8.4* 8.9       Hepatic Studies    Recent Labs   Lab Test 06/15/21  1405 10/29/17  1820 03/19/17  1015   BILITOTAL 0.4 0.2 0.2   ALKPHOS 59 60 64   ALBUMIN 3.6 3.2* 3.4   AST 14 9 13   ALT 14 11 12       Pancreatitis testing    Recent Labs   Lab Test 06/15/21  1405 03/19/17  1015   LIPASE 160 218       Hematology Studies      Recent Labs   Lab Test 07/14/21  1835 06/15/21  1405 05/06/19  1558 10/29/17  1820 03/19/17  1015   WBC 6.4 4.8  --  7.9 6.6   ANEU  --  2.2  --  4.7 2.5   ALYM  --  1.7 1.7 1.9 2.9   LINDSEY  --  0.6  --  0.9 0.9   AEOS  --  0.3  --  0.3 0.2   HGB 11.5* 11.9 9.3* 9.2* 9.9*   HCT 35.8 37.6 32.1* 31.4* 31.6*    311  --  411 294       Arterial Blood Gas Testing  No lab results found.     Urine Studies     Recent Labs   Lab Test 10/29/17  1834   URINEPH 6.0   NITRITE Negative   LEUKEST Negative   WBCU <1       Vancomycin Levels     No lab results found.    Invalid input(s): VANCO    Tobramycin levels     No lab results found.    Gentamicin levels    No lab results found.    CSF testing   No lab results found.    Hepatitis B Testing No lab results found.    Hepatitis C Testing     Hepatitis C Antibody   Date Value Ref Range Status   10/30/2009 Negative NEG Final       Respiratory Virus Testing    RSV Rapid Antigen Result   Date Value Ref Range Status   10/29/2009 Negative NEG Final       Last check of C difficile  No results found for: CDBPCT           Imaging:     CXR (07/14)    There are no  acute infiltrates. The cardiac silhouette is  not enlarged. Pulmonary vasculature is unremarkable.     MRI, Cervical Spine (07/14)  1. Thin prevertebral edema/fluid and enhancement throughout the visualized cervical spine and upper thoracic spine as detailed above. No deedee peripherally enhancing abscess. Findings are favored to be infectious. It appears to be centered about the   anterior C6-C7 disc space where there is mild irregularity and bony spurring. This suggests the possibility of early discitis at the C6-C7 level. The disc space and endplates at this level are otherwise normal. There is no abnormal epidural enhancement.     2. Mild likely reactive cervical adenopathy.    Head CT, w/o contrast (06/15)  The ventricles and basal cisterns are within normal limits  in configuration. There is no midline shift. There are no extra-axial  fluid collections. Gray-white differentiation is well maintained.     No intracranial hemorrhage, mass or recent infarct.     The visualized paranasal sinuses are well-aerated. There is no  mastoiditis. There are no fractures of the visualized bones.    CT Chest/Abdomen (06/15)  1.  No acute findings in the chest, abdomen or pelvis. No evidence of  a bowel obstruction or inflammatory process.

## 2021-07-15 NOTE — ED NOTES
Bed: IN04  Expected date: 7/14/21  Expected time:   Means of arrival:   Comments:  Leticia Morales -  referral (will be coming by POV), found to have vertebral fluid collection, possible early abscess (had presented with right arm symptoms and significant pain).    Patient had declined to await MRI results on the [facility] and left.  ED physician on [facility] received call from MR given the prevertebral fluid collection findings.  They contacted the patient, informed him of these findings, and recommended that she come here to the [facility] for IV antibiotics and neurosurgery consult, l  ikely admission. Should be arriving shortly.      Sofi Jacques MD  07/15/21 [id number]

## 2021-07-15 NOTE — H&P
[facility]    History and Physical - [facility]        Date of Admission:  7/15/2021    Assessment & Plan      Leticia Morales is a 35 year old female admitted on 7/15/2021. She has a history of chronic migraines and is admitted for acute onset of R shoulder and upper back pain and transient (now improved) R arm numbness, found to have cervical prevertebral fluid collection on MRI.     # Prevertebral fluid collection vs. Discitis  # Back and R shoulder pain  # Transient R arm numbness and tingling  # Elevated CRP, ESR  Patient with new onset right upper extremity pain and transient numbness (now improved) and upper back pain, found to have elevated inflammatory markers and MRI of the cervical spine showing prevertebral fluid collection concerning for infection she is currently afebrile, and hemodynamically stable.  She was evaluated by neurosurgery in the emergency department, who recommended that she remain n.p.o. and for daytime consideration of prevertebral fluid sampling to help guide antibiotics.    - Admit to inpatient  - NSGY consult, appreciate recs  - ID consult, appreciate recs  - Holding abx until fluid collection unless clinical changes  - Follow-up blood cultures  - q4 neuro checks  - q4 VS  - NPO  - LR 100cc/hr  - Pain management: apap, flexeril  - Daily CBC, CMP, q48 hour CRP, q72 hour ESR    # Odynophagia  Patient has acute onset of painful swallowing the past 24 to 48 hours.  This includes fluids and food of all textures.  Some concern from neurosurgery that the source of this pain would also be the source of her infection.  ENT consulted in the emergency department, awaiting their recommendations.  -ENT consulted, she recs    # Chronic migraines  With long history of chronic migraines.  She was seen by her neurologist on 7/13, was recommended to stop sumatriptan and start Topamax as preventative therapy.  She has not been able to start Topamax at  the time of admission, we will start her up taper while she is here.  It does appear that in the past she has experienced nausea with Topamax, will monitor closely.  Will add on rizatriptan as needed, per neurology recommendations.  - PTA topamax     Chronic issues:  GERD: PTA omeprazole     Diet: NPO per Anesthesia Guidelines for Procedure/Surgery Except for: Meds    DVT Prophylaxis: Pneumatic Compression Devices  Hill Catheter: Not present  Fluids: LR 100cc/hr  Central Lines: None  Code Status: Full Code      Disposition Plan   Expected discharge: 07/19/2021 recommended to prior living arrangement once antibiotic plan established.     The patient's care was discussed with the Attending Physician, Dr. Davis.    Gabriela Turner MD  [facility]  Securely message with the Vocera Web Console (learn more here)  Text page via [facility] Paging/Directory  Please see sign in/sign out for up to date coverage information  ______________________________________________________________________    Chief Complaint   Neck, back, right arm pain and pain swallowing    History is obtained from the patient and EMR      History of Present Illness   Leticia Morales is a 35 year old female who has a history of chronic migraine headaches, and remote history of ARDS associated with H1 N1 flu infection with subsequent PTSD, and is admitted for right shoulder and upper back pain and transient numbness and tingling of the right arm.    Leticia reports that starting on 7/10, upon waking up she began experiencing pain in her right shoulder and arm with associated numbness and tingling in the right arm only.  Over the next couple of days, with range of motion exercises of the right arm, the numbness and tingling went away, however the pain in the right arm and shoulder persisted.  Ultimately the pain spread to her upper back as well.  She alternated Tylenol and ibuprofen, and tried cold  packs but the pain became severe enough that she presented to the emergency department on 7/15.    She does note pain when swallowing that started the day prior to presentation.  She describes it as a sharp pain that occurs when swallowing food and fluids of any textures.      She has been feeling occasional tactile fevers and chills, however, has not checked her temperature.  She has not noticed any numbness and tingling anywhere else in her body, but does endorse some imbalance when walking.  She has chronic headaches, these are stable for her.  She has no changes in her vision.  She has had no loss of bowel and bladder function.      Patient denies IV drug use.    She has been fully immunized against COVID-19.    ED Course:  VS: Afebrile, normotensive, nontachycardic, saturating well on room air  Labs: Significant for CRP elevated to 25, ESR elevated to 32, no leukocytosis, blood cultures x2 pending  Imaging: MR of cervical spine shows prevertebral edema/fluid throughout cervical and upper thoracic spine without abscess.  Findings are concerning for early discitis at the C6-C7 level.  Radiographs of chest and thoracic spine.  EKG: Sinus bradycardia, otherwise normal EKG  Medications: Ibuprofen 800x1  Consults: Neurosurgery consulted, recommend patient remain n.p.o. and to hold off on antibiotics until source specimen is able to be obtained.  Recommend consideration of ENT and ID consults.        Review of Systems    Negative except as noted in HPI    Past Medical History    I have reviewed this patient's medical history and updated it with pertinent information if needed.   Past Medical History:   Diagnosis Date     ARDS (adult respiratory distress syndrome) (H) 2010    related to H1N1 infection     H1N1 influenza 2010    prolonged ICU stay, medically induced coma     Iron deficiency anemia      Migraines         Past Surgical History   I have reviewed this patient's surgical history and updated it with pertinent  information if needed.  Past Surgical History:   Procedure Laterality Date     THORACIC SURGERY      trach        Social History   I have reviewed this patient's social history and updated it with pertinent information if needed. Leticia Morales  reports that she has quit smoking. Her smoking use included hookah. She has never used smokeless tobacco. She reports that she does not drink alcohol and does not use drugs.    Family History   I have reviewed this patient's family history and updated it with pertinent information if needed.  Family History   Problem Relation Age of Onset     Diabetes Father      Hypertension Father      Cancer Father        Prior to Admission Medications   Prior to Admission Medications   Prescriptions Last Dose Informant Patient Reported? Taking?   SUMAtriptan (IMITREX) 50 MG tablet Past Week at Unknown time  No Yes   Sig: Take 2 tablets (100 mg) by mouth at onset of headache for migraine   acetaminophen (TYLENOL) 500 MG tablet 7/15/2021 at Unknown time  No Yes   Sig: Take 2 tablets (1,000 mg) by mouth every 6 hours as needed for mild pain   cyclobenzaprine (FLEXERIL) 5 MG tablet Unknown at Unknown time  No No   Sig: Take 1-2 tablets (5-10 mg) by mouth nightly as needed for muscle spasms (do not drive)   ibuprofen (ADVIL/MOTRIN) 800 MG tablet 7/15/2021 at Unknown time  No Yes   Sig: Take 1 tablet (800 mg) by mouth every 8 hours as needed for moderate pain   omeprazole (PRILOSEC) 20 MG DR capsule Past Week at Unknown time  No Yes   Sig: Take 1 capsule (20 mg) by mouth daily   topiramate (TOPAMAX) 25 MG tablet More than a month at Unknown time  No No   Sig: Take two tabs at bedtime for one week, than take three tabs at bedtime for one week, than take four tabs at bedtime if tolerated      Facility-Administered Medications Last Administration Doses Remaining   Botulinum Toxin Type A (BOTOX) 200 units injection 155 Units None recorded         Allergies   Allergies   Allergen Reactions      Macrodantin [Nitrofurantoin Macrocrystal] Other (See Comments)     itching       Physical Exam   Vital Signs: Temp: 97.8  F (36.6  C) Temp src: Oral BP: 104/77 Pulse: 68   Resp: 16 SpO2: 100 % O2 Device: None (Room air)    Weight: 125 lbs 0 oz    General Appearance: Alert, oriented, pleasant woman.  Resting in bed, no apparent distress.  Eyes: EOMI, no scleral icterus  HEENT: NC/AT.  Oral mucosa is moist and pink.  Tongue does show indentations from dentition.  Respiratory: CTA B.  Saturating well on room air.  Speaking in full sentences.  No extra work of breathing or respiratory distress  Cardiovascular: RRR, NL S1/S2.  No murmurs, W WP.  GI: Abdomen is soft, nondistended, nontender to palpation.  Skin: Warm, dry.  No rashes or lesions noted.  Musculoskeletal: TTP along R scapula, cervical spine and upper thoracic spine. No pedal edema.  Patient moving all extremities spontaneously  Neurologic: No gross focal neurologic deficits  Psychiatric: Appropriate mood and affect.    Data   Data reviewed today: I reviewed all medications, new labs and imaging results over the last 24 hours.

## 2021-07-15 NOTE — ED PROVIDER NOTES
ED Provider Note  [facility]      History   No chief complaint on file.    HPI  Leticia Morales is a 35 year old female with PMH notable for chronic migraines, GERD, PTSD who [place] [facility] [place] results concerning for vertebral edema and possible early discitis/findings for possible infection.    Patient presented to [place] [facility] [place] today with neck pain. Exam notable for reproducible pain with palpation of the trapezius muscles in the thoracic spine as well as the cervical spine and sternocleidomastoid on the right.  Tenderness over the midline of both the cervical and thoracic spines.  Normal range of motion in the right shoulder. No dysphonia or stridor.  She reports exacerbation of the neck and back muscular pain with swallowing but denies a sore throat or choking sensation.      Labs notable for normal white count, hemoglobin 11.5 which is stable from last month. EKG shows sinus bradycardia without acute ischemic changes.  Patient does report pleurisy so I added on troponin and D-dimer.  Troponin negative, D-dimer negative. Patient's pain was somewhat improved after the ibuprofen.  She was able to tolerate oral intake.  TSH resulted normal.  Chest x-ray clear. Thoracic spine x-ray also normal. MRI was ordered, and found on C-spine MRI to have prevertebral fluid collection concerning for early infection/abscess.    Patient had declined to await MRI results on the [facility] and left.  [place] physician [place] [facility] [place] call from  given the prevertebral fluid collection findings.  They contacted the patient, informed them of these findings, and recommended that she come [facility] and neurosurgery consult, likely admission.     MR Cervical Spine w/ and w/o contrast 7/14  CONCLUSION:  1. Thin prevertebral edema/fluid and enhancement throughout the visualized cervical spine and upper thoracic spine as detailed above. No deedee  peripherally enhancing abscess. Findings are favored to be infectious. It appears to be centered about the  anterior C6-C7 disc space where there is mild irregularity and bony spurring. This suggests the possibility of early discitis at the C6-C7 level. The disc space and endplates at this level are otherwise normal. There is no abnormal epidural enhancement.  2. Mild likely reactive cervical adenopathy.    Per further review of patient's EMR in a virtual visit with neurology yesterday, patient was diagnosed with chronic migraines and possible acute analgesic overuse.  They also considered a possible cervical dystonia.  They felt the patient was a good candidate for Botox to treat chronic migraine with cervicogenic symptoms.  They prescribed the patient a course of Flexeril to see if that would help with her neck movements.  They also ordered a cervical MRI and increased patient's dose of topiramate for migraine prophylaxis.  They instructed patient to take rizatriptan instead of sumatriptan for rescue.    Past Medical History  Past Medical History:   Diagnosis Date     ARDS (adult respiratory distress syndrome) (H) 2010    related to H1N1 infection     H1N1 influenza 2010    prolonged ICU stay, medically induced coma     Iron deficiency anemia      Migraines      Past Surgical History:   Procedure Laterality Date     THORACIC SURGERY      trach     acetaminophen (TYLENOL) 500 MG tablet  Aspirin-Acetaminophen-Caffeine (EXCEDRIN MIGRAINE PO)  cyclobenzaprine (FLEXERIL) 5 MG tablet  dicyclomine (BENTYL) 10 MG capsule  ibuprofen (ADVIL/MOTRIN) 800 MG tablet  loperamide (IMODIUM A-D) 2 MG tablet  omeprazole (PRILOSEC) 20 MG DR capsule  promethazine (PHENERGAN) 25 MG tablet  Riboflavin 400 MG TABS  rizatriptan (MAXALT-MLT) 5 MG ODT  SUMAtriptan (IMITREX) 50 MG tablet  topiramate (TOPAMAX) 25 MG tablet  vitamin D3 (CHOLECALCIFEROL) 50 mcg (2000 units) tablet      Allergies   Allergen Reactions     Macrodantin  [Nitrofurantoin Macrocrystal] Other (See Comments)     itching     Family History  Family History   Problem Relation Age of Onset     Diabetes Father      Hypertension Father      Cancer Father      Social History   Social History     Tobacco Use     Smoking status: Former Smoker     Types: Hookah     Smokeless tobacco: Never Used   Substance Use Topics     Alcohol use: No     Drug use: No      Past medical history, past surgical history, medications, allergies, family history, and social history were reviewed with the patient. No additional pertinent items.       Review of Systems   Constitutional: Negative for chills and fever.   HENT: Negative for drooling, sore throat and trouble swallowing.    Respiratory: Negative for cough and shortness of breath.    Gastrointestinal: Negative for abdominal pain, constipation, diarrhea, nausea and vomiting.   Musculoskeletal:        Had headache, neck pain earlier, arm discomfort (all improved currently)   Skin: Negative for color change and rash.   Allergic/Immunologic: Negative for immunocompromised state.   Neurological: Positive for headaches. Negative for syncope, weakness and light-headedness.        Arm symptoms as per initial ED note   All other systems reviewed and are negative.    A complete review of systems was performed with pertinent positives and negatives noted in the HPI, and all other systems negative.    Physical Exam      Physical Exam  *CONSTITUTIONAL: Well-developed and well-nourished. Awake and alert. Non-toxic appearance. No acute distress.   HENT:   - Head: Normocephalic and atraumatic.   - Ears: Hearing and external ear grossly normal.   - Nose: Nose normal. No rhinorrhea. No epistaxis.   - Mouth/Throat: MMM  EYES: Conjunctivae and lids are normal. No scleral icterus.   NECK: Normal range of motion and phonation normal. Neck supple.  No tracheal deviation, no stridor. No edema or erythema noted. No obvious stiffness/rigidity.  No fullness or  fluctuance.  CARDIOVASCULAR: Normal rate, regular rhythm and no appreciable abnormal heart sounds.  PULMONARY/CHEST: Normal work of breathing. No accessory muscle usage or stridor. No respiratory distress.  No appreciable abnormal breath sounds.  ABDOMEN: Soft, non-distended. No tenderness. No rigidity, rebound or guarding.   MUSCULOSKELETAL: Extremities warm and seemingly well perfused. No edema or calf tenderness.  NEUROLOGIC: Awake, alert. Not disoriented. She displays no atrophy and no tremor. Normal tone. No seizure activity. GCS 15  SKIN: Skin is warm and dry. No rash noted. No diaphoresis. No pallor.   PSYCHIATRIC: Normal mood and affect. Speech and behavior normal. Thought processes linear. Cognition and memory are normal.     ED Course     ED Course as of Jul 15 0217   Wed Jul 14, 2021   2341 Discussed w/ Neurosurgery who will see/evaluate patient and provide recommendations      Thu Jul 15, 2021   0034 Neurosurgery will see. They also recommended ENT consult. Discussed w/ ENT who will also see patient and provide recommendations.            Assessments & Plan (with Medical Decision Making)   IMPRESSION: 35 year old female w/ PMH notable for chronic migraines, GERD, PTSD who [place] the ED [place] [facility] [place] for MRI results concerning for vertebral edema and possible early discitis/findings for possible infection, as described further above and in EMR/previous notes and imaging results.    Clinically, patient appears nontoxic, NAD.  Vitals grossly WNL.  Currently the patient reports that the pain medications given at the  ED sniffily improved her pain and she is currently doing well.  No other obvious new findings on exam from previous.  Please see prior ED note for full details.    Does have findings for the prevertebral edema and possible early discitis.  That will need to be evaluated further for the potential for infectious process, versus inflammatory, etc.    PLAN: Neurosurgery consult,  additional labs as requested per Neurosurgery; ENT consult, admission    RESULTS:  - Labs: Pending  --- Blood cultures pending, Strep pending  - Imaging: See imaging from other ED visit earlier tonight, including the notable MR.     INTERVENTIONS:   - Neurosurgery and ENT consults  - Close monitoring (pt to tell us of any new symptoms or changes as well)  - Neurosurgery has recommended we hold off on ABx currently while getting the consults, to decide if this would alter further work-up or if we should start empirically.  Pending at shift change.    RE-EVALUATION:  - Pt otherwise continues to do well here in the ED, no acute issues or apparent concerning changes in vitals or clinical appearance.    DISCUSSIONS:  - w/ Neurosurgery: Reviewed the case and available results.  They will come to see and evaluate the patient here in the ED. Pending at signout They would recommend admission to IM for further work-up of why this may have occurred, and additionally recommend consult with ENT.  Here initially they recommend holding off on antimicrobials until patient can be seen/evaluated, given that she does not have any infectious symptoms or systemic symptoms/findings at this time, as this may allow better sampling/testing, however they may change this recommendation based on their evaluation and recommend starting Abx should the patient have any systemic or infectious symptoms.  - w/ ENT: Reviewed the case and available results.  They will come to see and evaluate the patient here in the ED. Pending at signout.   - w/ IM: Reviewed patient/presentation, current state of workup/any pending studies. They will admit for further evaluation/management, F/U pending studies as needed, coordinate w/ consulting services as needed. No additional requests/recommendations for workup/management for in the ED at this time.   - w/ Patient: I have reviewed the available findings, plan with the patient. She expressed understanding and  agreement with this plan. All questions answered to the best of our ability at this time.     DISPOSITION/PLANNING:  - IMPRESSION: Prevertebral edema, possible early discitis  - DISPOSITION: Admit to IM for further evaluation/management  - PENDING: Labs, blood cultures, Neurosurgery and ENT consults  - RECOMMENDATIONS: Follow-up ENT and Neurosurgery consults.  --- Per initial discussion with Neurosurgery (prior to their formal consult), they recommended we hold off on ABX initially in case that would allow better sampling/testing of such, unless patient were to develop systemic infectious symptoms or findings (which the patient currently does not have).  --- Discussed with IM and overnight EM physician and they will follow-up on need for antimicrobials/initiate as soon as any change, infectious symptoms, or recommendation from the consulting teams.      ______________________________________________________________________        --  Sofi Jacques MD  [facility] EMERGENCY DEPARTMENT  7/14/2021     Sofi Jacques MD  07/16/21 0157

## 2021-07-15 NOTE — DISCHARGE INSTRUCTIONS
Please make an appointment to follow up with Your Primary Care Provider as soon as possible to review results of your MRI c spine.      Take 600 mg ibuprofen every 8 hours, for pain and inflammation.  Take this on schedule, for approximately 1 week or until resolution of symptoms.    Take this medication with food to prevent stomach upset.  If you taking a chronic antacid medication, make sure to take this while you are taking this medication.    Take Flexeril for severe pain. Do not drive or drink alcohol while taking this medication. This is a sedating drug and may cause you to be off balance and at risk for falling especially when taken with other sedating medications.       Rest, avoid repetitive motion, and lifting over 5 pounds with the effected extremity.  May use ice or heating pad to help with the pain.    Follow-up with her primary care doctor in 1-2 weeks if your symptoms have not resolved. You may benefit from a referral to physical therapy.     Return to the emergency department if you develop worsening pain, weakness, numbness or tingling, bowel or bladder changes.    You may also benefit from a TENS unit, which you can buy over the counter.

## 2021-07-15 NOTE — ED NOTES
[facility]   ED Nurse to Floor Handoff     Leticia Morales is a 35 year old female who speaks Bel[place]an and lives alone,  in a home  They arrived in the ED by car from emergency room    ED Chief Complaint: Abscess    ED Dx;   Final diagnoses:   Discitis, unspecified spinal region         Needed?: No    Allergies:   Allergies   Allergen Reactions     Macrodantin [Nitrofurantoin Macrocrystal] Other (See Comments)     itching   .  Past Medical Hx:   Past Medical History:   Diagnosis Date     ARDS (adult respiratory distress syndrome) (H) 2010    related to H1N1 infection     H1N1 influenza 2010    prolonged ICU stay, medically induced coma     Iron deficiency anemia      Migraines       Baseline Mental status: WDL  Current Mental Status changes: at basesline    Infection present or suspected this encounter: no  Sepsis suspected: Yes  Isolation type: No active isolations  Patient tested for COVID 19 prior to admission: YES     Activity level - Baseline/Home:  Independent  Activity Level - Current:   Independent    Bariatric equipment needed?: No    In the ED these meds were given:   Medications   oxymetazoline (AFRIN) 0.05 % spray 2 spray (has no administration in time range)   ibuprofen (ADVIL/MOTRIN) tablet 800 mg (800 mg Oral Not Given 7/15/21 0430)   cyclobenzaprine (FLEXERIL) tablet 5-10 mg (has no administration in time range)   omeprazole (priLOSEC) CR capsule 20 mg (has no administration in time range)   topiramate (TOPAMAX) tablet 50 mg (has no administration in time range)   lidocaine 1 % 0.1-1 mL (has no administration in time range)   lidocaine (LMX4) cream (has no administration in time range)   sodium chloride (PF) 0.9% PF flush 3 mL (3 mLs Intracatheter Not Given 7/15/21 0438)   sodium chloride (PF) 0.9% PF flush 3 mL (has no administration in time range)   melatonin tablet 1 mg (has no administration in time range)   lactated ringers infusion (  Intravenous New Bag 7/15/21 6013)   ondansetron (ZOFRAN-ODT) ODT tab 4 mg (has no administration in time range)     Or   ondansetron (ZOFRAN) injection 4 mg (has no administration in time range)   polyethylene glycol (MIRALAX) Packet 17 g (has no administration in time range)   acetaminophen (TYLENOL) tablet 975 mg (975 mg Oral Given 7/15/21 4099)   ketorolac (TORADOL) injection 15 mg (15 mg Intravenous Given 7/15/21 2136)       Drips running?  Yes    Home pump  No    Current LDAs  Peripheral IV 07/15/21 Right;Dorsal Lower forearm (Active)   Number of days: 0       Peripheral IV 07/15/21 Left Hand (Active)   Number of days: 0       Labs results:   Labs Ordered and Resulted from Time of ED Arrival Up to the Time of Departure from the ED   CRP INFLAMMATION - Abnormal; Notable for the following components:       Result Value    CRP Inflammation 25.0 (*)     All other components within normal limits   ERYTHROCYTE SEDIMENTATION RATE AUTO - Abnormal; Notable for the following components:    Erythrocyte Sedimentation Rate 32 (*)     All other components within normal limits   PARTIAL THROMBOPLASTIN TIME - Normal   HCG QUALITATIVE PREGNANCY - Normal   SARS-COV2 (COVID-19) VIRUS RT-PCR - Normal    Narrative:     Testing was performed using the Xpert Xpress SARS-CoV-2 Assay on the  Cepheid Gene-Xpert Instrument Systems. Additional information about  this Emergency Use Authorization (EUA) assay can be found via the Lab  Guide. This test should be ordered for the detection of SARS-CoV-2 in  individuals who meet SARS-CoV-2 clinical and/or epidemiological  criteria. Test performance is unknown in asymptomatic patients. This  test is for in vitro diagnostic use under the FDA EUA for  laboratories certified under CLIA to perform high complexity testing.  This test has not been FDA cleared or approved. A negative result  does not rule out the presence of PCR inhibitors in the specimen or  target RNA in concentration below the limit  of detection for the  assay. The possibility of a false negative should be considered if  the patient's recent exposure or clinical presentation suggests  COVID-19. This test was validated by the [facility]. This laboratory is certified under  the Clinical Laboratory Improvement Amendments of 1988 (CLIA-88) as  qualified to perform high complexity laboratory testing.     STREPTOCOCCUS A RAPID SCREEN W REFELX TO PCR - Normal   GROUP A STREPTOCOCCUS PCR THROAT SWAB - Normal    Narrative:     The Xpert Xpress Strep A test, performed on the [employer], is a rapid, qualitative in vitro diagnostic test for the detection of Streptococcus pyogenes (Group A ß-hemolytic Streptococcus, Strep A) in throat swab specimens from patients with signs and symptoms of pharyngitis. The Xpert Xpress Strep A test can be used as an aid in the diagnosis of Group A Streptococcal pharyngitis. The assay is not intended to monitor treatment for Group A Streptococcus infections. The Xpert Xpress Strep A test utilizes an automated real-time polymerase chain reaction (PCR) to detect Streptococcus pyogenes DNA.   INR   IP ASSIGN PROVIDER TEAM TO TREATMENT TEAM   NEURO CHECKS   PERIPHERAL IV CATHETER   VITAL SIGNS   APPLY PNEUMATIC COMPRESSION DEVICE (PCD)   STRICT INTAKE AND OUTPUT   COVID-19 VIRUS (CORONAVIRUS) BY PCR    Narrative:     The following orders were created for panel order Asymptomatic COVID-19 Virus (Coronavirus) by PCR Nasopharyngeal.  Procedure                               Abnormality         Status                     ---------                               -----------         ------                     SARS-COV2 (COVID-19) Vir...[[id number]]  Normal              Final result                 Please view results for these tests on the individual orders.   BLOOD CULTURE   BLOOD CULTURE       Imaging Studies:   Recent Results (from the past 24 hour(s))   MR Cervical  Spine w/o & w Contrast    [facility]  MR CERVICAL SPINE W/O AND W CONTRAST  7/14/2021 4:46 PM     INDICATION: Paraspinal mass/tumor. Cervical radiculopathy, infection suspected.  TECHNIQUE: Routine.  CONTRAST: 5.6 ml IV Gadavist.  COMPARISON: None.    FINDINGS: Mild reversal of the normal cervical lordosis. Slight anterolisthesis C4 on C5. Alignment is otherwise normal. No definite marrow edema. No cord signal abnormality. No abnormal cord signal. There is thin prevertebral fluid extending from   approximately C2 to the visualized upper thoracic levels at T3. It likely extends below the field of view. This demonstrates diffuse enhancement. The thickest portion of this fluid and enhancement appears to be centered about the C6-C7 disc space where   there is mild irregularity along the ventral aspect of the disc space though there is no deedee enhancement or edema within the disc or adjacent endplates at this level. No deedee epidural abscess. A few scattered mildly prominent to enlarged cervical   lymph nodes.    Craniovertebral junction and C1-C2: Normal.    C2-C3: Normal disc height. No herniation. No facet arthropathy. No spinal canal stenosis. No right neural foraminal stenosis. No left neural foraminal stenosis.    C3-C4: Normal disc height. No herniation. No facet arthropathy. No spinal canal stenosis. No right neural foraminal stenosis. No left neural foraminal stenosis.    C4-C5: Normal disc height. No herniation. No facet arthropathy. No spinal canal stenosis. No right neural foraminal stenosis. No left neural foraminal stenosis.      C5-C6: Normal disc height. No herniation. No facet arthropathy. No spinal canal stenosis. No right neural foraminal stenosis. No left neural foraminal stenosis.    C6-C7: Normal disc height. No herniation. No facet arthropathy. No spinal canal stenosis. No right neural foraminal stenosis. No left neural foraminal stenosis.    C7-T1: Normal disc height.  "No herniation. No facet arthropathy. No spinal canal stenosis. No right neural foraminal stenosis. No left neural foraminal stenosis.      Impression    CONCLUSION:  1. Thin prevertebral edema/fluid and enhancement throughout the visualized cervical spine and upper thoracic spine as detailed above. No deedee peripherally enhancing abscess. Findings are favored to be infectious. It appears to be centered about the   anterior C6-C7 disc space where there is mild irregularity and bony spurring. This suggests the possibility of early discitis at the C6-C7 level. The disc space and endplates at this level are otherwise normal. There is no abnormal epidural enhancement.    2. Mild likely reactive cervical adenopathy.    Findings discussed with Dr. Larkin at 10:20 PM 07/14/2021.   XR Chest 2 Views    Narrative    CHEST TWO VIEWS 7/14/2021 7:49 PM     HISTORY: Chest pain.    COMPARISON: October 29, 2017       Impression    IMPRESSION: There are no acute infiltrates. The cardiac silhouette is  not enlarged. Pulmonary vasculature is unremarkable.    SUSAN BURNETTE MD         SYSTEM ID:  [id number]   XR Thoracic Spine 3 Views    Narrative    EXAM: XR THORACIC SPINE 3 VIEWS  LOCATION: [facility]  DATE/TIME: 7/14/2021 7:36 PM    INDICATION: Upper back and neck pain.  COMPARISON: None.  TECHNIQUE: CR Thoracic Spine.      Impression    IMPRESSION: Anatomic alignment thoracic spine. Vertebral body heights and intervertebral disc heights appear normal. No acute thoracic spine fracture identified. Pedicles appear intact throughout. Visualized lungs are clear. Heart size normal.        Recent vital signs:   /78   Pulse 63   Temp 97.8  F (36.6  C) (Oral)   Resp 16   Ht 1.626 m (5' 4\")   Wt 56.7 kg (125 lb)   SpO2 100%   BMI 21.46 kg/m      Venancio Coma Scale Score: 15 (07/15/21 0800)       Cardiac Rhythm: Other  Pt needs tele? No  Skin/wound Issues: None    Code Status: Full Code    Pain control: fair    Nausea " control: good    Abnormal labs/tests/findings requiring intervention: See chart     Family present during ED course? No   Family Comments/Social Situation comments: Patient cares for self, independent   Tasks needing completion: None    Thuy Aranda, RN      [phone] [facility]

## 2021-07-15 NOTE — CONSULTS
Otolaryngology Consult Note  July 15, 2021      CC: Neck stiffness    HPI: Leticia Morales is a 35 year old female with a past medical history of severe migraines who presents to the ED with neck stiffness. She reports that on Friday, she experienced a severe episode of migraine. The following day, she developed neck stiffness and some right upper extremity pain. She also developed some difficulty swallowing both food and liquids, although she is still able to do so after taking ibuprofen. No associated fever, chills, shortness of breath, or voice changes.  Her neurologist recommended an MRI, which was obtained and demonstrated prevertebral fluid collection in the cervical and upper thoracic regions - she presented to the [facility] for further management, and ENT is consulted to evaluate her odynophagia and prevertebral fluid collection.     Past Medical History:   Diagnosis Date     ARDS (adult respiratory distress syndrome) (H) 2010    related to H1N1 infection     H1N1 influenza 2010    prolonged ICU stay, medically induced coma     Iron deficiency anemia      Migraines        Past Surgical History:   Procedure Laterality Date     THORACIC SURGERY      trach     History of trach for H1N1, decannulated in 2010     Current Outpatient Medications   Medication Sig Dispense Refill     acetaminophen (TYLENOL) 500 MG tablet Take 2 tablets (1,000 mg) by mouth every 6 hours as needed for mild pain 180 tablet 0     ibuprofen (ADVIL/MOTRIN) 800 MG tablet Take 1 tablet (800 mg) by mouth every 8 hours as needed for moderate pain 90 tablet 0     omeprazole (PRILOSEC) 20 MG DR capsule Take 1 capsule (20 mg) by mouth daily 90 capsule 3     SUMAtriptan (IMITREX) 50 MG tablet Take 2 tablets (100 mg) by mouth at onset of headache for migraine 15 tablet 0     cyclobenzaprine (FLEXERIL) 5 MG tablet Take 1-2 tablets (5-10 mg) by mouth nightly as needed for muscle spasms (do not drive) 20 tablet 1     topiramate (TOPAMAX) 25 MG tablet Take two  tabs at bedtime for one week, than take three tabs at bedtime for one week, than take four tabs at bedtime if tolerated 120 tablet 3          Allergies   Allergen Reactions     Macrodantin [Nitrofurantoin Macrocrystal] Other (See Comments)     itching       Social History     Socioeconomic History     Marital status: Single     Spouse name: Not on file     Number of children: Not on file     Years of education: Not on file     Highest education level: Not on file   Occupational History     Not on file   Tobacco Use     Smoking status: Former Smoker     Types: Hookah     Smokeless tobacco: Never Used   Substance and Sexual Activity     Alcohol use: No     Drug use: No     Sexual activity: Never   Other Topics Concern     Parent/sibling w/ CABG, MI or angioplasty before 65F 55M? Not Asked   Social History Narrative     Not on file     Social Determinants of Health     Financial Resource Strain:      Difficulty of Paying Living Expenses:    Food Insecurity:      Worried About Running Out of Food in the Last Year:      Ran Out of Food in the Last Year:    Transportation Needs:      Lack of Transportation (Medical):      Lack of Transportation (Non-Medical):    Physical Activity:      Days of Exercise per Week:      Minutes of Exercise per Session:    Stress:      Feeling of Stress :    Social Connections:      Frequency of Communication with Friends and Family:      Frequency of Social Gatherings with Friends and Family:      Attends Latter day Services:      Active Member of Clubs or Organizations:      Attends Club or Organization Meetings:      Marital Status:    Intimate Partner Violence:      Fear of Current or Ex-Partner:      Emotionally Abused:      Physically Abused:      Sexually Abused:        Family History   Problem Relation Age of Onset     Diabetes Father      Hypertension Father      Cancer Father        ROS: 12 point review of systems is negative unless noted in HPI.    PHYSICAL EXAM:  General: laying in  "bed, no acute distress  /78   Pulse 63   Temp 97.8  F (36.6  C) (Oral)   Resp 16   Ht 1.626 m (5' 4\")   Wt 56.7 kg (125 lb)   SpO2 100%   BMI 21.46 kg/m    HEAD: normocephalic, atraumatic  Face: symmetrical, CN VII intact bilaterally (HB 1), no swelling, edema, or erythema. Sensation V1-V3 intact and equal bilaterally.   Eyes: EOMI without spontaneous or gaze evoked nystagmus, PERRL, clear sclera  Ears: no tragal tenderness, bilateral ear canals have cerumen that obscures view of the TM  Nose: no anterior drainage  Mouth: moist, no ulcers, no jaw or tooth tenderness, tongue midline and symmetric. Some poor dentition.   Oropharynx: tonsils within normal limits, uvula midline, no oropharyngeal/posterior pharyngeal wall erythema  Neck: no LAD, trachea midline. Well healed scar from prior tracheotomy.   Neuro: cranial nerves 2-12 grossly intact  Respiratory: breathing non-labored on RA, no stridor    FIBEROPTIC ENDOSCOPY  Given patient's odynophagia and prevertebral swelling, flexible nasopharyngoscopy was indicated. Verbal consent was obtained. The scope was inserted into the right nasal cavity. No masses or lesions were noted in the nasal cavity or nasopharynx. On inspection of the oropharynx, no significant posterior pharyngeal wall edema was appreciated, and there were no masses or lesions. Bilateral cords were mobile with no lesions or edema, and piriform sinuses were free of lesion bilaterally. Patient tolerated the procedure well.     ROUTINE IP LABS (Last four results)  BMP  Recent Labs   Lab 07/14/21  1835      POTASSIUM 3.5   CHLORIDE 112*   SAMUEL 8.8   CO2 23   BUN 8   CR 0.73   GLC 85     CBC  Recent Labs   Lab 07/14/21  1835   WBC 6.4   RBC 4.60   HGB 11.5*   HCT 35.8   MCV 78   MCH 25.0*   MCHC 32.1   RDW 15.9*        INRNo lab results found in last 7 days.    Imaging:  Results for orders placed or performed during the hospital encounter of 07/14/21   MR Cervical Spine w/o & w " Contrast    Narrative    [facility]  MR CERVICAL SPINE W/O AND W CONTRAST  7/14/2021 4:46 PM     INDICATION: Paraspinal mass/tumor. Cervical radiculopathy, infection suspected.  TECHNIQUE: Routine.  CONTRAST: 5.6 ml IV Gadavist.  COMPARISON: None.    FINDINGS: Mild reversal of the normal cervical lordosis. Slight anterolisthesis C4 on C5. Alignment is otherwise normal. No definite marrow edema. No cord signal abnormality. No abnormal cord signal. There is thin prevertebral fluid extending from   approximately C2 to the visualized upper thoracic levels at T3. It likely extends below the field of view. This demonstrates diffuse enhancement. The thickest portion of this fluid and enhancement appears to be centered about the C6-C7 disc space where   there is mild irregularity along the ventral aspect of the disc space though there is no deedee enhancement or edema within the disc or adjacent endplates at this level. No deedee epidural abscess. A few scattered mildly prominent to enlarged cervical   lymph nodes.    Craniovertebral junction and C1-C2: Normal.    C2-C3: Normal disc height. No herniation. No facet arthropathy. No spinal canal stenosis. No right neural foraminal stenosis. No left neural foraminal stenosis.    C3-C4: Normal disc height. No herniation. No facet arthropathy. No spinal canal stenosis. No right neural foraminal stenosis. No left neural foraminal stenosis.    C4-C5: Normal disc height. No herniation. No facet arthropathy. No spinal canal stenosis. No right neural foraminal stenosis. No left neural foraminal stenosis.      C5-C6: Normal disc height. No herniation. No facet arthropathy. No spinal canal stenosis. No right neural foraminal stenosis. No left neural foraminal stenosis.    C6-C7: Normal disc height. No herniation. No facet arthropathy. No spinal canal stenosis. No right neural foraminal stenosis. No left neural foraminal stenosis.    C7-T1: Normal disc height. No herniation.  No facet arthropathy. No spinal canal stenosis. No right neural foraminal stenosis. No left neural foraminal stenosis.      Impression    CONCLUSION:  1. Thin prevertebral edema/fluid and enhancement throughout the visualized cervical spine and upper thoracic spine as detailed above. No deedee peripherally enhancing abscess. Findings are favored to be infectious. It appears to be centered about the   anterior C6-C7 disc space where there is mild irregularity and bony spurring. This suggests the possibility of early discitis at the C6-C7 level. The disc space and endplates at this level are otherwise normal. There is no abnormal epidural enhancement.    2. Mild likely reactive cervical adenopathy.    Findings discussed with Dr. Larkin at 10:20 PM 07/14/2021.   XR Thoracic Spine 3 Views    Narrative    EXAM: XR THORACIC SPINE 3 VIEWS  LOCATION: [facility]  DATE/TIME: 7/14/2021 7:36 PM    INDICATION: Upper back and neck pain.  COMPARISON: None.  TECHNIQUE: CR Thoracic Spine.      Impression    IMPRESSION: Anatomic alignment thoracic spine. Vertebral body heights and intervertebral disc heights appear normal. No acute thoracic spine fracture identified. Pedicles appear intact throughout. Visualized lungs are clear. Heart size normal.    XR Chest 2 Views    Narrative    CHEST TWO VIEWS 7/14/2021 7:49 PM     HISTORY: Chest pain.    COMPARISON: October 29, 2017       Impression    IMPRESSION: There are no acute infiltrates. The cardiac silhouette is  not enlarged. Pulmonary vasculature is unremarkable.    SUSAN BURNETTE MD         SYSTEM ID:  [id number]         Assessment and Plan  Leticia Morales is a 35 year old female with a past medical history of severe migraines who presents to the ED with neck stiffness and odynophagia; MRI demonstrated a prevertebral fluid collection in the cervical and upper thoracic segments and ENT consulted for further evaluation. Based on our exam, the etiology of this prevertebral  edema is unclear. There is no clear source of infection in the oral cavity and scope exam did not identify a source in the oropharynx. Patient does have an elevated CRP (25) but WBC is within normal limits. The primary team is planning to admit the patient for administration of IV antibiotics - we agree with this plan and recommend monitoring for improvement in stiffness & odynophagia with antibiotics.     - No acute intervention from ENT perspective  - Agree with IV antibiotics per primary team and monitoring clinical response   - Appreciate Neurosurgery and ID recommendations  - Please contact ENT on-call with any questions    Patient discussed with chief resident, Dr. Mcbride, and attending physician, Dr. Serna.     Terrance Sage MD  Otolaryngology-Head & Neck Surgery, PGY-2  Please page ENT with questions by dialing [phone] and entering job code [phone] when prompted.

## 2021-07-15 NOTE — ED TRIAGE NOTES
Pt presents to the ER from home after receiving a call to come in and be sen following a CT Scan. Pt states that she has an abscess and needs to follow up with urology.

## 2021-07-15 NOTE — CONSULTS
"    Neuroradiology Consult Service Note    Neuroradiology consulted for possible prevertebral fluid aspiration for dx labs    This is a 35-year-old female with a past medical history significant for ARDS secondary to H1 N1 influenza, chronic headache, PTSD and anemia who presented to the emergency room 7/15 with complaints of acute onset of R shoulder and upper back pain and transient (now improved) R arm numbness and was found to have a cervical prevertebral fluid collection on MRI as well as elevated ESR, CRP. Afebrile and without leukocytosis. Neurosurgery deferred intervention and made multiple recs including ENT consult. ENT deferred intervention and recommended IV antibiotics and continued monitoring. ID is recommending sample for culture therefore neuroradiology was consulted.     Case and imaging was reviewed with Dr. Yañez from neuroradiology. No safe percutaneous access into the prevertebral collection in the cervical spine. No obvious discitis at the C6-C7 level based on his review of the imaging. Nothing to offer from neuroradiology standpoint.     Recommendations were reviewed with Dr. Rocha.    Vitals:   /78   Pulse 63   Temp 97.8  F (36.6  C) (Oral)   Resp 16   Ht 1.626 m (5' 4\")   Wt 56.7 kg (125 lb)   SpO2 100%   BMI 21.46 kg/m      Pertinent Labs:     Lab Results   Component Value Date    WBC 6.4 07/14/2021    WBC 4.8 06/15/2021    WBC 7.9 10/29/2017    WBC 6.6 03/19/2017       Lab Results   Component Value Date    HGB 11.5 07/14/2021    HGB 11.9 06/15/2021    HGB 9.3 05/06/2019    HGB 9.2 10/29/2017       Lab Results   Component Value Date     07/14/2021     06/15/2021     10/29/2017     03/19/2017       Lab Results   Component Value Date    INR 1.08 10/29/2017    PTT 36 07/15/2021    PTT 43 (H) 11/15/2009       Lab Results   Component Value Date    POTASSIUM 3.5 07/14/2021    POTASSIUM 3.7 06/15/2021        Desi Munoz, APRN CNP  Interventional " Radiology   Pager: [phone]

## 2021-07-15 NOTE — PHARMACY-ADMISSION MEDICATION HISTORY
Admission Medication History Completed by Pharmacy    See [facility] Admission Navigator for allergy information, preferred outpatient pharmacy, prior to admission medications and immunization status.     Medication History Sources:     Chart [facility], [facility]    Changes made to PTA medication list (reason):    Added: None    Deleted: None    Changed: None    Additional Information:    Patient has not started topiramate. Patient has tried topiramate in the past and experienced nausea    Patient was instructed on 7/13 to stop sumatriptan.     Prior to Admission medications    Medication Sig Last Dose Taking? Auth Provider   acetaminophen (TYLENOL) 500 MG tablet Take 2 tablets (1,000 mg) by mouth every 6 hours as needed for mild pain 7/15/2021 at Unknown time Yes Marc Dye MD   ibuprofen (ADVIL/MOTRIN) 800 MG tablet Take 1 tablet (800 mg) by mouth every 8 hours as needed for moderate pain 7/15/2021 at Unknown time Yes Marc Dye MD   omeprazole (PRILOSEC) 20 MG DR capsule Take 1 capsule (20 mg) by mouth daily Past Week at Unknown time Yes Marc Dye MD   SUMAtriptan (IMITREX) 50 MG tablet Take 2 tablets (100 mg) by mouth at onset of headache for migraine 7/13/2021 at Unknown time Yes Yashira Cuevas MD   cyclobenzaprine (FLEXERIL) 5 MG tablet Take 1-2 tablets (5-10 mg) by mouth nightly as needed for muscle spasms (do not drive) Unknown at Unknown time  Sherly Larkin MD   topiramate (TOPAMAX) 25 MG tablet Take two tabs at bedtime for one week, than take three tabs at bedtime for one week, than take four tabs at bedtime if tolerated   Silke Velasco APRN CNP       Date completed: 07/15/21    Medication history completed by: Lauren Angel [facility]

## 2021-07-15 NOTE — PROGRESS NOTES
[facility] Progress Note    Main Plans for Today     - NSGY consult  - ENT consult  - ID consult    Assessment & Plan   Leticia Morales is a 35 year old female admitted on 7/15/2021. She has a history of chronic migraines and is admitted for acute onset of R shoulder and upper back pain and transient (now improved) R arm numbness, found to have cervical prevertebral fluid collection on MRI of unclear etiology.     # Prevertebral fluid collection vs. Discitis  # Back and R shoulder pain  # Transient R arm numbness and tingling  # Elevated CRP, ESR  Patient with new onset right upper extremity pain and transient numbness (now resolved) and upper back/neck pain, found to have elevated inflammatory markers and MRI of the cervical spine showing prevertebral fluid collection concerning for infection. She remains afebrile, and hemodynamically stable.  Discussion with neurosurgery, would be ENT to evaluate for infectious source and possible sampling of fluid collection to tailor abx selection, though ENT feels no acute intervention from ENT perspective based on exam. Will further discuss with ID about priority of sampling this fluid. Considering other etiologies including rheumatologic, malignancy.    - NSGY consult, appreciate recs   - Glucose < 180   - Continue glucose checks   - Platelets > 100,000   - INR < 1.5   - Hemoglobin > 8  - ID consult, appreciate recs  - ENT consulted, appreciate recs  - Holding abx until fluid collection tapped unless clinical changes  - Follow-up blood cultures 7/15 NGTD  - q4 neuro checks  - q4 VS  - NPO  - LR 100cc/hr  - Pain management: apap, flexeril  - Daily CBC, CMP, q48 hour CRP, q72 hour ESR     # Odynophagia  Patient has acute onset of painful swallowing the past 24 to 48 hours.  This includes fluids and food of all textures.  Some concern from neurosurgery that the source of this pain would also be the source of  "her infection.  ENT consulted in the emergency department, no acute intervention. No significant finding on their exam to locate infectious source. Rapid strep negative.   -ENT consulted     # Chronic migraines  With long history of chronic migraines.  She was seen by her neurologist on 7/13, was recommended to stop sumatriptan and start Topamax as preventative therapy.  She has not been able to start Topamax at the time of admission, we will start her up taper while she is here.  It does appear that in the past she has experienced nausea with Topamax, will monitor closely.  Will add on rizatriptan as needed, per neurology recommendations.  - PTA topamax      Chronic issues:  GERD: PTA omeprazole    # Pain Assessment:  Current Pain Score 7/15/2021   Patient currently in pain? yes   Pain score (0-10) -   - Leticia is experiencing pain due to f. Pain management was discussed and the plan was created in a collaborative fashion.  Leticia's response to the current recommendations: engaged  - Please see the plan for pain management as documented above      Diet: Regular Diet Adult  Fluids: LR @100  DVT Prophylaxis: Pneumatic Compression Devices  Code Status: Full Code    Disposition Plan   Expected discharge:  2 - 3 days, recommended to prior living arrangement once adequate pain management/ tolerating PO medications and antibiotic plan established.Dispo:   7/19/2021        Entered: Judy Rocha 07/15/2021, 1:33 PM   Information in the above section will display in the discharge planner report.      The patient's care was discussed with the Attending Physician, Dr. López.    Judy Rocha  [facility]: PG2  Pager: [phone]  Please see sticky note for cross cover information    Interval History    States that her right arm is no longer numb. Still feeling \"pressure\" in her right arm. Shooting pain from neck down arm. Endorses pain in her neck/upper back. Still having throat pain. " No headache, no vision changes. Does endorse having migraines with vision changes, but no focal weakness in the past. She did have some vomitting in the past month 3-4 times a day for several days. Severe enough to bring her to the ED.      Review of Systems   7 point ROS negative unless noted in interval history    Physical Exam   Vital Signs: Temp: 97.8  F (36.6  C) Temp src: Oral BP: 109/78 Pulse: 63   Resp: 16 SpO2: 100 % O2 Device: None (Room air)    Weight: 125 lbs 0 oz  Physical Exam     General: Alert and oriented, in no acute distress.  Skin: Warm and dry, no abnormalities noted.  Eyes: Extra-ocular muscles grossly intact, pupils equal.  ENT: Speech intact, nasal passages open, no hearing impairment noted. No lesion. No erythema. Tonsils midline. No drooling.   CV: S1 S2 RRR. No murmur. No cyanosis or pallor, warm and well perfused.  Respiratory: CTAB. No w/r/r. No respiratory distress, no accessory muscle use.  Neuro: Mental Status: A&O x 3. Attention, memory, intact. Speech: Speech fluent. Comprehension intact. Cranial Nerves: CN II-XII grossly intact. Motor: Muscle tone and mass intact. Strength 5/5 BL UE and LE. Sensory: Light touch intact. Reflexes: Reflexes 2/4 BL UE and LE. Plantar response downgoing. Cerebellar/Gait: Rapid alternating movement and finger-to-nose intact and shin to heel intact. Cerebrovascular: no carotid bruits. No pronator drift.   Psychiatric: Mood and affect appear normal.   Extremities: Warm, able to move all four extremities at will.      Data   Recent Labs   Lab 07/14/21  1835   WBC 6.4   HGB 11.5*   MCV 78         POTASSIUM 3.5   CHLORIDE 112*   CO2 23   BUN 8   CR 0.73   ANIONGAP 6   SAMUEL 8.8   GLC 85   TROPONIN <0.015     Recent Results (from the past 24 hour(s))   MR Cervical Spine w/o & w Contrast    [facility]  MR CERVICAL SPINE W/O AND W CONTRAST  7/14/2021 4:46 PM     INDICATION: Paraspinal mass/tumor. Cervical radiculopathy,  infection suspected.  TECHNIQUE: Routine.  CONTRAST: 5.6 ml IV Gadavist.  COMPARISON: None.    FINDINGS: Mild reversal of the normal cervical lordosis. Slight anterolisthesis C4 on C5. Alignment is otherwise normal. No definite marrow edema. No cord signal abnormality. No abnormal cord signal. There is thin prevertebral fluid extending from   approximately C2 to the visualized upper thoracic levels at T3. It likely extends below the field of view. This demonstrates diffuse enhancement. The thickest portion of this fluid and enhancement appears to be centered about the C6-C7 disc space where   there is mild irregularity along the ventral aspect of the disc space though there is no deedee enhancement or edema within the disc or adjacent endplates at this level. No deedee epidural abscess. A few scattered mildly prominent to enlarged cervical   lymph nodes.    Craniovertebral junction and C1-C2: Normal.    C2-C3: Normal disc height. No herniation. No facet arthropathy. No spinal canal stenosis. No right neural foraminal stenosis. No left neural foraminal stenosis.    C3-C4: Normal disc height. No herniation. No facet arthropathy. No spinal canal stenosis. No right neural foraminal stenosis. No left neural foraminal stenosis.    C4-C5: Normal disc height. No herniation. No facet arthropathy. No spinal canal stenosis. No right neural foraminal stenosis. No left neural foraminal stenosis.      C5-C6: Normal disc height. No herniation. No facet arthropathy. No spinal canal stenosis. No right neural foraminal stenosis. No left neural foraminal stenosis.    C6-C7: Normal disc height. No herniation. No facet arthropathy. No spinal canal stenosis. No right neural foraminal stenosis. No left neural foraminal stenosis.    C7-T1: Normal disc height. No herniation. No facet arthropathy. No spinal canal stenosis. No right neural foraminal stenosis. No left neural foraminal stenosis.      Impression    CONCLUSION:  1. Thin  prevertebral edema/fluid and enhancement throughout the visualized cervical spine and upper thoracic spine as detailed above. No deedee peripherally enhancing abscess. Findings are favored to be infectious. It appears to be centered about the   anterior C6-C7 disc space where there is mild irregularity and bony spurring. This suggests the possibility of early discitis at the C6-C7 level. The disc space and endplates at this level are otherwise normal. There is no abnormal epidural enhancement.    2. Mild likely reactive cervical adenopathy.    Findings discussed with Dr. Larkin at 10:20 PM 07/14/2021.   XR Chest 2 Views    Narrative    CHEST TWO VIEWS 7/14/2021 7:49 PM     HISTORY: Chest pain.    COMPARISON: October 29, 2017       Impression    IMPRESSION: There are no acute infiltrates. The cardiac silhouette is  not enlarged. Pulmonary vasculature is unremarkable.    SUSAN BURNETTE MD         SYSTEM ID:  [id number]   XR Thoracic Spine 3 Views    Narrative    EXAM: XR THORACIC SPINE 3 VIEWS  LOCATION: [facility]  DATE/TIME: 7/14/2021 7:36 PM    INDICATION: Upper back and neck pain.  COMPARISON: None.  TECHNIQUE: CR Thoracic Spine.      Impression    IMPRESSION: Anatomic alignment thoracic spine. Vertebral body heights and intervertebral disc heights appear normal. No acute thoracic spine fracture identified. Pedicles appear intact throughout. Visualized lungs are clear. Heart size normal.      Medications     lactated ringers 100 mL/hr at 07/15/21 0504       [START ON 7/28/2021] Botulinum Toxin Type A  155 Units Intramuscular Q90 Days     omeprazole  20 mg Oral Daily     oxymetazoline  2 spray Both Nostrils BID     polyethylene glycol  17 g Oral Daily     sodium chloride (PF)  3 mL Intracatheter Q8H     topiramate  50 mg Oral At Bedtime

## 2021-07-15 NOTE — TELEPHONE ENCOUNTER
"Request for medication refill:    topamax 25 mg     Providers if patient needs an appointment and you are willing to give a one month supply please refill for one month and  send a letter/MyChart using \".SMILLIMITEDREFILL\" .smillimited and route chart to \"P SMI \" (Giving one month refill in non controlled medications is strongly recommended before denial)    If refill has been denied, meaning absolutely no refills without visit, please complete the smart phrase \".smirxrefuse\" and route it to the \"P SMI MED REFILLS\"  pool to inform the patient and the pharmacy.    Laura Bedolla, CMA        "

## 2021-07-16 PROBLEM — R90.89 ABNORMAL BRAIN MRI: Status: ACTIVE | Noted: 2021-07-16

## 2021-07-16 PROBLEM — R93.7 ABNORMAL MRI, CERVICAL SPINE: Status: ACTIVE | Noted: 2021-07-16

## 2021-07-16 LAB
ANION GAP SERPL CALCULATED.3IONS-SCNC: 2 MMOL/L (ref 3–14)
BUN SERPL-MCNC: 5 MG/DL (ref 7–30)
CALCIUM SERPL-MCNC: 8.7 MG/DL (ref 8.5–10.1)
CHLORIDE BLD-SCNC: 113 MMOL/L (ref 94–109)
CO2 SERPL-SCNC: 26 MMOL/L (ref 20–32)
CREAT SERPL-MCNC: 0.72 MG/DL (ref 0.52–1.04)
ERYTHROCYTE [DISTWIDTH] IN BLOOD BY AUTOMATED COUNT: 16.3 % (ref 10–15)
GFR SERPL CREATININE-BSD FRML MDRD: >90 ML/MIN/1.73M2
GLUCOSE BLD-MCNC: 104 MG/DL (ref 70–99)
HCT VFR BLD AUTO: 31.8 % (ref 35–47)
HGB BLD-MCNC: 10 G/DL (ref 11.7–15.7)
HOLD SPECIMEN: NORMAL
INR PPP: 1.11 (ref 0.85–1.15)
MCH RBC QN AUTO: 24.9 PG (ref 26.5–33)
MCHC RBC AUTO-ENTMCNC: 31.4 G/DL (ref 31.5–36.5)
MCV RBC AUTO: 79 FL (ref 78–100)
PLATELET # BLD AUTO: 241 10E3/UL (ref 150–450)
POTASSIUM BLD-SCNC: 3.8 MMOL/L (ref 3.4–5.3)
RBC # BLD AUTO: 4.01 10E6/UL (ref 3.8–5.2)
SODIUM SERPL-SCNC: 141 MMOL/L (ref 133–144)
WBC # BLD AUTO: 5.1 10E3/UL (ref 4–11)

## 2021-07-16 PROCEDURE — 250N000013 HC RX MED GY IP 250 OP 250 PS 637: Performed by: STUDENT IN AN ORGANIZED HEALTH CARE EDUCATION/TRAINING PROGRAM

## 2021-07-16 PROCEDURE — 36592 COLLECT BLOOD FROM PICC: CPT | Performed by: STUDENT IN AN ORGANIZED HEALTH CARE EDUCATION/TRAINING PROGRAM

## 2021-07-16 PROCEDURE — 36415 COLL VENOUS BLD VENIPUNCTURE: CPT | Performed by: STUDENT IN AN ORGANIZED HEALTH CARE EDUCATION/TRAINING PROGRAM

## 2021-07-16 PROCEDURE — 85027 COMPLETE CBC AUTOMATED: CPT | Performed by: STUDENT IN AN ORGANIZED HEALTH CARE EDUCATION/TRAINING PROGRAM

## 2021-07-16 PROCEDURE — 120N000002 HC R&B MED SURG/OB UMMC

## 2021-07-16 PROCEDURE — 85610 PROTHROMBIN TIME: CPT | Performed by: STUDENT IN AN ORGANIZED HEALTH CARE EDUCATION/TRAINING PROGRAM

## 2021-07-16 PROCEDURE — 99232 SBSQ HOSP IP/OBS MODERATE 35: CPT | Mod: GC | Performed by: STUDENT IN AN ORGANIZED HEALTH CARE EDUCATION/TRAINING PROGRAM

## 2021-07-16 PROCEDURE — 80048 BASIC METABOLIC PNL TOTAL CA: CPT | Performed by: STUDENT IN AN ORGANIZED HEALTH CARE EDUCATION/TRAINING PROGRAM

## 2021-07-16 PROCEDURE — 86481 TB AG RESPONSE T-CELL SUSP: CPT | Performed by: STUDENT IN AN ORGANIZED HEALTH CARE EDUCATION/TRAINING PROGRAM

## 2021-07-16 PROCEDURE — 96366 THER/PROPH/DIAG IV INF ADDON: CPT | Performed by: EMERGENCY MEDICINE

## 2021-07-16 PROCEDURE — 250N000011 HC RX IP 250 OP 636: Performed by: STUDENT IN AN ORGANIZED HEALTH CARE EDUCATION/TRAINING PROGRAM

## 2021-07-16 PROCEDURE — 99222 1ST HOSP IP/OBS MODERATE 55: CPT | Mod: GC | Performed by: STUDENT IN AN ORGANIZED HEALTH CARE EDUCATION/TRAINING PROGRAM

## 2021-07-16 PROCEDURE — 99233 SBSQ HOSP IP/OBS HIGH 50: CPT | Mod: GC | Performed by: INTERNAL MEDICINE

## 2021-07-16 PROCEDURE — 258N000003 HC RX IP 258 OP 636: Performed by: STUDENT IN AN ORGANIZED HEALTH CARE EDUCATION/TRAINING PROGRAM

## 2021-07-16 RX ORDER — NALOXONE HYDROCHLORIDE 0.4 MG/ML
0.2 INJECTION, SOLUTION INTRAMUSCULAR; INTRAVENOUS; SUBCUTANEOUS
Status: DISCONTINUED | OUTPATIENT
Start: 2021-07-16 | End: 2021-07-24 | Stop reason: HOSPADM

## 2021-07-16 RX ORDER — ALBUMIN (HUMAN) 12.5 G/50ML
50 SOLUTION INTRAVENOUS ONCE
Status: DISCONTINUED | OUTPATIENT
Start: 2021-07-16 | End: 2021-07-17 | Stop reason: CLARIF

## 2021-07-16 RX ORDER — NALOXONE HYDROCHLORIDE 0.4 MG/ML
0.4 INJECTION, SOLUTION INTRAMUSCULAR; INTRAVENOUS; SUBCUTANEOUS
Status: DISCONTINUED | OUTPATIENT
Start: 2021-07-16 | End: 2021-07-24 | Stop reason: HOSPADM

## 2021-07-16 RX ORDER — ACETAMINOPHEN 325 MG/1
975 TABLET ORAL 3 TIMES DAILY
Status: DISCONTINUED | OUTPATIENT
Start: 2021-07-16 | End: 2021-07-24 | Stop reason: HOSPADM

## 2021-07-16 RX ORDER — TRETINOIN 1 MG/G
CREAM TOPICAL AT BEDTIME
COMMUNITY
End: 2021-07-16

## 2021-07-16 RX ORDER — BENZOYL PEROXIDE 50 MG/ML
LIQUID TOPICAL
COMMUNITY
End: 2022-08-31

## 2021-07-16 RX ORDER — LORAZEPAM 1 MG/1
1 TABLET ORAL
Status: COMPLETED | OUTPATIENT
Start: 2021-07-16 | End: 2021-07-19

## 2021-07-16 RX ORDER — SPIRONOLACTONE 50 MG/1
100 TABLET, FILM COATED ORAL DAILY
COMMUNITY
End: 2022-01-24

## 2021-07-16 RX ORDER — TRETINOIN 0.25 MG/G
CREAM TOPICAL AT BEDTIME
COMMUNITY
End: 2022-08-31

## 2021-07-16 RX ADMIN — ACETAMINOPHEN 975 MG: 325 TABLET, FILM COATED ORAL at 21:00

## 2021-07-16 RX ADMIN — OMEPRAZOLE 20 MG: 20 CAPSULE, DELAYED RELEASE ORAL at 08:08

## 2021-07-16 RX ADMIN — MEROPENEM 2 G: 1 INJECTION, POWDER, FOR SOLUTION INTRAVENOUS at 06:55

## 2021-07-16 RX ADMIN — ACETAMINOPHEN 975 MG: 325 TABLET, FILM COATED ORAL at 14:24

## 2021-07-16 RX ADMIN — CYCLOBENZAPRINE 10 MG: 5 TABLET, FILM COATED ORAL at 21:05

## 2021-07-16 RX ADMIN — MEROPENEM 2 G: 1 INJECTION, POWDER, FOR SOLUTION INTRAVENOUS at 22:32

## 2021-07-16 RX ADMIN — ACETAMINOPHEN 975 MG: 325 TABLET, FILM COATED ORAL at 04:35

## 2021-07-16 RX ADMIN — TOPIRAMATE 50 MG: 50 TABLET ORAL at 21:01

## 2021-07-16 RX ADMIN — Medication 2.5 MG: at 08:08

## 2021-07-16 RX ADMIN — MEROPENEM 2 G: 1 INJECTION, POWDER, FOR SOLUTION INTRAVENOUS at 14:12

## 2021-07-16 ASSESSMENT — ENCOUNTER SYMPTOMS
LIGHT-HEADEDNESS: 0
SORE THROAT: 0
FEVER: 0
COUGH: 0
SHORTNESS OF BREATH: 0
WEAKNESS: 0
ABDOMINAL PAIN: 0
HEADACHES: 1
COLOR CHANGE: 0
NAUSEA: 0
DIARRHEA: 0
VOMITING: 0
CONSTIPATION: 0
TROUBLE SWALLOWING: 0
CHILLS: 0

## 2021-07-16 NOTE — CONSULTS
GASTROENTEROLOGY CONSULTATION      Date of Admission:  7/15/2021           Reason for Consultation:   We were asked by Judy Rocha of [facility] to evaluate this patient with prevertebral fluid collection.            ASSESSMENT AND RECOMMENDATIONS:   Assessment:  35 year old female with a history of GERD presenting with a prevertebral fluid collection and remote history of nausea and vomiting. We are consulted to assess need for upper endoscopy.      #. Prevertebral fluid collection  #. Recent nausea and vomiting (> 14 days prior to admission)  Fluid collection is unlikely to be related to be a deedee perforation from her vomiting. She denies any throat pain following retching/emesis more than two weeks ago. In any case, ENT endoscopic eval reassuring and imaging does not suggest communication between the esophagus and the preverterbal space.    #. GERD  Chronic issue. No red flag symptoms and she is a young female. No indications for endoscopic evaluation at this time. Increase PPI to standard dose and she can follow up with her PCP to assess response and need for outpatient endoscopic eval. Medication education provided for appropriate PPI use.     Recommendations:  -- if ongoing clinical concern for esophageal perforation, can consider water soluble esophagram   -- no clinic indication for upper endoscopy  -- defer to ENT, neurosurgery, and ID as to the management of this collection   -- increase PPI to 40 mg pantoprazole (standard strength) for 8 week course, response can be evaluated on an outpatient basis  -- luminal GI service will sign off, please reach out with any questions or concerns    Gastroenterology outpatient follow up recommendations:   -- PCP can evaluate for clinical response to PPI (6-8 week course), if ongoing symptoms can consider outpatient GI referral     Thank you for involving us in this patient's care. Please do not hesitate to contact the GI service with any questions or concerns.      Pt care plan discussed with Dr. Candelaria, GI staff physician.    Wally To MD  Gastroenterology Fellow  Division of Gastroenterology, Hepatology and Nutrition  [facility]  [phone]  See VICKY/JOYCELYN for GI on-call information    -------------------------------------------------------------------------------------------------------------------           History of Present Illness:   35 year old female with a history of GERD presenting with a prevertebral fluid collection and remote history of nausea and vomiting. We are consulted to assess need for upper endoscopy.      Today, Ms. Morales does have some chronic dull throat fullness in the area of her prevertebral collection, but denies severe odynophagia or dysphagia. She notes that more than two weeks ago she was having some GI distress and vomited (non bloody, non bilious) perhaps 4-5 times. She did not note any significant throat pain at that time and did not retch particularly forcefully. She spent some time without any symptoms, then began to develop throat pain. Around 7/10/21 she began to feel right upper extremity weakness and paresthesia that prompted evaluation in the ED.     Of note, she does have 3+ year history of GERD for which she takes a daily PPI. The last 12-18 months have been more bothersome characterized by acid brash, mild regurgitation, and pyrosis. She finds greatest benefit from maalox and TUMS. Denies dysphagia, weight loss, hematemesis. No family hx of esophageal cancer on chart review.      Denies melena, hematochezia, abdominal pain, nausea, vomiting, diarrhea, constipation, odynophagia.     Denies NSAIDs, EtOH.          Data:   Key relevant labs:   Admission on 07/14/2021, Discharged on 07/14/2021   Component Date Value Ref Range Status     TSH 07/14/2021 1.15  0.40 - 4.00 mU/L Final     Sodium 07/14/2021 141  133 - 144 mmol/L Final     Potassium 07/14/2021 3.5  3.4 - 5.3 mmol/L Final     Chloride 07/14/2021 112* 94 - 109  mmol/L Final     Carbon Dioxide (CO2) 07/14/2021 23  20 - 32 mmol/L Final     Anion Gap 07/14/2021 6  3 - 14 mmol/L Final     Urea Nitrogen 07/14/2021 8  7 - 30 mg/dL Final     Creatinine 07/14/2021 0.73  0.52 - 1.04 mg/dL Final     Calcium 07/14/2021 8.8  8.5 - 10.1 mg/dL Final     Glucose 07/14/2021 85  70 - 99 mg/dL Final     GFR Estimate 07/14/2021 >90  >60 mL/min/1.73m2 Final    As of July 11, 2021, eGFR is calculated by the CKD-EPI creatinine equation, without race adjustment. eGFR can be influenced by muscle mass, exercise, and diet. The reported eGFR is an estimation only and is only applicable if the renal function is stable.     D-Dimer Quantitative 07/14/2021 0.49  0.00 - 0.50 ug/mL FEU Final     Troponin I 07/14/2021 <0.015  0.000 - 0.045 ug/L Final    The 99th percentile for upper reference range is 0.045ug/L.  Troponin values in the range of 0.045 - 0.120 ug/L may be associated with risks of adverse clinical events.     Ventricular Rate 07/14/2021 59  BPM Final     Atrial Rate 07/14/2021 59  BPM Final     NH Interval 07/14/2021 108  ms Final     QRS Duration 07/14/2021 74  ms Final     QT 07/14/2021 434  ms Final     QTc 07/14/2021 429  ms Final     P Axis 07/14/2021 46  degrees Final     R AXIS 07/14/2021 43  degrees Final     T Axis 07/14/2021 46  degrees Final     Interpretation ECG 07/14/2021 Click View Image link to view waveform and result   Final-Edited     WBC Count 07/14/2021 6.4  4.0 - 11.0 10e3/uL Final     RBC Count 07/14/2021 4.60  3.80 - 5.20 10e6/uL Final     Hemoglobin 07/14/2021 11.5* 11.7 - 15.7 g/dL Final     Hematocrit 07/14/2021 35.8  35.0 - 47.0 % Final     MCV 07/14/2021 78  78 - 100 fL Final     MCH 07/14/2021 25.0* 26.5 - 33.0 pg Final     MCHC 07/14/2021 32.1  31.5 - 36.5 g/dL Final     RDW 07/14/2021 15.9* 10.0 - 15.0 % Final     Platelet Count 07/14/2021 302  150 - 450 10e3/uL Final     % Neutrophils 07/14/2021 57  % Final     % Lymphocytes 07/14/2021 28  % Final     %  Monocytes 07/14/2021 11  % Final     % Eosinophils 07/14/2021 3  % Final     % Basophils 07/14/2021 1  % Final     % Immature Granulocytes 07/14/2021 0  % Final     NRBCs per 100 WBC 07/14/2021 0  <1 /100 Final     Absolute Neutrophils 07/14/2021 3.7  1.6 - 8.3 10e3/uL Final     Absolute Lymphocytes 07/14/2021 1.8  0.8 - 5.3 10e3/uL Final     Absolute Monocytes 07/14/2021 0.7  0.0 - 1.3 10e3/uL Final     Absolute Eosinophils 07/14/2021 0.2  0.0 - 0.7 10e3/uL Final     Absolute Basophils 07/14/2021 0.0  0.0 - 0.2 10e3/uL Final     Absolute Immature Granulocytes 07/14/2021 0.0  <=0.0 10e3/uL Final     Absolute NRBCs 07/14/2021 0.0  10e3/uL Final     Erythrocyte Sedimentation Rate 07/14/2021 30* 0 - 20 mm/hr Final     CRP Inflammation 07/14/2021 26.0* 0.0 - 8.0 mg/L Final     Key relevant imaging:  MR spine 7/14/21:  CONCLUSION:  1. Thin prevertebral edema/fluid and enhancement throughout the visualized cervical spine and upper thoracic spine as detailed above. No deedee peripherally enhancing abscess. Findings are favored to be infectious. It appears to be centered about the   anterior C6-C7 disc space where there is mild irregularity and bony spurring. This suggests the possibility of early discitis at the C6-C7 level. The disc space and endplates at this level are otherwise normal. There is no abnormal epidural enhancement.     2. Mild likely reactive cervical adenopathy.             Previous Endoscopy:   None to review         Medications:     Current Facility-Administered Medications   Medication     acetaminophen (TYLENOL) tablet 975 mg     albumin human 25 % injection 50 g     [START ON 7/28/2021] Botulinum Toxin Type A (BOTOX) 200 units injection 155 Units     calcium carbonate (TUMS) chewable tablet 500-1,000 mg     cyclobenzaprine (FLEXERIL) tablet 5-10 mg     lidocaine (LMX4) cream     lidocaine 1 % 0.1-1 mL     LORazepam (ATIVAN) tablet 1 mg     melatonin tablet 1 mg     meropenem (MERREM) 2 g in sodium  "chloride 0.9 % 100 mL intermittent infusion     naloxone (NARCAN) injection 0.2 mg    Or     naloxone (NARCAN) injection 0.4 mg    Or     naloxone (NARCAN) injection 0.2 mg    Or     naloxone (NARCAN) injection 0.4 mg     omeprazole (priLOSEC) CR capsule 20 mg     ondansetron (ZOFRAN-ODT) ODT tab 4 mg    Or     ondansetron (ZOFRAN) injection 4 mg     oxyCODONE IR (ROXICODONE) half-tab 2.5 mg     oxymetazoline (AFRIN) 0.05 % spray 2 spray     polyethylene glycol (MIRALAX) Packet 17 g     sodium chloride (PF) 0.9% PF flush 3 mL     sodium chloride (PF) 0.9% PF flush 3 mL     topiramate (TOPAMAX) tablet 50 mg     Current Outpatient Medications   Medication Sig     acetaminophen (TYLENOL) 500 MG tablet Take 2 tablets (1,000 mg) by mouth every 6 hours as needed for mild pain     ibuprofen (ADVIL/MOTRIN) 800 MG tablet Take 1 tablet (800 mg) by mouth every 8 hours as needed for moderate pain     omeprazole (PRILOSEC) 20 MG DR capsule Take 1 capsule (20 mg) by mouth daily     SUMAtriptan (IMITREX) 50 MG tablet Take 2 tablets (100 mg) by mouth at onset of headache for migraine     cyclobenzaprine (FLEXERIL) 5 MG tablet Take 1-2 tablets (5-10 mg) by mouth nightly as needed for muscle spasms (do not drive)     topiramate (TOPAMAX) 25 MG tablet Take two tabs at bedtime for one week, than take three tabs at bedtime for one week, than take four tabs at bedtime if tolerated             Physical Exam:   /73   Pulse 77   Temp 98.3  F (36.8  C) (Oral)   Resp 14   Ht 1.626 m (5' 4\")   Wt 56.7 kg (125 lb)   SpO2 100%   BMI 21.46 kg/m    Wt:   Wt Readings from Last 2 Encounters:   07/15/21 56.7 kg (125 lb)   07/14/21 56.7 kg (125 lb)      Constitutional: cooperative, pleasant, not dyspneic/diaphoretic, no acute distress  Eyes: Sclera anicteric/injected  Ears/nose/mouth/throat: Normal oropharynx without ulcers or exudate, mucus membranes moist, hearing intact  Neck: supple, thyroid normal size  CV: No edema  Respiratory: " Unlabored breathing  Lymph: No axillary, submandibular, supraclavicular or inguinal lymphadenopathy  Abd: Nondistended, +bs, no hepatosplenomegaly, nontender, no peritoneal signs  Skin: warm, perfused, no jaundice  Neuro: AAO x 3, No asterixis  Psych: Normal affect  MSK: No gross deformities          Past Medical History:   Reviewed and edited as appropriate  Past Medical History:   Diagnosis Date     ARDS (adult respiratory distress syndrome) (H) 2010    related to H1N1 infection     H1N1 influenza 2010    prolonged ICU stay, medically induced coma     Iron deficiency anemia      Migraines             Past Surgical History:   Reviewed and edited as appropriate   Past Surgical History:   Procedure Laterality Date     THORACIC SURGERY      trach            Social History:   Alcohol use: denies  Smoking history:denies  Living situation: in a home with family         Family History:   Reviewed and edited as appropriate  Family History   Problem Relation Age of Onset     Diabetes Father      Hypertension Father      Cancer Father      No known history of colorectal cancer, liver disease, or inflammatory bowel disease.         Allergies:   Reviewed and edited as appropriate     Allergies   Allergen Reactions     Macrodantin [Nitrofurantoin Macrocrystal] Other (See Comments)     itching              Review of Systems:     A complete 10 point review of systems was performed and is negative except as noted in the HPI

## 2021-07-16 NOTE — PROGRESS NOTES
[facility]     Patient:  Leticia Morales   YOB: 1985  Date of Visit:  07/16/2021  Date of Admission: 7/15/2021  Consult Requester:Natalee López DO          Assessment and Recommendations:   ID Problem List:  - Headache, right arm numbness/ tingling to rule out prevertebral abscess and/or parapharyngeal/retropharyngeal space infection.  - Acute odynophagia 2/2 to parapharyngeal/retropharyngeal space infection/paraneoplasic   - Chronic migraines s/p analgesia overuse  - History of  GERD   - Epigastric tenderness   - Nausea, vomiting, retching/ gagging in June 2021    Recommendations:  - Consult to Neurology , lumbar puncture  - Continue with Meropenem IV (appropriate CNS doses of 2g/8h)  - Trend CRP, ESR every 2-3 days  - Follow up Quant gold and blood cultures  - Optimized pain management    Discussion:  Leticia Morales is a 36 yo female patient with a past medical history of chronic migraines (>10 visits to the ED/year), GERD, PTSD who presents to the ED on 07/14/2021 with a 5-day history of sudden moderate holoacranial  headache (6/10) that started in the morning and neck pain (8/10) that radiates to the right arm, upper and middle back by the evening. An MRI (07/14) ordered on the ED showed prevertebral fluid collection c/f early infection/abscess..    She describes a shooting and sharping pain that is associated with tingling and numbness on the right upper extremity that lasted for 4 hours. Some chills and increased temperature sensation over the past days. Nothing makes it better (pain medication: tylenol/ibuprofen, hot/cold pad) and massages and movements make it worse. She denies weakness but complies inability to completely raise her right arm due to pain as well as mild chest pain and shortness of breath and restricted range of motion (latero-lateral, up and down). Patient also complies of front and back neck pain which made her swallowing very difficult  especially for soft solids and liquids that started on Monday, in addition to presence of acid reflux due to pills intake without eating. Weight loss is present for the past month (weight loss of 9 pounds over 1 month). Patient denies sick contacts, any travel recently, IV drug use, recent cold/flu/sinusitus and trauma. She also denies cough, night sweats, voice muffling, stridor and neck swelling.     Patient hemodynamically stable, in acute distress due to pain, alert and oriented (light were off due to light sensitivity) with resolved numbness and tingling from right arm and a mild headache (3/10) during examination without clinical signs of airway obstruction. Patient mentions that pain has radiated from the right side to the middle line of posterior neck that extends until upper back and increased in intensity (8/10 up from 6/10 on 07/16), that it does not quickly respond to pain medication. Pain remains with the same dull characteristics. She refers less odynophagia (able to eat soft foods and drink liquids), able to open her mouth, no visible oropharynx lesions, significant restricted range of motion latero-lateral up-down, without new neurologic symptoms at this time.     In regard to her labs, she is not septic, normal WBC (5.1 on 07/16), mild anemia (10 down from 11.5 on 07/14 possible due to hemoconcentration since she has not been eating/drinking), BMP unremarkable, Blood cultures with no growth on 07/15, negative Group A Strep PCR and Throat Swab on 07/15.  A CXR (07/15) showed rule out acute mediastinitis and a thoracic spine xray (07/15) showed no fracture. A recent MRI (07/16) revealed mildly thickened and enhancing dura over the left frontal lobe with possible differential diagnosis of fungal and mycobacterial infection. Continue antibiotics, consult to neurology for possibility of lumbar puncture now that has an absent bleeding on her recent head CT scan and persistent neck not well controlled with  "pain medication.    Thank you for the consult. ID will continue to follow with you.     Ellen Frazier MD   Pager: [phone]-  [facility]  07/16/2021     Attestation:  This patient has been seen and evaluated by me.  I discussed this patient with resident Dr Bush and agree with the findings and plan in this note. I also personally edited this note to reflect my findings. I have reviewed today's vital signs, medications, labs and imaging.    1. Odynophagia/ dysphagia - to both solid/ liquid intake (since 7/10/21 - Saturday) - improved   2. Headaches / migraine headaches - worse and more frequent recently (took Ibuprofen 800 mg 3x/daily PRN alternating with acetaminophen)   3. Cervical and upper thoracic spine tenderness with swallowing , radiates to right shoulder and right arm - numbness/ tingling sensation  4. History of  GERD   5. Epigastric tenderness - improved  6. Nausea, vomiting, retching/ gagging in June 2021  7. abnormal MRI cervical spine 7/14/21 - Thin prevertebral edema/fluid and enhancement throughout the visualized cervical spine and upper thoracic spine No deedee peripherally enhancing abscess. Findings are favored to be infectious. It appears to be centered about the anterior C6-C7 disc space where there is mild irregularity and bony spurring. This suggests the possibility of early discitis at the C6-C7 level. The disc space and endplates at this level are otherwise normal. There is no abnormal epidural enhancement.  Mild likely reactive cervical adenopathy.\"   8. MRI brain w wo contrast 7/15/21 - Mildly thickened and enhancing dura over the left frontal lobe.     Recommendations:   - Meropenem started empirically last evening. This can decrease sensitivity of further cultures and work up .   - discussed LP with primary team ( from ID standpoint - gram stain, aerobic/ anaerobic, fungal stain, culture, AFB stain and culture, viral HSV, VZV, VDRL, Lyme PCR , Lyme IgM, IGG)   - " follow-up blood cxs     ID will follow. If you have any questions this weekend, please page Dr Ren, who will  be on call for General ID this weekend     Francis Chow MD,M.Med.Sc.  Infectious Diseases  Pager: [phone]      ________________________________________________________________    Consult Question: Prevertebral fluid collection to rule out prevertebral abscess and possibility of antibiotic treatment  Admission Diagnosis: Discitis, unspecified spinal region [M46.40]         History of Present Illness:   History obtained from review of chart and discussion with patient.     Leticia Morales is a 35 year old female with a PMH of significant chronic migraine, GERD, PTSD who came to the ED on 07/14/2021 with a 5-day history of headache, neck pain, swallowing troubles and numbness/tingling on he right upper extremity and had an MRI with positive findings of prevertebral fluid collection. ID was consulted to rule out prevertebral abscess and initiate antibiotic therapy. ID agreed with beginning of meropenem IV and neurology consult to possible lumbar puncture.         Review of Systems:   10 point review of systems was negative except for noted as above in HPI.     ROS:  Constitutional: no fever, no chills, weight loss (9 pounds over 1 month with current of 137), no night sweats. Feeling tire and with headache (3/10), intense upper back pain focused on midline (8/10) worsened while lying down.   Neuro: no HA, No focal weakness. Tingling and numbness absent on right arm. No visual changes, facial droop, drooling.  HEENT: no acute blurry vision, no earache, no ear discharge, sore throat, right cervical anterior chain lymphadenopathy x2.   CVS: mild chest pain during inspiration, heart palpitation, no syncope or presyncope  Lungs: no dyspnea, no chest pain, no cough. Mild shortness of breath.  GI: no N/V, no diarrhea, no constipation, no abdominal pain  : no dysuria, no hesitancy, no  incontinence  Skin: no rash, no Itching, no abscesses  Allergy/immunology: no allergic reaction   Musculoskeletal: no muscle pain, no joint pain, no joint/neck swelling, significant limited ROM of neck movements (latero-lateral, up-down)             Past Medical History:     Past Medical History:   Diagnosis Date     ARDS (adult respiratory distress syndrome) (H) 2010    related to H1N1 infection     H1N1 influenza 2010    prolonged ICU stay, medically induced coma     Iron deficiency anemia      Migraines             Past Surgical History:     Past Surgical History:   Procedure Laterality Date     THORACIC SURGERY      trach            Family History:   Reviewed and non-contributory.   Family History   Problem Relation Age of Onset     Diabetes Father      Hypertension Father      Cancer Father             Social History:     Social History     Tobacco Use     Smoking status: Former Smoker     Types: Hookah     Smokeless tobacco: Never Used   Substance Use Topics     Alcohol use: No     History   Sexual Activity     Sexual activity: Never            Current Medications:       acetaminophen  975 mg Oral TID     albumin human  50 g Intravenous Once     [START ON 7/28/2021] Botulinum Toxin Type A  155 Units Intramuscular Q90 Days     meropenem  2 g Intravenous Q8H     omeprazole  20 mg Oral Daily     oxymetazoline  2 spray Both Nostrils BID     polyethylene glycol  17 g Oral Daily     sodium chloride (PF)  3 mL Intracatheter Q8H     topiramate  50 mg Oral At Bedtime            Allergies:     Allergies   Allergen Reactions     Macrodantin [Nitrofurantoin Macrocrystal] Other (See Comments)     itching            Physical Exam:   Vitals were reviewed  Temp:  [98.2  F (36.8  C)-98.3  F (36.8  C)] 98.3  F (36.8  C)  Pulse:  [59-77] 77  Resp:  [14-16] 14  BP: ()/(66-73) 105/73  SpO2:  [98 %-100 %] 100 %    Vitals:    07/15/21 0015   Weight: 56.7 kg (125 lb)       Constitutional: Adult female seen mildly distress,  antalgic position, in NAD. Awake, alert, interactive.  HEENT: NC/AT, EOMI, sclera clear, conjunctiva normal, OP with MMM. No trismus. Mild pain while opening mouth. No ear discharge, no ear pain. No teeth pain.  Respiratory: No increased work of breathing, CTAB, no crackles or wheezing.  Cardiovascular: RRR, no murmur noted. No peripheral edema.   GI: Normal bowel sounds, soft, non-distended and non-tender. No hepatosplenomegaly.  Skin: Warm, dry, well-perfused. No bruising, bleeding, rashes, or lesions on limited exam.  Musculoskeletal: Extremities grossly normal, non-tender, no edema. Limited right upper extremity and neck ROM in bed.   Neurologic: A&O. Answers questions appropriately, speech normal. Moves all extremities spontaneously. CNII-XII without abnormalities.   Neuropsychiatric: Calm. Affect appropriate to situation.  Vascular access:  Peripheral catheter on placed CDI on right arm without local inflammatory signs, non-tender, no surrounding erythema.         Laboratory Data:     Microbiology:  Culture Micro   Date Value Ref Range Status   10/29/2017 No growth  Final   04/15/2015 Normal skin delia  Final   05/24/2012 No Beta Streptococcus isolated  Final   11/22/2011   Final    10 to 50,000 colonies/mL Mixed gram positive delia  Multiple species present, probable perineal contamination.  Susceptibility testing not routinely done   11/25/2009 No growth  Final   11/25/2009 No growth  Final   11/25/2009 Light growth Candida tropicalis  Final   11/25/2009 No growth  Final   11/24/2009 No growth  Final   11/23/2009 Light growth Candida tropicalis  Final   11/23/2009 No growth  Final   11/23/2009 No growth  Final   11/23/2009 No growth  Final   11/21/2009 Light growth Candida tropicalis  Final   11/21/2009 No growth  Final   11/21/2009 No growth  Final   11/21/2009 No growth  Final   11/19/2009 Normal delia  Final   11/19/2009 10 to 50,000 colonies/mL Mixed gram positive delia  Final     Comment:     Multiple  species present, probable perineal contamination.  Susceptibility testing not routinely done   11/19/2009 No growth  Final   11/19/2009 No growth  Final   11/16/2009   Final    Light growth Candida albicans / dubliniensis For non-significant sites, Candida     Comment:      albicans is not differentiated from Dacia dubliniensis. This is the   national   standard.   11/15/2009 No growth  Final   11/15/2009 No growth  Final   11/14/2009 No growth after 30 days  Final   11/14/2009 Light growth Candida albicans / dubliniensis  Final     Comment:     For non-significant sites, Candida albicans is not differentiated from Dacia   dubliniensis. This is the national standard.  No additional fungi cultured after 4 weeks incubation   11/14/2009 No growth  Final   11/14/2009   Final    Culture received and in progress. Assayed at [facility]     Comment:      [place] [address]  Positive AFB results are called as soon as detected.  Final report to follow in 7 to 8 weeks.  Culture negative for acid fast bacilli   11/14/2009 Moderate growth Coagulase negative Staphylococcus  Final   11/14/2009 No growth  Final   11/14/2009 No growth  Final   11/11/2009 No growth after 28 days  Final   11/11/2009 Moderate growth Candida tropicalis  Final     Comment:     No additional fungi cultured after 4 weeks incubation   11/11/2009 Moderate growth Candida tropicalis  Final   11/11/2009   Final    Culture negative for acid fast bacilli Assayed at [facility]     Comment:      [place] [address]   11/11/2009 No growth  Final   11/10/2009   Final    Heavy growth Dacia albicans / dubliniensis For non-significant sites, Candida     Comment:      albicans is not differentiated from Dacia dubliniensis. This is the   national   standard.   11/08/2009 Light growth Candida tropicalis  Final   11/08/2009 No growth after 14 days  Final   11/08/2009 No growth  Final   11/08/2009 No growth  Final   11/08/2009   Final     >100,000 colonies/mL Candida albicans / dubliniensis For non-significant sites,     Comment:      Candida albicans is not differentiated from Dacia dubliniensis. This is the   national standard.   11/08/2009 No growth  Final   11/08/2009 No growth  Final   11/04/2009 Moderate growth Candida tropicalis  Final   10/31/2009 No growth  Final   10/31/2009 No growth  Final   10/31/2009 No growth  Final   10/31/2009 No growth  Final   10/31/2009 No growth  Final   10/31/2009 No growth  Final   10/29/2009 No growth after 29 days  Final   10/29/2009   Final    Moderate growth Candida albicans / dubliniensis For non-significant sites,     Comment:      Candida albicans is not differentiated from Dacia dubliniensis. This is the   national standard.  No additional fungi cultured after 4 weeks incubation   10/29/2009 No growth  Final   10/29/2009 Culture received and in progress.  Final     Comment:     Positive AFB results are called as soon as detected.  Final report to follow in 7 to 8 weeks.  Culture negative for acid fast bacilli   10/29/2009   Final    No Salmonella, Shigella, Campylobacter or E coli 0157 isolated.   10/28/2009 No growth  Final   10/28/2009 No growth  Final   10/28/2009 No growth  Final   10/28/2009 No growth  Final   10/27/2009 No growth  Final   10/27/2009 No growth  Final   08/31/2009 No Beta Streptococcus isolated  Final       Inflammatory Markers    Recent Labs   Lab Test 07/15/21  0037 07/14/21  1835   SED 32* 30*   CRP 25.0* 26.0*       Metabolic Studies       Recent Labs   Lab Test 07/16/21  0536 07/14/21  1835 06/15/21  1405 10/29/17  1820 03/19/17  1015    141 141 142 143   POTASSIUM 3.8 3.5 3.7 3.4 3.9   CHLORIDE 113* 112* 110* 110* 111*   CO2 26 23 25 23 25   ANIONGAP 2* 6 6 9 7   BUN 5* 8 10 7 11   CR 0.72 0.73 0.94 0.65 0.57   GFRESTIMATED >90 >90 79 >90 >90  Non African American GFR Calc     * 85 80 82 87   SAMUEL 8.7 8.8 8.9 8.4* 8.9       Hepatic Studies    Recent Labs    Lab Test 06/15/21  1405 10/29/17  1820 03/19/17  1015   BILITOTAL 0.4 0.2 0.2   ALKPHOS 59 60 64   ALBUMIN 3.6 3.2* 3.4   AST 14 9 13   ALT 14 11 12       Pancreatitis testing    Recent Labs   Lab Test 06/15/21  1405 03/19/17  1015   LIPASE 160 218       Hematology Studies      Recent Labs   Lab Test 07/16/21  0536 07/14/21  1835 06/15/21  1405 05/06/19  1558 10/29/17  1820 03/19/17  1015   WBC 5.1 6.4 4.8  --  7.9 6.6   ANEU  --   --  2.2  --  4.7 2.5   ALYM  --   --  1.7 1.7 1.9 2.9   LINDSEY  --   --  0.6  --  0.9 0.9   AEOS  --   --  0.3  --  0.3 0.2   HGB 10.0* 11.5* 11.9 9.3* 9.2* 9.9*   HCT 31.8* 35.8 37.6 32.1* 31.4* 31.6*    302 311  --  411 294       Arterial Blood Gas Testing  No lab results found.     Urine Studies     Recent Labs   Lab Test 10/29/17  1834   URINEPH 6.0   NITRITE Negative   LEUKEST Negative   WBCU <1       Vancomycin Levels     No lab results found.    Invalid input(s): VANCO    Tobramycin levels     No lab results found.    Gentamicin levels    No lab results found.    CSF testing   No lab results found.    Hepatitis B Testing No lab results found.    Hepatitis C Testing     Hepatitis C Antibody   Date Value Ref Range Status   10/30/2009 Negative NEG Final       Respiratory Virus Testing    RSV Rapid Antigen Result   Date Value Ref Range Status   10/29/2009 Negative NEG Final       Last check of C difficile  No results found for: CDBPCT           Imaging:     Head CT (07/15)  Mildly thickened and enhancing dura over the left frontal lobe.  Differential for this could include post lumbar puncture intracranial  hypotension, although this typically is more diffuse and less focal.  Other considerations would be neoplastic processes such as lymphoma,  leukemia, or metastatic disease. Infectious considerations would  include fungal or mycobacterial infection. An inflammatory processes  such as sarcoid could have this appearance.    CXR (07/14)  There are no acute infiltrates. The  cardiac silhouette is  not enlarged. Pulmonary vasculature is unremarkable.     MRI, Cervical Spine (07/14)  1. Thin prevertebral edema/fluid and enhancement throughout the visualized cervical spine and upper thoracic spine as detailed above. No deedee peripherally enhancing abscess. Findings are favored to be infectious. It appears to be centered about the   anterior C6-C7 disc space where there is mild irregularity and bony spurring. This suggests the possibility of early discitis at the C6-C7 level. The disc space and endplates at this level are otherwise normal. There is no abnormal epidural enhancement.     2. Mild likely reactive cervical adenopathy.    Head CT, w/o contrast (06/15)  The ventricles and basal cisterns are within normal limits  in configuration. There is no midline shift. There are no extra-axial  fluid collections. Gray-white differentiation is well maintained.     No intracranial hemorrhage, mass or recent infarct.     The visualized paranasal sinuses are well-aerated. There is no  mastoiditis. There are no fractures of the visualized bones.    CT Chest/Abdomen (06/15)  1.  No acute findings in the chest, abdomen or pelvis. No evidence of  a bowel obstruction or inflammatory process.

## 2021-07-16 NOTE — PROGRESS NOTES
[facility] Progress Note    Main Plans for Today     - GI consult  - ID following  - LP   - Quant gold  - Started Meropenem  - Oxycodone 2.5 mg q 4 hrs PRN  - Health psychology consult    Assessment & Plan   Leticia Morales is a 35 year old female admitted on 7/15/2021. She has a history of chronic migraines and is admitted for acute onset of R shoulder and upper back pain and transient (now improved) R arm numbness, found to have cervical prevertebral fluid collection on MRI of unclear etiology.     # Prevertebral fluid collection   # Back and R shoulder pain  #Transient R arm numbness and tingling  # Elevated CRP, ESR  Patient with new onset right upper extremity pain and transient numbness (now resolved) and upper back/neck pain, found to have elevated inflammatory markers and MRI of the cervical spine showing prevertebral fluid collection concerning for infection vs. autoimmune vs. malignancy. She remains afebrile, and hemodynamically stable. Due to unclear nature of this fluid collection primary team asked NSGY, ENT, and Neuroradiology if they would sample fluid collection and also per ID recommendations. At this time, no intervention / tap planned. Discussion by night resident with ENT was to not delay treatment further due to concern to spread of infection to mediastinal space. They want to defer fluid sampling unless patient is non-responsive to abx. Neuroradiology noted they due not feel based on their read this is discitis. MR of brain showed mildly thickened and enhancing dura over the left frontal lobe. Differential includes: neoplastic process, infectious (fungal, mycobacterial), or inflammatory like sarcoid.   - NSGY consult, appreciate recs (no role for NSGY)  - ID consult, appreciate recs  - LP discuss with ID what we would get  - Quant gold  - ENT consulted, appreciate recs  - Meropenem 2g IV q8 hours 7/15 -    - Follow-up blood  cultures 7/15 NGTD  - q4 neuro checks  - q4 VS  - Pain management: Tylenol TID, flexeril, oxycodone 2.5 mg q 4 hrs  - Daily CBC, CMP, q48 hour CRP, q72 hour ESR     # Dysphagia  # Epigastric pain  Patient has acute onset of painful swallowing prior to admission. ENT consulted in the emergency department, no acute intervention. No significant finding on their exam to locate infectious source. Rapid strep negative. She does report week of vomiting 4-5 times (no hematemesis) a day in June that seems to have to initially precipitated the throat pain, seems to have subsided. Then returned acutely on 7/10. Endorses high NSAID use. With aforementioned history, some concern for upper esophogeal tear, consulted GI, wondering about possible EGD.  - GI consult  - ENT consulted     # Chronic migraines  With long history of chronic migraines.  She was seen by her neurologist on 7/13, was recommended to stop sumatriptan and start Topamax as preventative therapy. She has not been able to start Topamax at the time of admission, we will start her up taper while she is here.  It does appear that in the past she has experienced nausea with Topamax, will monitor closely.  Will add on rizatriptan as needed, per neurology recommendations.  - PTA topamax     # PTSD  # Depression with Anxiety  New stressors including hospitalization. 2010 was intubated with trach so significant prior hospitalization event. Discussed what we can do here, she is amenable to health psychology consult.   - Health psychology consult     Chronic issues:  GERD: PTA omeprazole    # Pain Assessment:  Current Pain Score 7/16/2021   Patient currently in pain? yes   Pain score (0-10) -   - Leticia is experiencing pain due to paravertebral fluid collection. Pain management was discussed and the plan was created in a collaborative fashion.  Leticia's response to the current recommendations: engaged  - Please see the plan for pain management as documented above      Diet:  "Regular Diet Adult  Fluids: PO  DVT Prophylaxis: Pneumatic Compression Devices  Code Status: Full Code    Disposition Plan   Expected discharge:  4 - 7 days, recommended to prior living arrangement once adequate pain management/ tolerating PO medications and antibiotic plan established.Dispo:   7/20/2021        Entered: Judy Nameller 07/16/2021, 7:33 AM   Information in the above section will display in the discharge planner report.      The patient's care was discussed with the Attending Physician, Dr. López.    Judy Josefina  [facility] Medicine: PG2  Pager: [phone]  Please see sticky note for cross cover information    Interval History    Pain states pain is worsening in \"the back of my neck.\" No worsening pain with swallowing. No drooling. No SOB. No chest pain. Discussed feels stressed with hospitalization and unknowns related to her health condition. Amenable to health psychology. History of trauma. No vision changes. No headache.     Review of Systems   7 point ROS negative unless noted in interval history    Physical Exam   Vital Signs: Temp: 98.3  F (36.8  C) Temp src: Oral BP: 97/73 Pulse: 65   Resp: 14 SpO2: 100 % O2 Device: None (Room air)    Weight: 125 lbs 0 oz  Physical Exam     General: Alert and oriented, in no acute distress.  Skin: Warm and dry, no abnormalities noted.  Eyes: Extra-ocular muscles grossly intact, pupils equal.  ENT: Speech intact, nasal passages open, no hearing impairment noted. No lesion. No erythema. Tonsils midline. No drooling.   CV: S1 S2 RRR. No murmur. No cyanosis or pallor, warm and well perfused.  Respiratory: CTAB. No w/r/r. No respiratory distress, no accessory muscle use.  Neuro: Mental Status: A&O x 3. Attention, memory, intact. Speech: Speech fluent. Comprehension intact. Cranial Nerves: CN II-XII grossly intact. Motor: Muscle tone and mass intact. Strength 5/5 BL UE and LE. Sensory: Light touch intact. Reflexes: Reflexes 2/4 " BL UE and LE. Plantar response downgoing. Cerebellar/Gait: Rapid alternating movement and finger-to-nose intact and shin to heel intact. Cerebrovascular: no carotid bruits. No pronator drift.   Psychiatric: Mood and affect appear normal.   Extremities: Warm, able to move all four extremities at will.      Data   Recent Labs   Lab 07/16/21  0536 07/14/21  1835   WBC 5.1 6.4   HGB 10.0* 11.5*   MCV 79 78    302   INR 1.11  --     141   POTASSIUM 3.8 3.5   CHLORIDE 113* 112*   CO2 26 23   BUN 5* 8   CR 0.72 0.73   ANIONGAP 2* 6   SAMUEL 8.7 8.8   * 85   TROPONIN  --  <0.015     Recent Results (from the past 24 hour(s))   MR Brain w/o & w Contrast    Narrative    Brain MRI without and with contrast    History: Headache, chronic, no new features; prevertebral fluid  collection in C spine, headaches (hx migraines), r/o infection.    Comparison: Head CT from 6/15/2021. Cervical spine MRI from 7/14/2021.    Technique: Axial FLAIR,  T1-weighted, and susceptibility images were  obtained without intravenous contrast. Following intravenous  gadolinium-based contrast administration, axial T2-weighted,  diffusion, FLAIR, and axial and coronal T1-weighted images were  obtained.     Dose: 7.5mL Gadavist    Findings:   There is no mass effect, midline shift, or evidence of intracranial  hemorrhage.  The ventricles are not enlarged out of proportion to the  cerebral sulci. The gray-white matter differentiation of the cerebral  hemispheres is preserved. Postcontrast images demonstrate mildly  thickened and enhancing dura overlying the left frontal lobe.    The major vascular flow-voids appear patent. The orbits, visualized  portions of paranasal sinuses, and mastoid air cells are relatively  clear.      Impression    Impression:  Mildly thickened and enhancing dura over the left frontal lobe.  Differential for this could include post lumbar puncture intracranial  hypotension, although this typically is more diffuse  and less focal.  Other considerations would be neoplastic processes such as lymphoma,  leukemia, or metastatic disease. Infectious considerations would  include fungal or mycobacterial infection. An inflammatory processes  such as sarcoid could have this appearance.    I have personally reviewed the examination and initial interpretation  and I agree with the findings.    AILYN MEDEIROS MD         SYSTEM ID:  [id number]     Medications       [START ON 7/28/2021] Botulinum Toxin Type A  155 Units Intramuscular Q90 Days     meropenem  2 g Intravenous Q8H     omeprazole  20 mg Oral Daily     oxymetazoline  2 spray Both Nostrils BID     polyethylene glycol  17 g Oral Daily     sodium chloride (PF)  3 mL Intracatheter Q8H     topiramate  50 mg Oral At Bedtime

## 2021-07-16 NOTE — PROGRESS NOTES
"Otolaryngology Progress Note  July 16, 2021    S: No acute events overnight. Feels better today. Ongoing dysphagia but is tolerating PO intake. Neck stiffness improved     O: BP 97/73   Pulse 65   Temp 98.3  F (36.8  C) (Oral)   Resp 14   Ht 1.626 m (5' 4\")   Wt 56.7 kg (125 lb)   SpO2 100%   BMI 21.46 kg/m     General: Alert and oriented x 3, laying comfortably in bed. Engaged in discussion.    HEENT: EOMI. Neck is soft, non-tender to palpation and no lymphadenopathy    Pulmonary: Breathing non-labored, no stridor, no accessory muscle use.    LABS: WBC 5.1 (6.4), CRP 25    A/P: Leticia Morales is a 35 year old female with a prevertebral fluid collection at C6-7, currently on IV abx per primary team. No indication for ENT intervention at this time, exam today is improved and reassuring.     - Antibiotics per primary team, continue to monitor clinical response  - No ENT intervention  - ENT to sign off. Please contact ENT if patient starts to worsen or develops respiratory symptoms    Patient and plan discussed with Dr. Kareem Mosley MD  Otolaryngology-Head & Neck Surgery PGY-4  Please contact ENT by dialing [phone] and entering job code [phone].       "

## 2021-07-16 NOTE — PROGRESS NOTES
Brief Progress Note    Discussed patient with ENT, who recommend initiation of antibiotics now given concern for potential spread of infection to mediastinal space without antimicrobial coverage. They would defer fluid sampling unless patient is non-responsive to antibiotics. Discussed with pharmacy, who agree with meropenem for H. Flu, strep species and anaerobic coverage with CSF penetration.    Plan:  - Meropenem 2g IV q8 hours    Gabriela Turner MD

## 2021-07-16 NOTE — ED NOTES
Contacted Dr. Chan as ordering provider of Albumin order. Per MD he did not order this medication for this patient and it should not be administered at this time.

## 2021-07-16 NOTE — PROGRESS NOTES
Brief interval note:    Assessment: 35 years old female presents with prevertebral fluid collection of unknown origin.  While this might be of infectious origin (CRP and ESR elevated), other differential such as autoimmune or neoplasm can be considered as well. Canal patent with all prevertebral involvement.     Plan:   - No role for neurosurgery  - ENT, ID consult    Neurosurgery will follow peripherally.    Kristina Dye MD  Neurosurgery Resident, PGY-2

## 2021-07-17 LAB
ANION GAP SERPL CALCULATED.3IONS-SCNC: 5 MMOL/L (ref 3–14)
APPEARANCE CSF: CLEAR
APPEARANCE CSF: CLEAR
BUN SERPL-MCNC: 5 MG/DL (ref 7–30)
CALCIUM SERPL-MCNC: 8.7 MG/DL (ref 8.5–10.1)
CHLORIDE BLD-SCNC: 112 MMOL/L (ref 94–109)
CO2 SERPL-SCNC: 22 MMOL/L (ref 20–32)
COLOR CSF: COLORLESS
COLOR CSF: COLORLESS
CREAT SERPL-MCNC: 0.78 MG/DL (ref 0.52–1.04)
CRP SERPL-MCNC: 11 MG/L (ref 0–8)
CRP SERPL-MCNC: 24 MG/L (ref 0–8)
CRYPTOC AG SPEC QL: NEGATIVE
ERYTHROCYTE [DISTWIDTH] IN BLOOD BY AUTOMATED COUNT: 16.3 % (ref 10–15)
ERYTHROCYTE [SEDIMENTATION RATE] IN BLOOD BY WESTERGREN METHOD: 22 MM/HR (ref 0–20)
GFR SERPL CREATININE-BSD FRML MDRD: >90 ML/MIN/1.73M2
GLUCOSE BLD-MCNC: 135 MG/DL (ref 70–99)
GLUCOSE CSF-MCNC: 58 MG/DL (ref 40–70)
HCT VFR BLD AUTO: 33.4 % (ref 35–47)
HGB BLD-MCNC: 10.7 G/DL (ref 11.7–15.7)
LACTATE SERPL-SCNC: 1 MMOL/L (ref 0.7–2)
LACTATE SERPL-SCNC: 2.1 MMOL/L (ref 0.7–2)
MCH RBC QN AUTO: 25.3 PG (ref 26.5–33)
MCHC RBC AUTO-ENTMCNC: 32 G/DL (ref 31.5–36.5)
MCV RBC AUTO: 79 FL (ref 78–100)
PATH INTERP SPEC-IMP: NORMAL
PLATELET # BLD AUTO: 251 10E3/UL (ref 150–450)
POTASSIUM BLD-SCNC: 3.6 MMOL/L (ref 3.4–5.3)
PROT CSF-MCNC: 32 MG/DL (ref 15–60)
RBC # BLD AUTO: 4.23 10E6/UL (ref 3.8–5.2)
RBC # CSF MANUAL: 240 /UL (ref 0–2)
RBC # CSF MANUAL: 32 /UL (ref 0–2)
S PNEUM AG SPEC QL: NEGATIVE
SODIUM SERPL-SCNC: 139 MMOL/L (ref 133–144)
TUBE # CSF: 1
TUBE # CSF: 4
WBC # BLD AUTO: 6.1 10E3/UL (ref 4–11)
WBC # CSF MANUAL: 0 /UL (ref 0–5)
WBC # CSF MANUAL: 1 /UL (ref 0–5)

## 2021-07-17 PROCEDURE — 87899 AGENT NOS ASSAY W/OPTIC: CPT | Performed by: STUDENT IN AN ORGANIZED HEALTH CARE EDUCATION/TRAINING PROGRAM

## 2021-07-17 PROCEDURE — 83605 ASSAY OF LACTIC ACID: CPT | Performed by: STUDENT IN AN ORGANIZED HEALTH CARE EDUCATION/TRAINING PROGRAM

## 2021-07-17 PROCEDURE — 84157 ASSAY OF PROTEIN OTHER: CPT | Performed by: STUDENT IN AN ORGANIZED HEALTH CARE EDUCATION/TRAINING PROGRAM

## 2021-07-17 PROCEDURE — 36415 COLL VENOUS BLD VENIPUNCTURE: CPT | Performed by: STUDENT IN AN ORGANIZED HEALTH CARE EDUCATION/TRAINING PROGRAM

## 2021-07-17 PROCEDURE — 62270 DX LMBR SPI PNXR: CPT | Performed by: PEDIATRICS

## 2021-07-17 PROCEDURE — 86140 C-REACTIVE PROTEIN: CPT | Performed by: STUDENT IN AN ORGANIZED HEALTH CARE EDUCATION/TRAINING PROGRAM

## 2021-07-17 PROCEDURE — 87206 SMEAR FLUORESCENT/ACID STAI: CPT | Performed by: STUDENT IN AN ORGANIZED HEALTH CARE EDUCATION/TRAINING PROGRAM

## 2021-07-17 PROCEDURE — 96374 THER/PROPH/DIAG INJ IV PUSH: CPT | Mod: 59 | Performed by: EMERGENCY MEDICINE

## 2021-07-17 PROCEDURE — 250N000011 HC RX IP 250 OP 636: Performed by: PEDIATRICS

## 2021-07-17 PROCEDURE — 250N000013 HC RX MED GY IP 250 OP 250 PS 637: Performed by: STUDENT IN AN ORGANIZED HEALTH CARE EDUCATION/TRAINING PROGRAM

## 2021-07-17 PROCEDURE — 120N000002 HC R&B MED SURG/OB UMMC

## 2021-07-17 PROCEDURE — 82945 GLUCOSE OTHER FLUID: CPT | Performed by: STUDENT IN AN ORGANIZED HEALTH CARE EDUCATION/TRAINING PROGRAM

## 2021-07-17 PROCEDURE — 99233 SBSQ HOSP IP/OBS HIGH 50: CPT | Mod: GC | Performed by: STUDENT IN AN ORGANIZED HEALTH CARE EDUCATION/TRAINING PROGRAM

## 2021-07-17 PROCEDURE — 258N000003 HC RX IP 258 OP 636: Performed by: STUDENT IN AN ORGANIZED HEALTH CARE EDUCATION/TRAINING PROGRAM

## 2021-07-17 PROCEDURE — 96375 TX/PRO/DX INJ NEW DRUG ADDON: CPT | Performed by: EMERGENCY MEDICINE

## 2021-07-17 PROCEDURE — 250N000011 HC RX IP 250 OP 636: Performed by: STUDENT IN AN ORGANIZED HEALTH CARE EDUCATION/TRAINING PROGRAM

## 2021-07-17 PROCEDURE — 85652 RBC SED RATE AUTOMATED: CPT | Performed by: STUDENT IN AN ORGANIZED HEALTH CARE EDUCATION/TRAINING PROGRAM

## 2021-07-17 PROCEDURE — 82164 ANGIOTENSIN I ENZYME TEST: CPT | Performed by: STUDENT IN AN ORGANIZED HEALTH CARE EDUCATION/TRAINING PROGRAM

## 2021-07-17 PROCEDURE — 96361 HYDRATE IV INFUSION ADD-ON: CPT | Performed by: EMERGENCY MEDICINE

## 2021-07-17 PROCEDURE — 80048 BASIC METABOLIC PNL TOTAL CA: CPT | Performed by: STUDENT IN AN ORGANIZED HEALTH CARE EDUCATION/TRAINING PROGRAM

## 2021-07-17 PROCEDURE — 87798 DETECT AGENT NOS DNA AMP: CPT | Performed by: STUDENT IN AN ORGANIZED HEALTH CARE EDUCATION/TRAINING PROGRAM

## 2021-07-17 PROCEDURE — 87075 CULTR BACTERIA EXCEPT BLOOD: CPT | Performed by: STUDENT IN AN ORGANIZED HEALTH CARE EDUCATION/TRAINING PROGRAM

## 2021-07-17 PROCEDURE — 87102 FUNGUS ISOLATION CULTURE: CPT | Performed by: STUDENT IN AN ORGANIZED HEALTH CARE EDUCATION/TRAINING PROGRAM

## 2021-07-17 PROCEDURE — 87476 LYME DIS DNA AMP PROBE: CPT | Performed by: STUDENT IN AN ORGANIZED HEALTH CARE EDUCATION/TRAINING PROGRAM

## 2021-07-17 PROCEDURE — 89050 BODY FLUID CELL COUNT: CPT | Performed by: STUDENT IN AN ORGANIZED HEALTH CARE EDUCATION/TRAINING PROGRAM

## 2021-07-17 PROCEDURE — 250N000013 HC RX MED GY IP 250 OP 250 PS 637: Performed by: EMERGENCY MEDICINE

## 2021-07-17 PROCEDURE — 36592 COLLECT BLOOD FROM PICC: CPT | Performed by: STUDENT IN AN ORGANIZED HEALTH CARE EDUCATION/TRAINING PROGRAM

## 2021-07-17 PROCEDURE — 009U3ZX DRAINAGE OF SPINAL CANAL, PERCUTANEOUS APPROACH, DIAGNOSTIC: ICD-10-PCS | Performed by: PEDIATRICS

## 2021-07-17 PROCEDURE — 88108 CYTOPATH CONCENTRATE TECH: CPT | Mod: TC | Performed by: STUDENT IN AN ORGANIZED HEALTH CARE EDUCATION/TRAINING PROGRAM

## 2021-07-17 PROCEDURE — 96366 THER/PROPH/DIAG IV INF ADDON: CPT | Performed by: EMERGENCY MEDICINE

## 2021-07-17 PROCEDURE — 87205 SMEAR GRAM STAIN: CPT | Performed by: STUDENT IN AN ORGANIZED HEALTH CARE EDUCATION/TRAINING PROGRAM

## 2021-07-17 PROCEDURE — 85027 COMPLETE CBC AUTOMATED: CPT | Performed by: STUDENT IN AN ORGANIZED HEALTH CARE EDUCATION/TRAINING PROGRAM

## 2021-07-17 PROCEDURE — 88108 CYTOPATH CONCENTRATE TECH: CPT | Mod: 26 | Performed by: PATHOLOGY

## 2021-07-17 PROCEDURE — 87116 MYCOBACTERIA CULTURE: CPT | Performed by: STUDENT IN AN ORGANIZED HEALTH CARE EDUCATION/TRAINING PROGRAM

## 2021-07-17 PROCEDURE — 87040 BLOOD CULTURE FOR BACTERIA: CPT | Performed by: STUDENT IN AN ORGANIZED HEALTH CARE EDUCATION/TRAINING PROGRAM

## 2021-07-17 PROCEDURE — 250N000009 HC RX 250: Performed by: EMERGENCY MEDICINE

## 2021-07-17 PROCEDURE — 87483 CNS DNA AMP PROBE TYPE 12-25: CPT | Performed by: STUDENT IN AN ORGANIZED HEALTH CARE EDUCATION/TRAINING PROGRAM

## 2021-07-17 PROCEDURE — 87529 HSV DNA AMP PROBE: CPT | Performed by: STUDENT IN AN ORGANIZED HEALTH CARE EDUCATION/TRAINING PROGRAM

## 2021-07-17 RX ORDER — CAFFEINE 200 MG
200 TABLET ORAL ONCE
Status: COMPLETED | OUTPATIENT
Start: 2021-07-17 | End: 2021-07-17

## 2021-07-17 RX ORDER — KETOROLAC TROMETHAMINE 15 MG/ML
15 INJECTION, SOLUTION INTRAMUSCULAR; INTRAVENOUS ONCE
Status: COMPLETED | OUTPATIENT
Start: 2021-07-17 | End: 2021-07-17

## 2021-07-17 RX ORDER — ACETAMINOPHEN 325 MG/1
650 TABLET ORAL ONCE
Status: COMPLETED | OUTPATIENT
Start: 2021-07-17 | End: 2021-07-17

## 2021-07-17 RX ORDER — LORAZEPAM 2 MG/ML
1 INJECTION INTRAMUSCULAR ONCE
Status: COMPLETED | OUTPATIENT
Start: 2021-07-17 | End: 2021-07-17

## 2021-07-17 RX ADMIN — CAFFEINE 200 MG: 200 TABLET ORAL at 11:41

## 2021-07-17 RX ADMIN — TOPIRAMATE 50 MG: 50 TABLET ORAL at 21:23

## 2021-07-17 RX ADMIN — Medication 2.5 MG: at 09:56

## 2021-07-17 RX ADMIN — SODIUM CHLORIDE, POTASSIUM CHLORIDE, SODIUM LACTATE AND CALCIUM CHLORIDE 1000 ML: 600; 310; 30; 20 INJECTION, SOLUTION INTRAVENOUS at 10:25

## 2021-07-17 RX ADMIN — LORAZEPAM 1 MG: 2 INJECTION INTRAMUSCULAR; INTRAVENOUS at 08:07

## 2021-07-17 RX ADMIN — Medication 2.5 MG: at 02:09

## 2021-07-17 RX ADMIN — LIDOCAINE HYDROCHLORIDE 30 ML: 20 SOLUTION ORAL; TOPICAL at 02:19

## 2021-07-17 RX ADMIN — KETOROLAC TROMETHAMINE 15 MG: 15 INJECTION, SOLUTION INTRAMUSCULAR; INTRAVENOUS at 11:42

## 2021-07-17 RX ADMIN — MEROPENEM 2 G: 1 INJECTION, POWDER, FOR SOLUTION INTRAVENOUS at 09:31

## 2021-07-17 RX ADMIN — MEROPENEM 2 G: 1 INJECTION, POWDER, FOR SOLUTION INTRAVENOUS at 15:02

## 2021-07-17 RX ADMIN — SODIUM CHLORIDE, POTASSIUM CHLORIDE, SODIUM LACTATE AND CALCIUM CHLORIDE 1000 ML: 600; 310; 30; 20 INJECTION, SOLUTION INTRAVENOUS at 06:16

## 2021-07-17 RX ADMIN — OMEPRAZOLE 40 MG: 20 CAPSULE, DELAYED RELEASE ORAL at 09:56

## 2021-07-17 RX ADMIN — ACETAMINOPHEN 975 MG: 325 TABLET, FILM COATED ORAL at 21:23

## 2021-07-17 RX ADMIN — ACETAMINOPHEN 975 MG: 325 TABLET, FILM COATED ORAL at 15:01

## 2021-07-17 RX ADMIN — MEROPENEM 2 G: 1 INJECTION, POWDER, FOR SOLUTION INTRAVENOUS at 22:38

## 2021-07-17 RX ADMIN — CALCIUM CARBONATE (ANTACID) CHEW TAB 500 MG 1000 MG: 500 CHEW TAB at 06:16

## 2021-07-17 RX ADMIN — ACETAMINOPHEN 650 MG: 325 TABLET, FILM COATED ORAL at 06:16

## 2021-07-17 ASSESSMENT — ACTIVITIES OF DAILY LIVING (ADL)
EATING/SWALLOWING: OTHER (SEE COMMENTS)
DIFFICULTY_EATING/SWALLOWING: NO
WALKING_OR_CLIMBING_STAIRS_DIFFICULTY: NO
DOING_ERRANDS_INDEPENDENTLY_DIFFICULTY: NO
TOILETING_ISSUES: NO
DIFFICULTY_COMMUNICATING: NO
DRESSING/BATHING_DIFFICULTY: NO
FALL_HISTORY_WITHIN_LAST_SIX_MONTHS: NO

## 2021-07-17 ASSESSMENT — MIFFLIN-ST. JEOR: SCORE: 1281.03

## 2021-07-17 NOTE — PROCEDURES
LUMBAR PUNCTURE PROCEDURE NOTE  Proceduralist: Dustin  Procedure: Lumbar Puncture  Indication: Brain lesions, possible meningitis  Risk Assessment:  Medium, no prior history of lumbar surgery, not on anticoagulation, plt and INR have been ok  The risks and benefits of the procedure were explained to the patient who expressed understanding and opted to proceed.  Consent was obtained and placed in the chart.  A time out was performed.    The patient was placed in the left semi-lateral decubitus position and the L5-S1 innerspace palpated, identified by US and marked.  3 ml of 1% lidocaine was instilled.  A  3.5 inch 22 gauge needle was inserted.  Clear  fluid was obtained, unfortunately after multiple insertions, which was clear.  The oncology administered 12ml mL of chemotherapy  The stylet was replaced and the needle removed.   Patient tolerated the procedure well. Please do not hesitate to contact our service if any complications or concerns arise.   Soto Chan MD  [facility] Hospitalist

## 2021-07-17 NOTE — PHARMACY-ADMISSION MEDICATION HISTORY
Admission Medication History Completed by Pharmacy    See [facility] Admission Navigator for allergy information, preferred outpatient pharmacy, prior to admission medications and immunization status.     Medication History Sources:     Patient, Dispense report    Changes made to PTA medication list (reason):    Added: Per Patient, verified by dispense report  o Tretinoin Cream 0.025%  o 5% Benzoyl peroxide wash  o Spironolactone 100mg daily    Deleted: Ibuprofen 200mg  o Doctor told her to stop taking on 7/13/21 during visit    Changed: None    Additional Information:    The patient recently had a neurology appointment 7/13/21 where she was told to dicontinue sumatriptan and ibuprofen    The patient has not yet taken cyclobenzaprine, this is a new medication from her neurology appointment    Topamax is a new medication for the patient, they are currently taking 2 tablets (50mg) once daily    Prior to Admission medications    Medication Sig Last Dose Taking? Auth Provider   acetaminophen (TYLENOL) 500 MG tablet Take 2 tablets (1,000 mg) by mouth every 6 hours as needed for mild pain 7/15/2021 at Unknown time Yes Marc Dye MD   benzoyl peroxide (BENZOYL PEROXIDE WASH) 5 % external liquid Use to wash face once daily in the morning 7/13/2021 at AM Yes Unknown, Entered By History   omeprazole (PRILOSEC) 20 MG DR capsule Take 1 capsule (20 mg) by mouth daily Past Week at Unknown time Yes Marc Dye MD   spironolactone (ALDACTONE) 50 MG tablet Take 100 mg by mouth daily  7/13/2021 at PM Yes Unknown, Entered By History   topiramate (TOPAMAX) 25 MG tablet Take two tabs at bedtime for one week, than take three tabs at bedtime for one week, than take four tabs at bedtime if tolerated 7/15/2021 at Unknown time Yes Silke Velasco APRN CNP   tretinoin (RETIN-A) 0.025 % external cream Apply topically At Bedtime 7/13/2021 at PM Yes Unknown, Entered By History   cyclobenzaprine (FLEXERIL) 5 MG tablet Take  1-2 tablets (5-10 mg) by mouth nightly as needed for muscle spasms (do not drive)   Sherly Larkin MD   SUMAtriptan (IMITREX) 50 MG tablet Take 2 tablets (100 mg) by mouth at onset of headache for migraine 7/13/2021  Yashira Cuevas MD         Date completed: 07/16/21    Medication history completed by: Kathia Lozada

## 2021-07-17 NOTE — ED NOTES
Notified Kathryn's Team regarding temperature of 102.7 and heart rate of 110's. Plan per Dr. Turner is to collect blood cultures, lactate level and administer oral tylenol. Will continue to monitor closely and update provider with any changes.

## 2021-07-17 NOTE — PROGRESS NOTES
Sepsis Evaluation Progress Note    I was called to see Leticia Morales due to abnormal vital signs triggering the Sepsis SIRS screening alert. She is known to have a likely infection.     Physical Exam   Vital Signs:  Temp: (!) 102.7  F (39.3  C) Temp src: Oral BP: 114/65 Pulse: 111   Resp: 16 SpO2: 95 % O2 Device: None (Room air)       General: in no acute distress  Mental Status: AAOx4.     Remainder of physical exam is significant for tachycardia to 100s, warm, dry skin, strong radial pulses. Clear lungs, normal cardiovascular exam.     Data   Lactic Acid   Date Value Ref Range Status   07/17/2021 2.1 (H) 0.7 - 2.0 mmol/L Final   11/16/2009 1.1 0.7 - 2.1 mmol/L Final   11/03/2009 0.8 0.7 - 2.1 mmol/L Final       Assessment & Plan   Leticia Morales meets SIRS criteria AND has a lactate >2 or other evidence of acute organ damage.  These vital signs, lab and physical exam findings constitute a diagnosis of SEVERE SEPSIS.    Sepsis Time-Zero (time severe sepsis diagnosis confirmed): 0647  07/17/21 as this was the time when Lactate resulted, and the level was > 2.0     Anti-infectives (From now, onward)    Start     Dose/Rate Route Frequency Ordered Stop    07/15/21 2300  meropenem (MERREM) 2 g in sodium chloride 0.9 % 100 mL intermittent infusion      2 g  200 mL/hr over 30 Minutes Intravenous EVERY 8 HOURS 07/15/21 2225          Current antibiotic coverage is appropriate for source of infection.    3 Hour Severe Sepsis Bundle Completion:  1. Initial Lactic Acid result shown above. Repeat lactic acid ordered for 2 hours from now.   2. Blood Cultures before Antibiotics: No, blood cultures x2 drawn, however patient already receiving antibiotics  3. Broad Spectrum Antibiotics Administered: yes  4. Fluids: 2000 mL fluids ORDERED to be given     Disposition: The patient will remain on the current unit. We will continue to monitor this patient closely..  Gabriela Turner MD    Sepsis Criteria   Sepsis: 2+ SIRS criteria due  to infection  Severe Sepsis: Sepsis AND 1+ new sign of acute organ dysfunction (Note: lactate >2 or acute encephalopathy each qualify as organ dysfunction)  Septic Shock: Sepsis AND hypotension despite volume resuscitation with 30 ml/kg crystalloid or lactate >=4  Note: HYPOTENSION is defined as 2 BP readings measured 3 hrs apart that have a SBP <90, MAP <65, or decrease >40 mmHg, occurring 6 hrs before or after t-zero

## 2021-07-17 NOTE — PROGRESS NOTES
[facility] Progress Note    Main Plans for Today     - LP per CAPs team  - Continue Meropenem, ID following    Assessment & Plan   Leticia Morales is a 35 year old female admitted on 7/15/2021. She has a history of chronic migraines and is admitted for acute onset of R shoulder and upper back pain and transient (now improved) R arm numbness, found to have cervical prevertebral fluid collection on MRI of unclear etiology.    #Sepsis   # Prevertebral fluid collection   # Enhanced thickened dura over (L) frontal lobe  # Back and R shoulder pain  #Transient R arm numbness and tingling  # Elevated CRP, ESR  Patient with new onset right upper extremity pain and transient numbness (now resolved) and upper back/neck pain, found to have elevated inflammatory markers and MRI of the cervical spine showing prevertebral fluid collection concerning for infection vs. autoimmune vs. malignancy. Due to unclear nature of this fluid collection primary team asked NSGY, ENT, and Neuroradiology if they would sample fluid collection and also per ID recommendations. At this time, no intervention / tap planned. Discussion by night resident with ENT was to not delay treatment further due to concern to spread of infection to mediastinal space. They want to defer fluid sampling unless patient is non-responsive to abx. Neuroradiology noted they due not feel based on their read this is discitis. MR of brain showed mildly thickened and enhancing dura over the left frontal lobe. Differential includes: neoplastic process, infectious (fungal, mycobacterial), or inflammatory like sarcoid. Due to fever, more likely this is infectious.     Overnight pt spiked temperature of 102.7 and was tachycardic with likely source of infection meeting sepsis criteria. LR boluses given per sepsis management. Lactate 2.1. Pt has worsening headache, but no focal findings on neurological exam  (similar to prior).     - NSGY consult, appreciate recs (no role for NSGY)  - ENT consulted, signed off  - ID consult, appreciate recs  - LP per CAPS team   - Broad work-up of labs pending  - Follow-up Quant gold  - Meropenem 2g IV q8 hours 7/15 -     - If acutely worsens, would add Vancomycin per ID, but not to broaden now  - Follow-up blood cultures 7/15 NGTD, repeat BC 7/17   - Repeat lactate this AM  - q4 neuro checks  - q4 VS  - Pain management: Tylenol TID, flexeril, oxycodone 2.5 mg q 4 hrs  - Daily CBC, CMP, q48 hour CRP, q48 hour ESR     # Dysphagia  # Epigastric pain  # GERD  Patient has acute onset of painful swallowing prior to admission. ENT consulted, no acute intervention. No significant finding on their exam to locate infectious source. Rapid strep negative. She does report week of vomiting 4-5 times (no hematemesis) a day in June that seems to have to initially precipitated the throat pain, seems to have subsided. Then returned acutely on 7/10. Endorses high NSAID use. With aforementioned history, some concern for upper esophogeal tear, consulted GI. They feel this is highly unlikely. Could consider Gastrogaffin esophogram.   - Increase PPI for 6-8 weeks and have pt follow-up outpatient if GERD symptoms do not resolve    # Chronic migraines  With long history of chronic migraines.  She was seen by her neurologist on 7/13, was recommended to stop sumatriptan and start Topamax as preventative therapy. She has not been able to start Topamax at the time of admission, we will start her up taper while she is here.  It does appear that in the past she has experienced nausea with Topamax, will monitor closely.  Will add on rizatriptan as needed, per neurology recommendations.  - PTA topamax     # PTSD  # Depression with Anxiety  New stressors including hospitalization. 2010 was intubated with trach so significant prior hospitalization event. Discussed what we can do here, she is amenable to health  "psychology consult.   - Health psychology consult    # Pain Assessment:  Current Pain Score 7/17/2021   Patient currently in pain? sleeping, patient not able to self report   Pain score (0-10) -   - Leticia is experiencing pain due to paravertebral fluid collection. Pain management was discussed and the plan was created in a collaborative fashion.  Leticia's response to the current recommendations: engaged  - Please see the plan for pain management as documented above    Diet: Regular Diet Adult  Fluids: PO, 7/17 LR bolus 1L x2  DVT Prophylaxis: Pneumatic Compression Devices  Code Status: Full Code    Disposition Plan   Expected discharge:  4 - 7 days, recommended to prior living arrangement once adequate pain management/ tolerating PO medications and antibiotic plan established.Dispo:   7/20/2021        Entered: Judy Rocha 07/17/2021, 6:21 AM   Information in the above section will display in the discharge planner report.      The patient's care was discussed with the Attending Physician, Dr. López.    Judy Rocha  [facility] Medicine: PG2  Pager: [phone]  Please see sticky note for cross cover information    Interval History    Pt spiked temp of 102 this AM met sepsis criteria. Repeat blood cultures and lactate gotten (see above).    Pt seen this AM noticing worsening headache, points to frontal part of head says it wraps around to the back and is \"throbbing.\" This is unlike her normal migraines. No other new symptoms.     Review of Systems   7 point ROS negative unless noted in interval history    Physical Exam   Vital Signs: Temp: (!) 102.7  F (39.3  C) Temp src: Oral BP: 114/65 Pulse: 111   Resp: 16 SpO2: 95 % O2 Device: None (Room air)    Weight: 125 lbs 0 oz  Physical Exam   General: Alert and oriented, in no acute distress.  Skin: Warm and dry, no abnormalities noted.  Eyes: Extra-ocular muscles grossly intact, pupils equal.  ENT: Speech intact, nasal passages open, " no hearing impairment noted. No lesion. No erythema. Tonsils midline. No drooling.   CV: S1 S2 RRR. No murmur. No cyanosis or pallor, warm and well perfused.  Respiratory: CTAB. No w/r/r. No respiratory distress, no accessory muscle use.  Neuro: Mental Status: A&O x 3. Attention, memory, intact. Speech: Speech fluent. Comprehension intact. Cranial Nerves: CN II-XII grossly intact. Motor: Muscle tone and mass intact. Strength 5/5 BL UE and LE. Sensory: Light touch intact. Reflexes: Reflexes 2/4 BL UE and LE.  Cerebellar/Gait: Rapid alternating movement and finger-to-nose intact and shin to heel intact. No pronator drift.   Psychiatric: Mood and affect appear normal.   Extremities: Warm, able to move all four extremities at will.    Data   Recent Labs   Lab 07/16/21  0536 07/14/21  1835   WBC 5.1 6.4   HGB 10.0* 11.5*   MCV 79 78    302   INR 1.11  --     141   POTASSIUM 3.8 3.5   CHLORIDE 113* 112*   CO2 26 23   BUN 5* 8   CR 0.72 0.73   ANIONGAP 2* 6   SAMUEL 8.7 8.8   * 85   TROPONIN  --  <0.015     No results found for this or any previous visit (from the past 24 hour(s)).  Medications       acetaminophen  975 mg Oral TID     albumin human  50 g Intravenous Once     [START ON 7/28/2021] Botulinum Toxin Type A  155 Units Intramuscular Q90 Days     meropenem  2 g Intravenous Q8H     omeprazole  40 mg Oral Daily     oxymetazoline  2 spray Both Nostrils BID     polyethylene glycol  17 g Oral Daily     sodium chloride (PF)  3 mL Intracatheter Q8H     topiramate  50 mg Oral At Bedtime

## 2021-07-17 NOTE — PROGRESS NOTES
KATHRYN'S BRIEF PROGRESS NOTE     I called Patient Placement to review this patient's case. She has been boarding in the ED for >48h and unfortunately is in a bed next to the ambulance bay which is very noisy and she has a severe headache which has been exacerbated by noise and lack of sleep. They shared that she has been slotted for a 6A Neuro unit bed and no beds are available due to staffing.     I shared that based on her current condition I don't feel strongly that a bed in the Neuro unit is necessary. She is being treated for suspected CNS infection and has a headache and next pain but is otherwise neurologically stable and requiring neuro checks no more frequently than q4h. She developed sepsis overnight which resolved w/ fluid resuscitation and I do not think she needs a higher level of care than a general medicine bed. I do think that she is best served by staying at [facility] due to needing multiple specialty consults including ID.      I appreciate ongoing efforts to get this patient an inpatient room as soon as possible as her boarding in the ED is having a negative effect on her symptoms and care.     DO [facility]

## 2021-07-18 LAB
ANION GAP SERPL CALCULATED.3IONS-SCNC: 5 MMOL/L (ref 3–14)
BASOPHILS # BLD AUTO: 0 10E3/UL (ref 0–0.2)
BASOPHILS NFR BLD AUTO: 1 %
BUN SERPL-MCNC: 7 MG/DL (ref 7–30)
CALCIUM SERPL-MCNC: 8.3 MG/DL (ref 8.5–10.1)
CHLORIDE BLD-SCNC: 112 MMOL/L (ref 94–109)
CO2 SERPL-SCNC: 23 MMOL/L (ref 20–32)
CREAT SERPL-MCNC: 0.89 MG/DL (ref 0.52–1.04)
CRP SERPL-MCNC: 34 MG/L (ref 0–8)
EOSINOPHIL # BLD AUTO: 0.3 10E3/UL (ref 0–0.7)
EOSINOPHIL NFR BLD AUTO: 12 %
ERYTHROCYTE [DISTWIDTH] IN BLOOD BY AUTOMATED COUNT: 16.4 % (ref 10–15)
GFR SERPL CREATININE-BSD FRML MDRD: 85 ML/MIN/1.73M2
GLUCOSE BLD-MCNC: 85 MG/DL (ref 70–99)
GLUCOSE BLDC GLUCOMTR-MCNC: 87 MG/DL (ref 70–99)
HCT VFR BLD AUTO: 31.9 % (ref 35–47)
HGB BLD-MCNC: 10 G/DL (ref 11.7–15.7)
HOLD SPECIMEN: NORMAL
HOLD SPECIMEN: NORMAL
HSV1 DNA CSF QL NAA+PROBE: NOT DETECTED
HSV2 DNA CSF QL NAA+PROBE: NOT DETECTED
IMM GRANULOCYTES # BLD: 0 10E3/UL
IMM GRANULOCYTES NFR BLD: 1 %
LACTATE SERPL-SCNC: 0.7 MMOL/L (ref 0.7–2)
LYMPHOCYTES # BLD AUTO: 0.6 10E3/UL (ref 0.8–5.3)
LYMPHOCYTES NFR BLD AUTO: 22 %
MCH RBC QN AUTO: 24.9 PG (ref 26.5–33)
MCHC RBC AUTO-ENTMCNC: 31.3 G/DL (ref 31.5–36.5)
MCV RBC AUTO: 80 FL (ref 78–100)
MONOCYTES # BLD AUTO: 0.7 10E3/UL (ref 0–1.3)
MONOCYTES NFR BLD AUTO: 24 %
NEUTROPHILS # BLD AUTO: 1.1 10E3/UL (ref 1.6–8.3)
NEUTROPHILS NFR BLD AUTO: 40 %
PLATELET # BLD AUTO: 230 10E3/UL (ref 150–450)
POTASSIUM BLD-SCNC: 3.7 MMOL/L (ref 3.4–5.3)
QUANTIFERON MITOGEN: 10 IU/ML
QUANTIFERON NIL TUBE: 0.13 IU/ML
QUANTIFERON TB1 TUBE: 0.13 IU/ML
QUANTIFERON TB2 TUBE: 0.12
RBC # BLD AUTO: 4.01 10E6/UL (ref 3.8–5.2)
SODIUM SERPL-SCNC: 140 MMOL/L (ref 133–144)
WBC # BLD AUTO: 2.8 10E3/UL (ref 4–11)
WBC # BLD AUTO: 2.8 10E3/UL (ref 4–11)

## 2021-07-18 PROCEDURE — 83605 ASSAY OF LACTIC ACID: CPT | Performed by: STUDENT IN AN ORGANIZED HEALTH CARE EDUCATION/TRAINING PROGRAM

## 2021-07-18 PROCEDURE — 99232 SBSQ HOSP IP/OBS MODERATE 35: CPT | Performed by: STUDENT IN AN ORGANIZED HEALTH CARE EDUCATION/TRAINING PROGRAM

## 2021-07-18 PROCEDURE — 250N000011 HC RX IP 250 OP 636: Performed by: STUDENT IN AN ORGANIZED HEALTH CARE EDUCATION/TRAINING PROGRAM

## 2021-07-18 PROCEDURE — 120N000002 HC R&B MED SURG/OB UMMC

## 2021-07-18 PROCEDURE — 36415 COLL VENOUS BLD VENIPUNCTURE: CPT | Performed by: STUDENT IN AN ORGANIZED HEALTH CARE EDUCATION/TRAINING PROGRAM

## 2021-07-18 PROCEDURE — 250N000013 HC RX MED GY IP 250 OP 250 PS 637: Performed by: STUDENT IN AN ORGANIZED HEALTH CARE EDUCATION/TRAINING PROGRAM

## 2021-07-18 PROCEDURE — 80048 BASIC METABOLIC PNL TOTAL CA: CPT | Performed by: STUDENT IN AN ORGANIZED HEALTH CARE EDUCATION/TRAINING PROGRAM

## 2021-07-18 PROCEDURE — 85025 COMPLETE CBC W/AUTO DIFF WBC: CPT | Performed by: STUDENT IN AN ORGANIZED HEALTH CARE EDUCATION/TRAINING PROGRAM

## 2021-07-18 PROCEDURE — 999N000128 HC STATISTIC PERIPHERAL IV START W/O US GUIDANCE

## 2021-07-18 PROCEDURE — 87040 BLOOD CULTURE FOR BACTERIA: CPT | Performed by: STUDENT IN AN ORGANIZED HEALTH CARE EDUCATION/TRAINING PROGRAM

## 2021-07-18 PROCEDURE — 258N000003 HC RX IP 258 OP 636: Performed by: STUDENT IN AN ORGANIZED HEALTH CARE EDUCATION/TRAINING PROGRAM

## 2021-07-18 PROCEDURE — 86140 C-REACTIVE PROTEIN: CPT | Performed by: STUDENT IN AN ORGANIZED HEALTH CARE EDUCATION/TRAINING PROGRAM

## 2021-07-18 RX ORDER — SPIRONOLACTONE 100 MG/1
100 TABLET, FILM COATED ORAL DAILY
Status: DISCONTINUED | OUTPATIENT
Start: 2021-07-18 | End: 2021-07-24 | Stop reason: HOSPADM

## 2021-07-18 RX ORDER — TRETINOIN 0.25 MG/G
CREAM TOPICAL AT BEDTIME
Status: DISCONTINUED | OUTPATIENT
Start: 2021-07-18 | End: 2021-07-18

## 2021-07-18 RX ORDER — TRETINOIN 0.25 MG/G
GEL TOPICAL AT BEDTIME
Status: DISCONTINUED | OUTPATIENT
Start: 2021-07-18 | End: 2021-07-24 | Stop reason: HOSPADM

## 2021-07-18 RX ORDER — KETOROLAC TROMETHAMINE 15 MG/ML
15 INJECTION, SOLUTION INTRAMUSCULAR; INTRAVENOUS ONCE
Status: COMPLETED | OUTPATIENT
Start: 2021-07-18 | End: 2021-07-18

## 2021-07-18 RX ADMIN — MEROPENEM 2 G: 1 INJECTION, POWDER, FOR SOLUTION INTRAVENOUS at 14:01

## 2021-07-18 RX ADMIN — PROCHLORPERAZINE EDISYLATE 5 MG: 5 INJECTION INTRAMUSCULAR; INTRAVENOUS at 11:09

## 2021-07-18 RX ADMIN — ACETAMINOPHEN 975 MG: 325 TABLET, FILM COATED ORAL at 14:01

## 2021-07-18 RX ADMIN — MEROPENEM 2 G: 1 INJECTION, POWDER, FOR SOLUTION INTRAVENOUS at 21:58

## 2021-07-18 RX ADMIN — ACETAMINOPHEN 975 MG: 325 TABLET, FILM COATED ORAL at 20:01

## 2021-07-18 RX ADMIN — TRETINOIN: 0.25 GEL TOPICAL at 21:59

## 2021-07-18 RX ADMIN — TOPIRAMATE 50 MG: 50 TABLET ORAL at 21:58

## 2021-07-18 RX ADMIN — ONDANSETRON 4 MG: 4 TABLET, ORALLY DISINTEGRATING ORAL at 18:40

## 2021-07-18 RX ADMIN — KETOROLAC TROMETHAMINE 15 MG: 15 INJECTION, SOLUTION INTRAMUSCULAR; INTRAVENOUS at 11:46

## 2021-07-18 RX ADMIN — MEROPENEM 2 G: 1 INJECTION, POWDER, FOR SOLUTION INTRAVENOUS at 05:55

## 2021-07-18 RX ADMIN — CALCIUM CARBONATE (ANTACID) CHEW TAB 500 MG 1000 MG: 500 CHEW TAB at 08:04

## 2021-07-18 RX ADMIN — SPIRONOLACTONE 100 MG: 100 TABLET ORAL at 17:32

## 2021-07-18 RX ADMIN — OMEPRAZOLE 40 MG: 20 CAPSULE, DELAYED RELEASE ORAL at 06:05

## 2021-07-18 RX ADMIN — SODIUM CHLORIDE, POTASSIUM CHLORIDE, SODIUM LACTATE AND CALCIUM CHLORIDE 1000 ML: 600; 310; 30; 20 INJECTION, SOLUTION INTRAVENOUS at 19:48

## 2021-07-18 RX ADMIN — ONDANSETRON 4 MG: 4 TABLET, ORALLY DISINTEGRATING ORAL at 10:29

## 2021-07-18 RX ADMIN — CYCLOBENZAPRINE 10 MG: 5 TABLET, FILM COATED ORAL at 21:58

## 2021-07-18 RX ADMIN — ACETAMINOPHEN 975 MG: 325 TABLET, FILM COATED ORAL at 08:04

## 2021-07-18 RX ADMIN — Medication 2.5 MG: at 08:04

## 2021-07-18 ASSESSMENT — ACTIVITIES OF DAILY LIVING (ADL)
ADLS_ACUITY_SCORE: 13
ADLS_ACUITY_SCORE: 15
ADLS_ACUITY_SCORE: 13
ADLS_ACUITY_SCORE: 13
ADLS_ACUITY_SCORE: 15
ADLS_ACUITY_SCORE: 15

## 2021-07-18 NOTE — PROGRESS NOTES
[facility] Progress Note    Main Plans for Today   - Migraine cocktail  - Await CSF studies    Assessment & Plan   Leticia Morales is a 35 year old female admitted on 7/15/2021. She has a history of chronic migraines and is admitted for acute onset of R shoulder and upper back pain and transient (now improved) R arm numbness, found to have cervical prevertebral fluid collection on MRI of unclear etiology and dural enhancement on MRI brain.     # Sepsis, resolved   # Prevertebral fluid collection   # Enhanced thickened dura over (L) frontal lobe  # Back and R shoulder pain  # Transient R arm numbness and tingling, resolved   # Elevated CRP, ESR  Presented with upper extremity pain, transient numbness (now resolved), upper back/neck pain, found to have elevated inflammatory markers and MRI of the cervical spine showing prevertebral fluid collection concerning for infection vs. autoimmune vs. malignancy. Multiple procedural specialties consulted - unable to obtain fluid sampling prior to antibiotic initiation. MRI brain showed mildly thickened and enhancing dura over the left frontal lobe. Differential includes: neoplastic process, infectious (fungal, mycobacterial), or inflammatory like sarcoid. Due to brief sepsis on 7/17, resolved with IV fluids, higher suspicion for infectious etiology. S/p LP 7/17.   - Appreciate Infectious Disease following   - IV meropenem 2g q8h (7/15- )    - If acutely worsens, would add vancomycin  - Follow-up Quant gold  - q4 neuro checks  - q4 VS -- consider spacing tomorrow to support sleep and avoid migraine triggers  - Pain management: Tylenol TID, flexeril, oxycodone 2.5 mg q 4 hrs  - Daily CBC, CMP, q48 hour CRP, q48 hour ESR     Micro:  Blood culture 7/15: NGTD  Blood culture 7/18: in process   CSF culture 7/17: NGTD   Aerobic culture: NGTD    Anaerobic: in process   AFB: in process   Fungal: in process    Crypto:  negative   HSV 1/2: Not detected    Meningitis/encephalitis panel: In process   Varicella: In process      # Leukopenia  - add on diff  - consider peripheral smear, retic    # Dysphagia  # Epigastric pain  # GERD  Patient has acute onset of painful swallowing prior to admission. ENT consulted, no acute intervention. No significant finding on their exam to locate infectious source. Rapid strep negative. She does report week of vomiting 4-5 times (no hematemesis) a day in June that seems to have to initially precipitated the throat pain, seems to have subsided. Then returned acutely on 7/10. Endorses high NSAID use. With aforementioned history, some concern for upper esophogeal tear, consulted GI. They feel this is highly unlikely.   - Could consider Gastrogaffin esophogram.   - Increase PPI for 6-8 weeks and have pt follow-up outpatient if GERD symptoms do not resolve    # Chronic migraines  Long history of chronic migraines. Differentiates chronic migraine pain from acute HA pain with this presentation.   - PTA topamax     # PTSD  # Depression with Anxiety  New stressors including hospitalization. 2010 was intubated with trach so significant prior hospitalization event. Discussed what we can do here, she is amenable to health psychology consult.   - Health psychology consult    # Pain Assessment:  Current Pain Score 7/18/2021   Patient currently in pain? denies   Pain score (0-10) -   - Leticia is experiencing pain due to paravertebral fluid collection. Pain management was discussed and the plan was created in a collaborative fashion.  Leticia's response to the current recommendations: engaged  - Please see the plan for pain management as documented above    Diet: Regular Diet Adult  Fluids: PO  DVT Prophylaxis: Pneumatic Compression Devices  Code Status: Full Code    Disposition Plan   Expected discharge:  4 - 7 days, recommended to prior living arrangement once adequate pain management/ tolerating PO medications and  antibiotic plan established.Dispo:   7/20/2021        Entered: Yashira Cuevas 07/18/2021, 6:48 AM   Information in the above section will display in the discharge planner report.      The patient's care was discussed with the Attending Physician, Dr. López.    Yashira Cuevas  [facility] Medicine: PG2  Pager: [phone]  Please see sticky note for cross cover information    Interval History    No acute events overnight. Headache from yesterday resolved, though today feels like she has more of her typical migraine today. No fevers/chills. No new neurologic symptoms.      Physical Exam   Vital Signs: Temp: 98.4  F (36.9  C) Temp src: Oral BP: 95/65 Pulse: 78   Resp: 16 SpO2: 100 % O2 Device: None (Room air)    Weight: 131 lbs 6.4 oz  Physical Exam   General: Alert and oriented, in no acute distress.  Skin: Warm and dry, no abnormalities noted.  Eyes: Extra-ocular muscles grossly intact, pupils equal.   ENT: moist mucous membranes   CV: S1 S2 RRR. No murmur. No cyanosis or pallor, warm and well perfused.  Respiratory: CTAB. No w/r/r. No respiratory distress, no accessory muscle use.  Neuro: Mental Status: A&O x 3. Attention, memory, intact. Speech: Speech fluent. Comprehension intact. Cranial Nerves: CN II-XII grossly intact   Psychiatric: Mood and affect appear normal.   Extremities: Warm, able to move all four extremities at will.    Data   Recent Labs   Lab 07/17/21  0614 07/16/21  0536 07/14/21  1835   WBC 6.1 5.1 6.4   HGB 10.7* 10.0* 11.5*   MCV 79 79 78    241 302   INR  --  1.11  --     141 141   POTASSIUM 3.6 3.8 3.5   CHLORIDE 112* 113* 112*   CO2 22 26 23   BUN 5* 5* 8   CR 0.78 0.72 0.73   ANIONGAP 5 2* 6   SAMUEL 8.7 8.7 8.8   * 104* 85   TROPONIN  --   --  <0.015     No results found for this or any previous visit (from the past 24 hour(s)).  Medications       acetaminophen  975 mg Oral TID     meropenem  2 g Intravenous Q8H      omeprazole  40 mg Oral Daily     polyethylene glycol  17 g Oral Daily     sodium chloride (PF)  3 mL Intracatheter Q8H     topiramate  50 mg Oral At Bedtime

## 2021-07-18 NOTE — PLAN OF CARE
Pt is alert and oriented. Up ad richard. Reporting a headache on admission that resolved with tylenol and topamax. Denying any pain since. Neuros intact. Voiding without issue. PIV saline locked between abx. SCD's on for overnight. Encourage ambulation in room while awake.

## 2021-07-19 ENCOUNTER — APPOINTMENT (OUTPATIENT)
Dept: MRI IMAGING | Facility: CLINIC | Age: 36
End: 2021-07-19
Payer: COMMERCIAL

## 2021-07-19 LAB
ANION GAP SERPL CALCULATED.3IONS-SCNC: 6 MMOL/L (ref 3–14)
BUN SERPL-MCNC: 9 MG/DL (ref 7–30)
C GATTII+NEOFOR DNA CSF QL NAA+NON-PROBE: NEGATIVE
CALCIUM SERPL-MCNC: 8.9 MG/DL (ref 8.5–10.1)
CHLORIDE BLD-SCNC: 111 MMOL/L (ref 94–109)
CMV DNA CSF QL NAA+NON-PROBE: NEGATIVE
CO2 SERPL-SCNC: 23 MMOL/L (ref 20–32)
CREAT SERPL-MCNC: 0.92 MG/DL (ref 0.52–1.04)
E COLI K1 AG CSF QL: NEGATIVE
ERYTHROCYTE [DISTWIDTH] IN BLOOD BY AUTOMATED COUNT: 16.7 % (ref 10–15)
EV RNA SPEC QL NAA+PROBE: NEGATIVE
GAMMA INTERFERON BACKGROUND BLD IA-ACNC: 0.13 IU/ML
GFR SERPL CREATININE-BSD FRML MDRD: 81 ML/MIN/1.73M2
GLUCOSE BLD-MCNC: 79 MG/DL (ref 70–99)
GP B STREP DNA CSF QL NAA+NON-PROBE: NEGATIVE
HAEM INFLU DNA CSF QL NAA+NON-PROBE: NEGATIVE
HCT VFR BLD AUTO: 33.7 % (ref 35–47)
HGB BLD-MCNC: 10.7 G/DL (ref 11.7–15.7)
HHV6 DNA CSF QL NAA+NON-PROBE: NEGATIVE
HSV1 DNA CSF QL NAA+NON-PROBE: NEGATIVE
HSV2 DNA CSF QL NAA+NON-PROBE: NEGATIVE
L MONOCYTOG DNA CSF QL NAA+NON-PROBE: NEGATIVE
M TB IFN-G BLD-IMP: NEGATIVE
M TB IFN-G CD4+ BCKGRND COR BLD-ACNC: 9.87 IU/ML
MCH RBC QN AUTO: 24.9 PG (ref 26.5–33)
MCHC RBC AUTO-ENTMCNC: 31.8 G/DL (ref 31.5–36.5)
MCV RBC AUTO: 79 FL (ref 78–100)
MITOGEN IGNF BCKGRD COR BLD-ACNC: -0.01 IU/ML
MITOGEN IGNF BCKGRD COR BLD-ACNC: 0 IU/ML
N MEN DNA CSF QL NAA+NON-PROBE: NEGATIVE
PARECHOVIRUS A RNA CSF QL NAA+NON-PROBE: NEGATIVE
PATH REPORT.COMMENTS IMP SPEC: NORMAL
PATH REPORT.FINAL DX SPEC: NORMAL
PATH REPORT.GROSS SPEC: NORMAL
PATH REPORT.RELEVANT HX SPEC: NORMAL
PLATELET # BLD AUTO: 241 10E3/UL (ref 150–450)
POTASSIUM BLD-SCNC: 3.7 MMOL/L (ref 3.4–5.3)
RBC # BLD AUTO: 4.29 10E6/UL (ref 3.8–5.2)
S PNEUM DNA CSF QL NAA+NON-PROBE: NEGATIVE
SODIUM SERPL-SCNC: 140 MMOL/L (ref 133–144)
VZV DNA CSF QL NAA+NON-PROBE: NEGATIVE
VZV DNA SPEC QL NAA+PROBE: NEGATIVE
WBC # BLD AUTO: 3.5 10E3/UL (ref 4–11)

## 2021-07-19 PROCEDURE — 85027 COMPLETE CBC AUTOMATED: CPT | Performed by: STUDENT IN AN ORGANIZED HEALTH CARE EDUCATION/TRAINING PROGRAM

## 2021-07-19 PROCEDURE — 70553 MRI BRAIN STEM W/O & W/DYE: CPT

## 2021-07-19 PROCEDURE — 80048 BASIC METABOLIC PNL TOTAL CA: CPT | Performed by: STUDENT IN AN ORGANIZED HEALTH CARE EDUCATION/TRAINING PROGRAM

## 2021-07-19 PROCEDURE — 250N000013 HC RX MED GY IP 250 OP 250 PS 637: Performed by: STUDENT IN AN ORGANIZED HEALTH CARE EDUCATION/TRAINING PROGRAM

## 2021-07-19 PROCEDURE — 70553 MRI BRAIN STEM W/O & W/DYE: CPT | Mod: 26 | Performed by: RADIOLOGY

## 2021-07-19 PROCEDURE — 36415 COLL VENOUS BLD VENIPUNCTURE: CPT | Performed by: STUDENT IN AN ORGANIZED HEALTH CARE EDUCATION/TRAINING PROGRAM

## 2021-07-19 PROCEDURE — 999N000127 HC STATISTIC PERIPHERAL IV START W US GUIDANCE

## 2021-07-19 PROCEDURE — 255N000002 HC RX 255 OP 636: Performed by: STUDENT IN AN ORGANIZED HEALTH CARE EDUCATION/TRAINING PROGRAM

## 2021-07-19 PROCEDURE — 99232 SBSQ HOSP IP/OBS MODERATE 35: CPT | Mod: GC | Performed by: STUDENT IN AN ORGANIZED HEALTH CARE EDUCATION/TRAINING PROGRAM

## 2021-07-19 PROCEDURE — 250N000011 HC RX IP 250 OP 636: Performed by: STUDENT IN AN ORGANIZED HEALTH CARE EDUCATION/TRAINING PROGRAM

## 2021-07-19 PROCEDURE — A9585 GADOBUTROL INJECTION: HCPCS | Performed by: STUDENT IN AN ORGANIZED HEALTH CARE EDUCATION/TRAINING PROGRAM

## 2021-07-19 PROCEDURE — 258N000003 HC RX IP 258 OP 636: Performed by: STUDENT IN AN ORGANIZED HEALTH CARE EDUCATION/TRAINING PROGRAM

## 2021-07-19 PROCEDURE — 120N000002 HC R&B MED SURG/OB UMMC

## 2021-07-19 PROCEDURE — 99233 SBSQ HOSP IP/OBS HIGH 50: CPT

## 2021-07-19 RX ORDER — PROCHLORPERAZINE 25 MG
25 SUPPOSITORY, RECTAL RECTAL EVERY 12 HOURS PRN
Status: DISCONTINUED | OUTPATIENT
Start: 2021-07-19 | End: 2021-07-20

## 2021-07-19 RX ORDER — RIZATRIPTAN BENZOATE 5 MG/1
5-10 TABLET, ORALLY DISINTEGRATING ORAL
Status: DISCONTINUED | OUTPATIENT
Start: 2021-07-19 | End: 2021-07-20

## 2021-07-19 RX ORDER — GADOBUTROL 604.72 MG/ML
7.5 INJECTION INTRAVENOUS ONCE
Status: COMPLETED | OUTPATIENT
Start: 2021-07-19 | End: 2021-07-19

## 2021-07-19 RX ORDER — PROCHLORPERAZINE MALEATE 5 MG
5 TABLET ORAL EVERY 6 HOURS PRN
Status: DISCONTINUED | OUTPATIENT
Start: 2021-07-19 | End: 2021-07-20

## 2021-07-19 RX ORDER — KETOROLAC TROMETHAMINE 15 MG/ML
15 INJECTION, SOLUTION INTRAMUSCULAR; INTRAVENOUS ONCE
Status: COMPLETED | OUTPATIENT
Start: 2021-07-19 | End: 2021-07-19

## 2021-07-19 RX ORDER — RIZATRIPTAN BENZOATE 5 MG/1
5-10 TABLET, ORALLY DISINTEGRATING ORAL
Status: COMPLETED | OUTPATIENT
Start: 2021-07-19 | End: 2021-07-19

## 2021-07-19 RX ORDER — LORAZEPAM 1 MG/1
1 TABLET ORAL
Status: DISCONTINUED | OUTPATIENT
Start: 2021-07-19 | End: 2021-07-24 | Stop reason: HOSPADM

## 2021-07-19 RX ADMIN — SODIUM CHLORIDE, POTASSIUM CHLORIDE, SODIUM LACTATE AND CALCIUM CHLORIDE 500 ML: 600; 310; 30; 20 INJECTION, SOLUTION INTRAVENOUS at 06:40

## 2021-07-19 RX ADMIN — CALCIUM CARBONATE (ANTACID) CHEW TAB 500 MG 1000 MG: 500 CHEW TAB at 11:36

## 2021-07-19 RX ADMIN — RIZATRIPTAN BENZOATE 5 MG: 5 TABLET, ORALLY DISINTEGRATING ORAL at 16:48

## 2021-07-19 RX ADMIN — SODIUM CHLORIDE, POTASSIUM CHLORIDE, SODIUM LACTATE AND CALCIUM CHLORIDE 1000 ML: 600; 310; 30; 20 INJECTION, SOLUTION INTRAVENOUS at 15:38

## 2021-07-19 RX ADMIN — MEROPENEM 2 G: 1 INJECTION, POWDER, FOR SOLUTION INTRAVENOUS at 22:41

## 2021-07-19 RX ADMIN — TRETINOIN: 0.25 GEL TOPICAL at 21:36

## 2021-07-19 RX ADMIN — RIZATRIPTAN BENZOATE 5 MG: 5 TABLET, ORALLY DISINTEGRATING ORAL at 00:37

## 2021-07-19 RX ADMIN — ACETAMINOPHEN 975 MG: 325 TABLET, FILM COATED ORAL at 20:24

## 2021-07-19 RX ADMIN — ONDANSETRON 4 MG: 2 INJECTION INTRAMUSCULAR; INTRAVENOUS at 21:31

## 2021-07-19 RX ADMIN — OMEPRAZOLE 40 MG: 20 CAPSULE, DELAYED RELEASE ORAL at 07:10

## 2021-07-19 RX ADMIN — GADOBUTROL 6 ML: 604.72 INJECTION INTRAVENOUS at 22:17

## 2021-07-19 RX ADMIN — LORAZEPAM 1 MG: 1 TABLET ORAL at 21:42

## 2021-07-19 RX ADMIN — KETOROLAC TROMETHAMINE 15 MG: 15 INJECTION, SOLUTION INTRAMUSCULAR; INTRAVENOUS at 22:40

## 2021-07-19 RX ADMIN — PROCHLORPERAZINE EDISYLATE 5 MG: 5 INJECTION INTRAMUSCULAR; INTRAVENOUS at 13:55

## 2021-07-19 RX ADMIN — ACETAMINOPHEN 975 MG: 325 TABLET, FILM COATED ORAL at 07:10

## 2021-07-19 RX ADMIN — MEROPENEM 2 G: 1 INJECTION, POWDER, FOR SOLUTION INTRAVENOUS at 05:12

## 2021-07-19 RX ADMIN — RIZATRIPTAN BENZOATE 10 MG: 5 TABLET, ORALLY DISINTEGRATING ORAL at 02:45

## 2021-07-19 RX ADMIN — SPIRONOLACTONE 100 MG: 100 TABLET ORAL at 07:10

## 2021-07-19 RX ADMIN — MEROPENEM 2 G: 1 INJECTION, POWDER, FOR SOLUTION INTRAVENOUS at 13:54

## 2021-07-19 RX ADMIN — ACETAMINOPHEN 975 MG: 325 TABLET, FILM COATED ORAL at 13:55

## 2021-07-19 RX ADMIN — ONDANSETRON 4 MG: 2 INJECTION INTRAMUSCULAR; INTRAVENOUS at 08:53

## 2021-07-19 RX ADMIN — RIZATRIPTAN BENZOATE 5 MG: 5 TABLET, ORALLY DISINTEGRATING ORAL at 20:24

## 2021-07-19 RX ADMIN — TOPIRAMATE 50 MG: 50 TABLET ORAL at 21:31

## 2021-07-19 RX ADMIN — POLYETHYLENE GLYCOL 3350 17 G: 17 POWDER, FOR SOLUTION ORAL at 07:11

## 2021-07-19 ASSESSMENT — ACTIVITIES OF DAILY LIVING (ADL)
ADLS_ACUITY_SCORE: 15

## 2021-07-19 NOTE — PROGRESS NOTES
[facility]     Patient:  Leticia Morales   YOB: 1985  Date of Visit:  07/19/2021  Date of Admission: 7/15/2021  Consult Requester:Natalee López DO          Assessment and Recommendations:   ID Problem List:  - Sepsis, resolved  - Prevertebral fluid collection:  Headache, right arm numbness/ tingling,    - Neoplastic, immunologic, more likely    - Prevertebral abscess and/or parapharyngeal/retropharyngeal space infection, less likely  - Leukopenia, Lymphopenia, possible meropenem-induced  - Acute odynophagia, resolved 2/2 to parapharyngeal/retropharyngeal space infection/paraneoplasic, less likely   - Chronic migraines s/p analgesia overuse, neoplastic, immunologic  - History of  GERD   - Epigastric tenderness   - PTSD  - Nausea, vomiting, retching/ gagging in June 2021    Recommendations:  - Suspend Meropenem--> CSF fluid analysis and culture are negative, improvement of neck pain/headache/photosensitivity and possible bone marrow suppresion (anemia, lymphopenia)  - Trend CRP, ESR every 2-3 days  - Watch for Meningitis/Encephalitis panel, Cryptococcal Ag, Anaerobic/Fungal cultures, AFB and Varicella Zoster CSF analysis results    Discussion:  Leticia Morales is a 36 yo female patient with a past medical history of chronic migraines (>10 visits to the ED/year), GERD, PTSD who presents to the ED on 07/14/2021 with a 5-day history of sudden moderate holoacranial  headache (6/10) that started in the morning and neck pain (8/10) that radiates to the right arm, upper and middle back by the evening. An MRI (07/14) ordered on the ED showed prevertebral fluid collection c/f early infection/abscess..    She describes a shooting and sharping pain that is associated with tingling and numbness on the right upper extremity that lasted for 4 hours. Some chills and increased temperature sensation over the past days. Nothing makes it better (pain medication: tylenol/ibuprofen,  hot/cold pad) and massages and movements make it worse. She denies weakness but complies inability to completely raise her right arm due to pain as well as mild chest pain and shortness of breath and restricted range of motion (latero-lateral, up and down). Patient also complies of front and back neck pain which made her swallowing very difficult especially for soft solids and liquids that started on Monday, in addition to presence of acid reflux due to pills intake without eating. Weight loss is present for the past month (weight loss of 9 pounds over 1 month). Patient denies sick contacts, any travel recently, IV drug use, recent cold/flu/sinusitus and trauma. She also denies cough, night sweats, voice muffling, stridor and neck swelling.     Patient hemodynamically stable, afebrile, pain free, alert and oriented (without light sensitivity) with resolved numbness and tingling from right arm and no headache and mild neck pain without restricted movements during examination. Patient mentions mild neck pain still persists in midline of her posterior neck (2/10), with better swallowing (able to eat solid foods), and some nauseas with 2 episodes of small amount of vomiting in the morning (non-projectile). She is able to open her mouth, no visible oropharynx lesions, significant restricted range of motion latero-lateral up-down, without new neurologic symptoms at this time.     Patient had an episode of fever 102.7 on 07/16 and lactate of 2.1, sepsis severe bundle was activated and resolved after 2L fluids without any complications. Her CSF analysis from her LP on (07/17) showed normal appearance, colorless, without abnormalities on WBC, glucose and proteins. An increased RBC on her CSF fluid is due to mild traumatic lumbar puncture. HSV 1-2, Cryptococcal antigen and Streptococcal Ag were not detected on CSF fluid. Quantiferon TBC Gold Plus was negative for TBC. No growth on blood culture since admission. Negative Group  A Strep PCR and Throat Swab on 07/15.    In regard to her labs, she is not septic, decreased WBC  (3.5 on 07/16) with lymphopenia, mild anemia (10.7 down from 11.5 on 07/14) an a drop to 230 (07/18) from 302 (07/14), BMP unremarkable. Due to negative CSF analysis, improvement of neurological symptoms, new leukopenia and lymphocytosis (drug-induced), and a spiked fever during antibiotic therapy, we suggest that meropenem should be discontinued. We believe the prevertebral fluid collection associated to systemic findings (weight loss, chronic migraine) could be associated to a neoplastic/immunologic process instead of infectious source.     A CXR (07/15) showed rule out acute mediastinitis and a thoracic spine xray (07/15) showed no fracture. A recent MRI (07/16) revealed mildly thickened and enhancing dura over the left frontal lobe with possible differential diagnosis of fungal and mycobacterial infection. Continue antibiotics, consult to neurology for possibility of lumbar puncture now that has an absent bleeding on her recent head CT scan and persistent neck not well controlled with pain medication.    Thank you for the consult. ID will continue to follow with you.     Ellen Frazier MD   Pager: [phone]  [facility]  07/19/2021     Attestation:    Infectious Diseases  Pager:      ________________________________________________________________    Consult Question: Prevertebral fluid collection to rule out prevertebral abscess and possibility of antibiotic treatment  Admission Diagnosis: Discitis, unspecified spinal region [M46.40]         History of Present Illness:   History obtained from review of chart and discussion with patient.     Leticia Morales is a 35 year old female with a PMH of significant chronic migraine, GERD, PTSD who came to the ED on 07/14/2021 with a 5-day history of headache, neck pain, swallowing troubles and numbness/tingling on he right upper extremity and had an MRI  with positive findings of prevertebral fluid collection. ID was consulted to rule out prevertebral abscess and initiate antibiotic therapy. ID agreed with beginning of meropenem IV and neurology consult to possible lumbar puncture.         Review of Systems:   10 point review of systems was negative except for noted as above in HPI.     ROS:  Constitutional: no fever, no chills, weight loss (9 pounds over 1 month with current of 137), no night sweats. Feeling well, no headache, mild upper back pain focused on midline (2/10) worsened while lying down.   Neuro: no HA, No focal weakness. Tingling and numbness absent on right arm. No visual changes, facial droop, drooling.  HEENT: no acute blurry vision, no earache, no ear discharge, sore throat, right cervical anterior chain lymphadenopathy x2.   CVS: mild chest pain during inspiration, heart palpitation, no syncope or presyncope  Lungs: no dyspnea, no chest pain, no cough. Mild shortness of breath.  GI: no N/V, no diarrhea, no constipation, no abdominal pain  : no dysuria, no hesitancy, no incontinence  Skin: no rash, no Itching, no abscesses  Allergy/immunology: no allergic reaction   Musculoskeletal: no muscle pain, no joint pain, no joint/neck swelling, mild limited ROM of neck movements (latero-lateral, up-down)             Past Medical History:     Past Medical History:   Diagnosis Date     ARDS (adult respiratory distress syndrome) (H) 2010    related to H1N1 infection     H1N1 influenza 2010    prolonged ICU stay, medically induced coma     Iron deficiency anemia      Migraines             Past Surgical History:     Past Surgical History:   Procedure Laterality Date     THORACIC SURGERY      trach            Family History:   Reviewed and non-contributory.   Family History   Problem Relation Age of Onset     Diabetes Father      Hypertension Father      Cancer Father             Social History:     Social History     Tobacco Use     Smoking status: Former  Smoker     Types: Hookah     Smokeless tobacco: Never Used   Substance Use Topics     Alcohol use: No     History   Sexual Activity     Sexual activity: Never            Current Medications:       acetaminophen  975 mg Oral TID     lactated ringers  1,000 mL Intravenous Once     meropenem  2 g Intravenous Q8H     omeprazole  40 mg Oral Daily     polyethylene glycol  17 g Oral Daily     sodium chloride (PF)  3 mL Intracatheter Q8H     [Held by provider] spironolactone  100 mg Oral Daily     topiramate  50 mg Oral At Bedtime     tretinoin   Topical At Bedtime            Allergies:     Allergies   Allergen Reactions     Macrodantin [Nitrofurantoin Macrocrystal] Other (See Comments)     itching            Physical Exam:   Vitals were reviewed  Temp:  [97.5  F (36.4  C)-98.7  F (37.1  C)] 98.7  F (37.1  C)  Pulse:  [72-92] 79  Resp:  [16-22] 20  BP: ()/(52-69) 97/55  SpO2:  [97 %-100 %] 97 %    Vitals:    07/15/21 0015 07/17/21 2047   Weight: 56.7 kg (125 lb) 59.6 kg (131 lb 6.4 oz)       Constitutional: Adult female in no distress, , in NAD. Awake, alert, interactive.  HEENT: NC/AT, EOMI, sclera clear, conjunctiva normal, OP with MMM. No trismus. No pain while opening mouth. No ear discharge, no ear pain. No teeth pain. No bulging in posterior orpharynx  Respiratory: No increased work of breathing, CTAB, no crackles or wheezing.  Cardiovascular: RRR, no murmur noted. No peripheral edema.   GI: Normal bowel sounds, soft, non-distended and non-tender. No hepatosplenomegaly.  Skin: Warm, dry, well-perfused. No bruising, bleeding, rashes, or lesions on limited exam.  Musculoskeletal: Extremities grossly normal, non-tender, no edema. Limited right upper extremity and neck ROM in bed.   Neurologic: A&O. Answers questions appropriately, speech normal. Moves all extremities spontaneously. CNII-XII without abnormalities.   Neuropsychiatric: Calm. Affect appropriate to situation.  Vascular access:  Peripheral catheter on  placed CDI on right arm without local inflammatory signs, non-tender, no surrounding erythema.         Laboratory Data:     Microbiology:  Culture Micro   Date Value Ref Range Status   10/29/2017 No growth  Final   04/15/2015 Normal skin delia  Final   05/24/2012 No Beta Streptococcus isolated  Final   11/22/2011   Final    10 to 50,000 colonies/mL Mixed gram positive delia  Multiple species present, probable perineal contamination.  Susceptibility testing not routinely done   11/25/2009 No growth  Final   11/25/2009 No growth  Final   11/25/2009 Light growth Candida tropicalis  Final   11/25/2009 No growth  Final   11/24/2009 No growth  Final   11/23/2009 Light growth Candida tropicalis  Final   11/23/2009 No growth  Final   11/23/2009 No growth  Final   11/23/2009 No growth  Final   11/21/2009 Light growth Candida tropicalis  Final   11/21/2009 No growth  Final   11/21/2009 No growth  Final   11/21/2009 No growth  Final   11/19/2009 Normal delia  Final   11/19/2009 10 to 50,000 colonies/mL Mixed gram positive delia  Final     Comment:     Multiple species present, probable perineal contamination.  Susceptibility testing not routinely done   11/19/2009 No growth  Final   11/19/2009 No growth  Final   11/16/2009   Final    Light growth Candida albicans / dubliniensis For non-significant sites, Candida     Comment:      albicans is not differentiated from Dacia dubliniensis. This is the   national   standard.   11/15/2009 No growth  Final   11/15/2009 No growth  Final   11/14/2009 No growth after 30 days  Final   11/14/2009 Light growth Candida albicans / dubliniensis  Final     Comment:     For non-significant sites, Candida albicans is not differentiated from Dacia   dubliniensis. This is the national standard.  No additional fungi cultured after 4 weeks incubation   11/14/2009 No growth  Final   11/14/2009   Final    Culture received and in progress. Assayed at [employer]     Comment:       [place] [address]  Positive AFB results are called as soon as detected.  Final report to follow in 7 to 8 weeks.  Culture negative for acid fast bacilli   11/14/2009 Moderate growth Coagulase negative Staphylococcus  Final   11/14/2009 No growth  Final   11/14/2009 No growth  Final   11/11/2009 No growth after 28 days  Final   11/11/2009 Moderate growth Candida tropicalis  Final     Comment:     No additional fungi cultured after 4 weeks incubation   11/11/2009 Moderate growth Candida tropicalis  Final   11/11/2009   Final    Culture negative for acid fast bacilli Assayed at [employer]     Comment:      [place] [address]   11/11/2009 No growth  Final   11/10/2009   Final    Heavy growth Dacia albicans / dubliniensis For non-significant sites, Candida     Comment:      albicans is not differentiated from Dacia dubliniensis. This is the   national   standard.   11/08/2009 Light growth Candida tropicalis  Final   11/08/2009 No growth after 14 days  Final   11/08/2009 No growth  Final   11/08/2009 No growth  Final   11/08/2009   Final    >100,000 colonies/mL Candida albicans / dubliniensis For non-significant sites,     Comment:      Candida albicans is not differentiated from Dacia dubliniensis. This is the   national standard.   11/08/2009 No growth  Final   11/08/2009 No growth  Final   11/04/2009 Moderate growth Candida tropicalis  Final   10/31/2009 No growth  Final   10/31/2009 No growth  Final   10/31/2009 No growth  Final   10/31/2009 No growth  Final   10/31/2009 No growth  Final   10/31/2009 No growth  Final   10/29/2009 No growth after 29 days  Final   10/29/2009   Final    Moderate growth Candida albicans / dubliniensis For non-significant sites,     Comment:      Candida albicans is not differentiated from Dacia dubliniensis. This is the   national standard.  No additional fungi cultured after 4 weeks incubation   10/29/2009 No growth  Final   10/29/2009 Culture received and in  progress.  Final     Comment:     Positive AFB results are called as soon as detected.  Final report to follow in 7 to 8 weeks.  Culture negative for acid fast bacilli   10/29/2009   Final    No Salmonella, Shigella, Campylobacter or E coli 0157 isolated.   10/28/2009 No growth  Final   10/28/2009 No growth  Final   10/28/2009 No growth  Final   10/28/2009 No growth  Final   10/27/2009 No growth  Final   10/27/2009 No growth  Final   08/31/2009 No Beta Streptococcus isolated  Final       Inflammatory Markers    Recent Labs   Lab Test 07/18/21  0800 07/17/21  0614 07/15/21  0037 07/14/21  1835   SED  --  22* 32* 30*   CRP 34.0* 11.0* 24.0*  25.0* 26.0*       Metabolic Studies       Recent Labs   Lab Test 07/19/21  0640 07/18/21  1958 07/18/21  1033 07/18/21  0800 07/17/21  1035 07/17/21  0614 07/16/21  0536 07/14/21  1835 06/15/21  1405     --   --  140  --  139 141 141 141   POTASSIUM 3.7  --   --  3.7  --  3.6 3.8 3.5 3.7   CHLORIDE 111*  --   --  112*  --  112* 113* 112* 110*   CO2 23  --   --  23  --  22 26 23 25   ANIONGAP 6  --   --  5  --  5 2* 6 6   BUN 9  --   --  7  --  5* 5* 8 10   CR 0.92  --   --  0.89  --  0.78 0.72 0.73 0.94   GFRESTIMATED 81  --   --  85  --  >90 >90 >90 79   GLC 79  --  87 85  --  135* 104* 85 80   SAMUEL 8.9  --   --  8.3*  --  8.7 8.7 8.8 8.9   LACT  --  0.7  --   --  1.0 2.1*  --   --   --        Hepatic Studies    Recent Labs   Lab Test 06/15/21  1405 10/29/17  1820 03/19/17  1015   BILITOTAL 0.4 0.2 0.2   ALKPHOS 59 60 64   ALBUMIN 3.6 3.2* 3.4   AST 14 9 13   ALT 14 11 12       Pancreatitis testing    Recent Labs   Lab Test 06/15/21  1405 03/19/17  1015   LIPASE 160 218       Hematology Studies      Recent Labs   Lab Test 07/19/21  0640 07/18/21  0800 07/17/21  0614 07/16/21  0536 07/14/21  1835 06/15/21  1405 05/06/19  1558 10/29/17  1820 10/29/17  1820 03/19/17  1015   WBC 3.5* 2.8*  2.8* 6.1 5.1 6.4 4.8  --   --  7.9 6.6   ANEU  --   --   --   --   --  2.2  --   --  4.7  2.5   ALYM  --   --   --   --   --  1.7 1.7  --  1.9 2.9   LINDSEY  --   --   --   --   --  0.6  --   --  0.9 0.9   AEOS  --   --   --   --   --  0.3  --   --  0.3 0.2   HGB 10.7* 10.0* 10.7* 10.0* 11.5* 11.9 9.3*   < > 9.2* 9.9*   HCT 33.7* 31.9* 33.4* 31.8* 35.8 37.6 32.1*   < > 31.4* 31.6*    230 251 241 302 311  --    < > 411 294    < > = values in this interval not displayed.       Arterial Blood Gas Testing  No lab results found.     Urine Studies     Recent Labs   Lab Test 10/29/17  1834   URINEPH 6.0   NITRITE Negative   LEUKEST Negative   WBCU <1       Vancomycin Levels     No lab results found.    Invalid input(s): VANCO    Tobramycin levels     No lab results found.    Gentamicin levels    No lab results found.    CSF testing     Recent Labs   Lab Test 07/17/21  0936   CWBC 0  1   CRBC 32*  240*   CGLU 58   CTP 32       Hepatitis B Testing No lab results found.    Hepatitis C Testing     Hepatitis C Antibody   Date Value Ref Range Status   10/30/2009 Negative NEG Final       Respiratory Virus Testing    RSV Rapid Antigen Result   Date Value Ref Range Status   10/29/2009 Negative NEG Final       Last check of C difficile  No results found for: CDBPCT           Imaging:     Brain MRI (07/15)  Mildly thickened and enhancing dura over the left frontal lobe.  Differential for this could include post lumbar puncture intracranial  hypotension, although this typically is more diffuse and less focal.  Other considerations would be neoplastic processes such as lymphoma,  leukemia, or metastatic disease. Infectious considerations would  include fungal or mycobacterial infection. An inflammatory processes  such as sarcoid could have this appearance.    CXR (07/14)  There are no acute infiltrates. The cardiac silhouette is  not enlarged. Pulmonary vasculature is unremarkable.     MRI, Cervical Spine (07/14)  1. Thin prevertebral edema/fluid and enhancement throughout the visualized cervical spine and upper  thoracic spine as detailed above. No deedee peripherally enhancing abscess. Findings are favored to be infectious. It appears to be centered about the   anterior C6-C7 disc space where there is mild irregularity and bony spurring. This suggests the possibility of early discitis at the C6-C7 level. The disc space and endplates at this level are otherwise normal. There is no abnormal epidural enhancement.     2. Mild likely reactive cervical adenopathy.    Head CT, w/o contrast (06/15)  The ventricles and basal cisterns are within normal limits  in configuration. There is no midline shift. There are no extra-axial  fluid collections. Gray-white differentiation is well maintained.     No intracranial hemorrhage, mass or recent infarct.     The visualized paranasal sinuses are well-aerated. There is no  mastoiditis. There are no fractures of the visualized bones.    CT Chest/Abdomen (06/15)  1.  No acute findings in the chest, abdomen or pelvis. No evidence of  a bowel obstruction or inflammatory process.

## 2021-07-19 NOTE — CONSULTS
Health Psychology                                                                                                                          Tamie Levy, Ph.D., L.P [phone]  Bette Bernard, Ph.D., L.P. [phone]  Kathrine Powers, Ph.D. [phone]  Keyla Goodwin, Ph.D., L.P. [phone]  Frank Toure, Ph.D., A.B.P.P., L.P. [phone]         Isaura Morris, Ph.D., L.P. [phone]                [facility], [address]rd Floor  [address] [place] [address]    Inpatient Health Psychology Consultation      Date of Service:  7/19/21    Per EMR: Leticia Morales is a 35 year old female admitted on 7/15/2021. She has a history of chronic migraines and is admitted for acute onset of R shoulder and upper back pain and transient (now improved) R arm numbness, found to have cervical prevertebral fluid collection on MRI of unclear etiology and dural enhancement on MRI brain.     Health Psychology consulted to support patient in coping with hospital stay and anxiety (history of PTSD). Chart reviewed. Ms. Morales has two psychology evaluations from 2011 (Sharon Pierre) and 2013 (scanned into chart from [facility] and [facility]     Attempted to meet with Ms. Morales early in the afternoon and she was asleep.  Returned to room later in the day to introduce services and discuss nature of referral. Due to scheduling conflicts this provider is unable to meet today.  Ms. Morales agreed to speak later this week.    Kathrine Powers, PhD,   Health Psychology Fellow  [phone]

## 2021-07-19 NOTE — PLAN OF CARE
"Time: 0700 - 2330    Reason for admission/Dx:   Chronic migraine without aura without status migrainosus, not intractable [G43.709]  Discitis, unspecified spinal region [M46.40]     BP 91/54 (BP Location: Right arm)   Pulse 88   Temp 98.2  F (36.8  C) (Oral)   Resp 22   Ht 1.626 m (5' 4\")   Wt 59.6 kg (131 lb 6.4 oz)   SpO2 98%   BMI 22.55 kg/m      Shift Summary: Soft symptomatic BPs (MD notified), otherwise VSS, on RA. Orthostatic negative. 1L Bolus given, without marked improvement. AOx4, very pleasant and cooperative. Airborne iso maintained 2/2 pending varicella r/o. LP studies pending. Continuing IV Merrem regimen. Neuros always intact, however endorsed HA today consistent with typical migraines, focal to behind R-periorbital region, with associated photo- and phonophobias. Toradol and compazine given, with relief. Tylenol, oxy and flexeril given for upper back pain. Regular diet avail, with only fair PO intake. No BM. Strict I/Os. Pt voids in hat and reports OP to RN. WBC 2.8, from 6.1.     Plan: IV ABX. ID Following. F/U on pending studies.     "

## 2021-07-19 NOTE — PROGRESS NOTES
[facility] Progress Note    Main Plans for Today     - Await CSF Studies  - Discuss case with ID    Assessment & Plan   Leticia Morales is a 35 year old female admitted on 7/15/2021. She has a history of chronic migraines and is admitted for acute onset of R shoulder and upper back pain and transient (now improved) R arm numbness, found to have cervical prevertebral fluid collection on MRI of unclear etiology and dural enhancement on MRI brain.     # Sepsis, resolved   # Prevertebral fluid collection   # Enhanced thickened dura over (L) frontal lobe  # Back and R shoulder pain  # Transient R arm numbness and tingling, resolved   # Elevated CRP, ESR  Presented with upper extremity pain, transient numbness (now resolved), upper back/neck pain, found to have elevated inflammatory markers and MRI of the cervical spine showing prevertebral fluid collection concerning for infection vs. autoimmune vs. malignancy. Multiple procedural specialties consulted - unable to obtain fluid sampling prior to antibiotic initiation. MRI brain showed mildly thickened and enhancing dura over the left frontal lobe. Differential includes: neoplastic process, infectious (fungal, mycobacterial), or inflammatory like sarcoid. Due to brief sepsis on 7/17, resolved with IV fluids, higher suspicion for infectious etiology. S/p LP 7/17.   - Appreciate Infectious Disease following   - IV meropenem 2g q8h (7/15- )    - If acutely worsens, would add vancomycin  - Consider Neurology & Rheum consults 7/20 if still no clear infectious etiology  - q8 VS   - Pain management: Tylenol TID, flexeril, oxycodone 2.5 mg q 4 hrs  - Daily CBC, CMP, q48 hour CRP, q48 hour ESR     Micro:  Blood culture 7/15: NGTD  Blood culture 7/18: in process   CSF culture 7/17: NGTD   Aerobic culture: NGTD    Anaerobic: in process   AFB: in process   Fungal: in process    Crypto: negative   HSV 1/2: Not  detected    Strep pneumo: negative   Meningitis/encephalitis panel: In process   Varicella: In process   CMV: In process      # Leukopenia, improving   ANC 1.1 7/18.  - Continue to monitor     # Dysphagia  # Epigastric pain  # GERD  Patient has acute onset of painful swallowing prior to admission. ENT consulted, no acute intervention. No significant finding on their exam to locate infectious source. Rapid strep negative. She does report week of vomiting 4-5 times (no hematemesis) a day in June that seems to have to initially precipitated the throat pain, seems to have subsided. Then returned acutely on 7/10. Endorses high NSAID use. With aforementioned history, some concern for upper esophogeal tear, consulted GI. They feel this is highly unlikely.   - Could consider Gastrogaffin esophogram.   - Increase PPI for 6-8 weeks and have pt follow-up outpatient if GERD symptoms do not resolve    # Chronic migraines  Long history of chronic migraines. Differentiates chronic migraine pain from acute HA pain with this presentation.   - PTA topamax     # PTSD  # Depression with Anxiety  New stressors including hospitalization. 2010 was intubated with trach so significant prior hospitalization event. Discussed what we can do here, she is amenable to health psychology consult.   - Health psychology consult    #Acne  - Hold PTA spirolactone due to softer BPs    # Pain Assessment:  Current Pain Score 7/19/2021   Patient currently in pain? yes   Pain score (0-10) -   - Leticia is experiencing pain due to paravertebral fluid collection. Pain management was discussed and the plan was created in a collaborative fashion.  Leticia's response to the current recommendations: engaged  - Please see the plan for pain management as documented above    Diet: Regular Diet Adult  Snacks/Supplements Adult: Ensure Enlive; Between Meals  Fluids: PO, 1 L LR bolus today due to poor PO intake 2/2 nausea  DVT Prophylaxis: Pneumatic Compression Devices  Code  Status: Full Code    Disposition Plan   Expected discharge:  4 - 7 days, recommended to prior living arrangement once adequate pain management/ tolerating PO medications and antibiotic plan established.Dispo:   7/23/2021        Entered: Judy Josefina 07/19/2021, 5:41 AM   Information in the above section will display in the discharge planner report.      The patient's care was discussed with the Attending Physician, Dr. López.    Judy Nameller  [facility] Medicine: PG2  Pager: [phone]  Please see sticky note for cross cover information    Interval History      Had a headache overnight consistent with migraines, resolved with Triptan. This morning was in bed feeling nauseas, vomited x1. Zofran helped a little with nausea.     Physical Exam   Vital Signs: Temp: 98.2  F (36.8  C) Temp src: Oral BP: 96/61 Pulse: 82   Resp: 16 SpO2: 100 % O2 Device: None (Room air)    Weight: 131 lbs 6.4 oz  Physical Exam   General: Alert and oriented, in no acute distress.  Skin: Warm and dry, no abnormalities noted.  Eyes: Extra-ocular muscles grossly intact, pupils equal.   ENT: dry mucous membranes   CV: S1 S2 RRR. No murmur. No cyanosis or pallor, warm and well perfused.  Respiratory: CTAB. No w/r/r. No respiratory distress, no accessory muscle use.  Neuro: Mental Status: A&O x 3. Attention, memory, intact. Speech: Speech fluent. Comprehension intact. Cranial Nerves: CN II-XII grossly intact. Strength 5/5 BL UE and BL LE.   Psychiatric: Mood and affect appear normal.   Extremities: Warm, able to move all four extremities at will.    Data   Recent Labs   Lab 07/18/21  1033 07/18/21  0800 07/17/21  0614 07/16/21  0536 07/14/21  1835   WBC  --  2.8*  2.8* 6.1 5.1 6.4   HGB  --  10.0* 10.7* 10.0* 11.5*   MCV  --  80 79 79 78   PLT  --  230 251 241 302   INR  --   --   --  1.11  --    NA  --  140 139 141 141   POTASSIUM  --  3.7 3.6 3.8 3.5   CHLORIDE  --  112* 112* 113* 112*   CO2  --  23 22 26  23   BUN  --  7 5* 5* 8   CR  --  0.89 0.78 0.72 0.73   ANIONGAP  --  5 5 2* 6   SAMUEL  --  8.3* 8.7 8.7 8.8   GLC 87 85 135* 104* 85   TROPONIN  --   --   --   --  <0.015     No results found for this or any previous visit (from the past 24 hour(s)).  Medications       acetaminophen  975 mg Oral TID     lactated ringers  500 mL Intravenous Once     meropenem  2 g Intravenous Q8H     omeprazole  40 mg Oral Daily     polyethylene glycol  17 g Oral Daily     sodium chloride (PF)  3 mL Intracatheter Q8H     spironolactone  100 mg Oral Daily     topiramate  50 mg Oral At Bedtime     tretinoin   Topical At Bedtime

## 2021-07-19 NOTE — PLAN OF CARE
Pt is alert and oriented. Up ad richard until bout of dizziness this morning then SBA. Pt c/o migraine last evening relieved by 2 doses of risotryptan. C/o head pain at the top and towards the back of her head. Kathryn's team notified. 500 mL bolus of LR ordered. Neuros intact. IV abx  q 8 hrs. Pt is voiding without issue and reports BM yesterday. Pt had 1 episode of nausea this am resolved by zofran. Mediocre PO intake.

## 2021-07-20 ENCOUNTER — APPOINTMENT (OUTPATIENT)
Dept: CT IMAGING | Facility: CLINIC | Age: 36
End: 2021-07-20
Attending: STUDENT IN AN ORGANIZED HEALTH CARE EDUCATION/TRAINING PROGRAM
Payer: COMMERCIAL

## 2021-07-20 LAB
ALBUMIN UR-MCNC: NEGATIVE MG/DL
ANION GAP SERPL CALCULATED.3IONS-SCNC: 6 MMOL/L (ref 3–14)
APPEARANCE UR: CLEAR
BACTERIA BLD CULT: NO GROWTH
BACTERIA BLD CULT: NO GROWTH
BILIRUB UR QL STRIP: NEGATIVE
BUN SERPL-MCNC: 8 MG/DL (ref 7–30)
CALCIUM SERPL-MCNC: 8.6 MG/DL (ref 8.5–10.1)
CHLORIDE BLD-SCNC: 112 MMOL/L (ref 94–109)
CO2 SERPL-SCNC: 22 MMOL/L (ref 20–32)
COLOR UR AUTO: ABNORMAL
CREAT SERPL-MCNC: 1.01 MG/DL (ref 0.52–1.04)
CRP SERPL-MCNC: 12 MG/L (ref 0–8)
ERYTHROCYTE [DISTWIDTH] IN BLOOD BY AUTOMATED COUNT: 16.9 % (ref 10–15)
GFR SERPL CREATININE-BSD FRML MDRD: 73 ML/MIN/1.73M2
GLUCOSE BLD-MCNC: 80 MG/DL (ref 70–99)
GLUCOSE UR STRIP-MCNC: NEGATIVE MG/DL
HCT VFR BLD AUTO: 36 % (ref 35–47)
HGB BLD-MCNC: 11.3 G/DL (ref 11.7–15.7)
HGB UR QL STRIP: NEGATIVE
KETONES UR STRIP-MCNC: NEGATIVE MG/DL
LACTATE SERPL-SCNC: 0.8 MMOL/L (ref 0.7–2)
LEUKOCYTE ESTERASE UR QL STRIP: NEGATIVE
MCH RBC QN AUTO: 24.9 PG (ref 26.5–33)
MCHC RBC AUTO-ENTMCNC: 31.4 G/DL (ref 31.5–36.5)
MCV RBC AUTO: 80 FL (ref 78–100)
NITRATE UR QL: NEGATIVE
PH UR STRIP: 8 [PH] (ref 5–7)
PLATELET # BLD AUTO: 264 10E3/UL (ref 150–450)
POTASSIUM BLD-SCNC: 3.6 MMOL/L (ref 3.4–5.3)
RBC # BLD AUTO: 4.53 10E6/UL (ref 3.8–5.2)
RBC URINE: <1 /HPF
SODIUM SERPL-SCNC: 140 MMOL/L (ref 133–144)
SP GR UR STRIP: 1.02 (ref 1–1.03)
SQUAMOUS EPITHELIAL: <1 /HPF
UROBILINOGEN UR STRIP-MCNC: NORMAL MG/DL
WBC # BLD AUTO: 3.6 10E3/UL (ref 4–11)
WBC URINE: <1 /HPF

## 2021-07-20 PROCEDURE — 120N000002 HC R&B MED SURG/OB UMMC

## 2021-07-20 PROCEDURE — 86255 FLUORESCENT ANTIBODY SCREEN: CPT | Performed by: STUDENT IN AN ORGANIZED HEALTH CARE EDUCATION/TRAINING PROGRAM

## 2021-07-20 PROCEDURE — 83605 ASSAY OF LACTIC ACID: CPT | Performed by: FAMILY MEDICINE

## 2021-07-20 PROCEDURE — 258N000003 HC RX IP 258 OP 636: Performed by: STUDENT IN AN ORGANIZED HEALTH CARE EDUCATION/TRAINING PROGRAM

## 2021-07-20 PROCEDURE — 82164 ANGIOTENSIN I ENZYME TEST: CPT | Performed by: STUDENT IN AN ORGANIZED HEALTH CARE EDUCATION/TRAINING PROGRAM

## 2021-07-20 PROCEDURE — 36415 COLL VENOUS BLD VENIPUNCTURE: CPT | Performed by: FAMILY MEDICINE

## 2021-07-20 PROCEDURE — 80048 BASIC METABOLIC PNL TOTAL CA: CPT | Performed by: STUDENT IN AN ORGANIZED HEALTH CARE EDUCATION/TRAINING PROGRAM

## 2021-07-20 PROCEDURE — 86140 C-REACTIVE PROTEIN: CPT | Performed by: STUDENT IN AN ORGANIZED HEALTH CARE EDUCATION/TRAINING PROGRAM

## 2021-07-20 PROCEDURE — 82787 IGG 1 2 3 OR 4 EACH: CPT | Performed by: STUDENT IN AN ORGANIZED HEALTH CARE EDUCATION/TRAINING PROGRAM

## 2021-07-20 PROCEDURE — 86225 DNA ANTIBODY NATIVE: CPT | Performed by: STUDENT IN AN ORGANIZED HEALTH CARE EDUCATION/TRAINING PROGRAM

## 2021-07-20 PROCEDURE — 250N000013 HC RX MED GY IP 250 OP 250 PS 637: Performed by: STUDENT IN AN ORGANIZED HEALTH CARE EDUCATION/TRAINING PROGRAM

## 2021-07-20 PROCEDURE — 999N000128 HC STATISTIC PERIPHERAL IV START W/O US GUIDANCE

## 2021-07-20 PROCEDURE — 85027 COMPLETE CBC AUTOMATED: CPT | Performed by: STUDENT IN AN ORGANIZED HEALTH CARE EDUCATION/TRAINING PROGRAM

## 2021-07-20 PROCEDURE — 36415 COLL VENOUS BLD VENIPUNCTURE: CPT | Performed by: STUDENT IN AN ORGANIZED HEALTH CARE EDUCATION/TRAINING PROGRAM

## 2021-07-20 PROCEDURE — 83516 IMMUNOASSAY NONANTIBODY: CPT | Performed by: STUDENT IN AN ORGANIZED HEALTH CARE EDUCATION/TRAINING PROGRAM

## 2021-07-20 PROCEDURE — 86038 ANTINUCLEAR ANTIBODIES: CPT | Performed by: STUDENT IN AN ORGANIZED HEALTH CARE EDUCATION/TRAINING PROGRAM

## 2021-07-20 PROCEDURE — 87389 HIV-1 AG W/HIV-1&-2 AB AG IA: CPT | Performed by: STUDENT IN AN ORGANIZED HEALTH CARE EDUCATION/TRAINING PROGRAM

## 2021-07-20 PROCEDURE — 250N000011 HC RX IP 250 OP 636: Performed by: STUDENT IN AN ORGANIZED HEALTH CARE EDUCATION/TRAINING PROGRAM

## 2021-07-20 PROCEDURE — 81001 URINALYSIS AUTO W/SCOPE: CPT | Performed by: STUDENT IN AN ORGANIZED HEALTH CARE EDUCATION/TRAINING PROGRAM

## 2021-07-20 PROCEDURE — 99233 SBSQ HOSP IP/OBS HIGH 50: CPT

## 2021-07-20 PROCEDURE — 70496 CT ANGIOGRAPHY HEAD: CPT | Mod: 26 | Performed by: RADIOLOGY

## 2021-07-20 PROCEDURE — 99223 1ST HOSP IP/OBS HIGH 75: CPT | Mod: GC | Performed by: INTERNAL MEDICINE

## 2021-07-20 PROCEDURE — 83876 ASSAY MYELOPEROXIDASE: CPT | Performed by: STUDENT IN AN ORGANIZED HEALTH CARE EDUCATION/TRAINING PROGRAM

## 2021-07-20 PROCEDURE — 86235 NUCLEAR ANTIGEN ANTIBODY: CPT | Performed by: STUDENT IN AN ORGANIZED HEALTH CARE EDUCATION/TRAINING PROGRAM

## 2021-07-20 PROCEDURE — 99222 1ST HOSP IP/OBS MODERATE 55: CPT | Mod: GC | Performed by: STUDENT IN AN ORGANIZED HEALTH CARE EDUCATION/TRAINING PROGRAM

## 2021-07-20 PROCEDURE — 70498 CT ANGIOGRAPHY NECK: CPT | Mod: 26 | Performed by: RADIOLOGY

## 2021-07-20 PROCEDURE — 99233 SBSQ HOSP IP/OBS HIGH 50: CPT | Mod: GC | Performed by: STUDENT IN AN ORGANIZED HEALTH CARE EDUCATION/TRAINING PROGRAM

## 2021-07-20 PROCEDURE — 70496 CT ANGIOGRAPHY HEAD: CPT

## 2021-07-20 PROCEDURE — 82784 ASSAY IGA/IGD/IGG/IGM EACH: CPT | Performed by: STUDENT IN AN ORGANIZED HEALTH CARE EDUCATION/TRAINING PROGRAM

## 2021-07-20 RX ORDER — MAGNESIUM SULFATE HEPTAHYDRATE 40 MG/ML
2 INJECTION, SOLUTION INTRAVENOUS
Status: COMPLETED | OUTPATIENT
Start: 2021-07-20 | End: 2021-07-20

## 2021-07-20 RX ORDER — KETOROLAC TROMETHAMINE 15 MG/ML
15 INJECTION, SOLUTION INTRAMUSCULAR; INTRAVENOUS ONCE
Status: COMPLETED | OUTPATIENT
Start: 2021-07-20 | End: 2021-07-20

## 2021-07-20 RX ORDER — IOPAMIDOL 755 MG/ML
75 INJECTION, SOLUTION INTRAVASCULAR ONCE
Status: COMPLETED | OUTPATIENT
Start: 2021-07-20 | End: 2021-07-20

## 2021-07-20 RX ORDER — DIPHENHYDRAMINE HCL 12.5MG/5ML
12.5 LIQUID (ML) ORAL
Status: COMPLETED | OUTPATIENT
Start: 2021-07-20 | End: 2021-07-20

## 2021-07-20 RX ORDER — ONDANSETRON 2 MG/ML
4 INJECTION INTRAMUSCULAR; INTRAVENOUS EVERY 6 HOURS
Status: DISCONTINUED | OUTPATIENT
Start: 2021-07-20 | End: 2021-07-20

## 2021-07-20 RX ORDER — DIPHENHYDRAMINE HCL 25 MG
12.5 TABLET ORAL
Status: DISCONTINUED | OUTPATIENT
Start: 2021-07-20 | End: 2021-07-20 | Stop reason: CLARIF

## 2021-07-20 RX ORDER — ONDANSETRON 2 MG/ML
4 INJECTION INTRAMUSCULAR; INTRAVENOUS EVERY 6 HOURS PRN
Status: DISCONTINUED | OUTPATIENT
Start: 2021-07-20 | End: 2021-07-24 | Stop reason: HOSPADM

## 2021-07-20 RX ORDER — PROCHLORPERAZINE MALEATE 5 MG
5 TABLET ORAL EVERY 6 HOURS
Status: DISCONTINUED | OUTPATIENT
Start: 2021-07-20 | End: 2021-07-23

## 2021-07-20 RX ORDER — ONDANSETRON 4 MG/1
4 TABLET, ORALLY DISINTEGRATING ORAL EVERY 6 HOURS PRN
Status: DISCONTINUED | OUTPATIENT
Start: 2021-07-20 | End: 2021-07-24 | Stop reason: HOSPADM

## 2021-07-20 RX ORDER — ONDANSETRON 4 MG/1
4 TABLET, ORALLY DISINTEGRATING ORAL EVERY 6 HOURS
Status: DISCONTINUED | OUTPATIENT
Start: 2021-07-20 | End: 2021-07-20

## 2021-07-20 RX ORDER — PROCHLORPERAZINE 25 MG
25 SUPPOSITORY, RECTAL RECTAL EVERY 6 HOURS
Status: DISCONTINUED | OUTPATIENT
Start: 2021-07-20 | End: 2021-07-23

## 2021-07-20 RX ORDER — METOCLOPRAMIDE 5 MG/1
10 TABLET ORAL
Status: COMPLETED | OUTPATIENT
Start: 2021-07-20 | End: 2021-07-20

## 2021-07-20 RX ADMIN — ACETAMINOPHEN 975 MG: 325 TABLET, FILM COATED ORAL at 20:07

## 2021-07-20 RX ADMIN — PROCHLORPERAZINE EDISYLATE 5 MG: 5 INJECTION INTRAMUSCULAR; INTRAVENOUS at 05:54

## 2021-07-20 RX ADMIN — KETOROLAC TROMETHAMINE 15 MG: 15 INJECTION, SOLUTION INTRAMUSCULAR; INTRAVENOUS at 08:17

## 2021-07-20 RX ADMIN — MEROPENEM 2 G: 1 INJECTION, POWDER, FOR SOLUTION INTRAVENOUS at 05:54

## 2021-07-20 RX ADMIN — PROCHLORPERAZINE EDISYLATE 5 MG: 5 INJECTION INTRAMUSCULAR; INTRAVENOUS at 18:02

## 2021-07-20 RX ADMIN — MAGNESIUM SULFATE IN WATER 2 G: 40 INJECTION, SOLUTION INTRAVENOUS at 23:55

## 2021-07-20 RX ADMIN — DIPHENHYDRAMINE HYDROCHLORIDE 12.5 MG: 25 SOLUTION ORAL at 23:55

## 2021-07-20 RX ADMIN — PROCHLORPERAZINE MALEATE 5 MG: 5 TABLET ORAL at 13:34

## 2021-07-20 RX ADMIN — OMEPRAZOLE 40 MG: 20 CAPSULE, DELAYED RELEASE ORAL at 08:23

## 2021-07-20 RX ADMIN — ONDANSETRON 4 MG: 2 INJECTION INTRAMUSCULAR; INTRAVENOUS at 08:23

## 2021-07-20 RX ADMIN — RIZATRIPTAN BENZOATE 5 MG: 5 TABLET, ORALLY DISINTEGRATING ORAL at 04:08

## 2021-07-20 RX ADMIN — METOCLOPRAMIDE 10 MG: 5 TABLET ORAL at 23:55

## 2021-07-20 RX ADMIN — TOPIRAMATE 50 MG: 50 TABLET ORAL at 21:26

## 2021-07-20 RX ADMIN — Medication 2.5 MG: at 18:02

## 2021-07-20 RX ADMIN — ONDANSETRON 4 MG: 4 TABLET, ORALLY DISINTEGRATING ORAL at 20:07

## 2021-07-20 RX ADMIN — ACETAMINOPHEN 975 MG: 325 TABLET, FILM COATED ORAL at 13:34

## 2021-07-20 RX ADMIN — ACETAMINOPHEN 975 MG: 325 TABLET, FILM COATED ORAL at 08:22

## 2021-07-20 RX ADMIN — TRETINOIN: 0.25 GEL TOPICAL at 22:38

## 2021-07-20 RX ADMIN — SODIUM CHLORIDE, POTASSIUM CHLORIDE, SODIUM LACTATE AND CALCIUM CHLORIDE 1000 ML: 600; 310; 30; 20 INJECTION, SOLUTION INTRAVENOUS at 09:37

## 2021-07-20 RX ADMIN — IOPAMIDOL 75 ML: 755 INJECTION, SOLUTION INTRAVENOUS at 20:41

## 2021-07-20 RX ADMIN — CYCLOBENZAPRINE 10 MG: 5 TABLET, FILM COATED ORAL at 21:26

## 2021-07-20 ASSESSMENT — ACTIVITIES OF DAILY LIVING (ADL)
ADLS_ACUITY_SCORE: 15

## 2021-07-20 NOTE — CONSULTS
"[facility]  Neurology Consultation    Patient Name:  Leticia Morales  MRN:  [id number]    :  1985  Date of Service:  2021  Primary care provider:  Mame Cuevas      Neurology consultation service was asked to see Leticia Morales by Dr. Richmond to evaluate abnormal imaging findings.    Chief Complaint: Migraine and Right sided shoulder and arm pain/numbness    History of Present Illness:   Leticia Morales is a 35 year old female with history of chronic migraines, GERD, and PTSD who presents with headache, right sided shoulder and arm pain and transient (now improved) right arm numbness. Further workup in the hospital revealed cervical prevertebral fluid collection on MRI and pachymeningeal enhancement of the left frontal lobe, which we are asked to evaluate.    A few days ago, patient went to bed with moderate migraine and woke up the next morning unable to turn neck to the right or move right arm. She had accompanied loss of sensation in the right arm. This improved in 1-2 hours after arm movement, however she continued to have pain in her right shoulder and neck. Ibuprofen, acetaminophen, and heat/ice provided relief. Monday morning, the patient had a sharp pain in the posterior throat that limited swallowing. She presented to the ED and was admitted. Muscle relaxant given in the hospital (flexeril) improved her symptoms entirely except an milder ongoing headache.    Currently, the patient describes her headache starting at the vertex that does not extend to the neck, as well as right retroorbital. Headache accompanied by photophobia, phonophobia, and nausea, right eye redness and mild lacrimation all consistent with past migraines. Sometimes she also has epistaxis with migraines but does not currently. Patient also describes \"vibration\" sound in the right ear when it is quiet as well as hyperacusis, both of which are not typical of her normal migraines. In the " "past, sumatriptan helped relieve her migraines. Toradol does not seem to relieve her symptoms.     Patient denies any recent injuries or trauma to neck/shoulder.    ROS  A comprehensive ROS was performed and pertinent findings were included in HPI.     PMH  Past Medical History:   Diagnosis Date     ARDS (adult respiratory distress syndrome) (H) 2010    related to H1N1 infection     H1N1 influenza 2010    prolonged ICU stay, medically induced coma     Iron deficiency anemia      Migraines      Past Surgical History:   Procedure Laterality Date     THORACIC SURGERY      trach       Medications   I have personally reviewed the patient's medication list.     Allergies  I have personally reviewed the patient's allergy list.     Social History    Socioeconomic History     Marital status: Single   Tobacco Use     Smoking status: Former Smoker     Types: Hookah     Smokeless tobacco: Never Used   Substance and Sexual Activity     Alcohol use: No     Drug use: No     Sexual activity: Never         Family History    Family History   Problem Relation Age of Onset     Diabetes Father      Hypertension Father      Cancer Father      Other - See Comments Father         Epistaxis when Angry     Migraines Mother            Physical Examination   Vitals: BP 95/57 (BP Location: Left arm)   Pulse 84   Temp 98.4  F (36.9  C) (Oral)   Resp 16   Ht 1.626 m (5' 4\")   Wt 59.6 kg (131 lb 6.4 oz)   SpO2 100%   BMI 22.55 kg/m    General: Lying in bed, NAD  Head: NC/AT  Eyes: no icterus, op pink and moist  Cardiac: no lower extremity edema  Respiratory: non-labored on RA  GI:  Skin: No rash or lesion on exposed skin  Psych: Mood pleasant, affect congruent  Neuro:  Mental status: Awake, alert, attentive, oriented to self, time, place, and circumstance. Language is fluent and coherent with intact comprehension of complex commands.  Cranial nerves: VFF, PERRL, conjugate gaze, EOMI, facial sensation intact, face symmetric, shoulder shrug " "strong, tongue/uvula midline, no dysarthria.   Motor: Normal bulk and tone. No abnormal movements. 5/5 strength bilaterally in deltoids, biceps, triceps, hand , hip flexors, hip extensors, knee flexion, knee extension, plantarflexion, dorsiflexion except Mild give away weakness in L hip flexor  Sensory: Intact to light touch and vibration in proximal and distal aspects of all 4 extremities. Decreased sensation to pinprick on right side of back over the scapula/shoulder area and patchy, inconsistent decrease to pin throughout BLE L>R.  Coordination: FNF without ataxia or dysmetria.   Gait: Normal width, stride length, turn, and arm swing. Station normal. Able to walk on heels/toes. Mild ataxia on tandem walk.    Investigations   I have personally reviewed pertinent labs, tests, and radiological imaging. Discussion of notable findings is included under Impression.     Was patient transferred from outside hospital?   No    Impression  Ms. Leticia Morales is a 35 year old female with a pmh of chronic migraines, GERD, and PTSD who presented with headache and right shoulder and arm pain which further workup revealed cervical prevertebral fluid collection, mild irregularity and bony spurring on the anterior C6-C7 disc space, increased CRP (34), leukopenia, focal dural enhancement and anemia.     Headache  The nature of the headache is not completely consistent with her past migraines, including nausea/vomiting, right sided \"vibratory\" sensation in the ear. Could be migraine w A CTV should be done to rule out cerebral venous thrombosis, as this could also explain the pachymeningeal enhancement on MRI. Opiates should be avoided as they do not provide relief and are associated with nausea.     Pachymeningeal Enhancement  Appreciate rheumatology recs. Differential is broad including inflammatory (neurosarcoidosis, IgG4-related disease, Granulomatosis w polyangiitis), vascular (adjacent cerebral venous sinus thrombosis), " infectious, and neoplastic (carcinomatosis, Lymphoma) although the latter is less likely as repeat MRI showed improvement.  -IgG1-4  -ACE serum and CSF  -May need to repeat LP for lymphoma w/u  -May need to consult neurosurgery for biopsy pending workup     Prevertebral Fluid Collection  Unclear etiology. Inflammatory reaction v infection v other. Will defer to interventional services whether able to collect.    Right sided arm/back pain  Given the physical exam showed full strength and inconsistent sensation to pinprick, this makes a radiculopathy/neuropathy unlikely. Timeline of onset/ improvement w lack of persistent deficits suggests this could have been transient compression of the nerves in her arm.       Recommendations  -CTV to rule out cerebral venous thrombosis  -Give benadryl 12.5mg, metaclopramide 10mg, and 2g IV magnesium for headache management  -if not improved in an hour can give another dose of benadryl and metoclopramide (same dose), with toradol  -Consider depakote load v IM Zyprexa if headache persists  -continue tylenol  -avoid opiates -can exacerbate headaches  -discontinue rizatriptan (too late into headache for additional benefit)  -Check ACE level for suspicion of neurosarcoid    Thank you for involving Neurology in the care of Leticia Morales.  Please do not hesitate to call with questions/concerns (consult pager [phone]).      Patient was seen and discussed with Dr. Marin.    YOUNG Rendon M.D.  PGY-4 Neurology

## 2021-07-20 NOTE — PROVIDER NOTIFICATION
Primary team notified via text page: BP 88/58, recheck BP 96/65. LR bolus currently infusing     *Received call from zain. Team going to round on patient. No new orders at this time

## 2021-07-20 NOTE — PROGRESS NOTES
[facility] Progress Note    Main Plans for Today     - Neurology & Rheumatology consults   - Repeat brain imaging  - Likely will HOLD meropenem (pending results of brain MRI)  - Vitals and neuro checks q 4     Assessment & Plan   Leticia Morales is a 35 year old female admitted on 7/15/2021. She has a history of chronic migraines and is admitted for acute onset of R shoulder and upper back pain and transient (now improved) R arm numbness, found to have cervical prevertebral fluid collection on MRI of unclear etiology and dural enhancement on MRI brain.     # Sepsis, resolved   # Prevertebral fluid collection   # Enhanced thickened dura over (L) frontal lobe  # Back and R shoulder pain  # Transient R arm numbness and tingling, resolved   # Elevated CRP, ESR  Presented with upper extremity pain, transient numbness (now resolved), upper back/neck pain, found to have elevated inflammatory markers and MRI of the cervical spine showing prevertebral fluid collection concerning for infection vs. autoimmune vs. malignancy. Multiple procedural specialties consulted - unable to obtain fluid sampling prior to antibiotic initiation. MRI brain showed mildly thickened and enhancing dura over the left frontal lobe. Differential includes: neoplastic process, infectious (fungal, mycobacterial), or inflammatory like sarcoid. Due to brief sepsis on 7/17, resolved with IV fluids, was initially having higher suspicion for infection. S/p LP 7/17, so far with no clear infectious etiology (though cultures were taken after abx). Per ID will likely meropenem and watch carefully to see if holding abx, unmasks subtle infection, pending results of brain MRI. Pt has been nauseas and vomiting with continued headaches the last 24 hours, raising concern for progressing intracranial process, overnight brain MRI ordered.   - Appreciate Infectious Disease following   - IV meropenem  2g q8h (7/15-   Likely will HOLD meropenem (pending results of brain MRI) per ID recs  - Neurology consult  - Rheumatology consult   - q4 VS & neurochecks q4 hours  - repeat brain MRI pending  - Pain management: Tylenol TID, flexeril, oxycodone 2.5 mg q 4 hrs  - Scheduled Zofran, Compazine PRN  - Daily CBC, BMP, q48 hour CRP, q48 hour ESR     Micro:  Blood culture 7/15: NGTD  Blood culture 7/18: in process   CSF culture 7/17: NGTD   Aerobic culture: NGTD    Anaerobic: in process   AFB: could not complete not enough CSF   Fungal: in process    Crypto: negative   HSV 1/2: Not detected    Strep pneumo: negative   Meningitis/encephalitis panel: Negative   Varicella: Negative   CMV: Negative     # Leukopenia, improving   ANC 1.1 7/18. Could be 2/2 to either sepsis or meropenem.   - Continue to monitor   - Add on HIV    # Dysphagia  # Epigastric pain  # GERD  Patient has acute onset of painful swallowing prior to admission. ENT consulted, no acute intervention. No significant finding on their exam to locate infectious source. Rapid strep negative. She does report week of vomiting 4-5 times (no hematemesis) a day in June that seems to have to initially precipitated the throat pain, seems to have subsided. Then returned acutely on 7/10. Endorses high NSAID use. With aforementioned history, some concern for upper esophogeal tear, consulted GI. They feel this is highly unlikely.   - Could consider Gastrogaffin esophogram.   - Increase PPI for 6-8 weeks and have pt follow-up outpatient if GERD symptoms do not resolve    # Chronic migraines  Long history of chronic migraines. Differentiates chronic migraine pain from acute HA pain with this presentation.   - PTA topamax     # PTSD  # Depression with Anxiety  New stressors including hospitalization. 2010 was intubated with trach so significant prior hospitalization event. Discussed what we can do here, she is amenable to health psychology consult. They attempted to see her 7/19  "unable due to scheduling to see patient, reached out to team due to concerns about worsening depression, they will try to phone visit today.   - Health psychology consult    #Acne  - Hold PTA spirolactone due to softer BPs    # Pain Assessment:  Current Pain Score 7/19/2021   Patient currently in pain? yes   Pain score (0-10) -   - Leticia is experiencing pain due to paravertebral fluid collection. Pain management was discussed and the plan was created in a collaborative fashion.  Leticia's response to the current recommendations: engaged  - Please see the plan for pain management as documented above    Diet: Regular Diet Adult  Snacks/Supplements Adult: Ensure Enlive; Between Meals  Fluids: PO (additional LR bolus due to poor PO hydration)  DVT Prophylaxis: Pneumatic Compression Devices  Code Status: Full Code    Disposition Plan   Expected discharge:  4 - 7 days, recommended to prior living arrangement once adequate pain management/ tolerating PO medications and antibiotic plan established.Dispo:   7/25/2021        Entered: Judy Rocha 07/20/2021, 5:33 AM   Information in the above section will display in the discharge planner report.      The patient's care was discussed with the Attending Physician, Dr. Richmond.    Judy Rocha  [facility]: PG2  Pager: [phone]  Please see sticky note for cross cover information    Interval History    Had headache overnight, continued nausea, vomiting. Feels pressure behind eyes. Overnight team ordered repeat MRI brain w/ and w/o contrast. Received toradol, resumed neuro checks.     This morning still feels nauseas, reports throwing up once this morning \"a little bit.\" Nausea not resolved with PRN anti-emetics. Has a headache pointing to the top of her head, stating it feels like \"pressure.\" Hasn't been able to drink much because nausea.     Physical Exam   Vital Signs: Temp: 97.5  F (36.4  C) Temp src: Oral BP: 99/56 Pulse: 86   Resp: 18 " SpO2: 97 % O2 Device: None (Room air)    Weight: 131 lbs 6.4 oz  Physical Exam   General: Alert and oriented, in no acute distress.  Skin: Warm and dry, no abnormalities noted. No new rash.  Eyes: Extra-ocular muscles grossly intact, PERRLA    ENT: dry mucous membranes   CV: S1 S2 RRR. No murmur. No cyanosis or pallor, warm and well perfused.  Respiratory: CTAB. No w/r/r. No respiratory distress, no accessory muscle use.  Neuro: Mental Status: A&O x 3. Attention, memory, intact. Speech: Speech fluent. Comprehension intact. Cranial Nerves: CN II-XII grossly intact. Strength 5/5 BL UE and BL LE.    Psychiatric: Mood and affect appear normal.   Extremities: Warm, able to move all four extremities at will. No pain with movement of joints.    Data   Recent Labs   Lab 07/19/21  0640 07/18/21  1033 07/18/21  0800 07/17/21  0614 07/16/21  0536 07/14/21  1835   WBC 3.5*  --  2.8*  2.8* 6.1 5.1 6.4   HGB 10.7*  --  10.0* 10.7* 10.0* 11.5*   MCV 79  --  80 79 79 78     --  230 251 241 302   INR  --   --   --   --  1.11  --      --  140 139 141 141   POTASSIUM 3.7  --  3.7 3.6 3.8 3.5   CHLORIDE 111*  --  112* 112* 113* 112*   CO2 23  --  23 22 26 23   BUN 9  --  7 5* 5* 8   CR 0.92  --  0.89 0.78 0.72 0.73   ANIONGAP 6  --  5 5 2* 6   SAMUEL 8.9  --  8.3* 8.7 8.7 8.8   GLC 79 87 85 135* 104* 85   TROPONIN  --   --   --   --   --  <0.015     No results found for this or any previous visit (from the past 24 hour(s)).  Medications       acetaminophen  975 mg Oral TID     meropenem  2 g Intravenous Q8H     omeprazole  40 mg Oral Daily     polyethylene glycol  17 g Oral Daily     sodium chloride (PF)  3 mL Intracatheter Q8H     [Held by provider] spironolactone  100 mg Oral Daily     topiramate  50 mg Oral At Bedtime     tretinoin   Topical At Bedtime

## 2021-07-20 NOTE — PLAN OF CARE
"Shift time: 8713-7904.    Neuro: alert and oriented x4. Lightheaded at times. Calls appropriately.   VS: VSS on RA.   Pain: c/o HA/pressure behind eyes.   Diet: Regular diet  Act: SBA, encouraged to call for help d/t lightheadedness.   Lines: PIV infusing intermittent abx//TKO  Skin: visible skin WNL  Cardiovascular: WNL  Resp: Denies SOB. LS clear. On RA.   GI/: voiding spontaneously. Intermittent nausea, given IV zofran.     New this shift: Pt reported a \"change\" in her HA. Says that it feels like pressure behind her eyes. Pain gets worse when sitting up and when she is laying down the pain radiates to her lower back feeling like pins and needles. Also reporting nausea. MD paged.  Emesis x1. Intervention: IV zofran given, IV torodol given with relief. MRI ordered and done tonight (premedicated w/ oral ativan).     HA relieved with IV toradol previously. This morning, pt reported an episode of emesis again and HA coming back (similar symptoms to last night). MD paged and another dose of IV toradol ordered. IV site symptomatic, new PIV ordered.     "

## 2021-07-20 NOTE — PLAN OF CARE
"Time 7074-8329     Reason for admission:   Chronic migraine without aura without status migrainosus, not intractable [G43.709]  Discitis, unspecified spinal region [M46.40]     Vitals: BP 97/55 (BP Location: Left arm)   Pulse 79   Temp 98.7  F (37.1  C) (Oral)   Resp 20   Ht 1.626 m (5' 4\")   Wt 59.6 kg (131 lb 6.4 oz)   SpO2 97%   BMI 22.55 kg/m       Activity: Independent in room and steady, educated to call for SBA if feeling any lightheadedness   Pain reports pain in head, PRN rizatriptan given  Neuro: A&Ox4   Cardiac: softer Bps   Respiratory: WDL  GI/: reports nausea tums and compazine given.   Lines: IV bolus ran       This shift: removed from airborne precautions      Plan:     Continue to monitor and follow POC    "

## 2021-07-20 NOTE — PROGRESS NOTES
"BRIEF PROGRESS NOTE    Updated by RN that patient continues to have new headaches (different from chronic migraine) that feels like pressure behind her eyes. She notes that it was worse in the AM, improved throughout the day, now worsening again. Also reports nausea.    BP 99/56 (BP Location: Left arm)   Pulse 86   Temp 97.5  F (36.4  C) (Oral)   Resp 18   Ht 1.626 m (5' 4\")   Wt 59.6 kg (131 lb 6.4 oz)   SpO2 97%   BMI 22.55 kg/m        Assessment:  Patient with known neurologic/intracranial process of unclear etiology with new headache superimposed on chronic migraine. Per day team, will order imaging now.    - MRI brain w/ and w/o contrast, ordered as routine, will change to stat if FND  - 1x toradol  - q4 neuro checks overnight    Gabriela Turner MD    "

## 2021-07-20 NOTE — PLAN OF CARE
RN assumed cares at 1500, Pt alert and oriented, VS stable this afternoon.  Pt consulted by multiple teams this afternoon. Pt reports a headache which progressed throughout the shift.  Unable to give tylenol until 2000 and no other medications available for migraine; oxycodone given with some relief.  CT ordered this evening.  Vascular consult placed for 20g IV.  Plan to discharge to prior living once pain managed, tolerating PO meds and abx regimen established.  No acute incidents this shift.  Continue to monitor and notify MD of any changes.

## 2021-07-20 NOTE — CONSULTS
"  Health Psychology                   Clinic    Department of Medicine  Tamie Levy, Ph.D., L.P. [phone]                        [facility]  [facility] Bette Bernard, Ph.D., L.P. [phone]                 3rd Floor  [place] [address]   Shavon Antunez, Ph.D.  [phone]     [address] Chastity Ruth, Ph.D.,  L.P. [phone]      [address] [address]           Gio Chisholm, Ph.D. [phone]      Keyla Goodwin, Ph.D., L.P. [phone]    Frank Toure, Ph.D., A.B.P.P., L.P. [phone]     Isaura Morris, Ph.D., L.P. [phone]     Inpatient Health Psychology Consultation      Date of Service:  7/20/21      Per EMR: Leticia Morales is a 35 year old female admitted on 7/15/2021. She has a history of chronic migraines and is admitted for acute onset of R shoulder and upper back pain and transient (now improved) R arm numbness, found to have cervical prevertebral fluid collection on MRI of unclear etiology and dural enhancement on MRI brain.     Health Psychology consulted to support patient in coping with hospital stay and anxiety (history of PTSD). Chart reviewed. Ms. Morales has two psychology evaluations from 2011 (Sharon Pierre) and 2013 (scanned into chart from [facility] and [facility]).     Spoke with referring provider Dr. Rocha about referral.  Contacted Ms. Morales via telephone later in the day to discuss nature of referral and assess immediate needs.    Ms. Morales reported she feels \"tired\" overall; tired of waiting for answers.  She said she thinks she is bad at waiting. Reviewed how she is coping with the diagnostic process and being away from family.  She said she worries about her mother because she is reminded of Ms. Morales's experience with H1N1.  Discussed how she is reassuring her mother.    Ms. Morales said she currently feels sick, weak, and nauseous.  Her headaches remain " persistent and she thinks her current medication is making headache symptoms worse.     To pass the time, Ms. Morales has been talking with her sisters and friends.  She does this at times but also can avoid phone calls because she doesn't want to answer their questions about what is going on, because she herself does not have the answers.    Ms. Morales reported that she ended a relationship today with her longtime partner.  She said she felt this person did not appreciate her and she feels liberated with ending the relationship.       Planned to meet in the hospital tomorrow to continue our discussion and complete a diagnostic assessment.  She expressed appreciation for the visit and said it was helpful to talk.     Kathrine Powers, PhD,   Health Psychology Fellow  [phone]    Visit start time: 12:30  Visit end time: 12:45

## 2021-07-20 NOTE — PLAN OF CARE
Care from: [id number]    Neuro: A&Ox4   Respiratory: Breathing on room air   Cardiac: Afebrile. Hypotension. HR WDL  GI/: Voiding spontaneously. No BM this shift  Nutrition: Patient reporting nausea. PRN IV Zofran provided w/ minimal relief.  Patient skipped breakfast. Ate a few bites off lunch tray. Started on scheduled compazine.   Pain: Continues on scheduled tylenol  Lines: PIV SL  Activity: Up SBA  Events this shift: Hypotension. LR bolus given    Plan: Continue to monitor & follow plan of care

## 2021-07-20 NOTE — PROGRESS NOTES
[facility]     Patient:  Leticia Morales   YOB: 1985  Date of Visit:  07/20/2021  Date of Admission: 7/15/2021  Consult Requester:Natalee López DO          Assessment and Recommendations:   ID Problem List:  - Sepsis, resolved  - Prevertebral fluid collection:  Headache, right arm numbness/ tingling,    - Neoplastic, immunologic, more likely    - Infectious: Prevertebral abscess and/or parapharyngeal/retropharyngeal space infection, less likely  - Leukopenia, Lymphopenia, possible meropenem-induced  - Acute odynophagia, resolved 2/2 to parapharyngeal/retropharyngeal space infection/paraneoplasic, less likely   - Chronic migraines s/p analgesia overuse, neoplastic, immunologic  - History of  GERD   - Epigastric tenderness   - PTSD  - Nausea, vomiting, retching/ gagging in June 2021    Recommendations:  - We suggest neurologic consult due to new throbbing-type headache associated with nausea/vomiting.   - Discontinue Meropenem due to non-infectious source and possible meropenem-induced leukopenia. CSF fluid analysis and culture are negative, improvement of neck pain/headache/photosensitivity and possible bone marrow suppresion (anemia, lymphopenia) and less likely possibility of infectious source  - ID will sign off tomorrow based on patient's hemodynamics/clinical symptoms/signs  - Follow-up anaerobic/fungal/AFB CSF cultures    Discussion:  Leticia Morales is a 36 yo female patient with a past medical history of chronic migraines (>10 visits to the ED/year), GERD, PTSD who presents to the ED on 07/14/2021 with a 5-day history of sudden moderate holoacranial  headache (6/10) that started in the morning and neck pain (8/10) that radiates to the right arm, upper and middle back by the evening. An MRI (07/14) ordered on the ED showed prevertebral fluid collection c/f early infection/abscess..    She describes a shooting and sharping pain that is associated with  "tingling and numbness on the right upper extremity that lasted for 4 hours. Some chills and increased temperature sensation over the past days. Nothing makes it better (pain medication: tylenol/ibuprofen, hot/cold pad) and massages and movements make it worse. She denies weakness but complies inability to completely raise her right arm due to pain as well as mild chest pain and shortness of breath and restricted range of motion (latero-lateral, up and down). Patient also complies of front and back neck pain which made her swallowing very difficult especially for soft solids and liquids that started on Monday, in addition to presence of acid reflux due to pills intake without eating. Weight loss is present for the past month (weight loss of 9 pounds over 1 month). Patient denies sick contacts, any travel recently, IV drug use, recent cold/flu/sinusitus and trauma. She also denies cough, night sweats, voice muffling, stridor and neck swelling.     Patient hemodynamically stable, afebrile, alerted, oriented, with mild headache (2/10) and neck pain on examination. Restricted neck range of movements (flexion-extension) and mild midline tenderness on posterior neck. She states she is able to eat without any swallowing problems, although she complies of loss of appetite. Persistent photosensitivity, less intense.    Patient complies of a headache since yesterday (8 pm) with new characteristics including throbbing quality and intensity 10/10, behind her eyes, that resolved after pain medication. It was associated with nauseas and 2 episodes of vomiting (non-projectile), and \"peed myself while vomiting\" (suspicios of bladder pressure while vomiting). She denies involuntary movements, tongue biting, numbness, tingling, weakness, fasciculations. An recent MRI (07/19/21) due to this latest episode showed a decreased, now minimal, left frontal dural thickening and enhancement \"sequelae of prior subdural hemorrhage\"), an " improvement of a prior on 07/15.    Her recent CSF fluid analysis without cellular abnormalities on 07/16 showed No growth on aerobic/gram stain CSF cultures on 07/16, pending results for anaerobic/fungus/AFB culture. Additionally, no growth on blood cultures (07/18 and 07/17), and no detected pathogens on Meningitis/Encephalitis panel. 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     Patient with a long history of chronic migraine (2009), weight loss with improved neck pain, headache, afebrile, hemodynamically stable, without any altered mental status and a MRI consistent with prevertebral fluid collection and an improved enhanced/thickened dura on left frontal lobe (07/19 versus 07/15) during hospitalization raised concern for a probable neoplastic/immunologic etiology. Tuberculosis source had been rule out through [facility]. Still fungal cultures pending.    - We suggest neurologic consult due to new throbbing-type headache associated with nausea/vomiting.   - Discontinue Meropenem due to non-infectious source and possible meropenem-induced leukopenia.  - ID will sign off tomorrow based on patient's hemodynamics/clinical symptoms/signs  - Follow-up anaerobic/fungal/AFB CSF cultures    Thank you for the consult. ID will continue to follow with you.     Ellen Frazier MD   Pager: [phone]  [facility]  07/20/2021     Attestation:    Infectious Diseases  Pager:      ________________________________________________________________    Consult Question: Prevertebral fluid collection to rule out prevertebral abscess and possibility of antibiotic treatment  Admission Diagnosis: Discitis, unspecified spinal region [M46.40]         History of Present Illness:   History obtained from review of chart and discussion with patient.     Leticia Morales is a 35 year  old female with a PMH of significant chronic migraine, GERD, PTSD who came to the ED on 07/14/2021 with a 5-day history of headache, neck pain, swallowing troubles and numbness/tingling on he right upper extremity and had an MRI with positive findings of prevertebral fluid collection. ID was consulted to rule out prevertebral abscess and initiate antibiotic therapy. ID agreed with beginning of meropenem IV and neurology consult to possible lumbar puncture. LP showed no abnormal cellularity and negative aerobic cultures. Afebrile. Less likely infectious source for now.         Review of Systems:   10 point review of systems was negative except for noted as above in HPI.     ROS:  Constitutional: no fever, no chills, nauseas, midline neck pain (2/10), loss of appetite.  Neuro: no HA, No focal weakness. No visual changes, facial droop, drooling. Mild photosensitivity. Headache with new characteristics (throbbing, behind eyes and 2/10). No tingling, weakness, numbness.   HEENT: no acute blurry vision, no earache, no ear discharge, sore throat, right cervical anterior chain lymphadenopathy x2. No mass sensation on posterior oropharynx.  CVS: no chest pain, heart palpitation, no syncope or presyncope  Lungs: no dyspnea, no chest pain, no cough. No shortness of breath.  GI: no N/V, no diarrhea, no constipation, no abdominal pain  : no dysuria, no hesitancy, no incontinence  Skin: no rash, no Itching, no abscesses  Allergy/immunology: no allergic reaction   Musculoskeletal: no muscle pain, no joint pain, no joint/neck swelling, mild limited ROM of neck movements (latero-lateral, up-down)             Past Medical History:     Past Medical History:   Diagnosis Date     ARDS (adult respiratory distress syndrome) (H) 2010    related to H1N1 infection     H1N1 influenza 2010    prolonged ICU stay, medically induced coma     Iron deficiency anemia      Migraines             Past Surgical History:     Past Surgical History:    Procedure Laterality Date     THORACIC SURGERY      trach            Family History:   Reviewed and non-contributory.   Family History   Problem Relation Age of Onset     Diabetes Father      Hypertension Father      Cancer Father             Social History:     Social History     Tobacco Use     Smoking status: Former Smoker     Types: Hookah     Smokeless tobacco: Never Used   Substance Use Topics     Alcohol use: No     History   Sexual Activity     Sexual activity: Never            Current Medications:       acetaminophen  975 mg Oral TID     lactated ringers  1,000 mL Intravenous Once     omeprazole  40 mg Oral Daily     polyethylene glycol  17 g Oral Daily     prochlorperazine  5 mg Intravenous Q6H    Or     prochlorperazine  5 mg Oral Q6H    Or     prochlorperazine  25 mg Rectal Q6H     sodium chloride (PF)  3 mL Intracatheter Q8H     [Held by provider] spironolactone  100 mg Oral Daily     topiramate  50 mg Oral At Bedtime     tretinoin   Topical At Bedtime            Allergies:     Allergies   Allergen Reactions     Macrodantin [Nitrofurantoin Macrocrystal] Other (See Comments)     itching            Physical Exam:   Vitals were reviewed  Temp:  [97.5  F (36.4  C)-98.7  F (37.1  C)] 98  F (36.7  C)  Pulse:  [76-86] 76  Resp:  [18-20] 18  BP: ()/(55-73) 96/65  SpO2:  [97 %-100 %] 100 %    Vitals:    07/15/21 0015 07/17/21 2047   Weight: 56.7 kg (125 lb) 59.6 kg (131 lb 6.4 oz)       Constitutional: Adult female in no distress, , in NAD. Awake, alert, interactive.  HEENT: NC/AT, EOMI, sclera clear, conjunctiva normal, OP with MMM. No trismus. No pain while opening mouth. No ear discharge, no ear pain. No teeth pain. No bulging in posterior oropharynx.   Respiratory: No increased work of breathing, CTAB, no crackles or wheezing.  Cardiovascular: RRR, no murmur noted. No peripheral edema.   GI: Normal bowel sounds, soft, non-distended and non-tender. No hepatosplenomegaly.  Skin: Warm, dry,  well-perfused. No bruising, bleeding, rashes, or lesions on limited exam.  Musculoskeletal: Extremities grossly normal, non-tender, no edema. Limited right upper extremity and neck ROM in bed.   Neurologic: A&O. Answers questions appropriately, speech normal. Moves all extremities spontaneously. CNII-XII without abnormalities.   Neuropsychiatric: Calm. Affect appropriate to situation.  Vascular access:  Peripheral catheter on placed CDI on right arm without local inflammatory signs, non-tender, no surrounding erythema.         Laboratory Data:     Microbiology:  Culture Micro   Date Value Ref Range Status   10/29/2017 No growth  Final   04/15/2015 Normal skin delia  Final   05/24/2012 No Beta Streptococcus isolated  Final   11/22/2011   Final    10 to 50,000 colonies/mL Mixed gram positive delia  Multiple species present, probable perineal contamination.  Susceptibility testing not routinely done   11/25/2009 No growth  Final   11/25/2009 No growth  Final   11/25/2009 Light growth Candida tropicalis  Final   11/25/2009 No growth  Final   11/24/2009 No growth  Final   11/23/2009 Light growth Candida tropicalis  Final   11/23/2009 No growth  Final   11/23/2009 No growth  Final   11/23/2009 No growth  Final   11/21/2009 Light growth Candida tropicalis  Final   11/21/2009 No growth  Final   11/21/2009 No growth  Final   11/21/2009 No growth  Final   11/19/2009 Normal delia  Final   11/19/2009 10 to 50,000 colonies/mL Mixed gram positive delia  Final     Comment:     Multiple species present, probable perineal contamination.  Susceptibility testing not routinely done   11/19/2009 No growth  Final   11/19/2009 No growth  Final   11/16/2009   Final    Light growth Candida albicans / dubliniensis For non-significant sites, Candida     Comment:      albicans is not differentiated from Dacia dubliniensis. This is the   national   standard.   11/15/2009 No growth  Final   11/15/2009 No growth  Final   11/14/2009 No growth  after 30 days  Final   11/14/2009 Light growth Candida albicans / dubliniensis  Final     Comment:     For non-significant sites, Candida albicans is not differentiated from Dacia   dubliniensis. This is the national standard.  No additional fungi cultured after 4 weeks incubation   11/14/2009 No growth  Final   11/14/2009   Final    Culture received and in progress. Assayed at [facility]     Comment:      [place] [address]  Positive AFB results are called as soon as detected.  Final report to follow in 7 to 8 weeks.  Culture negative for acid fast bacilli   11/14/2009 Moderate growth Coagulase negative Staphylococcus  Final   11/14/2009 No growth  Final   11/14/2009 No growth  Final   11/11/2009 No growth after 28 days  Final   11/11/2009 Moderate growth Candida tropicalis  Final     Comment:     No additional fungi cultured after 4 weeks incubation   11/11/2009 Moderate growth Candida tropicalis  Final   11/11/2009   Final    Culture negative for acid fast bacilli Assayed at [facility]     Comment:      [place] [phone]   11/11/2009 No growth  Final   11/10/2009   Final    Heavy growth Dacia albicans / dubliniensis For non-significant sites, Candida     Comment:      albicans is not differentiated from Daica dubliniensis. This is the   national   standard.   11/08/2009 Light growth Candida tropicalis  Final   11/08/2009 No growth after 14 days  Final   11/08/2009 No growth  Final   11/08/2009 No growth  Final   11/08/2009   Final    >100,000 colonies/mL Candida albicans / dubliniensis For non-significant sites,     Comment:      Candida albicans is not differentiated from Dacia dubliniensis. This is the   national standard.   11/08/2009 No growth  Final   11/08/2009 No growth  Final   11/04/2009 Moderate growth Candida tropicalis  Final   10/31/2009 No growth  Final   10/31/2009 No growth  Final   10/31/2009 No growth  Final   10/31/2009 No growth  Final   10/31/2009 No  growth  Final   10/31/2009 No growth  Final   10/29/2009 No growth after 29 days  Final   10/29/2009   Final    Moderate growth Candida albicans / dubliniensis For non-significant sites,     Comment:      Candida albicans is not differentiated from Dacia dubliniensis. This is the   national standard.  No additional fungi cultured after 4 weeks incubation   10/29/2009 No growth  Final   10/29/2009 Culture received and in progress.  Final     Comment:     Positive AFB results are called as soon as detected.  Final report to follow in 7 to 8 weeks.  Culture negative for acid fast bacilli   10/29/2009   Final    No Salmonella, Shigella, Campylobacter or E coli 0157 isolated.   10/28/2009 No growth  Final   10/28/2009 No growth  Final   10/28/2009 No growth  Final   10/28/2009 No growth  Final   10/27/2009 No growth  Final   10/27/2009 No growth  Final   08/31/2009 No Beta Streptococcus isolated  Final       Inflammatory Markers    Recent Labs   Lab Test 07/20/21  0621 07/18/21  0800 07/17/21  0614 07/15/21  0037 07/14/21  1835   SED  --   --  22* 32* 30*   CRP 12.0* 34.0* 11.0* 24.0*  25.0* 26.0*       Metabolic Studies       Recent Labs   Lab Test 07/20/21  1044 07/20/21  0621 07/19/21  0640 07/18/21  1958 07/18/21  1033 07/18/21  0800 07/17/21  0614 07/16/21  0536 07/14/21  1835 07/14/21  1835   NA  --  140 140  --   --  140 139 141  --  141   POTASSIUM  --  3.6 3.7  --   --  3.7 3.6 3.8  --  3.5   CHLORIDE  --  112* 111*  --   --  112* 112* 113*  --  112*   CO2  --  22 23  --   --  23 22 26  --  23   ANIONGAP  --  6 6  --   --  5 5 2*  --  6   BUN  --  8 9  --   --  7 5* 5*  --  8   CR  --  1.01 0.92  --   --  0.89 0.78 0.72  --  0.73   GFRESTIMATED  --  73 81  --   --  85 >90 >90  --  >90   GLC  --  80 79  --  87 85 135* 104*  --  85   SAMUEL  --  8.6 8.9  --   --  8.3* 8.7 8.7  --  8.8   LACT 0.8  --   --  0.7  --   --  2.1*  --    < >  --     < > = values in this interval not displayed.       Hepatic Studies     Recent Labs   Lab Test 06/15/21  1405 10/29/17  1820 03/19/17  1015   BILITOTAL 0.4 0.2 0.2   ALKPHOS 59 60 64   ALBUMIN 3.6 3.2* 3.4   AST 14 9 13   ALT 14 11 12       Pancreatitis testing    Recent Labs   Lab Test 06/15/21  1405 03/19/17  1015   LIPASE 160 218       Hematology Studies      Recent Labs   Lab Test 07/20/21  0621 07/19/21  0640 07/18/21  0800 07/17/21  0614 07/16/21  0536 07/14/21  1835 06/15/21  1405 05/06/19  1558 10/29/17  1820 10/29/17  1820 03/19/17  1015   WBC 3.6* 3.5* 2.8*  2.8* 6.1 5.1 6.4 4.8  --   --  7.9 6.6   ANEU  --   --   --   --   --   --  2.2  --   --  4.7 2.5   ALYM  --   --   --   --   --   --  1.7 1.7  --  1.9 2.9   LINDSEY  --   --   --   --   --   --  0.6  --   --  0.9 0.9   AEOS  --   --   --   --   --   --  0.3  --   --  0.3 0.2   HGB 11.3* 10.7* 10.0* 10.7* 10.0* 11.5* 11.9 9.3*   < > 9.2* 9.9*   HCT 36.0 33.7* 31.9* 33.4* 31.8* 35.8 37.6 32.1*   < > 31.4* 31.6*    241 230 251 241 302 311  --    < > 411 294    < > = values in this interval not displayed.       Arterial Blood Gas Testing  No lab results found.     Urine Studies     Recent Labs   Lab Test 10/29/17  1834   URINEPH 6.0   NITRITE Negative   LEUKEST Negative   WBCU <1       Vancomycin Levels     No lab results found.    Invalid input(s): VANCO    Tobramycin levels     No lab results found.    Gentamicin levels    No lab results found.    CSF testing     Recent Labs   Lab Test 07/17/21  0936   CWBC 0  1   CRBC 32*  240*   CGLU 58   CTP 32       Hepatitis B Testing No lab results found.    Hepatitis C Testing     Hepatitis C Antibody   Date Value Ref Range Status   10/30/2009 Negative NEG Final       Respiratory Virus Testing    RSV Rapid Antigen Result   Date Value Ref Range Status   10/29/2009 Negative NEG Final       Last check of C difficile  No results found for: CDBPCT           Imaging:   Brain MRI (07/19)  No acute intracranial abnormality. Decreased, now minimal,  left frontal dural thickening and  enhancement, perhaps sequelae of  prior subdural hemorrhage.      Brain MRI (07/15)  Mildly thickened and enhancing dura over the left frontal lobe.  Differential for this could include post lumbar puncture intracranial  hypotension, although this typically is more diffuse and less focal.  Other considerations would be neoplastic processes such as lymphoma,  leukemia, or metastatic disease. Infectious considerations would  include fungal or mycobacterial infection. An inflammatory processes  such as sarcoid could have this appearance.    CXR (07/14)  There are no acute infiltrates. The cardiac silhouette is  not enlarged. Pulmonary vasculature is unremarkable.     MRI, Cervical Spine (07/14)  1. Thin prevertebral edema/fluid and enhancement throughout the visualized cervical spine and upper thoracic spine as detailed above. No deedee peripherally enhancing abscess. Findings are favored to be infectious. It appears to be centered about the   anterior C6-C7 disc space where there is mild irregularity and bony spurring. This suggests the possibility of early discitis at the C6-C7 level. The disc space and endplates at this level are otherwise normal. There is no abnormal epidural enhancement.     2. Mild likely reactive cervical adenopathy.    Head CT, w/o contrast (06/15)  The ventricles and basal cisterns are within normal limits  in configuration. There is no midline shift. There are no extra-axial  fluid collections. Gray-white differentiation is well maintained.     No intracranial hemorrhage, mass or recent infarct.     The visualized paranasal sinuses are well-aerated. There is no  mastoiditis. There are no fractures of the visualized bones.    CT Chest/Abdomen (06/15)  1.  No acute findings in the chest, abdomen or pelvis. No evidence of  a bowel obstruction or inflammatory process.

## 2021-07-20 NOTE — CONSULTS
[facility] Rheumatology Consultation    Leticia Morales MRN# [id number]   Age: 35 year old YOB: 1985     Date of Admission:       7/15/2021    Reason for consult: Concern for immunologic causes of HA (ie. Sarcoid)       Requesting physician: Dr. Rocha        Level of consult: Consult, follow and place orders           Assessment and Plan:   Assessment:  Leticia Morales is a 35yoF with PMH chronic migraines (>10 ED visits/yr), GERD, and PTSD admitted on 7/15/2021 d/t 6/10 bitemporal headache and acute onset of transient right shoulder/arm/hand weakness/numbness/tingling, found to have a cervical prevertebral fluid collection and frontal dural enhancement on MRI. Rheumatology is consulted due to concern for immunologic cause of her headaches given infectious disease workup with low concern for infectious etiology. Followed by ID and Neurology.     Acute on chronic headaches/migraines with frontal dural enhancement/prevertebral fluid collection on MRI in association with leukopenia, anemia, and weight loss. Sepsis 7/16 with initial primary concern for infectious etiology s/p LP 7/17 with CSF profile not consistent with bacterial meningitis (HHV7, Parechovirus, VZV negative; CMV/meningitis PCR panel/aerobic culture pending). Notably, LP was after 2 days of antibiotics. Will continue to follow-up on CSF culture results and ID workup as well as neurology recs.     Ddx: neoplastic, infectious (fungal, mycobacterial), or inflammatory. In terms of inflammatory causes, her MRI findings can be exhibited in individuals with SLE, rheumatoid arthritis, GPA, and sarcoidosis. Her CRP/ESR are mildly elevated and fluctuating. She does not have vision changes, facial weakness, rash, shortness of breath/cough, or ongoing neuropathic pain/weakness commonly associated with sarcoidosis; additionally, she does not have any painful/joint swelling or morning stiffness common with RA. Furthermore, she denies fatigue/abdominal  discomfort/rash common with SLE or GPA (although symptoms can be variable). Her findings are not consistent with sarcoidosis, SLE, Sjogren's, Scleroderma, RA, or GPA apart from recent weight loss, 1x fever, and possible oral manifestations; however, we plan to obtain labs for further workup out of an abundance of caution.     Plan:  - Agree with trending inflammatory markers (ESR/CRP)   - Labs: Add-on ACE to CSF studies (called lab, awaiting call back), serum ACE (assuming CSF add-on unable), ANGELINA, RICCI panel, dsDNA, C3/C4, ANCA/MPO/PR3, IgG subclasses, UA with microscopy, & VDRL (all ordered for you)  - Will continue to follow ID and Neurology workup (ie. CMV/meningitis panel pending)  - Rheumatology will continue to follow     Dominga Rush  Medical Student   [facility] Rheumatology    Resident/Fellow Attestation   I, Holly Rene, was present with the medical/MILENA student who participated in the service and in the documentation of the note. I agree with the assessment and plan of care as documented in the note.      Unclear etiology for patient's imaging & symptoms. Rheumatological conditions such as RA, Lupus, Sarcoid, etc. Will order lab workup accordingly. Plan discussed with the primary Family Medicine team.     Holly Rene MD  PGY3  Date of Service (when I saw the patient): 07/20/21    Staff Addendum:  This patient was interviewed and examined in the presence of the medical student who was acting as a scribe, and this note personally edited by me reflects our mutual impression. I personally performed the history and physical exam. I personally reviewed available lab and imaging studies.    Pardeep Dumont MD  Rheumatology          Chief Complaint:   CC: Headaches and RUE pain    HPI: Leticia Morlaes is a 35yoF with PMH chronic migraines, ARDS 2/2 H1N1 flu (2010), GERD, and PTSD who presented with a 5-day hx of intermittent 6/10 headache beginning with associated 8/10 neck pain accompanied by right arm and  upper/middle back numbness/tingling/weakness with no changes to vision and no loss of bowel/bladder function.     She reports >10 visits to the ED/yr for chronic migraines. Her RUE pain was shooting/sharp numbness/tingling with associated weakness from her right shoulder to her right hand diffusely after waking from sleep. This lasted 2-3hrs and fully resolved with movement/massaging of her right arm. She notes front and back neck pain with associated odynophagia/dysphagia. She notes ongoing weight loss (9lbs over last month) 2/2 low appetite associated with her headaches. She denies sick contacts or recent travel or trauma. She denies cough, night sweats, or neck swelling. She denies any CP/SOB, dizziness, gait abnormalities, facial numbness/weakness, abnormal mentation, or seizure-like movements, or LOC. She denies any recurrence of any numbness/tingling or weakness in any of her extremities. She had been using imitrex 100mg (~2 years), flexeril 5-10mg QPM, topiramate (recently added), botox injections, and tylenol/ibuprofen PRN for headaches/pain.     She notes 3 types of headaches ongoing for 2 years: severe (4x/month associated with n/v and low activity), moderate (2-3x/mo associated with ability to eat some foods/get up to go to the bathroom), and daily mild headaches (able to go to work/eat normally/drive). All of these are either unitemporal or bitemporal pressure-like headaches. She has associated phonophobia/photophobia with visual aura prior to her severe headaches, noting these have become more frequent lately.     MRI 7/14 in the ED showed prevertebral fluid collection concerning for early infection/abscess. She was started on meropenem IV (7/15). Seen by neurosurgery with no indications for NS intervention. ENT conducted endoscopy 7/15 without signs of fluid or mass in the oropharynx, noting no indication for ENT intervention. Neuroradiology seen on 7/15, noting no safe percutaneous access into the  prevertebral collection in the cervical spine. GI consulted on 7/16, stating no recommendation/indication for upper EGD.     She spiked a fever of 102.7 7/16 with lactate 2.1, resolved 2L fluids without complications. CSF analysis s/p LP 7/17 showed normal appearance, colorless, without abnormal WBC/glucose/proteins. HSV 1-2, cryptococcal ag, streptococcal ag negative on CSF. Quant TB negative. Bl cx NGTD. Strep PCR/throat swab negative.     She had leukopenia/lymphopenia/anemia onset after initiation of meropenem, now improving. BMP remains unremarkable. Meropenem was stopped by ID to unmask underlying infection while workup of other causes is ongoing.        Past Medical History:     Past Medical History:   Diagnosis Date     ARDS (adult respiratory distress syndrome) (H) 2010    related to H1N1 infection     H1N1 influenza 2010    prolonged ICU stay, medically induced coma     Iron deficiency anemia      Migraines           Past Surgical History:     Past Surgical History:   Procedure Laterality Date     THORACIC SURGERY      trach          Social History:     Social History     Tobacco Use     Smoking status: Former Smoker     Types: Hookah     Smokeless tobacco: Never Used   Substance Use Topics     Alcohol use: No   Lives at home with mom and dad. 7 siblings not living with her. 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 Denies use of alcohol, >3 years without smoking tobacco. No other use of drugs.        Family History:     Family History   Problem Relation Age of Onset     Diabetes Father      Hypertension Father      Cancer Father     FH: mom with osteoporosis. No other family history of cancer or rheumatologic conditions (ie. RA, SLE, Scleroderma, Sjogren's, Raynaud's, myositis). Mom and sister with migraines.        Immunizations:     Immunization History   Administered Date(s) Administered     COVID-19,PF,Moderna 04/03/2021, 05/01/2021     Influenza (IIV3) PF 10/25/2013     Influenza Vaccine IM > 6 months  Valent IIV4 10/16/2019     Influenza Vaccine, 6+MO IM (QUADRIVALENT W/PRESERVATIVES) 12/19/2014     Tdap (Adacel,Boostrix) 06/20/2011, 06/15/2021     Tdap (Adacel,boostrix) 06/20/2011          Allergies:     Allergies   Allergen Reactions     Macrodantin [Nitrofurantoin Macrocrystal] Other (See Comments)     itching           Medications:     Facility-Administered Medications Prior to Admission   Medication Dose Route Frequency Provider Last Rate Last Admin     [START ON 7/28/2021] Botulinum Toxin Type A (BOTOX) 200 units injection 155 Units  155 Units Intramuscular Q90 Days Silke Velasco APRN CNP         Medications Prior to Admission   Medication Sig Dispense Refill Last Dose     acetaminophen (TYLENOL) 500 MG tablet Take 2 tablets (1,000 mg) by mouth every 6 hours as needed for mild pain 180 tablet 0 7/15/2021 at Unknown time     benzoyl peroxide (BENZOYL PEROXIDE WASH) 5 % external liquid Use to wash face once daily in the morning   7/13/2021 at AM     omeprazole (PRILOSEC) 20 MG DR capsule Take 1 capsule (20 mg) by mouth daily 90 capsule 3 Past Week at Unknown time     spironolactone (ALDACTONE) 50 MG tablet Take 100 mg by mouth daily    7/13/2021 at PM     topiramate (TOPAMAX) 25 MG tablet Take two tabs at bedtime for one week, than take three tabs at bedtime for one week, than take four tabs at bedtime if tolerated 120 tablet 3 7/15/2021 at Unknown time     tretinoin (RETIN-A) 0.025 % external cream Apply topically At Bedtime   7/13/2021 at PM     cyclobenzaprine (FLEXERIL) 5 MG tablet Take 1-2 tablets (5-10 mg) by mouth nightly as needed for muscle spasms (do not drive) 20 tablet 1      SUMAtriptan (IMITREX) 50 MG tablet Take 2 tablets (100 mg) by mouth at onset of headache for migraine 15 tablet 0 7/13/2021          Review of Systems:   The 14 point Review of Systems is negative other than noted in the HPI.    Objective  BP (!) 88/55 (BP Location: Left arm)   Pulse 77   Temp 98.3  F  "(36.8  C) (Oral)   Resp 18   Ht 1.626 m (5' 4\")   Wt 59.6 kg (131 lb 6.4 oz)   SpO2 100%   BMI 22.55 kg/m    General: alert, awake, interactive, orientedx3, no acute distress, sitting comfortably in bed with lights off   Skin: no rashes, bruises, lesions   HEENT: EOM intact, PERRLA, slightly dry mucous membranes with geographic tongue with circular non-ulcerated non-erythematous tongue lesions  Resp: CTA b/l without wheezes/crackles, no increased WOB  CV: HRRR, no m/r/g, no LE edema  GI: soft, non-tender, non-distended, without masses  Musc: no swelling/erythema b/l hands/wrist/elbows/shoulders/knees/ankles/toes, normal ROM of b/l shoulders/elbows/wrists/hips/knees/ankles  Neuro: nl cranial nerves b/l grossly, normal speech/mentation       Data:   Micro:  Blood culture 7/15: NGTD  Blood culture 7/18: in process   CSF culture 7/17: NGTD              Aerobic culture: NGTD               Anaerobic: in process              AFB: could not complete not enough CSF              Fungal: in process               Crypto: negative              HSV 1/2: Not detected               Strep pneumo: negative              Meningitis/encephalitis panel: Negative              Varicella: Negative              CMV: Negative     Brain MRI (07/15)  Mildly thickened and enhancing dura over the left frontal lobe.  Differential for this could include post lumbar puncture intracranial  hypotension, although this typically is more diffuse and less focal.  Other considerations would be neoplastic processes such as lymphoma,  leukemia, or metastatic disease. Infectious considerations would  include fungal or mycobacterial infection. An inflammatory processes  such as sarcoid could have this appearance.    FIBEROPTIC ENDOSCOPY (ENT) (7/15)  No masses or lesions were noted in the nasal cavity or nasopharynx. On inspection of the oropharynx, no significant posterior pharyngeal wall edema was appreciated, and there were no masses or lesions. " Bilateral cords were mobile with no lesions or edema, and piriform sinuses were free of lesion bilaterally. Patient tolerated the procedure well.      CXR (07/14)  There are no acute infiltrates. The cardiac silhouette is  not enlarged. Pulmonary vasculature is unremarkable.     MRI, Cervical Spine (07/14)  1. Thin prevertebral edema/fluid and enhancement throughout the visualized cervical spine and upper thoracic spine as detailed above. No deedee peripherally enhancing abscess. Findings are favored to be infectious. It appears to be centered about the   anterior C6-C7 disc space where there is mild irregularity and bony spurring. This suggests the possibility of early discitis at the C6-C7 level. The disc space and endplates at this level are otherwise normal. There is no abnormal epidural enhancement.  2. Mild likely reactive cervical adenopathy.     Head CT, w/o contrast (06/15)  The ventricles and basal cisterns are within normal limits  in configuration. There is no midline shift. There are no extra-axial  fluid collections. Gray-white differentiation is well maintained.  No intracranial hemorrhage, mass or recent infarct.  The visualized paranasal sinuses are well-aerated. There is no  mastoiditis. There are no fractures of the visualized bones.     CT Chest/Abdomen (06/15)  1.No acute findings in the chest, abdomen or pelvis. No evidence of  a bowel obstruction or inflammatory process.

## 2021-07-21 LAB
ANA SER QL IF: NEGATIVE
ANCA AB PATTERN SER IF-IMP: NORMAL
ANION GAP SERPL CALCULATED.3IONS-SCNC: 7 MMOL/L (ref 3–14)
B BURGDOR DNA SPEC QL NAA+PROBE: NOT DETECTED
BUN SERPL-MCNC: 9 MG/DL (ref 7–30)
C-ANCA TITR SER IF: NORMAL {TITER}
CALCIUM SERPL-MCNC: 8.2 MG/DL (ref 8.5–10.1)
CHLORIDE BLD-SCNC: 110 MMOL/L (ref 94–109)
CO2 SERPL-SCNC: 20 MMOL/L (ref 20–32)
CREAT SERPL-MCNC: 0.96 MG/DL (ref 0.52–1.04)
ERYTHROCYTE [DISTWIDTH] IN BLOOD BY AUTOMATED COUNT: 17 % (ref 10–15)
ERYTHROCYTE [SEDIMENTATION RATE] IN BLOOD BY WESTERGREN METHOD: 20 MM/HR (ref 0–20)
GFR SERPL CREATININE-BSD FRML MDRD: 77 ML/MIN/1.73M2
GLUCOSE BLD-MCNC: 83 MG/DL (ref 70–99)
HCT VFR BLD AUTO: 33.3 % (ref 35–47)
HGB BLD-MCNC: 10.7 G/DL (ref 11.7–15.7)
HIV 1+2 AB+HIV1 P24 AG SERPL QL IA: NONREACTIVE
IGG SERPL-MCNC: 889 MG/DL (ref 610–1616)
MCH RBC QN AUTO: 25.1 PG (ref 26.5–33)
MCHC RBC AUTO-ENTMCNC: 32.1 G/DL (ref 31.5–36.5)
MCV RBC AUTO: 78 FL (ref 78–100)
PLATELET # BLD AUTO: 268 10E3/UL (ref 150–450)
POTASSIUM BLD-SCNC: 3.8 MMOL/L (ref 3.4–5.3)
RBC # BLD AUTO: 4.26 10E6/UL (ref 3.8–5.2)
SODIUM SERPL-SCNC: 137 MMOL/L (ref 133–144)
T PALLIDUM AB SER QL: NONREACTIVE
WBC # BLD AUTO: 3.4 10E3/UL (ref 4–11)

## 2021-07-21 PROCEDURE — 99233 SBSQ HOSP IP/OBS HIGH 50: CPT | Mod: GC | Performed by: STUDENT IN AN ORGANIZED HEALTH CARE EDUCATION/TRAINING PROGRAM

## 2021-07-21 PROCEDURE — 86780 TREPONEMA PALLIDUM: CPT | Performed by: STUDENT IN AN ORGANIZED HEALTH CARE EDUCATION/TRAINING PROGRAM

## 2021-07-21 PROCEDURE — 250N000013 HC RX MED GY IP 250 OP 250 PS 637: Performed by: STUDENT IN AN ORGANIZED HEALTH CARE EDUCATION/TRAINING PROGRAM

## 2021-07-21 PROCEDURE — 120N000002 HC R&B MED SURG/OB UMMC

## 2021-07-21 PROCEDURE — 99233 SBSQ HOSP IP/OBS HIGH 50: CPT | Mod: GC

## 2021-07-21 PROCEDURE — 85027 COMPLETE CBC AUTOMATED: CPT | Performed by: STUDENT IN AN ORGANIZED HEALTH CARE EDUCATION/TRAINING PROGRAM

## 2021-07-21 PROCEDURE — 80048 BASIC METABOLIC PNL TOTAL CA: CPT | Performed by: STUDENT IN AN ORGANIZED HEALTH CARE EDUCATION/TRAINING PROGRAM

## 2021-07-21 PROCEDURE — 250N000011 HC RX IP 250 OP 636: Performed by: STUDENT IN AN ORGANIZED HEALTH CARE EDUCATION/TRAINING PROGRAM

## 2021-07-21 PROCEDURE — 258N000003 HC RX IP 258 OP 636: Performed by: STUDENT IN AN ORGANIZED HEALTH CARE EDUCATION/TRAINING PROGRAM

## 2021-07-21 PROCEDURE — 85652 RBC SED RATE AUTOMATED: CPT | Performed by: STUDENT IN AN ORGANIZED HEALTH CARE EDUCATION/TRAINING PROGRAM

## 2021-07-21 PROCEDURE — 999N000128 HC STATISTIC PERIPHERAL IV START W/O US GUIDANCE

## 2021-07-21 PROCEDURE — 36415 COLL VENOUS BLD VENIPUNCTURE: CPT | Performed by: STUDENT IN AN ORGANIZED HEALTH CARE EDUCATION/TRAINING PROGRAM

## 2021-07-21 RX ORDER — DIPHENHYDRAMINE HCL 12.5MG/5ML
12.5 LIQUID (ML) ORAL
Status: COMPLETED | OUTPATIENT
Start: 2021-07-21 | End: 2021-07-22

## 2021-07-21 RX ORDER — METOCLOPRAMIDE 5 MG/1
10 TABLET ORAL
Status: COMPLETED | OUTPATIENT
Start: 2021-07-21 | End: 2021-07-23

## 2021-07-21 RX ORDER — DEXAMETHASONE SODIUM PHOSPHATE 4 MG/ML
10 INJECTION, SOLUTION INTRA-ARTICULAR; INTRALESIONAL; INTRAMUSCULAR; INTRAVENOUS; SOFT TISSUE ONCE
Status: COMPLETED | OUTPATIENT
Start: 2021-07-21 | End: 2021-07-21

## 2021-07-21 RX ORDER — RIZATRIPTAN BENZOATE 5 MG/1
5 TABLET, ORALLY DISINTEGRATING ORAL
Status: COMPLETED | OUTPATIENT
Start: 2021-07-21 | End: 2021-07-21

## 2021-07-21 RX ORDER — OLANZAPINE 10 MG/2ML
5 INJECTION, POWDER, FOR SOLUTION INTRAMUSCULAR ONCE
Status: COMPLETED | OUTPATIENT
Start: 2021-07-21 | End: 2021-07-21

## 2021-07-21 RX ADMIN — TOPIRAMATE 50 MG: 50 TABLET ORAL at 22:05

## 2021-07-21 RX ADMIN — SODIUM CHLORIDE, POTASSIUM CHLORIDE, SODIUM LACTATE AND CALCIUM CHLORIDE 1000 ML: 600; 310; 30; 20 INJECTION, SOLUTION INTRAVENOUS at 06:16

## 2021-07-21 RX ADMIN — RIZATRIPTAN BENZOATE 5 MG: 5 TABLET, ORALLY DISINTEGRATING ORAL at 22:05

## 2021-07-21 RX ADMIN — DEXAMETHASONE SODIUM PHOSPHATE 10 MG: 4 INJECTION, SOLUTION INTRAMUSCULAR; INTRAVENOUS at 16:14

## 2021-07-21 RX ADMIN — PROCHLORPERAZINE EDISYLATE 5 MG: 5 INJECTION INTRAMUSCULAR; INTRAVENOUS at 18:41

## 2021-07-21 RX ADMIN — ACETAMINOPHEN 975 MG: 325 TABLET, FILM COATED ORAL at 08:55

## 2021-07-21 RX ADMIN — ACETAMINOPHEN 975 MG: 325 TABLET, FILM COATED ORAL at 14:05

## 2021-07-21 RX ADMIN — TRETINOIN: 0.25 GEL TOPICAL at 22:05

## 2021-07-21 RX ADMIN — ACETAMINOPHEN 975 MG: 325 TABLET, FILM COATED ORAL at 19:53

## 2021-07-21 RX ADMIN — PROCHLORPERAZINE EDISYLATE 5 MG: 5 INJECTION INTRAMUSCULAR; INTRAVENOUS at 13:05

## 2021-07-21 RX ADMIN — PROCHLORPERAZINE MALEATE 5 MG: 5 TABLET ORAL at 06:14

## 2021-07-21 RX ADMIN — OMEPRAZOLE 40 MG: 20 CAPSULE, DELAYED RELEASE ORAL at 08:57

## 2021-07-21 RX ADMIN — CALCIUM CARBONATE (ANTACID) CHEW TAB 500 MG 1000 MG: 500 CHEW TAB at 22:17

## 2021-07-21 RX ADMIN — OLANZAPINE 5 MG: 10 INJECTION, POWDER, LYOPHILIZED, FOR SOLUTION INTRAMUSCULAR at 10:46

## 2021-07-21 ASSESSMENT — VISUAL ACUITY
OD: NOT TESTED
OD: NOT TESTED
OS: NOT TESTED
OS: NOT TESTED

## 2021-07-21 ASSESSMENT — ACTIVITIES OF DAILY LIVING (ADL)
ADLS_ACUITY_SCORE: 15

## 2021-07-21 NOTE — CONSULTS
Health Psychology                                                                                                                          Tamie Levy, Ph.D., L.P [phone]  Bette Bernard, Ph.D., L.P. [phone]  Kathrine Powers, Ph.D. [phone]  Keyla Goodwin, Ph.D., L.P. [phone]  Frank Toure, Ph.D., A.B.P.P., L.P. [phone]         Isaura Morris, Ph.D., L.P. [phone]                [facility] [place], [address] Floor  [address] [place] [address]  Inpatient Health Psychology Consultation      Stopped by Ms. Kelly's room and she reported very bad headache pain.  She was initially asleep but woke up slightly when I called her name. She had difficulty engaging in conversation because of the pain and requested to speak later. Upon return she was sleeping soundly.    Plan: Re-attempt visit later this week.

## 2021-07-21 NOTE — PROGRESS NOTES
BRIEF PROGRESS NOTE    Notified of headache by RN. Pt just received flexeril. If no improvement, have ordered 1x prn of the following headache cocktail per neuro:  - benadryl 12.5mg  - reglan 10mg  - IV magnesium 2mg    If no improvement in 1 hour after administration, please page primary cross-cover.     Gabriela Turner MD

## 2021-07-21 NOTE — PROGRESS NOTES
[facility]     Patient:  Leticia Morales   YOB: 1985  Date of Visit:  07/21/2021  Date of Admission: 7/15/2021  Consult Requester:Natalee López DO          Assessment and Recommendations:   ID Problem List:  - Sepsis, resolved  - Prevertebral fluid collection:  Headache, right arm numbness/ tingling,    - Neoplastic, immunologic, more likely    - Infectious: Prevertebral abscess and/or parapharyngeal/retropharyngeal space infection, less likely  - Leukopenia, Lymphopenia, possible meropenem-induced  - Acute odynophagia, resolved 2/2 to parapharyngeal/retropharyngeal space infection/paraneoplasic, less likely   - Chronic migraines s/p analgesia overuse, neoplastic, immunologic  - History of  GERD   - Epigastric tenderness   - PTSD  - Nausea, vomiting, retching/ gagging in June 2021    Recommendations:  - Follow-up AFB CSF cultures, treponema RPR titer, HIV  - ID will continue to follow-up  - If fever develops, activate sepsis workup and run blood cultures      Discussion:  Leticia Morales is a 36 yo female patient with a past medical history of chronic migraines (>10 visits to the ED/year), GERD, PTSD who presents to the ED on 07/14/2021 with a 5-day history of sudden moderate holoacranial  headache (6/10) that started in the morning and neck pain (8/10) that radiates to the right arm, upper and middle back by the evening. An MRI (07/14) ordered on the ED showed prevertebral fluid collection c/f early infection/abscess.    Her recent CSF fluid analysis obtained by LP on 07/17 showed no growth on anaerobic/aerobic/fungal culture as well as a negative  meningitis/encephalitis panel. Tuberculosis source had been rule out through Quantiferon testing.  Rheumatology recommended Treponema RPR titer/HIV/ACE and multiple immunologic studies to rule out SLE/RA and sarcoidosis. An CTV Head MRI on 07/20 recommended by Neurology consult showed no dural venous sinus thrombosis  "but enlarged 1A/1B lymph nodes. Neurosarcoidosis and SLE are possible differentials.  She still persists with leukopenia/lymphopenia (meropenem was stopped on 07/20),  lowered CRP of 12 (drop from 34 on 07/18), and ESR of 20 (07/20) remains stable, BMP remains unremarkable. n recent MRI (07/19/21) due to this latest episode showed a decreased, now minimal, left frontal dural thickening and enhancement \"sequelae of prior subdural hemorrhage\"), an improvement of a prior on 07/15.     Patient complies of a migraine since last night (8 pm), right unilateral side with eye pain and right swollen glands. She refers an intensity 9/10 associated with photosensitivity/phonophobia, nauseas (no vomiting episodes so far). No neurological signs on  Rheumatology and Neurology consulted ans ordered multiple testing analysis to rule out Neurosarcoidosis/SLE/RA/Syphylis. An HIV test is pending as well.     - Follow-up AFB CSF cultures, Treponema RPR titer, HIV  - ID will continue to follow-up  - If fever develops, activate sepsis workup and run blood cultures    Thank you for the consult. ID will continue to follow with you.     Ellen Frazier MD   Pager: [phone]  [facility]  07/21/2021     Attestation:    Infectious Diseases  Pager:      ________________________________________________________________    Consult Question: Prevertebral fluid collection to rule out prevertebral abscess and possibility of antibiotic treatment  Admission Diagnosis: Discitis, unspecified spinal region [M46.40]         History of Present Illness:   History obtained from review of chart and discussion with patient.     Leticia Morales is a 35 year old female with a PMH of significant chronic migraine, GERD, PTSD who came to the ED on 07/14/2021 with a 5-day history of headache, neck pain, swallowing troubles and numbness/tingling on he right upper extremity and had an MRI with positive findings of prevertebral fluid collection. ID " was consulted to rule out prevertebral abscess and initiate antibiotic therapy. ID agreed with beginning of meropenem IV and neurology consult to possible lumbar puncture. LP showed no abnormal cellularity and negative aerobic cultures. Afebrile. Less likely infectious source for now.    She describes a shooting and sharping pain that is associated with tingling and numbness on the right upper extremity that lasted for 4 hours. Some chills and increased temperature sensation over the past days. Nothing makes it better (pain medication: tylenol/ibuprofen, hot/cold pad) and massages and movements make it worse. She denies weakness but complies inability to completely raise her right arm due to pain as well as mild chest pain and shortness of breath and restricted range of motion (latero-lateral, up and down). Patient also complies of front and back neck pain which made her swallowing very difficult especially for soft solids and liquids that started on Monday, in addition to presence of acid reflux due to pills intake without eating. Weight loss is present for the past month (weight loss of 9 pounds over 1 month). Patient denies sick contacts, any travel recently, IV drug use, recent cold/flu/sinusitus and trauma. She also denies cough, night sweats, voice muffling, stridor and neck swelling.          Review of Systems:   10 point review of systems was negative except for noted as above in HPI.     ROS:  Constitutional: no fever, no chills, nauseas, midline neck pain (2/10), loss of appetite, intense headache  Neuro: no HA, No focal weakness. No visual changes, facial droop, drooling. Mild photosensitivity. Headache with migraine characteristics (unilateral throbbing, 9/10). No tingling, weakness, numbness.   HEENT: no acute blurry vision, no earache, no ear discharge, sore throat, right cervical anterior chain lymphadenopathy x2. No mass sensation on posterior oropharynx.  CVS: no chest pain, heart palpitation, no  syncope or presyncope  Lungs: no dyspnea, no chest pain, no cough. No shortness of breath.  GI: no N/V, no diarrhea, no constipation, no abdominal pain  : no dysuria, no hesitancy, no incontinence  Skin: no rash, no Itching, no abscesses  Allergy/immunology: no allergic reaction   Musculoskeletal: no muscle pain, no joint pain, no joint/neck swelling, mild limited ROM of neck movements (latero-lateral, up-down)             Past Medical History:     Past Medical History:   Diagnosis Date     ARDS (adult respiratory distress syndrome) (H) 2010    related to H1N1 infection     H1N1 influenza 2010    prolonged ICU stay, medically induced coma     Iron deficiency anemia      Migraines             Past Surgical History:     Past Surgical History:   Procedure Laterality Date     THORACIC SURGERY      trach            Family History:   Reviewed and non-contributory.   Family History   Problem Relation Age of Onset     Diabetes Father      Hypertension Father      Cancer Father      Other - See Comments Father         Epistaxis when Angry     Migraines Mother             Social History:     Social History     Tobacco Use     Smoking status: Former Smoker     Types: Hookah     Smokeless tobacco: Never Used   Substance Use Topics     Alcohol use: No     History   Sexual Activity     Sexual activity: Never            Current Medications:       acetaminophen  975 mg Oral TID     lactated ringers  1,000 mL Intravenous Once     omeprazole  40 mg Oral Daily     polyethylene glycol  17 g Oral Daily     prochlorperazine  5 mg Intravenous Q6H    Or     prochlorperazine  5 mg Oral Q6H    Or     prochlorperazine  25 mg Rectal Q6H     sodium chloride (PF)  3 mL Intracatheter Q8H     [Held by provider] spironolactone  100 mg Oral Daily     topiramate  50 mg Oral At Bedtime     tretinoin   Topical At Bedtime            Allergies:     Allergies   Allergen Reactions     Macrodantin [Nitrofurantoin Macrocrystal] Other (See Comments)      itching            Physical Exam:   Vitals were reviewed  Temp:  [98  F (36.7  C)-98.4  F (36.9  C)] 98  F (36.7  C)  Pulse:  [76-92] 92  Resp:  [16-18] 18  BP: ()/(55-66) 102/63  SpO2:  [96 %-100 %] 100 %    Vitals:    07/15/21 0015 07/17/21 2047   Weight: 56.7 kg (125 lb) 59.6 kg (131 lb 6.4 oz)       Constitutional: Adult female in no distress, , in NAD. Awake, alert, interactive.  HEENT: NC/AT, EOMI, sclera clear, conjunctiva normal, OP with MMM. No trismus. No pain while opening mouth. No ear discharge, no ear pain. No teeth pain. No bulging in posterior oropharynx.   Respiratory: No increased work of breathing, CTAB, no crackles or wheezing.  Cardiovascular: RRR, no murmur noted. No peripheral edema.   GI: Normal bowel sounds, soft, non-distended and non-tender. No hepatosplenomegaly.  Skin: Warm, dry, well-perfused. No bruising, bleeding, rashes, or lesions on limited exam.  Musculoskeletal: Extremities grossly normal, non-tender, no edema. Limited right upper extremity and neck ROM in bed.   Neurologic: A&O. Answers questions appropriately, speech normal. Moves all extremities spontaneously. CNII-XII without abnormalities.   Neuropsychiatric: Calm. Affect appropriate to situation.  Vascular access:  Peripheral catheter on placed CDI on right arm without local inflammatory signs, non-tender, no surrounding erythema.         Laboratory Data:     Microbiology:    Culture Micro   Date Value Ref Range Status   10/29/2017 No growth  Final   04/15/2015 Normal skin delia  Final   05/24/2012 No Beta Streptococcus isolated  Final   11/22/2011   Final    10 to 50,000 colonies/mL Mixed gram positive delia  Multiple species present, probable perineal contamination.  Susceptibility testing not routinely done   11/25/2009 No growth  Final   11/25/2009 No growth  Final   11/25/2009 Light growth Candida tropicalis  Final   11/25/2009 No growth  Final   11/24/2009 No growth  Final   11/23/2009 Light growth Candida  tropicalis  Final   11/23/2009 No growth  Final   11/23/2009 No growth  Final   11/23/2009 No growth  Final   11/21/2009 Light growth Candida tropicalis  Final   11/21/2009 No growth  Final   11/21/2009 No growth  Final   11/21/2009 No growth  Final   11/19/2009 Normal delia  Final   11/19/2009 10 to 50,000 colonies/mL Mixed gram positive delia  Final     Comment:     Multiple species present, probable perineal contamination.  Susceptibility testing not routinely done   11/19/2009 No growth  Final   11/19/2009 No growth  Final   11/16/2009   Final    Light growth Candida albicans / dubliniensis For non-significant sites, Candida     Comment:      albicans is not differentiated from Dacia dubliniensis. This is the   national   standard.   11/15/2009 No growth  Final   11/15/2009 No growth  Final   11/14/2009 No growth after 30 days  Final   11/14/2009 Light growth Candida albicans / dubliniensis  Final     Comment:     For non-significant sites, Candida albicans is not differentiated from Dacia   dubliniensis. This is the national standard.  No additional fungi cultured after 4 weeks incubation   11/14/2009 No growth  Final   11/14/2009   Final    Culture received and in progress. Assayed at [facility]     Comment:      [place] [address]  Positive AFB results are called as soon as detected.  Final report to follow in 7 to 8 weeks.  Culture negative for acid fast bacilli   11/14/2009 Moderate growth Coagulase negative Staphylococcus  Final   11/14/2009 No growth  Final   11/14/2009 No growth  Final   11/11/2009 No growth after 28 days  Final   11/11/2009 Moderate growth Candida tropicalis  Final     Comment:     No additional fungi cultured after 4 weeks incubation   11/11/2009 Moderate growth Candida tropicalis  Final   11/11/2009   Final    Culture negative for acid fast bacilli Assayed at [facility]     Comment:      [place] [address]   11/11/2009 No growth  Final    11/10/2009   Final    Heavy growth Dacia albicans / dubliniensis For non-significant sites, Candida     Comment:      albicans is not differentiated from Dacia dubliniensis. This is the   national   standard.   11/08/2009 Light growth Candida tropicalis  Final   11/08/2009 No growth after 14 days  Final   11/08/2009 No growth  Final   11/08/2009 No growth  Final   11/08/2009   Final    >100,000 colonies/mL Candida albicans / dubliniensis For non-significant sites,     Comment:      Candida albicans is not differentiated from Dacia dubliniensis. This is the   national standard.   11/08/2009 No growth  Final   11/08/2009 No growth  Final   11/04/2009 Moderate growth Candida tropicalis  Final   10/31/2009 No growth  Final   10/31/2009 No growth  Final   10/31/2009 No growth  Final   10/31/2009 No growth  Final   10/31/2009 No growth  Final   10/31/2009 No growth  Final   10/29/2009 No growth after 29 days  Final   10/29/2009   Final    Moderate growth Candida albicans / dubliniensis For non-significant sites,     Comment:      Candida albicans is not differentiated from Dacia dubliniensis. This is the   national standard.  No additional fungi cultured after 4 weeks incubation   10/29/2009 No growth  Final   10/29/2009 Culture received and in progress.  Final     Comment:     Positive AFB results are called as soon as detected.  Final report to follow in 7 to 8 weeks.  Culture negative for acid fast bacilli   10/29/2009   Final    No Salmonella, Shigella, Campylobacter or E coli 0157 isolated.   10/28/2009 No growth  Final   10/28/2009 No growth  Final   10/28/2009 No growth  Final   10/28/2009 No growth  Final   10/27/2009 No growth  Final   10/27/2009 No growth  Final   08/31/2009 No Beta Streptococcus isolated  Final       Inflammatory Markers    Recent Labs   Lab Test 07/21/21  0556 07/20/21  0621 07/18/21  0800 07/17/21  0614 07/15/21  0037 07/14/21  1835   SED 20  --   --  22* 32* 30*   CRP  --  12.0*  34.0* 11.0* 24.0*  25.0* 26.0*       Metabolic Studies       Recent Labs   Lab Test 07/21/21  0556 07/20/21  1044 07/20/21  0621 07/19/21  0640 07/18/21  1958 07/18/21  1033 07/18/21  0800 07/17/21  0614 07/16/21  0536 07/16/21  0536     --  140 140  --   --  140 139  --  141   POTASSIUM 3.8  --  3.6 3.7  --   --  3.7 3.6  --  3.8   CHLORIDE 110*  --  112* 111*  --   --  112* 112*  --  113*   CO2 20  --  22 23  --   --  23 22  --  26   ANIONGAP 7  --  6 6  --   --  5 5  --  2*   BUN 9  --  8 9  --   --  7 5*  --  5*   CR 0.96  --  1.01 0.92  --   --  0.89 0.78  --  0.72   GFRESTIMATED 77  --  73 81  --   --  85 >90  --  >90   GLC 83  --  80 79  --  87 85 135*  --  104*   SAMUEL 8.2*  --  8.6 8.9  --   --  8.3* 8.7  --  8.7   LACT  --  0.8  --   --  0.7  --   --  2.1*   < >  --     < > = values in this interval not displayed.       Hepatic Studies    Recent Labs   Lab Test 06/15/21  1405 10/29/17  1820 03/19/17  1015   BILITOTAL 0.4 0.2 0.2   ALKPHOS 59 60 64   ALBUMIN 3.6 3.2* 3.4   AST 14 9 13   ALT 14 11 12       Pancreatitis testing    Recent Labs   Lab Test 06/15/21  1405 03/19/17  1015   LIPASE 160 218       Hematology Studies      Recent Labs   Lab Test 07/21/21  0556 07/20/21  0621 07/19/21  0640 07/18/21  0800 07/17/21  0614 07/16/21  0536 06/15/21  1405 05/06/19  1558 10/29/17  1820 10/29/17  1820 03/19/17  1015   WBC 3.4* 3.6* 3.5* 2.8*  2.8* 6.1 5.1 4.8  --    < > 7.9 6.6   ANEU  --   --   --   --   --   --  2.2  --   --  4.7 2.5   ALYM  --   --   --   --   --   --  1.7 1.7  --  1.9 2.9   LINDSEY  --   --   --   --   --   --  0.6  --   --  0.9 0.9   AEOS  --   --   --   --   --   --  0.3  --   --  0.3 0.2   HGB 10.7* 11.3* 10.7* 10.0* 10.7* 10.0* 11.9 9.3*   < > 9.2* 9.9*   HCT 33.3* 36.0 33.7* 31.9* 33.4* 31.8* 37.6 32.1*   < > 31.4* 31.6*    264 241 230 251 241 311  --    < > 411 294    < > = values in this interval not displayed.       Arterial Blood Gas Testing  No lab results found.      Urine Studies     Recent Labs   Lab Test 07/20/21  2245 10/29/17  1834   URINEPH 8.0* 6.0   NITRITE Negative Negative   LEUKEST Negative Negative   WBCU <1 <1       Vancomycin Levels     No lab results found.    Invalid input(s): VANCO    Tobramycin levels     No lab results found.    Gentamicin levels    No lab results found.    CSF testing     Recent Labs   Lab Test 07/17/21  0936   CWBC 0  1   CRBC 32*  240*   CGLU 58   CTP 32       Hepatitis B Testing No lab results found.    Hepatitis C Testing     Hepatitis C Antibody   Date Value Ref Range Status   10/30/2009 Negative NEG Final       Respiratory Virus Testing    RSV Rapid Antigen Result   Date Value Ref Range Status   10/29/2009 Negative NEG Final       Last check of C difficile  No results found for: CDBPCT           Imaging:   Brain MRI (07/19)  No acute intracranial abnormality. Decreased, now minimal,  left frontal dural thickening and enhancement, perhaps sequelae of  prior subdural hemorrhage.    Brain MRI (07/15)  Mildly thickened and enhancing dura over the left frontal lobe.  Differential for this could include post lumbar puncture intracranial  hypotension, although this typically is more diffuse and less focal.  Other considerations would be neoplastic processes such as lymphoma,  leukemia, or metastatic disease. Infectious considerations would  include fungal or mycobacterial infection. An inflammatory processes  such as sarcoid could have this appearance.    CXR (07/14)  There are no acute infiltrates. The cardiac silhouette is  not enlarged. Pulmonary vasculature is unremarkable.     MRI, Cervical Spine (07/14)  1. Thin prevertebral edema/fluid and enhancement throughout the visualized cervical spine and upper thoracic spine as detailed above. No deedee peripherally enhancing abscess. Findings are favored to be infectious. It appears to be centered about the   anterior C6-C7 disc space where there is mild irregularity and bony spurring.  This suggests the possibility of early discitis at the C6-C7 level. The disc space and endplates at this level are otherwise normal. There is no abnormal epidural enhancement.     2. Mild likely reactive cervical adenopathy.    Head CT, w/o contrast (06/15)  The ventricles and basal cisterns are within normal limits  in configuration. There is no midline shift. There are no extra-axial  fluid collections. Gray-white differentiation is well maintained.     No intracranial hemorrhage, mass or recent infarct.     The visualized paranasal sinuses are well-aerated. There is no  mastoiditis. There are no fractures of the visualized bones.    CT Chest/Abdomen (06/15)  1.  No acute findings in the chest, abdomen or pelvis. No evidence of  a bowel obstruction or inflammatory process.

## 2021-07-21 NOTE — PROGRESS NOTES
[facility] Progress Note    Main Plans for Today     - Continued lab work up per rheumatology   - Neurology following  - CTV    Assessment & Plan   Leticia Morales is a 35 year old female admitted on 7/15/2021. She has a history of chronic migraines and is admitted for acute onset of R shoulder and upper back pain and transient (now improved) R arm numbness, found to have cervical prevertebral fluid collection on MRI of unclear etiology and dural enhancement on MRI brain.     # Sepsis, resolved   # Prevertebral fluid collection   # Enhanced thickened dura over (L) frontal lobe  # Back and R shoulder pain  # Transient R arm numbness and tingling, resolved   # Elevated CRP, ESR  Presented with upper extremity pain, transient numbness (now resolved), upper back/neck pain, found to have elevated inflammatory markers and MRI of the cervical spine showing prevertebral fluid collection concerning for infection vs. autoimmune vs. malignancy. Multiple procedural specialties consulted - unable to obtain fluid sampling prior to antibiotic initiation. MRI brain showed mildly thickened and enhancing dura over the left frontal lobe, repeat MRI 7/20 shows improvement. Differential includes: neoplastic process, infectious (fungal, mycobacterial), or inflammatory like sarcoid. Due to brief sepsis with increase in inflammatory markers on 7/17, resolved with IV fluids & Meropenem, was initially having higher suspicion for infection. S/p LP 7/17, so far with no clear infectious etiology (though cultures were taken after abx). Per ID now discontinued meropenem and watch carefully to see if holding abx, unmasks subtle infection, however at this time their overall suscpicion for infection is low. CSF Cytology 7/17 negative for malignancy. Rheumatology was consulted, initial differential includes SLE, rheumatoid arthritis, GPA, and sarcoidosis, however she does not  display systemic signs/symptoms that clearly delineate a specific etiology. Neurology was consulted, suggested CTV to r/o cerebral venous thrombosis which was negative, agreed with work up per rheum.    - Appreciate Infectious Disease following   - Discontinued meropenem 2g q8h (7/15 - 7/20 am)    - Fever >100.4, not responsive to Tylenol restart abx   - If fever, repeat blood cultures stat  - Neurology consult   - Consider repeat MRI vs dural biopsy in a few days if still not improved and no other  conclusive results  - Rheumatology consult    - Labs: Add-on ACE to CSF studies if possible  serum ACE (assuming CSF add-on unable), ANGELINA,  RICCI panel, dsDNA, C3/C4, ANCA/MPO/PR3, IgG subclasses, & VDRL.   - q4 VS & neurochecks q4 hours  - Pain management: Tylenol TID, flexeril PRN. Avoid opioids.     -Trial of Zyprexa 5 mg IM x1   - If no improvement with above, can consider trial of dexamethasone 10 mg IV  - Daily CBC, BMP, q48 hour CRP, q48 hour ESR    Micro:  Blood culture 7/15: NGTD  Blood culture 7/18: NGTD   CSF culture 7/17: NGTD   Aerobic culture: NGTD    Anaerobic: in process   AFB: could not complete not enough CSF   Fungal: in process    Crypto: negative   HSV 1/2: Not detected    Strep pneumo: negative   Meningitis/encephalitis panel: Negative   Varicella: Negative   CMV: Negative     # Leukopenia, improving   ANC 1.1 7/18. Could be 2/2 to either sepsis or meropenem.   - Continue to monitor   - HIV in process    # Dysphagia  # Epigastric pain  # GERD  Patient has acute onset of painful swallowing prior to admission. ENT consulted, no acute intervention. No significant finding on their exam to locate infectious source. Rapid strep negative. She does report week of vomiting 4-5 times (no hematemesis) a day in June that seems to have to initially precipitated the throat pain, seems to have subsided. Then returned acutely on 7/10. Endorses high NSAID use. With aforementioned history, some concern for upper  esophogeal tear, consulted GI. They feel this is highly unlikely.   - Could consider Gastrogaffin esophogram.   - Increase PPI for 6-8 weeks and have pt follow-up outpatient if GERD symptoms do not resolve    # Chronic migraines  Long history of chronic migraines. Differentiates chronic migraine pain from acute HA pain with this presentation.   - PTA topamax     # PTSD  # Depression with Anxiety  New stressors including hospitalization. 2010 was intubated with trach so significant prior hospitalization event. Initial virtuala meeting with health psychology 7/20, with planned in hospital meeting 7/21.   - Health psychology consult    #Acne  - Hold PTA spirolactone due to softer BPs    # Pain Assessment:  Current Pain Score 7/20/2021   Patient currently in pain? yes   Pain score (0-10) -   - Leticia is experiencing pain due to paravertebral fluid collection. Pain management was discussed and the plan was created in a collaborative fashion.  Leticia's response to the current recommendations: engaged  - Please see the plan for pain management as documented above    Diet: Regular Diet Adult  Snacks/Supplements Adult: Ensure Enlive; Between Meals  Fluids: PO (additional LR bolus due to poor PO hydration)  DVT Prophylaxis: Pneumatic Compression Devices  Code Status: Full Code    Disposition Plan   Expected discharge:  4 - 7 days, recommended to prior living arrangement once adequate pain management/ tolerating PO medications and antibiotic plan established.Dispo:   7/25/2021        Entered: Judy Rocha 07/21/2021, 5:25 AM   Information in the above section will display in the discharge planner report.      The patient's care was discussed with the Attending Physician, Dr. Plummer.    Judy Rocha  [facility]: PG2  Pager: [phone]  Please see sticky note for cross cover information    Interval History    Overnight had consistent headache, throbbing pain, points to top of head.  Tried migraine cocktail x2 no relief. Got IM zyprexa, feels tired but no longer nauseas after the Zyprexa. Had received in the past hour, so not sure how its helping her headache yet. No vomitting.     Physical Exam   Vital Signs: Temp: 98.3  F (36.8  C) Temp src: Oral BP: 112/66 Pulse: 87   Resp: 16 SpO2: 100 % O2 Device: None (Room air)    Weight: 131 lbs 6.4 oz  Physical Exam   General: Alert and oriented, in no acute distress.  Skin: Warm and dry, no abnormalities noted. No new rash.  Eyes: Extra-ocular muscles grossly intact, PERRLA    ENT: dry mucous membranes   CV: S1 S2 RRR. No murmur. No cyanosis or pallor, warm and well perfused.  Respiratory: CTAB. No w/r/r. No respiratory distress, no accessory muscle use.  Neuro: Mental Status: A&O x 3. Attention, memory, intact. Speech: Speech fluent. Comprehension intact. Cranial Nerves: CN II-XII grossly intact. Strength 5/5 BL UE and BL LE.     Psychiatric: Mood and affect appear normal.   Extremities: Warm, able to move all four extremities at will. No pain with movement of joints.    Data   Recent Labs   Lab 07/20/21  0621 07/19/21  0640 07/18/21  1033 07/18/21  0800 07/16/21  0536 07/14/21  1835   WBC 3.6* 3.5*  --  2.8*  2.8* 5.1 6.4   HGB 11.3* 10.7*  --  10.0* 10.0* 11.5*   MCV 80 79  --  80 79 78    241  --  230 241 302   INR  --   --   --   --  1.11  --     140  --  140 141 141   POTASSIUM 3.6 3.7  --  3.7 3.8 3.5   CHLORIDE 112* 111*  --  112* 113* 112*   CO2 22 23  --  23 26 23   BUN 8 9  --  7 5* 8   CR 1.01 0.92  --  0.89 0.72 0.73   ANIONGAP 6 6  --  5 2* 6   SAMUEL 8.6 8.9  --  8.3* 8.7 8.8   GLC 80 79 87 85 104* 85   TROPONIN  --   --   --   --   --  <0.015     Recent Results (from the past 24 hour(s))   CTV Head Neck w Contrast    Narrative    CTV HEAD NECK WITH CONTRAST 7/20/2021 9:00 PM    History:  Dural venous sinus thrombosis suspected     Comparison: Brain MRI 7/19/2021 and 7/15/2021, head CT 6/15/2021,  cervical spine MRI 7/14/2021      Technique: Helically acquired thin section axial CT images were  obtained with 1 mm collimation through the brain after intravenous  bolus injection of iodinated contrast medium with an approximately  20-25 second delay between administration of contrast and scanning.  Image data were sent to the 3D workstation and postprocessed using  maximum intensity pixel (MIP), multiplanar and volume rendered 3D  reconstruction programs.     Contrast: iopamidol (ISOVUE-370) solution 75 mL    Findings: No thrombosis or stenosis in the major intracranial dural  sinuses or deep cerebral veins. Right dominant venous drainage.  Congenitally hypoplastic left transverse, sigmoid, and internal  jugular vein. Short segment focal severe narrowing of the left  internal jugular vein as it courses along the lateral margin of the  left transverse process of C1. There is distal reconstitution of the  left jugular vein via the left external jugular vein.    No mass effect, midline shift, or intracranial hemorrhage. The  ventricles are proportionate to the cerebral sulci. No abnormal  enhancement in the brain. The paranasal sinuses and mastoid air cells  are clear. The orbits are unremarkable.    The thyroid gland is unremarkable. Enlarged right level 1A lymph node  measuring up to 1.5 cm and right level 1B lymph node measuring 1.9 cm.  Multiple additional prominent bilateral cervical chain lymph nodes.    Reversal of the normal cervical lordotic curvature. No significant  spinal canal and neural foraminal stenosis at any level. The  visualized lung apices are clear.      Impression    Impression:    1. No dural venous sinus thrombosis.  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  3. Enlarged right level 1A and 1B lymph nodes in addition to multiple  other prominent cervical chain lymph nodes which are likely reactive.    I have personally reviewed the examination and initial interpretation  and I agree with the  findings.    GÓMEZ LI MD         SYSTEM ID:  [id number]     Medications       acetaminophen  975 mg Oral TID     omeprazole  40 mg Oral Daily     polyethylene glycol  17 g Oral Daily     prochlorperazine  5 mg Intravenous Q6H    Or     prochlorperazine  5 mg Oral Q6H    Or     prochlorperazine  25 mg Rectal Q6H     sodium chloride (PF)  3 mL Intracatheter Q8H     [Held by provider] spironolactone  100 mg Oral Daily     topiramate  50 mg Oral At Bedtime     tretinoin   Topical At Bedtime

## 2021-07-21 NOTE — PROGRESS NOTES
"[facility]  Neurology Consultation - Progress Note    Patient Name:  Leticia Morales  Date of Service:  July 21, 2021    Subjective:    Ms. Morales's headache has unfortunately worsened since the last time we saw her. It has shifted from the vertex to the temporal region bilaterally with some retroorbital involvement as well. The headache is sharp and throbbing  consistent with her previous migraines. Endorsed photophobia, phonophobia, nausea, and blurry vision on the right side. Denied epistaxis or lacrimation.  The headache cocktail of benadryl, metaclopromide, and magnesium given overnight made her drowsy, but did not relieve the pain.       Objective:    Vitals: /67 (BP Location: Left arm)   Pulse 81   Temp 98  F (36.7  C) (Oral)   Resp 18   Ht 1.626 m (5' 4\")   Wt 59.6 kg (131 lb 6.4 oz)   SpO2 99%   BMI 22.55 kg/m    General: Lying in bed, uncomfortable, clutching right side of face  Head: Atraumatic, normocephalic   Cardiac: no lower extremity edema  Neurologic:  Mental Status: Fully alert, attentive and oriented. Speech clear and fluent.   Cranial Nerves: EOM intact, pain with right eye movements. Without nystagmus. Facial movements symmetric at rest and with activation. Tongue midline. Hyperesthesia on the right face.   Motor: No abnormal movements.  5/5 strength on proximal upper and lower extremities.  Reflexes: Normoreflexic biceps, brachioradialis, patellae, achilles  Sensory: Intact to light touch x 4 extremities   Station/Gait: did not assess, right sided headache pain exacerbated when walking    Pertinent Investigations:    I have personally reviewed most recent and pertinent labs, tests, and radiological images.     Assessment  Ms. Leticia Morales is a 35 year old female with a pmh of chronic migraines, GERD, and PTSD presented with headache and right shoulder and arm pain which further work up revealed prevertebral fluid collection, increased CRP, focal dural " "enhancement, leukopenia, and anemia.    Headache  The worsening throbbing headache, with unilateral components, that is consistent with her prior \"major\" migraines, makes recurrent migraine the most likely etiology. Another possibility on the differential is a rebound migraine given the patient recently stopped sumatriptan per outpatient neurology recommendation on 7/13.  Per chart review, the new headache in the hospital began in the AM on 7/17 a few hours before her LP. This makes a low pressure headache from LP unlikely, particularly given this was not consistent with the MRI showing only focal dural enhancement.     She was started on topiramate 50 mg on admission for preventative medications per outpatient Neurologist. She was given benadryl, magnesium, and metoclopramide yesterday without improvement. Of note, benadryl was given PO and not IV which could explain her lack of relief.  In the future this might be a reasonable headache management if given appropriately.it is reasonable to add an additional agent.    Given persistent headache with migrainous features, would elect to start another agent. Options include Depakote, Zyprexa, etc. Elected to start Zyprexa which may also help with nausea. Will follow patients symptoms.    Pachymeningeal Enhancement  Inflammatory (neurosarcoid, IgG4, GPA) v. Neoplastic (carcinomatosis, lymphoma) v. Infectious (see original note for detailed differential).    Given her original LP did not include cytology or flow cytometry, an additional LP with these orders would be useful to rule out lymphoma while awaiting rheum labs, particularly given her worsening headache symptoms. If still not improved, a repeat MRI and dural biopsy could eventually be performed if LP and other labs inconclusive. Given the short interval since her last MRI (7/19), an MRI at this time would not yield useful results.     Recommendations:   -Trial of Zyprexa 5 mg IM x1  -If no improvement with above, " can consider trial of dexamethasone 10 mg IV  -Agree with autoimmune work up per rheumatology, if unrevealing could consider repeat LP with cytology and flow cytometry  -Consider repeat MRI vs dural biopsy in a few days if still not improved and no other conclusive results    Thank you for involving Neurology in the care of Leticia Morales.  Please do not hesitate to call with questions/concerns (consult pager [phone]).      Patient was seen and discussed with Dr. Levin.    YOUNG IBRAHIM, MS3    Resident/Fellow Attestation   I, Shavon Candelario, was present with the medical student who participated in the service and in the documentation of the note.  I have verified the history and personally performed the physical exam and medical decision making.  I agree with the assessment and plan of care as documented in the note.      Shavon Candelario MD  PGY2  Date of Service (when I saw the patient): 07/21/21

## 2021-07-21 NOTE — PROGRESS NOTES
Brief Rheumatology Note    Leticia Morales is a 35yoF with PMH chronic migraines, GERD, and PTSD admitted on 7/15/2021 d/t 6/10 bitemporal headache and acute onset of transient right shoulder/arm/hand weakness/numbness/tingling, found to have a cervical prevertebral fluid collection and frontal dural enhancement on MRI. Rheumatology was consulted due to concern for immunologic cause of her headaches given infectious disease workup with low concern for infectious etiology. We have low concern for rheumatologic cause of her headaches given improvement in imaging in short time frame without treatment of rheumatologic etiologies. Her ANGELINA and ANCA returned negative. We will continue to follow labs pending: dsDNA, MPO/PR3, RICCI, ACE serum, and IgG classes. Given low concern for infectious cause per infectious disease, we have no objection to starting trial of steroids per neurology recs.     Plan:  -no further recommendations per rheumatology as we do not suspect a rheumatologic cause of her migraines  -no objection to starting steroids as advised per neurology  -will follow-up on pending labs: dsDNA, MPO/PR3, RICCI, ACE serum, IgG classes    Dominga Rush  Medical Student   [facility] Rheumatology Service    Resident/Fellow Attestation   I, Holly Rene, was present with the medical/MILENA student who participated in the service and in the documentation of the note.  I have verified the history and personally performed the physical exam and medical decision making.  I agree with the assessment and plan of care as documented in the note.        Holly Rene MD  PGY 3  Date of Service (when I saw the patient): 07/21/21

## 2021-07-21 NOTE — PROGRESS NOTES
Time: 6971-4202     Reason for admit: Migraines   Vitals: VSS on RA, afebrile   Activity: SBA   Neuro: Q4 hr neuro checks, intact.   Mood/Behavior: Pleasant, cooperative   Lines/Drains: RPIV - SL   Cardiac: WDL  Resp: LS clear, tolerating RA, denies SOB  Diet: Regular diet, fair appetite  GI/: Voiding WDL, LBM 7/20  Skin: Intact  Pain: Persistent migraine - several PRN's provided w/o relief    New this shift: Consistent headache, throbbing pain, points to top of head. 1X trial of IM zyprexa w/o results. This afternoon, gave a 1X dose of dexamethasone. Encourage caffeine intake, helping slightly. Neuro following, rec's placed. Met w/      Plan: May consider repeat MRI vs dural biopsy if symptoms still not improving. All testing so far has negative. Pending rheum lab work up.

## 2021-07-21 NOTE — PROGRESS NOTES
"SPIRITUAL HEALTH SERVICES  SPIRITUAL ASSESSMENT Progress Note  [facility] 5A       REFERRAL SOURCE: Self initiated  visit, Anglican specific.     DATA: Pt Leticia Morales identifies as Anglican and is of [place] descent.     Upon introducing myself as the Lead Anglican  to Leticia at the door of her room, she asked that I wait a moment while she observes her hijab. I entered and oriented her to [facility] and my availabilty.     She shared that, \"My migraines may be from a tumor in my brain\". She was in much pain during our encounter but asked which way was [place] so that she could have her bed face that direction for prayers. I checked the [place] direction on my mobile device and noted that Leticia's position was already facing [place]. I also taught her an Islamic concession in which she could perform a dry ablution for prayers from her bedside so that she would not need to get up and wash using a sink, as she is very ill. Leticia was grateful for the ease in this and did perform it in my presence.     I offered Islamic incantations/prayer at bedside. We prayer together at her request. Leticia has received an Persian/English copy of the Holy Quran. I also provided Leticia with an Islamic prayer booklet and card with a Prophetic supplication.       PLAN: I will follow up with Leticia Morales for the duration of her stay.     María Tello  Lead Anglican   Pager [phone]    [facility] remains available 24/7 for emergent requests/referrals, either by having the switchboard page the on-call  or by entering an ASAP/STAT consult in Epic (this will also page the on-call ).    "

## 2021-07-21 NOTE — PLAN OF CARE
"Shift time: 4087-7694.    Neuro: alert and oriented x4. Pleasant, calm, cooperative. Neuro exams Q4.   VS: VSS on RA  Pain: c/o HA and pressure behind eyes.   Diet: Regular diet. Fair appetite.   Act: SBA d/t dizzyness  Lines: PIV x2  Skin: visible skin WNL  Cardiovascular: WNL  Resp: Denies SOB. LS clear. On RA.  GI/: Voiding spontaneously up to bathroom. Intermittent nausea.     New this shift: RN contacted by neurology team and discussed ordering \"migraine cocktail\" Pt continued to have persistent HA so primary team contacted, and this was ordered and given. Pt still experiencing HA after these meds were given, another PRN dose available if needed.  Flexeril also given w/ minimal relief.     Pt went down for CT this shift. UA sent.     "

## 2021-07-22 LAB
ACE CSF-CCNC: 1 U/L
ACE SERPL-CCNC: 22 U/L
ANION GAP SERPL CALCULATED.3IONS-SCNC: 9 MMOL/L (ref 3–14)
BACTERIA BLD CULT: NO GROWTH
BACTERIA CSF CULT: NO GROWTH
BUN SERPL-MCNC: 11 MG/DL (ref 7–30)
CALCIUM SERPL-MCNC: 9 MG/DL (ref 8.5–10.1)
CHLORIDE BLD-SCNC: 110 MMOL/L (ref 94–109)
CO2 SERPL-SCNC: 19 MMOL/L (ref 20–32)
CREAT SERPL-MCNC: 0.79 MG/DL (ref 0.52–1.04)
CRP SERPL-MCNC: 3.8 MG/L (ref 0–8)
ERYTHROCYTE [DISTWIDTH] IN BLOOD BY AUTOMATED COUNT: 17 % (ref 10–15)
GFR SERPL CREATININE-BSD FRML MDRD: >90 ML/MIN/1.73M2
GLUCOSE BLD-MCNC: 166 MG/DL (ref 70–99)
GRAM STAIN RESULT: NORMAL
GRAM STAIN RESULT: NORMAL
HCT VFR BLD AUTO: 34.7 % (ref 35–47)
HGB BLD-MCNC: 11.1 G/DL (ref 11.7–15.7)
MCH RBC QN AUTO: 25 PG (ref 26.5–33)
MCHC RBC AUTO-ENTMCNC: 32 G/DL (ref 31.5–36.5)
MCV RBC AUTO: 78 FL (ref 78–100)
PLATELET # BLD AUTO: 328 10E3/UL (ref 150–450)
POTASSIUM BLD-SCNC: 3.5 MMOL/L (ref 3.4–5.3)
RBC # BLD AUTO: 4.44 10E6/UL (ref 3.8–5.2)
SODIUM SERPL-SCNC: 138 MMOL/L (ref 133–144)
WBC # BLD AUTO: 3.8 10E3/UL (ref 4–11)

## 2021-07-22 PROCEDURE — 82374 ASSAY BLOOD CARBON DIOXIDE: CPT | Performed by: STUDENT IN AN ORGANIZED HEALTH CARE EDUCATION/TRAINING PROGRAM

## 2021-07-22 PROCEDURE — 250N000011 HC RX IP 250 OP 636: Performed by: STUDENT IN AN ORGANIZED HEALTH CARE EDUCATION/TRAINING PROGRAM

## 2021-07-22 PROCEDURE — 120N000002 HC R&B MED SURG/OB UMMC

## 2021-07-22 PROCEDURE — 99233 SBSQ HOSP IP/OBS HIGH 50: CPT | Mod: GC

## 2021-07-22 PROCEDURE — 250N000013 HC RX MED GY IP 250 OP 250 PS 637: Performed by: STUDENT IN AN ORGANIZED HEALTH CARE EDUCATION/TRAINING PROGRAM

## 2021-07-22 PROCEDURE — 86140 C-REACTIVE PROTEIN: CPT | Performed by: STUDENT IN AN ORGANIZED HEALTH CARE EDUCATION/TRAINING PROGRAM

## 2021-07-22 PROCEDURE — 82565 ASSAY OF CREATININE: CPT | Performed by: STUDENT IN AN ORGANIZED HEALTH CARE EDUCATION/TRAINING PROGRAM

## 2021-07-22 PROCEDURE — 36415 COLL VENOUS BLD VENIPUNCTURE: CPT | Performed by: STUDENT IN AN ORGANIZED HEALTH CARE EDUCATION/TRAINING PROGRAM

## 2021-07-22 PROCEDURE — 99231 SBSQ HOSP IP/OBS SF/LOW 25: CPT | Mod: GC | Performed by: STUDENT IN AN ORGANIZED HEALTH CARE EDUCATION/TRAINING PROGRAM

## 2021-07-22 PROCEDURE — 99232 SBSQ HOSP IP/OBS MODERATE 35: CPT | Mod: GC | Performed by: STUDENT IN AN ORGANIZED HEALTH CARE EDUCATION/TRAINING PROGRAM

## 2021-07-22 PROCEDURE — 250N000009 HC RX 250: Performed by: STUDENT IN AN ORGANIZED HEALTH CARE EDUCATION/TRAINING PROGRAM

## 2021-07-22 PROCEDURE — 999N000127 HC STATISTIC PERIPHERAL IV START W US GUIDANCE

## 2021-07-22 PROCEDURE — 85027 COMPLETE CBC AUTOMATED: CPT | Performed by: STUDENT IN AN ORGANIZED HEALTH CARE EDUCATION/TRAINING PROGRAM

## 2021-07-22 RX ORDER — PREDNISONE 20 MG/1
60 TABLET ORAL DAILY
Status: DISCONTINUED | OUTPATIENT
Start: 2021-07-23 | End: 2021-07-22

## 2021-07-22 RX ORDER — PREDNISONE 20 MG/1
60 TABLET ORAL DAILY
Status: DISCONTINUED | OUTPATIENT
Start: 2021-07-23 | End: 2021-07-24 | Stop reason: HOSPADM

## 2021-07-22 RX ORDER — NAPROXEN 250 MG/1
250 TABLET ORAL 2 TIMES DAILY PRN
Status: DISCONTINUED | OUTPATIENT
Start: 2021-07-22 | End: 2021-07-24 | Stop reason: HOSPADM

## 2021-07-22 RX ORDER — TOPIRAMATE SPINKLE 25 MG/1
50 CAPSULE ORAL 2 TIMES DAILY
Status: DISCONTINUED | OUTPATIENT
Start: 2021-07-22 | End: 2021-07-24 | Stop reason: HOSPADM

## 2021-07-22 RX ORDER — DEXAMETHASONE SODIUM PHOSPHATE 4 MG/ML
10 INJECTION, SOLUTION INTRA-ARTICULAR; INTRALESIONAL; INTRAMUSCULAR; INTRAVENOUS; SOFT TISSUE ONCE
Status: COMPLETED | OUTPATIENT
Start: 2021-07-22 | End: 2021-07-22

## 2021-07-22 RX ADMIN — ACETAMINOPHEN 975 MG: 325 TABLET, FILM COATED ORAL at 07:59

## 2021-07-22 RX ADMIN — ACETAMINOPHEN 975 MG: 325 TABLET, FILM COATED ORAL at 20:31

## 2021-07-22 RX ADMIN — PROCHLORPERAZINE MALEATE 5 MG: 5 TABLET ORAL at 07:59

## 2021-07-22 RX ADMIN — TOPIRAMATE 50 MG: 25 CAPSULE, COATED PELLETS ORAL at 20:34

## 2021-07-22 RX ADMIN — PROCHLORPERAZINE MALEATE 5 MG: 5 TABLET ORAL at 11:38

## 2021-07-22 RX ADMIN — PROCHLORPERAZINE MALEATE 5 MG: 5 TABLET ORAL at 18:20

## 2021-07-22 RX ADMIN — PROCHLORPERAZINE EDISYLATE 5 MG: 5 INJECTION INTRAMUSCULAR; INTRAVENOUS at 00:00

## 2021-07-22 RX ADMIN — OMEPRAZOLE 40 MG: 20 CAPSULE, DELAYED RELEASE ORAL at 20:31

## 2021-07-22 RX ADMIN — CALCIUM CARBONATE (ANTACID) CHEW TAB 500 MG 1000 MG: 500 CHEW TAB at 14:40

## 2021-07-22 RX ADMIN — LIDOCAINE HYDROCHLORIDE 30 ML: 20 SOLUTION ORAL; TOPICAL at 00:00

## 2021-07-22 RX ADMIN — DIPHENHYDRAMINE HYDROCHLORIDE 12.5 MG: 25 SOLUTION ORAL at 20:31

## 2021-07-22 RX ADMIN — OMEPRAZOLE 40 MG: 20 CAPSULE, DELAYED RELEASE ORAL at 08:00

## 2021-07-22 RX ADMIN — DEXAMETHASONE SODIUM PHOSPHATE 10 MG: 4 INJECTION, SOLUTION INTRAMUSCULAR; INTRAVENOUS at 14:40

## 2021-07-22 RX ADMIN — ACETAMINOPHEN 975 MG: 325 TABLET, FILM COATED ORAL at 14:40

## 2021-07-22 RX ADMIN — PROCHLORPERAZINE EDISYLATE 5 MG: 5 INJECTION INTRAMUSCULAR; INTRAVENOUS at 05:19

## 2021-07-22 RX ADMIN — TOPIRAMATE 50 MG: 25 CAPSULE, COATED PELLETS ORAL at 11:38

## 2021-07-22 ASSESSMENT — ACTIVITIES OF DAILY LIVING (ADL)
ADLS_ACUITY_SCORE: 15

## 2021-07-22 NOTE — PROGRESS NOTES
[facility]     Patient:  Leticia Morales   YOB: 1985  Date of Visit:  07/22/2021  Date of Admission: 7/15/2021  Consult Requester:Natalee López DO          Assessment and Recommendations:   ID Problem List:  - Sepsis, resolved  - Prevertebral fluid collection:  Headache, right arm numbness/ tingling,    - Neoplastic, immunologic, more likely    - Infectious: Prevertebral abscess and/or parapharyngeal/retropharyngeal space infection, less likely  - Leukopenia, Lymphopenia, possible meropenem-induced  - Acute odynophagia, resolved 2/2 to parapharyngeal/retropharyngeal space infection/paraneoplasic, less likely   - Chronic migraines s/p analgesia overuse, neoplastic, immunologic  - History of  GERD   - Epigastric tenderness   - PTSD  - Nausea, vomiting, retching/ gagging in June 2021    Recommendations:  - ID sign off  - If fever develops, activate sepsis workup and run blood cultures  - No further recommendations per ID as we do not suspect a infectious cause of her migraines    Discussion:  Leticia Morales is a 34 yo female patient with a past medical history of chronic migraines (>10 visits to the ED/year), GERD, PTSD who presents to the ED on 07/14/2021 with a 5-day history of sudden moderate holoacranial  headache (6/10) that started in the morning and neck pain (8/10) that radiates to the right arm, upper and middle back by the evening. An MRI (07/14) ordered on the ED showed prevertebral fluid collection c/f early infection/abscess.    Patient with generalized well-appearance, alert and oriented to self, time and space, refers persistent left-sided migraine pain (6/10) that started today in the morning. Overnight events were present with persistent migraine last night right-sided, photophobia and nauseas (no emesis) that did not resolved with migraine cocktail, but did after steroid IV administration. Immunologic blood serum testing resulted negative for ANCA,  IgG4. ANGELINA. Still pending ACE, PR3, MPO, DNAds, RICCI, IgG subclass. Rheumatology signed off due to no possible immunologic cause. HIV and Treponema Ab are nonreactive. AFB CSF cultures have not been analyzed for insufficient CSF sample. Head MRI on 07/20 recommended by Neurology consult showed no dural venous sinus thrombosis but enlarged 1A/1B lymph nodes. Neurosarcoidosis is a possible diagnosis. Evaluating possibility of a second LP and posterior flow cytometry/cytology to rule out lymphoma, and MRI and dural biopsy.    Based on persistent migraine-type headache, being afebrile since admission, absence of leukocytosis, CRP improvement (3.8 down from 12 on 07/17), normal lactic acid, absence of neck stiffness (Brudzinsky and Kerning signs (-)), and significant improvement with meropenem, high suspicion of neoplastic/inflammatory process, and less likely and infectious cause, ID will sign off today.    - ID sign off  - If fever develops, activate sepsis workup and run blood cultures  - No further recommendations per ID as we do not suspect a infectious cause of her migraines    Thank you for the consult. ID will continue to follow with you.     Ellen Frazier MD   Pager: [phone]  [facility]  07/22/2021     Attestation:    Infectious Diseases  Pager:      ________________________________________________________________    Consult Question: Prevertebral fluid collection to rule out prevertebral abscess and possibility of antibiotic treatment  Admission Diagnosis: Discitis, unspecified spinal region [M46.40]         History of Present Illness:   History obtained from review of chart and discussion with patient.     Leticia Morales is a 35 year old female with a PMH of significant chronic migraine, GERD, PTSD who came to the ED on 07/14/2021 with a 5-day history of headache, neck pain, swallowing troubles and numbness/tingling on he right upper extremity and had an MRI with positive findings of  prevertebral fluid collection. ID was consulted to rule out prevertebral abscess and initiate antibiotic therapy. ID agreed with beginning of meropenem IV and neurology consult to possible lumbar puncture. LP showed no abnormal cellularity and negative aerobic cultures. Afebrile. Less likely infectious source for now.    She describes a shooting and sharping pain that is associated with tingling and numbness on the right upper extremity that lasted for 4 hours. Some chills and increased temperature sensation over the past days. Nothing makes it better (pain medication: tylenol/ibuprofen, hot/cold pad) and massages and movements make it worse. She denies weakness but complies inability to completely raise her right arm due to pain as well as mild chest pain and shortness of breath and restricted range of motion (latero-lateral, up and down). Patient also complies of front and back neck pain which made her swallowing very difficult especially for soft solids and liquids that started on Monday, in addition to presence of acid reflux due to pills intake without eating. Weight loss is present for the past month (weight loss of 9 pounds over 1 month). Patient denies sick contacts, any travel recently, IV drug use, recent cold/flu/sinusitus and trauma. She also denies cough, night sweats, voice muffling, stridor and neck swelling.          Review of Systems:   10 point review of systems was negative except for noted as above in HPI.     ROS:  Constitutional: no fever, no chills, nauseas, midline neck pain (2/10), loss of appetite, moderate left unilateral headache  Neuro: no HA, No focal weakness. No visual changes, facial droop, drooling. Mild photosensitivity. Headache with migraine characteristics (unilateral throbbing, 6/10). No tingling, weakness, numbness.   HEENT: no acute blurry vision, no earache, no ear discharge, sore throat, right cervical anterior chain lymphadenopathy x2. No mass sensation on posterior  oropharynx.  CVS: no chest pain, heart palpitation, no syncope or presyncope  Lungs: no dyspnea, no chest pain, no cough. No shortness of breath.  GI: no N/V, no diarrhea, no constipation, no abdominal pain  : no dysuria, no hesitancy, no incontinence  Skin: no rash, no Itching, no abscesses  Allergy/immunology: no allergic reaction   Musculoskeletal: no muscle pain, no joint pain, no joint/neck swelling, mild limited ROM of neck movements (latero-lateral, up-down)             Past Medical History:     Past Medical History:   Diagnosis Date     ARDS (adult respiratory distress syndrome) (H) 2010    related to H1N1 infection     H1N1 influenza 2010    prolonged ICU stay, medically induced coma     Iron deficiency anemia      Migraines             Past Surgical History:     Past Surgical History:   Procedure Laterality Date     THORACIC SURGERY      trach            Family History:   Reviewed and non-contributory.   Family History   Problem Relation Age of Onset     Diabetes Father      Hypertension Father      Cancer Father      Other - See Comments Father         Epistaxis when Angry     Migraines Mother             Social History:     Social History     Tobacco Use     Smoking status: Former Smoker     Types: Hookah     Smokeless tobacco: Never Used   Substance Use Topics     Alcohol use: No     History   Sexual Activity     Sexual activity: Never            Current Medications:       acetaminophen  975 mg Oral TID     omeprazole  40 mg Oral BID     polyethylene glycol  17 g Oral Daily     [START ON 7/23/2021] predniSONE  60 mg Oral Daily     prochlorperazine  5 mg Intravenous Q6H    Or     prochlorperazine  5 mg Oral Q6H    Or     prochlorperazine  25 mg Rectal Q6H     sodium chloride (PF)  3 mL Intracatheter Q8H     [Held by provider] spironolactone  100 mg Oral Daily     topiramate  50 mg Oral BID     tretinoin   Topical At Bedtime            Allergies:     Allergies   Allergen Reactions     Macrodantin  [Nitrofurantoin Macrocrystal] Other (See Comments)     itching            Physical Exam:   Vitals were reviewed  Temp:  [97  F (36.1  C)-98.6  F (37  C)] 98.1  F (36.7  C)  Pulse:  [74-94] 94  Resp:  [16] 16  BP: ()/(55-73) 109/71  SpO2:  [99 %-100 %] 100 %    Vitals:    07/15/21 0015 07/17/21 2047   Weight: 56.7 kg (125 lb) 59.6 kg (131 lb 6.4 oz)       Constitutional: Adult female in no distress, , in NAD. Awake, alert, interactive.  HEENT: NC/AT, EOMI, sclera clear, conjunctiva normal, OP with MMM. No trismus. No pain while opening mouth. No ear discharge, no ear pain. No teeth pain. No bulging in posterior oropharynx.   Respiratory: No increased work of breathing, CTAB, no crackles or wheezing.  Cardiovascular: RRR, no murmur noted. No peripheral edema.   GI: Normal bowel sounds, soft, non-distended and non-tender. No hepatosplenomegaly.  Skin: Warm, dry, well-perfused. No bruising, bleeding, rashes, or lesions on limited exam.  Musculoskeletal: Extremities grossly normal, non-tender, no edema. Limited right upper extremity and neck ROM in bed.   Neurologic: A&O. Answers questions appropriately, speech normal. Moves all extremities spontaneously. CNII-XII without abnormalities.   Neuropsychiatric: Calm. Affect appropriate to situation.  Vascular access:  Peripheral catheter on placed CDI on left arm without local inflammatory signs, non-tender, no surrounding erythema.         Laboratory Data:     Microbiology:    Culture Micro   Date Value Ref Range Status   10/29/2017 No growth  Final   04/15/2015 Normal skin delia  Final   05/24/2012 No Beta Streptococcus isolated  Final   11/22/2011   Final    10 to 50,000 colonies/mL Mixed gram positive delia  Multiple species present, probable perineal contamination.  Susceptibility testing not routinely done   11/25/2009 No growth  Final   11/25/2009 No growth  Final   11/25/2009 Light growth Candida tropicalis  Final   11/25/2009 No growth  Final   11/24/2009 No  growth  Final   11/23/2009 Light growth Candida tropicalis  Final   11/23/2009 No growth  Final   11/23/2009 No growth  Final   11/23/2009 No growth  Final   11/21/2009 Light growth Candida tropicalis  Final   11/21/2009 No growth  Final   11/21/2009 No growth  Final   11/21/2009 No growth  Final   11/19/2009 Normal delia  Final   11/19/2009 10 to 50,000 colonies/mL Mixed gram positive delia  Final     Comment:     Multiple species present, probable perineal contamination.  Susceptibility testing not routinely done   11/19/2009 No growth  Final   11/19/2009 No growth  Final   11/16/2009   Final    Light growth Candida albicans / dubliniensis For non-significant sites, Candida     Comment:      albicans is not differentiated from Dacia dubliniensis. This is the   national   standard.   11/15/2009 No growth  Final   11/15/2009 No growth  Final   11/14/2009 No growth after 30 days  Final   11/14/2009 Light growth Candida albicans / dubliniensis  Final     Comment:     For non-significant sites, Candida albicans is not differentiated from Dacia   dubliniensis. This is the national standard.  No additional fungi cultured after 4 weeks incubation   11/14/2009 No growth  Final   11/14/2009   Final    Culture received and in progress. Assayed at [employer]     Comment:      [place] [address]  Positive AFB results are called as soon as detected.  Final report to follow in 7 to 8 weeks.  Culture negative for acid fast bacilli   11/14/2009 Moderate growth Coagulase negative Staphylococcus  Final   11/14/2009 No growth  Final   11/14/2009 No growth  Final   11/11/2009 No growth after 28 days  Final   11/11/2009 Moderate growth Candida tropicalis  Final     Comment:     No additional fungi cultured after 4 weeks incubation   11/11/2009 Moderate growth Candida tropicalis  Final   11/11/2009   Final    Culture negative for acid fast bacilli Assayed at [facility]     Comment:      [place]  [address]   11/11/2009 No growth  Final   11/10/2009   Final    Heavy growth Dacia albicans / dubliniensis For non-significant sites, Candida     Comment:      albicans is not differentiated from Dacia dubliniensis. This is the   national   standard.   11/08/2009 Light growth Candida tropicalis  Final   11/08/2009 No growth after 14 days  Final   11/08/2009 No growth  Final   11/08/2009 No growth  Final   11/08/2009   Final    >100,000 colonies/mL Candida albicans / dubliniensis For non-significant sites,     Comment:      Candida albicans is not differentiated from Dacia dubliniensis. This is the   national standard.   11/08/2009 No growth  Final   11/08/2009 No growth  Final   11/04/2009 Moderate growth Candida tropicalis  Final   10/31/2009 No growth  Final   10/31/2009 No growth  Final   10/31/2009 No growth  Final   10/31/2009 No growth  Final   10/31/2009 No growth  Final   10/31/2009 No growth  Final   10/29/2009 No growth after 29 days  Final   10/29/2009   Final    Moderate growth Candida albicans / dubliniensis For non-significant sites,     Comment:      Candida albicans is not differentiated from Dacia dubliniensis. This is the   national standard.  No additional fungi cultured after 4 weeks incubation   10/29/2009 No growth  Final   10/29/2009 Culture received and in progress.  Final     Comment:     Positive AFB results are called as soon as detected.  Final report to follow in 7 to 8 weeks.  Culture negative for acid fast bacilli   10/29/2009   Final    No Salmonella, Shigella, Campylobacter or E coli 0157 isolated.   10/28/2009 No growth  Final   10/28/2009 No growth  Final   10/28/2009 No growth  Final   10/28/2009 No growth  Final   10/27/2009 No growth  Final   10/27/2009 No growth  Final   08/31/2009 No Beta Streptococcus isolated  Final       Inflammatory Markers    Recent Labs   Lab Test 07/22/21  0827 07/21/21  0556 07/20/21  0621 07/18/21  0800 07/17/21  0614 07/15/21  0037 07/14/21  1835    SED  --  20  --   --  22* 32* 30*   CRP 3.8  --  12.0* 34.0* 11.0* 24.0*  25.0* 26.0*       Metabolic Studies       Recent Labs   Lab Test 07/22/21  0827 07/21/21  0556 07/20/21  1044 07/20/21  0621 07/19/21  0640 07/18/21  1958 07/18/21  1033 07/18/21  0800 07/17/21  0614    137  --  140 140  --   --  140 139   POTASSIUM 3.5 3.8  --  3.6 3.7  --   --  3.7 3.6   CHLORIDE 110* 110*  --  112* 111*  --   --  112* 112*   CO2 19* 20  --  22 23  --   --  23 22   ANIONGAP 9 7  --  6 6  --   --  5 5   BUN 11 9  --  8 9  --   --  7 5*   CR 0.79 0.96  --  1.01 0.92  --   --  0.89 0.78   GFRESTIMATED >90 77  --  73 81  --   --  85 >90   * 83  --  80 79  --  87 85 135*   SAMUEL 9.0 8.2*  --  8.6 8.9  --   --  8.3* 8.7   LACT  --   --  0.8  --   --  0.7  --   --  2.1*       Hepatic Studies    Recent Labs   Lab Test 06/15/21  1405 10/29/17  1820 03/19/17  1015   BILITOTAL 0.4 0.2 0.2   ALKPHOS 59 60 64   ALBUMIN 3.6 3.2* 3.4   AST 14 9 13   ALT 14 11 12       Pancreatitis testing    Recent Labs   Lab Test 06/15/21  1405 03/19/17  1015   LIPASE 160 218       Hematology Studies      Recent Labs   Lab Test 07/22/21  0827 07/21/21  0556 07/20/21  0621 07/19/21  0640 07/18/21  0800 07/17/21  0614 06/15/21  1405 05/06/19  1558 10/29/17  1820 10/29/17  1820 03/19/17  1015   WBC 3.8* 3.4* 3.6* 3.5* 2.8*  2.8* 6.1 4.8  --    < > 7.9 6.6   ANEU  --   --   --   --   --   --  2.2  --   --  4.7 2.5   ALYM  --   --   --   --   --   --  1.7 1.7  --  1.9 2.9   LINDSEY  --   --   --   --   --   --  0.6  --   --  0.9 0.9   AEOS  --   --   --   --   --   --  0.3  --   --  0.3 0.2   HGB 11.1* 10.7* 11.3* 10.7* 10.0* 10.7* 11.9 9.3*   < > 9.2* 9.9*   HCT 34.7* 33.3* 36.0 33.7* 31.9* 33.4* 37.6 32.1*   < > 31.4* 31.6*    268 264 241 230 251 311  --    < > 411 294    < > = values in this interval not displayed.       Arterial Blood Gas Testing  No lab results found.     Urine Studies     Recent Labs   Lab Test 07/20/21  4154  10/29/17  1834   URINEPH 8.0* 6.0   NITRITE Negative Negative   LEUKEST Negative Negative   WBCU <1 <1       Vancomycin Levels     No lab results found.    Invalid input(s): VANCO    Tobramycin levels     No lab results found.    Gentamicin levels    No lab results found.    CSF testing     Recent Labs   Lab Test 07/17/21  0936   CWBC 0  1   CRBC 32*  240*   CGLU 58   CTP 32       Hepatitis B Testing No lab results found.    Hepatitis C Testing     Hepatitis C Antibody   Date Value Ref Range Status   10/30/2009 Negative NEG Final       Respiratory Virus Testing    RSV Rapid Antigen Result   Date Value Ref Range Status   10/29/2009 Negative NEG Final       Last check of C difficile  No results found for: CDBPCT           Imaging:   Brain MRI (07/19)  No acute intracranial abnormality. Decreased, now minimal,  left frontal dural thickening and enhancement, perhaps sequelae of  prior subdural hemorrhage.    Brain MRI (07/15)  Mildly thickened and enhancing dura over the left frontal lobe.  Differential for this could include post lumbar puncture intracranial  hypotension, although this typically is more diffuse and less focal.  Other considerations would be neoplastic processes such as lymphoma,  leukemia, or metastatic disease. Infectious considerations would  include fungal or mycobacterial infection. An inflammatory processes  such as sarcoid could have this appearance.    CXR (07/14)  There are no acute infiltrates. The cardiac silhouette is  not enlarged. Pulmonary vasculature is unremarkable.     MRI, Cervical Spine (07/14)  1. Thin prevertebral edema/fluid and enhancement throughout the visualized cervical spine and upper thoracic spine as detailed above. No deedee peripherally enhancing abscess. Findings are favored to be infectious. It appears to be centered about the   anterior C6-C7 disc space where there is mild irregularity and bony spurring. This suggests the possibility of early discitis at the C6-C7  level. The disc space and endplates at this level are otherwise normal. There is no abnormal epidural enhancement.     2. Mild likely reactive cervical adenopathy.    Head CT, w/o contrast (06/15)  The ventricles and basal cisterns are within normal limits  in configuration. There is no midline shift. There are no extra-axial  fluid collections. Gray-white differentiation is well maintained.     No intracranial hemorrhage, mass or recent infarct.     The visualized paranasal sinuses are well-aerated. There is no  mastoiditis. There are no fractures of the visualized bones.    CT Chest/Abdomen (06/15)  1.  No acute findings in the chest, abdomen or pelvis. No evidence of  a bowel obstruction or inflammatory process.

## 2021-07-22 NOTE — PROGRESS NOTES
CLINICAL NUTRITION SERVICES    Reviewed nutrition risk factors due to LOS x 7 days. Pt is tolerating her regular diet well, eating fairly well per nursing documentation. No immediate nutrition issues identified at this time. RD will follow via rounds and or per routine at this time, unless consulted.    Cathy Sevilla RD/LORIE  Pager [phone]

## 2021-07-22 NOTE — PROGRESS NOTES
[facility] Progress Note    Main Plans for Today     - Neuro continues following, appreciate recs  - repeat dose of dexamethasone 10mg IV once  - Prednisone 60mg PO daily starting 7/23 for 5 doses  - Topimax 50mg PO BID  - consider valproic acid 500mg IV x1 if severe headache      Assessment & Plan   Leticia Morales is a 35 year old female admitted on 7/15/2021. She has a history of chronic migraines and was admitted for acute onset of R shoulder and upper back pain and transient (now improved) R arm numbness. She was found to have cervical prevertebral fluid collection on MRI of unclear etiology and dural enhancement (which has shown improvement) on repeat MRI brain imaging. She continues admission for persistent headache.     # Status migrainosus  # Medication overuse headache  # Chronic migraines  Long history of chronic migraines with frequent triptan (every other day), ibuprofen, acetaminophen use. HA improvement with decadron delivery 7/21. Neurologic exam stable w/o deficit 7/22.  Patient does note worsening memory over last year. Unclear if Topamax side effect vs PTSD component vs TBI from fall sustained that pt doesn't recall given concern for chronic SDH.   - appreciate neurology recs   - increase topimax to 50mg BID   - repeat dose of dexamethasone 10mg IV once followed by 5 days of    Prednisone 60mg daily   - if severe headache okay to trial one time dose of valproic acid  500mg IV    - follow-up outpatient with neurology Dr. Cathy Candelario   - Repeat MRI scan w/wo contrast in a month    # Sepsis, resolved   # Prevertebral fluid collection   # Enhanced thickened dura over (L) frontal lobe - improved  # Back and R shoulder pain  # Transient R arm numbness and tingling, resolved   # Elevated CRP, ESR, resolved  Presented with upper extremity pain, transient numbness (now resolved), upper back/neck pain, found to have elevated  inflammatory markers and MRI of the cervical spine showing prevertebral fluid collection concerning for infection vs. autoimmune vs. malignancy. Multiple procedural specialties consulted - unable to obtain fluid sampling prior to antibiotic initiation. MRI brain showed mildly thickened and enhancing dura over the left frontal lobe, repeat MRI 7/20 shows improvement. Differential includes: neoplastic process, infectious (fungal, mycobacterial), or inflammatory like sarcoid. Due to brief sepsis with increase in inflammatory markers on 7/17, resolved with IV fluids & Meropenem, was initially having higher suspicion for infection. S/p LP 7/17, so far with no clear infectious etiology (though cultures were taken after abx). Per ID now discontinued meropenem and watch carefully to see if holding abx, unmasks subtle infection, however at this time their overall suscpicion for infection is low. CSF Cytology 7/17 negative for malignancy. Rheumatology was consulted, initial differential includes SLE, rheumatoid arthritis, GPA, and sarcoidosis, however she does not display systemic signs/symptoms that clearly delineate a specific etiology. Neurology was consulted, suggested CTV to r/o cerebral venous thrombosis which was negative, agreed with work up per rheum.    - Neurology consult, appreciate recs   -see above for plan   - Appreciate Infectious Disease recs  - On 7/20 stopped meropenem 2g q8h (7/15 - 7/20 am)    - If fever >100.4, not responsive to Tylenol restart abx   - If fever, repeat blood cultures stat  - Rheumatology consult    - Labs: Add-on ACE to CSF studies if possible follow serum ACE (assuming CSF add-on unable), ANGELINA, RICCI panel, dsDNA, C3/C4, ANCA/MPO/PR3, IgG subclasses,   - q4 VS & neurochecks q4 hours  - Pain management: Tylenol TID, flexeril PRN. Avoid opioids.    - Daily CBC, BMP, q48 hour CRP, q48 hour ESR    Micro:  Blood culture 7/15: NGTD  Blood culture 7/18: NGTD   CSF culture 7/17: NGTD   Aerobic  culture: NGTD    Anaerobic: in process   AFB: could not complete not enough CSF   Fungal: in process    Crypto: negative   HSV 1/2: Not detected    Strep pneumo: negative   Meningitis/encephalitis panel: Negative   Varicella: Negative   CMV: Negative      # Leukopenia, improving   ANC 1.1 7/18. Could be 2/2 to meropenem.   - Continue to monitor     # Dysphagia  # Epigastric pain  # GERD  Patient has acute onset of painful swallowing prior to admission. ENT consulted, no acute intervention. No significant finding on their exam to locate infectious source. Rapid strep negative. She does report week of vomiting 4-5 times (no hematemesis) a day in June that seems to have to initially precipitated the throat pain, seems to have subsided. Then returned acutely on 7/10. Endorses high NSAID use. With aforementioned history, some concern for upper esophogeal tear, consulted GI. They feel this is highly unlikely.   - Increase PPI omeprazole 40mg PO BID  - Continue PPI for 6-8 weeks and have pt follow-up outpatient if GERD symptoms do not resolve  - Could consider Gastrogaffin esophogram.     # PTSD  # Depression with Anxiety  New stressors including hospitalization. 2010 was intubated with trach so significant prior hospitalization event. Initial virtuala meeting with health psychology 7/20, with planned in hospital meeting 7/21.   - Health psychology consult    #Acne  - Hold PTA spirolactone due to softer BPs    # Pain Assessment:  Current Pain Score 7/21/2021   Patient currently in pain? yes   Pain score (0-10) -   - Leticia is experiencing pain due to paravertebral fluid collection. Pain management was discussed and the plan was created in a collaborative fashion.  Leticia's response to the current recommendations: engaged  - Please see the plan for pain management as documented above    Diet: Regular Diet Adult  Snacks/Supplements Adult: Ensure Enlive; Between Meals  Fluids: PO (additional LR bolus due to poor PO  hydration)  DVT Prophylaxis: Pneumatic Compression Devices  Code Status: Full Code    Disposition Plan   Expected discharge:  4 - 7 days, recommended to prior living arrangement once adequate pain management/ tolerating PO medications and antibiotic plan established.Dispo:   7/25/2021        Entered: Gertrude Mcqueen 07/22/2021, 6:00 AM   Information in the above section will display in the discharge planner report.      The patient's care was discussed staff attending Dr. Candy Biswas.    Gertrudesteve Mcqueen  [facility] Medicine: PG2  Pager: [phone]  Please see sticky note for cross cover information    Interval History    Overnight rizatriptan given for headache patient notes she feels much better this AM. Is     Physical Exam   Vital Signs: Temp: 98.6  F (37  C) Temp src: Oral BP: 112/73 Pulse: 74   Resp: 16 SpO2: 100 % O2 Device: None (Room air)    Weight: 131 lbs 6.4 oz  Physical Exam   General: Alert and oriented, in no acute distress.  Skin: Warm and dry, no abnormalities noted. No new rash.  Eyes: Extra-ocular muscles grossly intact, PERRLA    ENT:mucous membranes moist  CV:  No cyanosis or pallor, warm and well perfused.  Respiratory:  No respiratory distress, no accessory muscle use.  Neuro: Mental Status: A&O x 3. Attention intact. Speech: Speech fluent. Comprehension intact. Cranial Nerves: CN II-XII grossly intact. Strength 5/5 BL UE and BL LE. SILT intact throughout     Psychiatric: Mood and affect appear normal.   Extremities: Warm, able to move all four extremities at will. No pain with movement of joints.    Data   Recent Labs   Lab 07/21/21  0556 07/20/21  0621 07/19/21  0640 07/17/21  0614 07/16/21  0536   WBC 3.4* 3.6* 3.5*  --  5.1   HGB 10.7* 11.3* 10.7*  --  10.0*   MCV 78 80 79  --  79    264 241  --  241   INR  --   --   --   --  1.11    140 140  --  141   POTASSIUM 3.8 3.6 3.7  --  3.8   CHLORIDE 110* 112* 111*  --  113*   CO2 20 22 23  --  26   BUN 9 8 9  --   5*   CR 0.96 1.01 0.92  --  0.72   ANIONGAP 7 6 6  --  2*   SAMUEL 8.2* 8.6 8.9  --  8.7   GLC 83 80 79   < > 104*    < > = values in this interval not displayed.     No results found for this or any previous visit (from the past 24 hour(s)).  Medications       acetaminophen  975 mg Oral TID     omeprazole  40 mg Oral Daily     polyethylene glycol  17 g Oral Daily     prochlorperazine  5 mg Intravenous Q6H    Or     prochlorperazine  5 mg Oral Q6H    Or     prochlorperazine  25 mg Rectal Q6H     sodium chloride (PF)  3 mL Intracatheter Q8H     [Held by provider] spironolactone  100 mg Oral Daily     tretinoin   Topical At Bedtime

## 2021-07-22 NOTE — PROGRESS NOTES
"[facility]  Neurology Consultation - Progress Note    Patient Name:  Leticia Morales  Date of Service:  July 22, 2021    Subjective:    Ms. Morales is feeling better today, though she still has a mild migraine. Her migraine is now throbbing on the left temporal face, with a little pain/pressure retro orbitally on the right. She still hears \"vibration\" in both ears, worse on the right.      Objective:    Vitals: /73 (BP Location: Right arm)   Pulse 74   Temp 98.6  F (37  C) (Oral)   Resp 16   Ht 1.626 m (5' 4\")   Wt 59.6 kg (131 lb 6.4 oz)   SpO2 100%   BMI 22.55 kg/m    General: Lying in bed, NAD  Head: Atraumatic, normocephalic   Cardiac: no lower extremity edema  Neurologic:  Mental Status: Fully alert, attentive and oriented. Speech clear and fluent.   Cranial Nerves: EOM intact but painful, without nystagmus. Facial movements symmetric at rest and with activation.   Motor: No abnormal movements.  5/5 strength in biceps, triceps, deltoids, hip flex, plantar/dorsiflex  Reflexes: normoreflexic symmetrically biceps, brachioradialis, patellae, achilles  Sensory: Intact to light touch x 4 extremities     Pertinent Investigations:    I have personally reviewed most recent and pertinent labs, tests, and radiological images.     Assessment  Ms. Leticia Morales is a 35 year old female with a pmh of chronic migraines, GERD, and PTSD presented with headache and right shoulder and arm pain which further work up revealed prevertebral fluid collection, increased CRP, focal dural enhancement, leukopenia, and anemia.    #Status migrainosus  #Medication overuse headache  Ms. Morales's headache improvement with dexamethasone is encouraging. This supports recurrent migraine or rebound migraine the most likely etiology.     Given her history of frequent triptan, ibuprofen, and acetaminophen use, these agents should be used sparingly in the future (2-3/week max). Recommend another dose of " "dexamethasone now followed by a course of PO prednisone prior to her appt for botox 7/28 to resolve current HA.    #Pachymeningeal Enhancement  #Chronic SDH  Reassured by negative infectious and rheumatologic w/u per ID and rheum consults. Given this, the most likely explanation for the focal left dural enhancement is inflammation from prior small subdural hemorrhage (can be seen on SDI). The improvement on 2nd MRI on 7/19 supports this. Patient does not remember any previous falls or head trauma although endorses \"memory problems.\" It is possible she could have sustained a fall she doesn't recall.      Recommendations:   For headache:  -Increase topamax to 50mg BID  -Repeat dexamethasone 10mg IV followed by 5 day course of prednisone 60mg qday  -Limit rizutriptan, ibuprofen, and acetaminophen use to 2-3x/week to prevent medication overuse headache    For pachymeningeal enhancement:  -Repeat MRI scan w/wo contrast in a month  -Follow up in outpatient neurology with Dr. Cathy Candelario    Thank you for involving Neurology in the care of Leticia Morales.  Please do not hesitate to call with questions/concerns (consult pager [phone]).      Patient was seen and discussed with Dr. Levin.    YOUNG IBRAHIM, MS3    Eliza Rendon M.D.  PGY-4 Neurology      "

## 2021-07-23 LAB
ANION GAP SERPL CALCULATED.3IONS-SCNC: 9 MMOL/L (ref 3–14)
BACTERIA BLD CULT: NO GROWTH
BUN SERPL-MCNC: 14 MG/DL (ref 7–30)
CALCIUM SERPL-MCNC: 8.8 MG/DL (ref 8.5–10.1)
CHLORIDE BLD-SCNC: 112 MMOL/L (ref 94–109)
CO2 SERPL-SCNC: 19 MMOL/L (ref 20–32)
CREAT SERPL-MCNC: 0.76 MG/DL (ref 0.52–1.04)
ERYTHROCYTE [DISTWIDTH] IN BLOOD BY AUTOMATED COUNT: 17.2 % (ref 10–15)
ERYTHROCYTE [SEDIMENTATION RATE] IN BLOOD BY WESTERGREN METHOD: 30 MM/HR (ref 0–20)
GFR SERPL CREATININE-BSD FRML MDRD: >90 ML/MIN/1.73M2
GLUCOSE BLD-MCNC: 125 MG/DL (ref 70–99)
HCT VFR BLD AUTO: 34.2 % (ref 35–47)
HGB BLD-MCNC: 10.9 G/DL (ref 11.7–15.7)
IGG SERPL-MCNC: 900 MG/DL (ref 610–1616)
IGG1 SER-MCNC: 627 MG/DL (ref 382–929)
IGG2 SER-MCNC: 175 MG/DL (ref 242–700)
IGG3 SER-MCNC: 61 MG/DL (ref 22–176)
IGG4 SER-MCNC: 15 MG/DL (ref 4–86)
MCH RBC QN AUTO: 24.9 PG (ref 26.5–33)
MCHC RBC AUTO-ENTMCNC: 31.9 G/DL (ref 31.5–36.5)
MCV RBC AUTO: 78 FL (ref 78–100)
PLATELET # BLD AUTO: 351 10E3/UL (ref 150–450)
POTASSIUM BLD-SCNC: 3.4 MMOL/L (ref 3.4–5.3)
RBC # BLD AUTO: 4.37 10E6/UL (ref 3.8–5.2)
SODIUM SERPL-SCNC: 140 MMOL/L (ref 133–144)
SUBCLASSES, PERCENT: 98 %
WBC # BLD AUTO: 6.1 10E3/UL (ref 4–11)

## 2021-07-23 PROCEDURE — 99233 SBSQ HOSP IP/OBS HIGH 50: CPT | Mod: GC | Performed by: STUDENT IN AN ORGANIZED HEALTH CARE EDUCATION/TRAINING PROGRAM

## 2021-07-23 PROCEDURE — 80048 BASIC METABOLIC PNL TOTAL CA: CPT | Performed by: STUDENT IN AN ORGANIZED HEALTH CARE EDUCATION/TRAINING PROGRAM

## 2021-07-23 PROCEDURE — 250N000013 HC RX MED GY IP 250 OP 250 PS 637: Performed by: STUDENT IN AN ORGANIZED HEALTH CARE EDUCATION/TRAINING PROGRAM

## 2021-07-23 PROCEDURE — 250N000012 HC RX MED GY IP 250 OP 636 PS 637: Performed by: STUDENT IN AN ORGANIZED HEALTH CARE EDUCATION/TRAINING PROGRAM

## 2021-07-23 PROCEDURE — 120N000002 HC R&B MED SURG/OB UMMC

## 2021-07-23 PROCEDURE — 90832 PSYTX W PT 30 MINUTES: CPT | Mod: HN | Performed by: STUDENT IN AN ORGANIZED HEALTH CARE EDUCATION/TRAINING PROGRAM

## 2021-07-23 PROCEDURE — 85652 RBC SED RATE AUTOMATED: CPT | Performed by: STUDENT IN AN ORGANIZED HEALTH CARE EDUCATION/TRAINING PROGRAM

## 2021-07-23 PROCEDURE — 85027 COMPLETE CBC AUTOMATED: CPT | Performed by: STUDENT IN AN ORGANIZED HEALTH CARE EDUCATION/TRAINING PROGRAM

## 2021-07-23 PROCEDURE — 36415 COLL VENOUS BLD VENIPUNCTURE: CPT | Performed by: STUDENT IN AN ORGANIZED HEALTH CARE EDUCATION/TRAINING PROGRAM

## 2021-07-23 RX ORDER — METOCLOPRAMIDE 5 MG/1
10 TABLET ORAL EVERY 6 HOURS PRN
Status: DISCONTINUED | OUTPATIENT
Start: 2021-07-23 | End: 2021-07-24 | Stop reason: HOSPADM

## 2021-07-23 RX ORDER — PROCHLORPERAZINE MALEATE 5 MG
5 TABLET ORAL EVERY 6 HOURS PRN
Status: DISCONTINUED | OUTPATIENT
Start: 2021-07-23 | End: 2021-07-24 | Stop reason: HOSPADM

## 2021-07-23 RX ORDER — PROCHLORPERAZINE 25 MG
25 SUPPOSITORY, RECTAL RECTAL EVERY 6 HOURS PRN
Status: DISCONTINUED | OUTPATIENT
Start: 2021-07-23 | End: 2021-07-24 | Stop reason: HOSPADM

## 2021-07-23 RX ORDER — DIPHENHYDRAMINE HCL 25 MG
25 CAPSULE ORAL EVERY 6 HOURS PRN
Status: DISCONTINUED | OUTPATIENT
Start: 2021-07-23 | End: 2021-07-24 | Stop reason: HOSPADM

## 2021-07-23 RX ORDER — DIPHENHYDRAMINE HYDROCHLORIDE 50 MG/ML
25 INJECTION INTRAMUSCULAR; INTRAVENOUS EVERY 6 HOURS PRN
Status: DISCONTINUED | OUTPATIENT
Start: 2021-07-23 | End: 2021-07-24 | Stop reason: HOSPADM

## 2021-07-23 RX ADMIN — ACETAMINOPHEN 975 MG: 325 TABLET, FILM COATED ORAL at 20:29

## 2021-07-23 RX ADMIN — METOCLOPRAMIDE 10 MG: 5 TABLET ORAL at 09:05

## 2021-07-23 RX ADMIN — OMEPRAZOLE 40 MG: 20 CAPSULE, DELAYED RELEASE ORAL at 20:29

## 2021-07-23 RX ADMIN — ACETAMINOPHEN 975 MG: 325 TABLET, FILM COATED ORAL at 13:29

## 2021-07-23 RX ADMIN — TRETINOIN: 0.25 GEL TOPICAL at 22:08

## 2021-07-23 RX ADMIN — ACETAMINOPHEN 975 MG: 325 TABLET, FILM COATED ORAL at 08:05

## 2021-07-23 RX ADMIN — PROCHLORPERAZINE MALEATE 5 MG: 5 TABLET ORAL at 00:15

## 2021-07-23 RX ADMIN — TOPIRAMATE 50 MG: 25 CAPSULE, COATED PELLETS ORAL at 20:31

## 2021-07-23 RX ADMIN — PROCHLORPERAZINE MALEATE 5 MG: 5 TABLET ORAL at 13:29

## 2021-07-23 RX ADMIN — METOCLOPRAMIDE 10 MG: 5 TABLET ORAL at 16:19

## 2021-07-23 RX ADMIN — METOCLOPRAMIDE 10 MG: 5 TABLET ORAL at 23:50

## 2021-07-23 RX ADMIN — DIPHENHYDRAMINE HYDROCHLORIDE 25 MG: 25 CAPSULE ORAL at 16:19

## 2021-07-23 RX ADMIN — DIPHENHYDRAMINE HYDROCHLORIDE 25 MG: 25 CAPSULE ORAL at 23:50

## 2021-07-23 RX ADMIN — CALCIUM CARBONATE (ANTACID) CHEW TAB 500 MG 1000 MG: 500 CHEW TAB at 23:48

## 2021-07-23 RX ADMIN — TOPIRAMATE 50 MG: 25 CAPSULE, COATED PELLETS ORAL at 08:06

## 2021-07-23 RX ADMIN — POLYETHYLENE GLYCOL 3350 17 G: 17 POWDER, FOR SOLUTION ORAL at 08:06

## 2021-07-23 RX ADMIN — Medication 1 MG: at 02:36

## 2021-07-23 RX ADMIN — OMEPRAZOLE 40 MG: 20 CAPSULE, DELAYED RELEASE ORAL at 08:06

## 2021-07-23 RX ADMIN — PROCHLORPERAZINE MALEATE 5 MG: 5 TABLET ORAL at 05:16

## 2021-07-23 RX ADMIN — PREDNISONE 60 MG: 20 TABLET ORAL at 08:06

## 2021-07-23 ASSESSMENT — VISUAL ACUITY
OD: NOT TESTED
OS: NOT TESTED

## 2021-07-23 ASSESSMENT — ACTIVITIES OF DAILY LIVING (ADL)
ADLS_ACUITY_SCORE: 15

## 2021-07-23 NOTE — PLAN OF CARE
AxOx4, VSS RA, C/o dizziness upon standing. 9/10 headache today. reglan and benadryl capsules PRN to be taken together, decreased migraine after prns given. Regular diet. Tolerates well. PIV left forearm SL. Plan: Likely discharge tomorrow pending migraine status tomorrow.

## 2021-07-23 NOTE — PLAN OF CARE
"Time: 4748-1870     Reason for admission: Chronic migraine without aura without status migrainosus, not intractable [G43.709]  Discitis, unspecified spinal region [M46.40]    Vitals: /71 (BP Location: Left arm)   Pulse 98   Temp 97.9  F (36.6  C) (Oral)   Resp 16   Ht 1.626 m (5' 4\")   Wt 59.6 kg (131 lb 6.4 oz)   SpO2 100%   BMI 22.55 kg/m       Activity: IND  Pain: c/o migraine today. Significantly better than yesterday. Worst rating was 6/10 today which is tolerable for her. Reglan available.   Neuro: AxOx4. Calls appropriately.   Cardiac: WDL  Respiratory: WDL  GI/: Voids spontaneously. LBM 7/20  Diet:  Orders Placed This Encounter      Regular Diet Adult     Lines:   Peripheral IV 07/22/21 Right;Posterior Lower forearm (Active)   Site Assessment WDL 07/22/21 0800   Line Status Saline locked 07/22/21 0800   Dressing Intervention New dressing  07/22/21 0452   Phlebitis Scale 0-->no symptoms 07/22/21 0800   Infiltration Scale 0 07/22/21 0800   Infiltration Site Treatment Method  None 07/22/21 0452   Number of days: 0      Skin/Wounds: WDL  BG:   Glucose Values Bedside Glucose (mg/dl )  GLUCOSE   Latest Ref Rng & Units - 70 - 99 mg/dL   7/22/2021 -- 166(H)   7/21/2021 -- 83   7/20/2021 -- 80   Some recent data might be hidden        New changes this shift: Uneventful shift. Mild migraine reported. Benadryl given with reduction in pain.      Plan: Neuro and Rheum following. Continue workup for migraines, and continue with POC    "

## 2021-07-23 NOTE — CONSULTS
Health Psychology                   Clinic    Department of Medicine  Tamie Levy, Ph.D., L.P. [phone]                        [facility]  [facility] Bette Bernard, Ph.D., L.P. [phone]                 3rd Floor  [place] Mail Code 749   Shavon Antunez, Ph.D.  [phone]     [address] Chastity Ruth, Ph.D.,  L.P. [phone]      [place], [place]   [address] [place] [address]           Gio Chisholm, Ph.D. [phone]      Keyla Goodwin, Ph.D., L.P. [phone]    Frank Toure, Ph.D., A.B.P.P., L.P. [phone]     Isaura Morris, Ph.D., L.P. [phone]    Inpatient Health Psychology Consultation    Date of Service:  7/23/21    BACKGROUND:  Leticia Morales is a 35 year old female admitted on 7/15/2021. She has a history of chronic migraines and was admitted for acute onset of R shoulder and upper back pain and transient R arm numbness found to have cervical prevertebral fluid collection on MRI of unclear etiology and dural enhancement (which has shown improvement) on repeat MRI brain imaging. She continues admission for status migranosus.     Health Psychology consulted to support patient in coping with hospital stay and symptoms of anxiety related to previous acute health issues.     SUBJECTIVE:  Spoke with Ms. Morales about recent experiences in the hospital and progression of symtpoms.  She said her headache has improved since we tried to meet on Wednesday.      Viewed on the board that her team is discussing discharge in the very near future.  Ms. Morales said she is very excited to go home so her mother does not have to worry about her anymore.  We reviewed her mother's concersn and the larger dynamic of her relationship with her mother.    Discussed upcoming plans for travel and question [facility] has for her medical team: I.e. can she travel on plane in October with her current symptoms?    Ms. Morales reported she will  likely not return to work after discharge because of symptoms and her upcoming long-term travel plans (going to [place] then [place] for around three months). Discussed how she can structure her time to ensure she is able to care for her own wellbeing while also caring for others such as her family.  She provided many examples of helping others and few examples of doing things for herself.  Encouraged her to schedule brief activities or breaks into her daily routine that will allow her to engage in self-management of her health conditions as well as engage in pleasant activities.  Ms. Morales said she did yoga in the past and enjoyed this for physical activity benefit and mental wellness.    Ms. Morales was engaged in the visit and expressed understanding of information provided.       OBJECTIVE:  Ms. Morales was sitting cross-legged on her bed speaking on the phone with someone when I entered the room.  She agreed to the visit.  She reported improved mood as headache symptoms were better, but still felt worried about her mother's wellbeing.  Her affect was mood- and thought-congruent. Thought processes logical and linear. Insight and judgment fair. Speech WNL. Denied suicidal ideation.        ASSESSMENT:  Ms. Morales described frequent worries related to her health status and how being in the hospital is impacting her family given her history of acute illness requiring hospitalization. She described ongoing questions about how to manage her health issues and how this will impact future plans. She reports a few adaptive coping strategies and has good social support.  She may benefit from connecting with an outpatient mental health provider to discuss self-management strategies and routines for addressing symptoms and preventing migraines/setbacks.       DIAGNOSIS:  Adjustment disorder with anxiety  History of PTSD      PLAN:  Plan for health psychology to follow this patient approximately once per week for the duration of  their hospitalization.     Please feel free to call in urgent concerns arise prior to the next follow-up session.     Kathrine Powers, PhD  Health Psychology Fellow  Phone: [phone]    Time in: 3:35  Time out: 4:00

## 2021-07-23 NOTE — DISCHARGE SUMMARY
[facility]    Family Medicine Discharge Summary- [facility]  Service    Date of Admission:  7/15/2021  Date of Service: 7/24/2021  Date of Discharge:  7/24/2021  2:04 PM  Discharging Attending Provider: Yi  Discharge Team: [facility]    Discharge Diagnoses   Status migrainosus   Medication overuse headache  Sepsis   Elevated inflammatory markers  Prevertebral fluid collection  Dural thickening, left frontal lobe     Follow-ups Needed After Discharge   - Follow up with PCP within 7 days for hospital follow up  - Follow-up with neurology Dr. Cathy Candelario at next available  - Repeat MRI brain mid-August    Hospital Course   Leticia Morales is a 35 year old female admitted on 7/15/2021. She has a history of chronic migraines and was admitted for acute onset of R shoulder and upper back pain and transient R arm numbness found to have cervical prevertebral fluid collection on MRI of unclear etiology and dural enhancement (which has shown improvement) on repeat MRI brain imaging.      # Status migrainosus  # Medication overuse headache  # Chronic migraines  Long history of chronic migraines with frequent triptan (every other day), ibuprofen, acetaminophen use. Patient does note worsening memory over last year. Unclear if Topamax side effect vs PTSD component vs TBI from fall sustained that pt doesn't recall given concern for chronic SDH. Neurology was consulted for assistance of migraine workup and treatment. Numerous medication trials were attempted including: IV toradol, IV Benadryl, Valproic acid. Patient with improved headache control on discharge.   - topimax 50mg PO BID (increased from PTA dose of 50 mg daily)  - Prednisone 60mg daily x5 days (7/23-7/27)  - Outpatient PRN headache plan:   - limit tylenol, NSAIDs, triptans for 2-3x/week to limit overuse headache   - compazine PRN  - Follow-up outpatient with neurology Dr. Cathy Candelario   - Repeat MRI scan w/wo contrast in 1  month     # Sepsis, resolved   # Prevertebral fluid collection   # Enhanced thickened dura over (L) frontal lobe, improved  # Back and R shoulder pain  # Transient R arm numbness and tingling, resolved   # Elevated CRP, ESR, resolved  Presented with upper extremity pain, transient numbness (now resolved), upper back/neck pain, found to have elevated inflammatory markers and MRI of the cervical spine showing prevertebral fluid collection concerning for infection vs autoimmune vs malignancy. Multiple specialists consulted - unable to obtain fluid sampling prior to antibiotic initiation. MRI brain showed mildly thickened and enhancing dura over the left frontal lobe, repeat MRI 7/20 showed improvement. Met sepsis criteria with increase in inflammatory markers on 7/17, resolved with IV fluids & IV meropenem, initially with higher suspicion for infection. S/p LP 7/17, with no clear infectious etiology (though cultures were taken after abx). Discontinued meropenem 7/20 given negative infectious work up with no further fevers. CSF Cytology 7/17 negative for malignancy. Rheumatology pursued work up for SLE, rheumatoid arthritis, GPA, and sarcoidosis, however no systemic signs/symptoms that clearly delineate a specific etiology and negative ANGELINA, IgG, ACE. CTV ruled out cerebral venous thrombosis.    Micro:  Blood culture 7/15: NGTD  Blood culture 7/18: NGTD   CSF culture 7/17: NGTD              Aerobic culture: NGTD               Anaerobic: in process              AFB: could not complete not enough CSF              Fungal: in process               Crypto: negative              HSV 1/2: Not detected               Strep pneumo: negative              Meningitis/encephalitis panel: Negative              Varicella: Negative              CMV: Negative     -pending labs:  RICCI panel, dsDNA, C3/C4, ANCA normal /MPO/PR3     # Leukopenia, resolved   ANC 1.1 7/18. Improved after discontinuation of meropenem.     # Dysphagia, resolved  #  Epigastric pain, resolved   # GERD  Patient has acute onset of painful swallowing prior to admission. ENT consulted, no acute intervention. No significant finding on their exam to locate infectious source. Rapid strep negative. She does report week of vomiting 4-5 times (no hematemesis) a day in June that seems to have to initially precipitated the throat pain, seems to have subsided. Then returned acutely on 7/10. Endorses high NSAID use. With aforementioned history, some concern for upper esophogeal tear, consulted GI. They feel this is highly unlikely. Improved to date.   - PPI omeprazole 40mg PO BID  - Continue PPI for 6-8 weeks and have pt follow-up outpatient if GERD symptoms do not resolve  - Could consider Gastrogaffin esophogram if returns or worsening     # PTSD  # Depression with Anxiety  New stressors including hospitalization. 2010 was intubated with trach so significant prior hospitalization event. Health psychology consulted.     #Acne  - PTA spirolactone was held due to softer Bps    # Discharge Pain Plan:   - Patient currently has NO PAIN and is not being prescribed pain medications on discharge.    Consultations This Hospital Stay   INFECTIOUS DISEASE GENERAL ADULT IP CONSULT  NEUROSURGERY ADULT IP CONSULT  ENT IP CONSULT  INTERVENTIONAL RADIOLOGY ADULT/PEDS IP CONSULT  GI LUMINAL ADULT IP CONSULT  PSYCHOLOGY ADULT IP CONSULT  INTERNAL MEDICINE PROCEDURE TEAM ADULT IP CONSULT EAST BANK - LUMBAR PUNCTURE  NEUROLOGY GENERAL ADULT IP CONSULT  VASCULAR ACCESS CARE ADULT IP CONSULT  VASCULAR ACCESS CARE ADULT IP CONSULT  VASCULAR ACCESS CARE ADULT IP CONSULT  RHEUMATOLOGY IP CONSULT  NEUROLOGY GENERAL ADULT IP CONSULT  VASCULAR ACCESS CARE ADULT IP CONSULT  VASCULAR ACCESS CARE ADULT IP CONSULT  VASCULAR ACCESS CARE ADULT IP CONSULT  VASCULAR ACCESS CARE ADULT IP CONSULT  VASCULAR ACCESS CARE ADULT IP CONSULT  INTERNAL MEDICINE PROCEDURE TEAM ADULT IP CONSULT EAST BANK - LUMBAR PUNCTURE    Code Status    Full Code       The patient was discussed with Dr. Yi Peralta's [facility] Inpatient Service  [facility]   Pager:[phone]_  ___________________________________________________________________  Review of Systems:  CONSTITUTIONAL: NEGATIVE for fever, chills, change in weight  INTEGUMENTARY/SKIN: NEGATIVE for worrisome rashes, moles or lesions  EYES: NEGATIVE for vision changes or irritation  ENT/MOUTH: NEGATIVE for ear, mouth and throat problems  RESP: NEGATIVE for significant cough or SOB  CV: NEGATIVE for chest pain, palpitations or peripheral edema  GI: NEGATIVE for nausea, abdominal pain, heartburn, or change in bowel habits  : NEGATIVE for frequency, dysuria, or hematuria  MUSCULOSKELETAL: NEGATIVE for significant arthralgias or myalgia  NEURO: NEGATIVE for weakness, dizziness or paresthesias  ENDOCRINE: NEGATIVE for temperature intolerance, skin/hair changes  HEME/ALLERGY: NEGATIVE for bleeding problems  PSYCHIATRIC: NEGATIVE for changes in mood or affect  Physical Exam   Vital Signs:                    Weight: 131 lbs 6.4 oz    General Appearance: Awake, alert, resting comfortably in bed.   Respiratory: Breathing comfortably on room air.  Cardiovascular: Regular rate.  Neuro: More alert, interactive, conversant today.    Significant Results and Procedures   Most Recent 3 CBC's:Recent Labs   Lab Test 07/24/21  0641 07/23/21  0551 07/22/21  0827   WBC 8.2 6.1 3.8*   HGB 10.7* 10.9* 11.1*   MCV 78 78 78    351 328     Most Recent 3 BMP's:Recent Labs   Lab Test 07/24/21  0641 07/23/21  0551 07/22/21  0827    140 138   POTASSIUM 3.3* 3.4 3.5   CHLORIDE 111* 112* 110*   CO2 22 19* 19*   BUN 16 14 11   CR 0.84 0.76 0.79   ANIONGAP 6 9 9   SAMUEL 9.0 8.8 9.0   GLC 99 125* 166*     Most Recent ESR & CRP:Recent Labs   Lab Test 07/24/21  0641 07/23/21  0551   SED  --  30*   CRP <2.9  --    ,   Results for orders placed or performed during the hospital  encounter of 07/15/21   MR Brain w/o & w Contrast    Narrative    Brain MRI without and with contrast    History: Headache, chronic, no new features; prevertebral fluid  collection in C spine, headaches (hx migraines), r/o infection.    Comparison: Head CT from 6/15/2021. Cervical spine MRI from 7/14/2021.    Technique: Axial FLAIR,  T1-weighted, and susceptibility images were  obtained without intravenous contrast. Following intravenous  gadolinium-based contrast administration, axial T2-weighted,  diffusion, FLAIR, and axial and coronal T1-weighted images were  obtained.     Dose: 7.5mL Gadavist    Findings:   There is no mass effect, midline shift, or evidence of intracranial  hemorrhage.  The ventricles are not enlarged out of proportion to the  cerebral sulci. The gray-white matter differentiation of the cerebral  hemispheres is preserved. Postcontrast images demonstrate mildly  thickened and enhancing dura overlying the left frontal lobe.    The major vascular flow-voids appear patent. The orbits, visualized  portions of paranasal sinuses, and mastoid air cells are relatively  clear.      Impression    Impression:  Mildly thickened and enhancing dura over the left frontal lobe.  Differential for this could include post lumbar puncture intracranial  hypotension, although this typically is more diffuse and less focal.  Other considerations would be neoplastic processes such as lymphoma,  leukemia, or metastatic disease. Infectious considerations would  include fungal or mycobacterial infection. An inflammatory processes  such as sarcoid could have this appearance.    I have personally reviewed the examination and initial interpretation  and I agree with the findings.    AILYN MEDEIROS MD         SYSTEM ID:  [id number]   MR Brain w/o & w Contrast    Narrative     MR BRAIN W/O & W CONTRAST 7/19/2021 10:32 PM    Provided History: Brain mass or lesion.  ICD-10:    Comparison: 7/15/2021.    Technique: Multiplanar  T1-weighted, axial FLAIR, and susceptibility  images were obtained without intravenous contrast. Following  intravenous gadolinium-based contrast administration, axial  T2-weighted, diffusion, and T1-weighted images (in multiple planes)  were obtained.    Contrast: 6 mL Gadavist    Findings:  There is no mass effect, midline shift, or evidence of intracranial  hemorrhage. The ventricles are proportionate to the cerebral sulci.  Gray-white matter differentiation is preserved. No abnormal areas of  restricted diffusion. Normal major vascular intracranial flow-voids.    Postcontrast images demonstrate minimal dural enhancement and  thickening overlying the left frontal lobe, similar to prior exam.    No abnormality of the skull marrow signal. The visualized portions of  paranasal sinuses, and mastoid air cells are relatively clear. The  orbits are grossly unremarkable.      Impression    Impression: No acute intracranial abnormality. Decreased, now minimal,  left frontal dural thickening and enhancement, perhaps sequelae of  prior subdural hemorrhage.     I have personally reviewed the examination and initial interpretation  and I agree with the findings.    RONALD MONTGOMERY MD         SYSTEM ID:  [id number]   CTV Head Neck w Contrast    Narrative    CTV HEAD NECK WITH CONTRAST 7/20/2021 9:00 PM    History:  Dural venous sinus thrombosis suspected     Comparison: Brain MRI 7/19/2021 and 7/15/2021, head CT 6/15/2021,  cervical spine MRI 7/14/2021     Technique: Helically acquired thin section axial CT images were  obtained with 1 mm collimation through the brain after intravenous  bolus injection of iodinated contrast medium with an approximately  20-25 second delay between administration of contrast and scanning.  Image data were sent to the 3D workstation and postprocessed using  maximum intensity pixel (MIP), multiplanar and volume rendered 3D  reconstruction programs.     Contrast: iopamidol (ISOVUE-370) solution 75  mL    Findings: No thrombosis or stenosis in the major intracranial dural  sinuses or deep cerebral veins. Right dominant venous drainage.  Congenitally hypoplastic left transverse, sigmoid, and internal  jugular vein. Short segment focal severe narrowing of the left  internal jugular vein as it courses along the lateral margin of the  left transverse process of C1. There is distal reconstitution of the  left jugular vein via the left external jugular vein.    No mass effect, midline shift, or intracranial hemorrhage. The  ventricles are proportionate to the cerebral sulci. No abnormal  enhancement in the brain. The paranasal sinuses and mastoid air cells  are clear. The orbits are unremarkable.    The thyroid gland is unremarkable. Enlarged right level 1A lymph node  measuring up to 1.5 cm and right level 1B lymph node measuring 1.9 cm.  Multiple additional prominent bilateral cervical chain lymph nodes.    Reversal of the normal cervical lordotic curvature. No significant  spinal canal and neural foraminal stenosis at any level. The  visualized lung apices are clear.      Impression    Impression:    1. No dural venous sinus thrombosis.  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  3. Enlarged right level 1A and 1B lymph nodes in addition to multiple  other prominent cervical chain lymph nodes which are likely reactive.    I have personally reviewed the examination and initial interpretation  and I agree with the findings.    GÓMEZ LI MD         SYSTEM ID:  [id number]       Pending Results   These results will be followed up by PCP  Unresulted Labs Ordered in the Past 30 Days of this Admission     Date and Time Order Name Status Description    7/20/2021  3:55 PM Proteinase 3 Antibody IgG In process     7/20/2021  3:55 PM Myeloperoxidase Antibody IgG In process     7/20/2021  3:55 PM DNA double stranded antibodies In process     7/20/2021  3:55 PM RICCI antibody panel In process     7/17/2021   2:53 PM Acid-Fast Bacilli Culture Preliminary     7/16/2021  1:06 PM Cytomegalovirus Quant PCR Non Blood CSF In process     7/16/2021  1:06 PM Fungus Culture, non-blood CSF Preliminary     7/16/2021  1:06 PM Anaerobic CSF Culture Preliminary         Primary Care Physician     Yashira Cuevas MD     Discharge Disposition   Discharged to home  Condition at discharge: Stable    Discharge Orders      Activity    Your activity upon discharge: activity as tolerated     [facility] Follow-up and recommended labs and tests    Follow up with primary care provider, Yashira Cuevas, within 7 days for hospital follow- up.  No follow up labs or test are needed.    Follow up with Neurology Dr. Cathy Candelario at her next available appointment (please call Monday to get this appointment scheduled).     Appointments on [place] and/or [facility] (with [facility] or [facility] provider or service). Call [phone] if you haven't heard regarding these appointments within 7 days of discharge.     Reason for your hospital stay    You were hospitalized because of worsening migraine headaches, nausea, right arm numbness. You were seen by Neurology and your symptoms were ultimately felt to be related to status migrainosus (a cycle of recurrent headaches because of frequent use of your home migraine medications). We did imaging of your brain which showed a possible small bleed from prior (and improved on the repeat image). There was also a fluid collection found in your neck. These findings were not felt to be contributing to your symptoms, though you will have another MRI to make sure they continue to improve in about a month.    Your symptoms finally improved with increasing your Topamax and trying some new IV medications. You should limit your use of tylenol, NSAIDs (Aleve, ibuprofen, naproxen) and triptans to 2-3x/week to limit overuse headache. Follow up with Neurology as soon as able.     Diet    Follow this diet upon  discharge: Regular Diet Adult     Discharge Medications   Discharge Medication List as of 7/24/2021  1:36 PM      START taking these medications    Details   metoclopramide (REGLAN) 10 MG tablet Take 1 tablet (10 mg) by mouth every 6 hours as needed (migraine, nausea), Disp-10 tablet, R-0, E-Prescribe      predniSONE (DELTASONE) 20 MG tablet Take 3 tablets (60 mg) by mouth daily for 2 days, Disp-6 tablet, R-0, E-Prescribe      topiramate (TOPAMAX) 25 MG capsule Take 2 capsules (50 mg) by mouth 2 times daily, Disp-60 capsule, R-0, E-Prescribe         CONTINUE these medications which have CHANGED    Details   SUMAtriptan (IMITREX) 50 MG tablet Take 2 tablets (100 mg) by mouth at onset of headache for migraine Do NOT use more than 2-3 times per week, Disp-15 tablet, R-0, E-Prescribe         CONTINUE these medications which have NOT CHANGED    Details   acetaminophen (TYLENOL) 500 MG tablet Take 2 tablets (1,000 mg) by mouth every 6 hours as needed for mild pain, Disp-180 tablet,R-0, E-Prescribe      benzoyl peroxide (BENZOYL PEROXIDE WASH) 5 % external liquid Use to wash face once daily in the morningHistorical      omeprazole (PRILOSEC) 20 MG DR capsule Take 1 capsule (20 mg) by mouth daily, Disp-90 capsule,R-3, E-Prescribe      spironolactone (ALDACTONE) 50 MG tablet Take 100 mg by mouth daily , Historical      tretinoin (RETIN-A) 0.025 % external cream Apply topically At BedtimeHistorical      cyclobenzaprine (FLEXERIL) 5 MG tablet Take 1-2 tablets (5-10 mg) by mouth nightly as needed for muscle spasms (do not drive), Disp-20 tablet, R-1, E-Prescribe         STOP taking these medications       topiramate (TOPAMAX) 25 MG tablet Comments:   Reason for Stopping:             Allergies   Allergies   Allergen Reactions     Macrodantin [Nitrofurantoin Macrocrystal] Other (See Comments)     itching

## 2021-07-23 NOTE — PLAN OF CARE
Pt A&O, VSS RA.  C/O worsening HA this shift, MD notified.  Reglan, tylenol and compazine given.  Regular diet.  PIV SL.

## 2021-07-23 NOTE — PLAN OF CARE
Shift time: 1900-0730    Neuro: A&O x 4. Headache is improved from yesterday.   VS: Vitally stable and afebrile overnight.   Pain: Ongoing headache but is currently at a tolerable level. Otherwise denied pain.  Diet: Regular diet. Good appetite.   Act: SBA due to some intermittent dizziness.   Lines: One PIV in L forearm, saline locked.   Skin: Mild bruising from previous IV sticks. Otherwise clean, dry.   Cardiovascular: WDL.  Resp: WDL on RA.   GI/: Voiding appropriately. Last BM 7/20.     New this shift: Patient did not sleep much overnight despite melatonin. Headache and nausea manageable with scheduled compazine.

## 2021-07-23 NOTE — PROGRESS NOTES
[facility] Progress Note    Main Plans for Today   - possible discharge tomorrow  - Neuro following, appreciate recs  - Prednisone 60mg PO daily starting 7/23 x 5 doses  - trial of reglan and benadryl q6h PRN for migraine  - consider valproic acid 500mg IV x1 if severe headache  - compazine scheduled-->PRN      Assessment & Plan   Leticia Morales is a 35 year old female admitted on 7/15/2021. She has a history of chronic migraines and was admitted for acute onset of R shoulder and upper back pain and transient R arm numbness found to have cervical prevertebral fluid collection on MRI of unclear etiology and dural enhancement (which has shown improvement) on repeat MRI brain imaging. She continues admission for status migranosus.     # Status migrainosus  # Medication overuse headache  # Chronic migraines  Long history of chronic migraines with frequent triptan (every other day), ibuprofen, acetaminophen use. Patient does note worsening memory over last year. Unclear if Topamax side effect vs PTSD component vs TBI from fall sustained that pt doesn't recall given concern for chronic SDH.   - appreciate neurology recs  - limiting rizutriptan, NSAIDs, opioids per neuro to 2-3x/week to limit overuse headache  -Tylenol TID  - flexeril at bedtime PRN  - topimax 50mg BID  - trial of reglan and benadryl q6h PRN   - PRN compazine  - Prednisone 60mg daily x5 days (7/23-7/27)  - if severe headache okay to trial one time dose of valproic acid 500mg IV   - follow-up outpatient with neurology Dr. Cathy Candelario  - Repeat MRI scan w/wo contrast in 1 month    # Sepsis, resolved   # Prevertebral fluid collection   # Enhanced thickened dura over (L) frontal lobe - improved  # Back and R shoulder pain  # Transient R arm numbness and tingling, resolved   # Elevated CRP, ESR, resolved  Presented with upper extremity pain, transient numbness (now resolved), upper  back/neck pain, found to have elevated inflammatory markers and MRI of the cervical spine showing prevertebral fluid collection concerning for infection vs. autoimmune vs. malignancy. Multiple procedural specialties consulted - unable to obtain fluid sampling prior to antibiotic initiation. MRI brain showed mildly thickened and enhancing dura over the left frontal lobe, repeat MRI 7/20 shows improvement. Differential includes: neoplastic process, infectious (fungal, mycobacterial), or inflammatory. ACE negative, les likely sarcoidosis. Due to brief sepsis with increase in inflammatory markers on 7/17, resolved with IV fluids & Meropenem, was initially having higher suspicion for infection. S/p LP 7/17, so far with no clear infectious etiology (though cultures were taken after abx). Now off meropenem without new fever or concern for infectious process. CSF Cytology 7/17 negative for malignancy. Rheumatology was consulted, initial differential includes SLE, rheumatoid arthritis, GPA, and sarcoidosis, however she does not display systemic signs/symptoms that clearly delineate a specific etiology and negative ANGELINA, IgG, ACE to-date. Neurology was consulted, suggested CTV to r/o cerebral venous thrombosis which was negative.  - Neurology consulted, appreciate recs   - Appreciate Infectious Disease recs  - s/p meropenem 2g q8h (7/15 - 7/20 am)    - If fever >100.4, not responsive to Tylenol restart abx   - If fever, repeat blood cultures stat  - Rheumatology consult    -pending labs:  RICCI panel, dsDNA, C3/C4, ANCA normal /MPO/PR3   - q4 VS & neurochecks q4 hours    Micro:  Blood culture 7/15: NGTD  Blood culture 7/18: NGTD   CSF culture 7/17: NGTD   Aerobic culture: NGTD    Anaerobic: in process   AFB: could not complete not enough CSF   Fungal: in process    Crypto: negative   HSV 1/2: Not detected    Strep pneumo: negative   Meningitis/encephalitis panel: Negative   Varicella: Negative   CMV: Negative      # Leukopenia,  improving   ANC 1.1 7/18. Could be 2/2 to meropenem.   - Continue to monitor     # Dysphagia  # Epigastric pain  # GERD  Patient has acute onset of painful swallowing prior to admission. ENT consulted, no acute intervention. No significant finding on their exam to locate infectious source. Rapid strep negative. She does report week of vomiting 4-5 times (no hematemesis) a day in June that seems to have to initially precipitated the throat pain, seems to have subsided. Then returned acutely on 7/10. Endorses high NSAID use. With aforementioned history, some concern for upper esophogeal tear, consulted GI. They feel this is highly unlikely. Improved to date.   - PPI omeprazole 40mg PO BID  - Continue PPI for 6-8 weeks and have pt follow-up outpatient if GERD symptoms do not resolve  - Could consider Gastrogaffin esophogram if returns or worsening    # PTSD  # Depression with Anxiety  New stressors including hospitalization. 2010 was intubated with trach so significant prior hospitalization event. Initial virtuala meeting with health psychology 7/20, with planned in hospital meeting 7/21.   - Health psychology consult    #Acne  - Hold PTA spirolactone due to softer BPs    # Pain Assessment:  Current Pain Score 7/22/2021   Patient currently in pain? yes   Pain score (0-10) -   - Leticia is experiencing pain due to paravertebral fluid collection. Pain management was discussed and the plan was created in a collaborative fashion.  Leticia's response to the current recommendations: engaged  - Please see the plan for pain management as documented above    Diet: Regular Diet Adult  Snacks/Supplements Adult: Ensure Enlive; Between Meals  Fluids: PO (additional LR bolus due to poor PO hydration)  DVT Prophylaxis: Pneumatic Compression Devices   Code Status: Full Code    Disposition Plan   Expected discharge:  2 - 3 days, recommended to prior living arrangement once adequate pain management/ tolerating PO medications and antibiotic  plan established.Dispo:   7/25/2021        Entered: Bryson Pandey 07/23/2021, 7:15 AM   Information in the above section will display in the discharge planner report.      The patient's care was discussed staff attending Dr. Yi Pandey  [facility] Medicine: PG2  Pager: [phone]  Please see sticky note for cross cover information    Interval History    NAEO. This AM, patient with what felt like beginning of a migraine. This afternoon, migraine 10/10. Tylenol, compazine improved only to 9/10.     Physical Exam   Vital Signs: Temp: 97.3  F (36.3  C) Temp src: Oral BP: 118/81 Pulse: 92   Resp: 14 SpO2: 97 % O2 Device: None (Room air)    Weight: 131 lbs 6.4 oz  Physical Exam   General: Alert and oriented, uncomfortable laying in bed this afternoon.  Skin: Warm and dry, no abnormalities noted. No new rash.  Eyes: Extra-ocular muscles grossly intact, PERRLA    ENT: mucous membranes moist  CV:  No cyanosis or pallor, warm and well perfused.  Respiratory:  No respiratory distress, no accessory muscle use.  Neuro: Mental Status: A&O x 3. Attention intact. Speech: Speech fluent. Comprehension intact. Cranial Nerves: CN II-XII grossly intact. Strength 5/5 BL UE and BL LE. SILT intact throughout     Psychiatric: Mood and affect appear normal.   Extremities: Warm, able to move all four extremities at will. No pain with movement of joints.    Data   Recent Labs   Lab 07/23/21  0551 07/22/21  0827 07/21/21  0556 07/20/21  0621   WBC 6.1 3.8* 3.4* 3.6*   HGB 10.9* 11.1* 10.7* 11.3*   MCV 78 78 78 80    328 268 264   NA  --  138 137 140   POTASSIUM  --  3.5 3.8 3.6   CHLORIDE  --  110* 110* 112*   CO2  --  19* 20 22   BUN  --  11 9 8   CR  --  0.79 0.96 1.01   ANIONGAP  --  9 7 6   SAMUEL  --  9.0 8.2* 8.6   GLC  --  166* 83 80     No results found for this or any previous visit (from the past 24 hour(s)).  Medications       acetaminophen  975 mg Oral TID     omeprazole  40  mg Oral BID     polyethylene glycol  17 g Oral Daily     predniSONE  60 mg Oral Daily     prochlorperazine  5 mg Intravenous Q6H    Or     prochlorperazine  5 mg Oral Q6H    Or     prochlorperazine  25 mg Rectal Q6H     sodium chloride (PF)  3 mL Intracatheter Q8H     [Held by provider] spironolactone  100 mg Oral Daily     topiramate  50 mg Oral BID     tretinoin   Topical At Bedtime

## 2021-07-24 VITALS
SYSTOLIC BLOOD PRESSURE: 126 MMHG | DIASTOLIC BLOOD PRESSURE: 83 MMHG | WEIGHT: 131.4 LBS | TEMPERATURE: 98 F | RESPIRATION RATE: 18 BRPM | HEIGHT: 64 IN | HEART RATE: 98 BPM | OXYGEN SATURATION: 99 % | BODY MASS INDEX: 22.43 KG/M2

## 2021-07-24 LAB
ANION GAP SERPL CALCULATED.3IONS-SCNC: 6 MMOL/L (ref 3–14)
BUN SERPL-MCNC: 16 MG/DL (ref 7–30)
CALCIUM SERPL-MCNC: 9 MG/DL (ref 8.5–10.1)
CHLORIDE BLD-SCNC: 111 MMOL/L (ref 94–109)
CO2 SERPL-SCNC: 22 MMOL/L (ref 20–32)
CREAT SERPL-MCNC: 0.84 MG/DL (ref 0.52–1.04)
CRP SERPL-MCNC: <2.9 MG/L (ref 0–8)
ERYTHROCYTE [DISTWIDTH] IN BLOOD BY AUTOMATED COUNT: 17.2 % (ref 10–15)
GFR SERPL CREATININE-BSD FRML MDRD: >90 ML/MIN/1.73M2
GLUCOSE BLD-MCNC: 99 MG/DL (ref 70–99)
HCT VFR BLD AUTO: 33.1 % (ref 35–47)
HGB BLD-MCNC: 10.7 G/DL (ref 11.7–15.7)
MCH RBC QN AUTO: 25.1 PG (ref 26.5–33)
MCHC RBC AUTO-ENTMCNC: 32.3 G/DL (ref 31.5–36.5)
MCV RBC AUTO: 78 FL (ref 78–100)
PLATELET # BLD AUTO: 342 10E3/UL (ref 150–450)
POTASSIUM BLD-SCNC: 3.3 MMOL/L (ref 3.4–5.3)
RBC # BLD AUTO: 4.27 10E6/UL (ref 3.8–5.2)
SODIUM SERPL-SCNC: 139 MMOL/L (ref 133–144)
WBC # BLD AUTO: 8.2 10E3/UL (ref 4–11)

## 2021-07-24 PROCEDURE — 80048 BASIC METABOLIC PNL TOTAL CA: CPT | Performed by: STUDENT IN AN ORGANIZED HEALTH CARE EDUCATION/TRAINING PROGRAM

## 2021-07-24 PROCEDURE — 85027 COMPLETE CBC AUTOMATED: CPT | Performed by: STUDENT IN AN ORGANIZED HEALTH CARE EDUCATION/TRAINING PROGRAM

## 2021-07-24 PROCEDURE — 250N000009 HC RX 250: Performed by: STUDENT IN AN ORGANIZED HEALTH CARE EDUCATION/TRAINING PROGRAM

## 2021-07-24 PROCEDURE — 99239 HOSP IP/OBS DSCHRG MGMT >30: CPT | Mod: GC | Performed by: STUDENT IN AN ORGANIZED HEALTH CARE EDUCATION/TRAINING PROGRAM

## 2021-07-24 PROCEDURE — 250N000013 HC RX MED GY IP 250 OP 250 PS 637: Performed by: STUDENT IN AN ORGANIZED HEALTH CARE EDUCATION/TRAINING PROGRAM

## 2021-07-24 PROCEDURE — 258N000003 HC RX IP 258 OP 636: Performed by: STUDENT IN AN ORGANIZED HEALTH CARE EDUCATION/TRAINING PROGRAM

## 2021-07-24 PROCEDURE — 86140 C-REACTIVE PROTEIN: CPT | Performed by: STUDENT IN AN ORGANIZED HEALTH CARE EDUCATION/TRAINING PROGRAM

## 2021-07-24 PROCEDURE — 250N000012 HC RX MED GY IP 250 OP 636 PS 637: Performed by: STUDENT IN AN ORGANIZED HEALTH CARE EDUCATION/TRAINING PROGRAM

## 2021-07-24 PROCEDURE — 36415 COLL VENOUS BLD VENIPUNCTURE: CPT | Performed by: STUDENT IN AN ORGANIZED HEALTH CARE EDUCATION/TRAINING PROGRAM

## 2021-07-24 PROCEDURE — 999N000128 HC STATISTIC PERIPHERAL IV START W/O US GUIDANCE

## 2021-07-24 RX ORDER — TOPIRAMATE SPINKLE 25 MG/1
50 CAPSULE ORAL 2 TIMES DAILY
Qty: 60 CAPSULE | Refills: 0 | Status: SHIPPED | OUTPATIENT
Start: 2021-07-24 | End: 2021-08-03 | Stop reason: DRUGHIGH

## 2021-07-24 RX ORDER — METOCLOPRAMIDE 10 MG/1
10 TABLET ORAL EVERY 6 HOURS PRN
Qty: 10 TABLET | Refills: 0 | Status: SHIPPED | OUTPATIENT
Start: 2021-07-24 | End: 2021-08-03

## 2021-07-24 RX ORDER — SUMATRIPTAN 50 MG/1
100 TABLET, FILM COATED ORAL
Qty: 15 TABLET | Refills: 0 | Status: SHIPPED | OUTPATIENT
Start: 2021-07-24 | End: 2021-08-03 | Stop reason: SINTOL

## 2021-07-24 RX ORDER — PREDNISONE 20 MG/1
60 TABLET ORAL DAILY
Qty: 6 TABLET | Refills: 0 | Status: SHIPPED | OUTPATIENT
Start: 2021-07-25 | End: 2021-07-27

## 2021-07-24 RX ADMIN — CALCIUM CARBONATE (ANTACID) CHEW TAB 500 MG 1000 MG: 500 CHEW TAB at 08:03

## 2021-07-24 RX ADMIN — DIPHENHYDRAMINE HYDROCHLORIDE 25 MG: 25 CAPSULE ORAL at 05:50

## 2021-07-24 RX ADMIN — OMEPRAZOLE 40 MG: 20 CAPSULE, DELAYED RELEASE ORAL at 07:55

## 2021-07-24 RX ADMIN — SODIUM CHLORIDE 500 MG: 9 INJECTION, SOLUTION INTRAVENOUS at 10:10

## 2021-07-24 RX ADMIN — PREDNISONE 60 MG: 20 TABLET ORAL at 07:55

## 2021-07-24 RX ADMIN — ACETAMINOPHEN 975 MG: 325 TABLET, FILM COATED ORAL at 05:32

## 2021-07-24 RX ADMIN — METOCLOPRAMIDE 10 MG: 5 TABLET ORAL at 05:49

## 2021-07-24 RX ADMIN — TOPIRAMATE 50 MG: 25 CAPSULE, COATED PELLETS ORAL at 07:56

## 2021-07-24 ASSESSMENT — ACTIVITIES OF DAILY LIVING (ADL)
ADLS_ACUITY_SCORE: 15

## 2021-07-24 NOTE — PLAN OF CARE
Time: 0145-1374    Reason for admission: Migraines    A&Ox4, up ad richard, VSS on RA. Reglan and benadryl given twice for headache, worse this morning scoring it 10/10 accompanied with ear pressure and photosensitivity, 0800 scheduled tylenol given early at 0530, next doses re-timed. Denies SOB/nausea. PRN Tums given for indigestion. Voids spontaneously, LBM 7/23 per pt. Regular diet. L PIV painful to flush so it was removed, currently did not want another one placed    Plan: Pain management, possible discharge?

## 2021-07-24 NOTE — PLAN OF CARE
Pt A&O, VSS on RA.  C/O headache this shift.  Volproate given IV with some relief.  Discharge information discussed with pt, pt verbalized understanding. No questions or concerns.  PIV removed.  Meds to be picked up at pharmacy by pt before leaving hospital.  Pt left by wheelchair with belongings.

## 2021-07-25 DIAGNOSIS — G43.711 INTRACTABLE CHRONIC MIGRAINE WITHOUT AURA AND WITH STATUS MIGRAINOSUS: Primary | ICD-10-CM

## 2021-07-26 ENCOUNTER — PATIENT OUTREACH (OUTPATIENT)
Dept: CARE COORDINATION | Facility: CLINIC | Age: 36
End: 2021-07-26

## 2021-07-26 DIAGNOSIS — Z71.89 OTHER SPECIFIED COUNSELING: ICD-10-CM

## 2021-07-26 NOTE — PROGRESS NOTES
Clinic Care Coordination Contact  Background: Care Coordination referral placed from [facility] discharge report for reason of patient meeting criteria for a TCM outreach call by [facility] team.    Assessment: Upon chart review, [facility] Team member will cancel/close the referral for TCM outreach due to reason below:    Patient has been contacted by a clinic RN or provider within [facility] for reason of discussing hospital follow up plan of care and answering questions patient may have related to discharge instructions.     Plan: Care Coordination referral for TCM outreach canceled to minimize duplicative efforts.    Tanja Hussein RN  [facility], [facility]

## 2021-07-31 LAB — BACTERIA CSF CULT: NORMAL

## 2021-08-03 ENCOUNTER — VIRTUAL VISIT (OUTPATIENT)
Dept: NEUROLOGY | Facility: CLINIC | Age: 36
End: 2021-08-03
Payer: COMMERCIAL

## 2021-08-03 DIAGNOSIS — Z92.89 HISTORY OF RECENT HOSPITALIZATION: ICD-10-CM

## 2021-08-03 DIAGNOSIS — G43.711 INTRACTABLE CHRONIC MIGRAINE WITHOUT AURA AND WITH STATUS MIGRAINOSUS: Primary | ICD-10-CM

## 2021-08-03 DIAGNOSIS — Z86.59 HISTORY OF DEPRESSION: ICD-10-CM

## 2021-08-03 DIAGNOSIS — R90.89 ABNORMAL BRAIN MRI: ICD-10-CM

## 2021-08-03 PROCEDURE — 99215 OFFICE O/P EST HI 40 MIN: CPT | Mod: 95 | Performed by: NURSE PRACTITIONER

## 2021-08-03 RX ORDER — TOPIRAMATE 50 MG/1
50 TABLET, FILM COATED ORAL 2 TIMES DAILY
Qty: 60 TABLET | Refills: 6 | Status: SHIPPED | OUTPATIENT
Start: 2021-08-03 | End: 2022-08-31

## 2021-08-03 RX ORDER — METOCLOPRAMIDE 10 MG/1
10 TABLET ORAL EVERY 6 HOURS PRN
Qty: 10 TABLET | Refills: 3 | Status: SHIPPED | OUTPATIENT
Start: 2021-08-03 | End: 2022-01-24

## 2021-08-03 NOTE — PROGRESS NOTES
Leticia is a 35 year old who is being evaluated via a billable video visit.      How would you like to obtain your AVS? MyChart  If the video visit is dropped, the invitation should be resent by: Text to cell phone: [phone]  Will anyone else be joining your video visit? No      Video Start Time: MIGRAINE DISABILITY ASSESSMENT (MIDAS)    On how many days in the last 3 months did you miss work or school because of your headaches?  30    How many days in the last 3 months was your productivity at work or school reduced by half or more because of your headaches? (Do not include days you counted in question 1 where you missed work or school.)  30    On how many days in the last 3 months did you not do household work (such as housework, home repairs and maintenance, shopping, caring for children and relatives) because of your headaches?  30    How many days in the last 3 months was your productivity in household work reduced by half or more because of your headaches? (Do not include days you counted in question 3 where you did not do household work).  30    On how many days in the last 3 months did you miss family, social, or lesiure activities because of your headaches?  30    MIDAS Total Score: 150     On how many days in the last 3 months did you have a headache? (If a headache lasted more than 1 day, count each day.)   30    On a scale of 0 - 10, on average how painful were these headaches (where 0 = no pain at all, and 10 = pain as bad as it can be.)  10  Video-Visit Details    Type of service:  Video Visit    12:25 PM      Video End Time:1:10 PM    Originating Location (pt. Location): Home    Distant Location (provider location):  [facility]     Platform used for Video Visit: Angie     Reason for visit:  Post hospital discharge and headache treatment follow-up  Interval history:  This is a 35-year-old who presents to [facility] headache clinic for headache  and post hospital  discharge follow-up.  Patient presents to virtual visit alone.  Patient is a good historian.  Initial headache clinic visit on 5/15/ 2019 for migraine headache treatment.  It was recommended a trial of Emgality for migraine headache prevention and sumatriptan as needed as rescue treatment.  Patient return to our clinic on 7/13/2021 for worsening headaches.  Emgality was recommended but denied by insurance and just stopped in 19 and unfortunately patient did not return to headache clinic and at least 2 years.  At last follow-up visit patient reported a right-sided headache with some neck stiffness and patient has been taking topiramate but only 25 mg at bedtime.  Patient headaches were managed by primary care and sumatriptan was taken almost daily and primary increase evening dose 200 mg.  Because of the reported neck stiffness and arm weakness at the last visit it was recommended cervical MRI for further evaluation.  Botox recommended and patient was advised to stop sumatriptan and topiramate dose was increased gradually from 25 mg to a 00 mg at bedtime.    Admission for acute headache, neck stiffness, abnormal cervical and brain MRIs on 7/15/2021.  Patient was discharged on 7/24/2021, admission notes were reviewed reviewed.  I reviewed patient's discharge note with plan of care.  I reviewed brain MRI results from 7/15/2021 and 7/19/2021 Ms. findings of mildly thickened and enhancing tumor over the left frontal lobe with differentials of post lumbar puncture intracranial hypotension, neoplastic process such as lymphoma, fungal or mycobacterial infection, sarcoid.  On repeat brain MRI on 7/19/2021 decreased to minimal left frontal dural thickening and enhancement and perhaps sequela of prior subdural hemorrhage.  MRI cervical spine images and results were reviewed from 7/14/2021 with findings of thin prevertebral edema/fluid and enhancement throughout the visualized cervical spine and upper thoracic spine is findings  favored to be infectious with appears to be centered about the anterior C6-C7 disc space with mild irregularity and bony spurring.  Suggest the possibility of urinary discitis at the C6-7 level.   Today patient reports that headaches wise has been doing Ok but last migraine 2 days after the discharge on 7/24/2021, patient took Tylenol and Reglan for it.    Patient reports that neck stiffness has improved. Reports that she can move her neck.   Reports persistent dizziness and difficulties with balance, not able to get back to work and feels that she needs some time off to recover.    Headache treatment prevention patient has been taking topiramate 50 mg twice daily   Patient stopped sumatriptan   Has been taking reglan as needed for migraine headaches rescue treatment and helps in combination +acetaminophen   Patient has been taking acetaminophen as needed -used 3 days in the 6 days post discharge.     Completed Doxycycline. Patient reports that she has been feeling dizzy and vision problems.   Patient has not been feeling well and feeling orthostatic when standing.   Did not have appointment scheduled yet with general neurology post discharge follow up -with Tracy Candelario MD    No brain MRI repeat ordered or scheduled as of today-will order brain MRI to repeat mid -August.     Migraine headache treatment Botox was denied initially on July 27th and second level of appeal on July 30th -Botox was approved.     Migraine management plan:  Rescue treatment -a trial rizatriptan as needed. Limit use to no more than 9 days per month. Side effects as discussed.   Reglan as needed for nausea  Limit use of tylenol to as needed only and no more than 10 days per month   Migraine headache prevention-  Continue Topiramate  Botox injections to schedule with any available provider  Follow up in 3 months or sooner if needed     Post discharge follow up  Repeat brain MRI mid-August for interval changes  Follow up with Dr Tracy Candelario  -neurology    Patient would like to take about 3 months off to get better and feel more steady. Patient reports that she will contact her clinic manager to see what papers needed    Patient reports that hospitalization opened a lot of memories and PTSD. Patient will see her PCP to discuss and referral to therapy for coping and trauma related to health and hospitalization.    Have not been in therapy for a couple of years.  Discussed coping mechanism and stress reduction but need mental health help.  Patient is open to mental health referrals but would like to discuss with her primary care first.    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     Per discharge note from 7/15/2021 reviewed  was admitted for acute onset of R shoulder and upper back pain and transient R arm numbness found to have cervical prevertebral fluid collection on MRI of unclear etiology and dural enhancement (which has shown improvement) on repeat MRI brain imaging.   S/p LP 7/17, with no clear infectious etiology (though cultures were taken after abx). Discontinued meropenem 7/20 given negative infectious work up with no further fevers. CSF Cytology 7/17 negative for malignancy. Rheumatology pursued work up for SLE, rheumatoid arthritis, GPA, and sarcoidosis, however no systemic signs/symptoms that clearly delineate a specific etiology and negative ANGELINA, IgG, ACE. CTV ruled out cerebral venous thrombosis.   Numerous medication trials were attempted including: IV toradol, IV Benadryl, Valproic acid. Patient with improved headache control on discharge.   - topimax 50mg PO BID (increased from PTA dose of 50 mg daily)  - Prednisone 60mg daily x5 days (7/23-7/27)  - Outpatient PRN headache plan:              - limit tylenol, NSAIDs, triptans for 2-3x/week to limit overuse headache              - compazine PRN -patient has been taking Reglan as needed.    PMH, allergies and current prescription medications reviewed    7 point ROS of  systems including Constitutional, Eyes, Respiratory, Cardiovascular, Gastroenterology, neurology , psychiatric were reviewed and no concerns reported today unless as mentioned in the interval history.    Patient appears alert and no in apparent acute distress,  mentation appears normal, judgement and insight intact, normal speech, EOM intact, normal facial expressions and smile, no apparent weakness, moves around the room without any difficulties    Assessment and plan as discussed above    I discussed all my recommendations with Leticia Morales who verbalizes understanding and comfortable with the plan.  All of patient's questions were answered from the best of my knowledge.  Patient is in agreement with the plan.      46 minutes spent on the date of the encounter doing chart review, history,  exam, documentation and further activities as noted above    CONNIE Garcia, CNP [facility]  Headache certified  [facility]

## 2021-08-03 NOTE — LETTER
8/3/2021       RE: Leticia Morales  [address]  [place] [address]     Dear Colleague,    Thank you for referring your patient, Leticia Morales, to the [facility] [place] at [facility]. Please see a copy of my visit note below.    Video Start Time: MIGRAINE DISABILITY ASSESSMENT (MIDAS)    On how many days in the last 3 months did you miss work or school because of your headaches?  30    How many days in the last 3 months was your productivity at work or school reduced by half or more because of your headaches? (Do not include days you counted in question 1 where you missed work or school.)  30    On how many days in the last 3 months did you not do household work (such as housework, home repairs and maintenance, shopping, caring for children and relatives) because of your headaches?  30    How many days in the last 3 months was your productivity in household work reduced by half or more because of your headaches? (Do not include days you counted in question 3 where you did not do household work).  30    On how many days in the last 3 months did you miss family, social, or lesiure activities because of your headaches?  30    MIDAS Total Score: 150     On how many days in the last 3 months did you have a headache? (If a headache lasted more than 1 day, count each day.)   30    On a scale of 0 - 10, on average how painful were these headaches (where 0 = no pain at all, and 10 = pain as bad as it can be.)  10    Reason for visit:  Post hospital discharge and headache treatment follow-up  Interval history:  This is a 35-year-old who presents to [facility] headache clinic for headache  and post hospital discharge follow-up.  Patient presents to virtual visit alone.  Patient is a good historian.  Initial headache clinic visit on 5/15/ 2019 for migraine headache treatment.  It was recommended a trial of Emgality for migraine headache  prevention and sumatriptan as needed as rescue treatment.  Patient return to our clinic on 7/13/2021 for worsening headaches.  Emgality was recommended but denied by insurance and just stopped in 19 and unfortunately patient did not return to headache clinic and at least 2 years.  At last follow-up visit patient reported a right-sided headache with some neck stiffness and patient has been taking topiramate but only 25 mg at bedtime.  Patient headaches were managed by primary care and sumatriptan was taken almost daily and primary increase evening dose 200 mg.  Because of the reported neck stiffness and arm weakness at the last visit it was recommended cervical MRI for further evaluation.  Botox recommended and patient was advised to stop sumatriptan and topiramate dose was increased gradually from 25 mg to a 00 mg at bedtime.    Admission for acute headache, neck stiffness, abnormal cervical and brain MRIs on 7/15/2021.  Patient was discharged on 7/24/2021, admission notes were reviewed reviewed.  I reviewed patient's discharge note with plan of care.  I reviewed brain MRI results from 7/15/2021 and 7/19/2021 Ms. findings of mildly thickened and enhancing tumor over the left frontal lobe with differentials of post lumbar puncture intracranial hypotension, neoplastic process such as lymphoma, fungal or mycobacterial infection, sarcoid.  On repeat brain MRI on 7/19/2021 decreased to minimal left frontal dural thickening and enhancement and perhaps sequela of prior subdural hemorrhage.  MRI cervical spine images and results were reviewed from 7/14/2021 with findings of thin prevertebral edema/fluid and enhancement throughout the visualized cervical spine and upper thoracic spine is findings favored to be infectious with appears to be centered about the anterior C6-C7 disc space with mild irregularity and bony spurring.  Suggest the possibility of urinary discitis at the C6-7 level.   Today patient reports that  headaches miranda has been doing Ok but last migraine 2 days after the discharge on 7/24/2021, patient took Tylenol and Reglan for it.    Patient reports that neck stiffness has improved. Reports that she can move her neck.   Reports persistent dizziness and difficulties with balance, not able to get back to work and feels that she needs some time off to recover.    Headache treatment prevention patient has been taking topiramate 50 mg twice daily   Patient stopped sumatriptan   Has been taking reglan as needed for migraine headaches rescue treatment and helps in combination +acetaminophen   Patient has been taking acetaminophen as needed -used 3 days in the 6 days post discharge.     Completed Doxycycline. Patient reports that she has been feeling dizzy and vision problems.   Patient has not been feeling well and feeling orthostatic when standing.   Did not have appointment scheduled yet with general neurology post discharge follow up -with Tracy Candelario MD    No brain MRI repeat ordered or scheduled as of today-will order brain MRI to repeat mid -August.     Migraine headache treatment Botox was denied initially on July 27th and second level of appeal on July 30th -Botox was approved.     Migraine management plan:  Rescue treatment -a trial rizatriptan as needed. Limit use to no more than 9 days per month. Side effects as discussed.   Reglan as needed for nausea  Limit use of tylenol to as needed only and no more than 10 days per month   Migraine headache prevention-  Continue Topiramate  Botox injections to schedule with any available provider  Follow up in 3 months or sooner if needed     Post discharge follow up  Repeat brain MRI mid-August for interval changes  Follow up with Dr Tracy Candelario -neurology    Patient would like to take about 3 months off to get better and feel more steady. Patient reports that she will contact her clinic manager to see what papers needed    Patient reports that hospitalization  opened a lot of memories and PTSD. Patient will see her PCP to discuss and referral to therapy for coping and trauma related to health and hospitalization.    Have not been in therapy for a couple of years.  Discussed coping mechanism and stress reduction but need mental health help.  Patient is open to mental health referrals but would like to discuss with her primary care first.    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     Per discharge note from 7/15/2021 reviewed  was admitted for acute onset of R shoulder and upper back pain and transient R arm numbness found to have cervical prevertebral fluid collection on MRI of unclear etiology and dural enhancement (which has shown improvement) on repeat MRI brain imaging.   S/p LP 7/17, with no clear infectious etiology (though cultures were taken after abx). Discontinued meropenem 7/20 given negative infectious work up with no further fevers. CSF Cytology 7/17 negative for malignancy. Rheumatology pursued work up for SLE, rheumatoid arthritis, GPA, and sarcoidosis, however no systemic signs/symptoms that clearly delineate a specific etiology and negative ANGELINA, IgG, ACE. CTV ruled out cerebral venous thrombosis.   Numerous medication trials were attempted including: IV toradol, IV Benadryl, Valproic acid. Patient with improved headache control on discharge.   - topimax 50mg PO BID (increased from PTA dose of 50 mg daily)  - Prednisone 60mg daily x5 days (7/23-7/27)  - Outpatient PRN headache plan:              - limit tylenol, NSAIDs, triptans for 2-3x/week to limit overuse headache              - compazine PRN -patient has been taking Reglan as needed.    PMH, allergies and current prescription medications reviewed    7 point ROS of systems including Constitutional, Eyes, Respiratory, Cardiovascular, Gastroenterology, neurology , psychiatric were reviewed and no concerns reported today unless as mentioned in the interval history.    Patient appears  alert and no in apparent acute distress,  mentation appears normal, judgement and insight intact, normal speech, EOM intact, normal facial expressions and smile, no apparent weakness, moves around the room without any difficulties    Assessment and plan as discussed above    I discussed all my recommendations with Leticia Morales who verbalizes understanding and comfortable with the plan.  All of patient's questions were answered from the best of my knowledge.  Patient is in agreement with the plan.      46 minutes spent on the date of the encounter doing chart review, history,  exam, documentation and further activities as noted above    CONNIE Garcia, CNP [facility]  Headache certified  [facility]        Again, thank you for allowing me to participate in the care of your patient.      Sincerely,    CONNIE Meeks CNP

## 2021-08-03 NOTE — PATIENT INSTRUCTIONS
Migraine management plan:  Rescue treatment -a trial rizatriptan as needed. Limit use to no more than 9 days per month. Side effects as discussed.   Reglan as needed for nausea  Limit use of tylenol to as needed only and no more than 10 days per month   Migraine headache prevention-  Continue Topiramate  Botox injections to schedule with any available provider  Follow up in 3 months or sooner if needed     Post discharge follow up  Repeat brain MRI mid-August for interval changes  Follow up with Dr Tracy Candelario -

## 2021-08-06 DIAGNOSIS — G43.711 INTRACTABLE CHRONIC MIGRAINE WITHOUT AURA AND WITH STATUS MIGRAINOSUS: Primary | ICD-10-CM

## 2021-08-06 RX ORDER — RIZATRIPTAN BENZOATE 5 MG/1
5-10 TABLET, ORALLY DISINTEGRATING ORAL
Qty: 18 TABLET | Refills: 6 | Status: SHIPPED | OUTPATIENT
Start: 2021-08-06 | End: 2022-02-16

## 2021-08-09 LAB
DSDNA AB SER-ACNC: NORMAL [IU]/ML
ENA SM IGG SER IA-ACNC: NORMAL
ENA SM IGG SER IA-ACNC: NORMAL
ENA SS-A AB SER IA-ACNC: NORMAL
ENA SS-A AB SER IA-ACNC: NORMAL
ENA SS-B IGG SER IA-ACNC: NORMAL
ENA SS-B IGG SER IA-ACNC: NORMAL
MYELOPEROXIDASE AB SER IA-ACNC: NORMAL
MYELOPEROXIDASE AB SER IA-ACNC: NORMAL
PROTEINASE3 AB SER IA-ACNC: NORMAL
PROTEINASE3 AB SER IA-ACNC: NORMAL
U1 SNRNP IGG SER IA-ACNC: NORMAL
U1 SNRNP IGG SER IA-ACNC: NORMAL

## 2021-08-12 ENCOUNTER — OFFICE VISIT (OUTPATIENT)
Dept: FAMILY MEDICINE | Facility: CLINIC | Age: 36
End: 2021-08-12
Payer: COMMERCIAL

## 2021-08-12 VITALS
HEART RATE: 112 BPM | TEMPERATURE: 98.4 F | SYSTOLIC BLOOD PRESSURE: 100 MMHG | OXYGEN SATURATION: 100 % | DIASTOLIC BLOOD PRESSURE: 73 MMHG | RESPIRATION RATE: 16 BRPM | BODY MASS INDEX: 21.28 KG/M2 | WEIGHT: 124 LBS

## 2021-08-12 DIAGNOSIS — F43.21 ADJUSTMENT DISORDER WITH DEPRESSED MOOD: ICD-10-CM

## 2021-08-12 DIAGNOSIS — F41.8 DEPRESSION WITH ANXIETY: ICD-10-CM

## 2021-08-12 DIAGNOSIS — E44.1 MILD PROTEIN-CALORIE MALNUTRITION (H): ICD-10-CM

## 2021-08-12 DIAGNOSIS — F43.10 PTSD (POST-TRAUMATIC STRESS DISORDER): Primary | ICD-10-CM

## 2021-08-12 DIAGNOSIS — G43.109 MIGRAINE WITH AURA AND WITHOUT STATUS MIGRAINOSUS, NOT INTRACTABLE: ICD-10-CM

## 2021-08-12 PROBLEM — E46 PROTEIN-CALORIE MALNUTRITION (H): Status: ACTIVE | Noted: 2021-08-12

## 2021-08-12 PROCEDURE — 99213 OFFICE O/P EST LOW 20 MIN: CPT | Mod: GC | Performed by: STUDENT IN AN ORGANIZED HEALTH CARE EDUCATION/TRAINING PROGRAM

## 2021-08-12 ASSESSMENT — ANXIETY QUESTIONNAIRES
2. NOT BEING ABLE TO STOP OR CONTROL WORRYING: NEARLY EVERY DAY
1. FEELING NERVOUS, ANXIOUS, OR ON EDGE: NEARLY EVERY DAY
3. WORRYING TOO MUCH ABOUT DIFFERENT THINGS: NEARLY EVERY DAY
7. FEELING AFRAID AS IF SOMETHING AWFUL MIGHT HAPPEN: MORE THAN HALF THE DAYS
6. BECOMING EASILY ANNOYED OR IRRITABLE: SEVERAL DAYS
5. BEING SO RESTLESS THAT IT IS HARD TO SIT STILL: SEVERAL DAYS
IF YOU CHECKED OFF ANY PROBLEMS ON THIS QUESTIONNAIRE, HOW DIFFICULT HAVE THESE PROBLEMS MADE IT FOR YOU TO DO YOUR WORK, TAKE CARE OF THINGS AT HOME, OR GET ALONG WITH OTHER PEOPLE: NOT DIFFICULT AT ALL
GAD7 TOTAL SCORE: 15

## 2021-08-12 ASSESSMENT — PATIENT HEALTH QUESTIONNAIRE - PHQ9: 5. POOR APPETITE OR OVEREATING: MORE THAN HALF THE DAYS

## 2021-08-12 NOTE — PROGRESS NOTES
Assessment & Plan     Migraine with aura and without status migrainosus, not intractable  Mild protein-calorie malnutrition (H)  Patient seen for follow up after recent hospitalization for status migrainosus with imaging findings as above. Has Neurology follow up in place as well as repeat imaging next week. She has ongoing symptoms including lightheadedness (likely more related to dehydration, hypoglycemia), numbness/tingling and migraines (though less severe and frequent). No changes made to regimen given multiple medication trials and possibility of contributing to headaches with side effects of medications. She has been approved for Botox which I encouraged her to try. Discussed limiting triptan, NSAID use per Neurology recs due to concern for medication overuse contributing.     PTSD (post-traumatic stress disorder)  Adjustment disorder with depressed mood  Depression with anxiety  Patient has noticed increased anxiety symptoms with she attributes to the unknown of her diagnosis and prognosis, ongoing testing and variety of specialists involved in her care. Interested in working with therapy, preferably at [facility] if available.     No follow-ups on file.    Yashira Cuevas MD  [facility] RENATA Kelly is a 35 year old with history of migraines who presents with her sister for the following health issues    HPI     Hospital Follow-up Visit:    Hospital/Nursing Home/IP Rehab Facility: [facility]  Date of Admission: 7/15/2021  Date of Discharge: 7/24/2021  Reason(s) for Admission: migraines      Was your hospitalization related to COVID-19? No   Problems taking medications regularly:  None  Medication changes since discharge: prednisone x5 days (completed)  Problems adhering to non-medication therapy:  None    Summary of hospitalization:  [facility] discharge summary reviewed  - Patient with history of  "migraines presenting for right shoulder and back pain, R arm numbness, found to have cervical prevertebral fluid collection and dural enhancement on imaging. Neurology was consulted and symptoms were ultimately attributed to status migrainosus. She was discharged on a prednisone 5 day course (7/23-7/27) with instructions to limit Tylenol, NSAIDs, triptans and follow up with Neurology with repeat imaging in one month.   Diagnostic Tests/Treatments reviewed.  Follow up needed: Neurology  - Did see Neurology Dr. Velasco 8/03/2021 and reported overall doing ok  - Needs follow up MRI, scheduled for 8/22/2021   Other Healthcare Providers Involved in Patient s Care:         Neurology, Infectious Disease, Rheumatology  Update since discharge: fluctuating course. On and off headaches. Having episodes of \"fogginess\" and lightheadedness-- has not had any falls. Nauseous. Last headache two days ago- took rizatriptan, makes headache disappear within hours but then returns 3 days later. Gap is longer.     Post Discharge Medication Reconciliation: discharge medications reconciled, continue medications without change.  Plan of care communicated with patient        Review of Systems    ROS: 10 point ROS neg other than the symptoms noted above in the HPI.       Objective    /73   Pulse 112   Temp 98.4  F (36.9  C) (Oral)   Resp 16   Wt 56.2 kg (124 lb)   SpO2 100%   BMI 21.28 kg/m    Body mass index is 21.28 kg/m .  Physical Exam  Constitutional:       General: She is not in acute distress.     Appearance: She is well-developed. She is not diaphoretic.   Cardiovascular:      Rate and Rhythm: Normal rate.   Pulmonary:      Effort: Pulmonary effort is normal. No respiratory distress.   Neurological:      General: No focal deficit present.      Mental Status: She is alert.      Sensory: No sensory deficit.   Psychiatric:         Attention and Perception: Attention normal.         Mood and Affect: Mood is anxious.         " Speech: Speech normal.          No results found for this or any previous visit (from the past 24 hour(s)).

## 2021-08-12 NOTE — Clinical Note
This patient is interested in starting therapy and asked if [facility] was an option-- not sure what the status is but said I would ask you! If not, do I just put the mental health referral in? Thanks! Arianne

## 2021-08-12 NOTE — LETTER
August 12, 2021      To Whom It May Concern:      Leticia Morales was seen in our clinic today, 08/12/21.  I expect her condition to improve over the next 6 months.  She may return to work when improved.    Sincerely,        Yashira Cuevas MD         [facility] [place]  [address], [address]  [place] [address]  Phone: [phone]  Fax: [phone]    Pt Name: Bryn Camarillo Jr.  Injury Date: 07/20/2018   Employee ID:[id number] Date 07/23/2018   Company: [employer]            Appointment Time: 09:15 AM Arrived:  11:11 AM CDT   Physician: Isis Huerta NP Time out: 12:22 PM       Office Treatment: Bryn was seen today for arm injury.    Diagnoses and all orders for this visit:    Laceration of left forearm, subsequent encounter    Work related injury       Patient Instructions: Keep dressing clean/dry/covered, Attention not to aggravate affected area (Return to [place]. Do not get wound wet. Okay to have wound open to air at home only for a few hours after work. Dressing at night. Monitor for signs of infection ( increased pain, discharge, warmth at wound site.  Continue antibiotic as directed )    Light Duty:  No lifting/pushing/pulling more than 10 lbs,  Limited use of left hand and arm,   No above the shoulder/overhead work  (Must clean wound clean and dry.  Avoid dirty work environments .    Wound to be covered while at work.       Return Appointment: 7/26/2018 @ 11:00AM

## 2021-08-13 ASSESSMENT — ANXIETY QUESTIONNAIRES: GAD7 TOTAL SCORE: 15

## 2021-08-16 LAB — BACTERIA CSF CULT: NO GROWTH

## 2021-08-17 ENCOUNTER — TELEPHONE (OUTPATIENT)
Dept: PSYCHOLOGY | Facility: CLINIC | Age: 36
End: 2021-08-17

## 2021-08-17 NOTE — TELEPHONE ENCOUNTER
Mental Health Referral:  Please schedule with Dr. Monahan or Sandra      Please let patient know this is for primary care behavioral health services which means we provide short-term therapy services.  Therapists at our clinic are able to see patients for 8-12 sessions. If further assistance is needed, they will help the patient connect with ongoing services in the community.    Check with the patient if prefer an in person, video or telephone visit.  If they choose a video visit, they will need access to either a smartphone or a laptop with camera/microphone.  If they can do a video visit, please schedule as a video visit.  If they do not have the technology to do a video visit, please schedule as a telephone visit. For either visit, please put the phone number or email in the appt notes that the provider is supposed to call or send the visit invite.  There are some instructions regarding how the video visits work for patients below. This can be copied/pasted into a Tickade message if the patient would like more information.      If you are unable to reach the patient after two phone attempts, please send a letter and close the encounter.      Thank you!    Reebkah Flores PsyD

## 2021-08-17 NOTE — LETTER
September 30, 2021 September 30, 2021      Leticia Morales  [address]   [place] [address]      Hi Leticia     We have been trying to reach you to schedule an appointment for therapy here at [facility].  The last time you were in, your physician put in a behavioral health referral.  We have not been able to reach you or to leave a voicemail on your phone.  Please give us a call to schedule the appointment at your earliest convenience.  Thank you!      Please feel free to call the clinic to schedule an appointment at [phone].    Sincerely,      Yashira Cuevas    [facility]  [address]  [place] [address] [phone]      Sincerely,    Rebekah Flores

## 2021-08-22 ENCOUNTER — ANCILLARY PROCEDURE (OUTPATIENT)
Dept: MRI IMAGING | Facility: CLINIC | Age: 36
End: 2021-08-22
Attending: NURSE PRACTITIONER
Payer: COMMERCIAL

## 2021-08-22 DIAGNOSIS — G44.86 CERVICOGENIC HEADACHE: ICD-10-CM

## 2021-08-22 DIAGNOSIS — R20.0 NUMBNESS AND TINGLING OF RIGHT ARM: ICD-10-CM

## 2021-08-22 DIAGNOSIS — Z92.89 HISTORY OF RECENT HOSPITALIZATION: ICD-10-CM

## 2021-08-22 DIAGNOSIS — R20.2 NUMBNESS AND TINGLING OF RIGHT ARM: ICD-10-CM

## 2021-08-22 DIAGNOSIS — R90.89 ABNORMAL BRAIN MRI: ICD-10-CM

## 2021-08-22 PROCEDURE — 72141 MRI NECK SPINE W/O DYE: CPT | Mod: GC | Performed by: STUDENT IN AN ORGANIZED HEALTH CARE EDUCATION/TRAINING PROGRAM

## 2021-08-22 PROCEDURE — A9585 GADOBUTROL INJECTION: HCPCS | Performed by: STUDENT IN AN ORGANIZED HEALTH CARE EDUCATION/TRAINING PROGRAM

## 2021-08-22 PROCEDURE — 70553 MRI BRAIN STEM W/O & W/DYE: CPT | Mod: GC | Performed by: STUDENT IN AN ORGANIZED HEALTH CARE EDUCATION/TRAINING PROGRAM

## 2021-08-22 RX ORDER — GADOBUTROL 604.72 MG/ML
7.5 INJECTION INTRAVENOUS ONCE
Status: COMPLETED | OUTPATIENT
Start: 2021-08-22 | End: 2021-08-22

## 2021-08-22 RX ADMIN — GADOBUTROL 5 ML: 604.72 INJECTION INTRAVENOUS at 09:05

## 2021-08-22 NOTE — DISCHARGE INSTRUCTIONS
MRI Contrast Discharge Instructions    The IV contrast you received today will pass out of your body in your  urine. This will happen in the next 24 hours. You will not feel this process.  Your urine will not change color.    Drink at least 4 extra glasses of water or juice today (unless your doctor  has restricted your fluids). This reduces the stress on your kidneys.  You may take your regular medicines.    If you are on dialysis: It is best to have dialysis today.    If you have a reaction: Most reactions happen right away. If you have  any new symptoms after leaving the hospital (such as hives or swelling),  call your hospital at the correct number below. Or call your family doctor.  If you have breathing distress or wheezing, call 911.    Special instructions: ***    I have read and understand the above information.    Signature:______________________________________ Date:___________    Staff:__________________________________________ Date:___________     Time:__________    [place] [facility] Departments:    ___[place]: [phone]  ___[facility]: [phone]  ___[place]: [phone] ___[facility]: [phone]  ___[facility]: [phone]  ___[facility]: [phone]  ___[phone]  ___[facility]: [phone]  ___[phone]

## 2021-08-23 DIAGNOSIS — K21.9 GASTROESOPHAGEAL REFLUX DISEASE WITHOUT ESOPHAGITIS: ICD-10-CM

## 2021-08-23 NOTE — RESULT ENCOUNTER NOTE
Jed Kelly,   Your recent brain MRI images reviewed and improved. Nothing acute or abnormal at this time except some old changes related to your previous ICU/hospitalization in the past.   Please let me know if you have any questions  Take care,  Silke

## 2021-08-23 NOTE — RESULT ENCOUNTER NOTE
Jed Kelly,   Your recent cervical MRI images reviewed and much improved since your last admission. Please let me know if you have any questions.   Follow up as discussed.   Take care,  Silke

## 2021-08-23 NOTE — TELEPHONE ENCOUNTER
Verify that the refill encounter hasn't been started Yes    [facility] phone call message- patient requesting a refill:    Full Medication Name: omeprazole (PRILOSEC) 20 MG DR capsule    Dose:      Pharmacy confirmed as   [id number] - [place], [place] - [address]  [address] [address]  Phone: [phone] Fax: [phone]    [id number] - [place], [place] - [address]  [address]  [facility] [address]  Phone: [phone] Fax: [phone]    [place], [place] - [address]  [address]  [facility] [address]  [facility] [address]  Phone: [phone] Fax: [phone] Alternate Fax: [phone], [phone], [phone]    [id number] - [place], [place] - [address]  [address] [address]  Phone: [phone] Fax: [phone]  : Yes    Medication tab checked to see if medication has been sent  Yes    Additional Comments:  Patient request refill    OK to leave a message on voice mail? Yes    Advised patient refill may take up to 2 business days? Yes    Primary language: Montenegrin      needed? No    Call taken on August 23, 2021 at 1:18 PM by Lorenzo Butt    Route to P [facility] REFILL

## 2021-08-23 NOTE — TELEPHONE ENCOUNTER

## 2021-08-26 ENCOUNTER — VIRTUAL VISIT (OUTPATIENT)
Dept: NEUROLOGY | Facility: CLINIC | Age: 36
End: 2021-08-26
Payer: COMMERCIAL

## 2021-08-26 DIAGNOSIS — R42 DIZZINESS: ICD-10-CM

## 2021-08-26 DIAGNOSIS — H81.10 BPV (BENIGN POSITIONAL VERTIGO), UNSPECIFIED LATERALITY: Primary | ICD-10-CM

## 2021-08-26 PROCEDURE — 99213 OFFICE O/P EST LOW 20 MIN: CPT | Mod: 95 | Performed by: NURSE PRACTITIONER

## 2021-08-26 NOTE — LETTER
8/26/2021       RE: Leticia Morales  [address]  [place] [address]     Dear Colleague,    Thank you for referring your patient, Leticia Morales, to the [facility] NEUROLOGY CLINIC [place] at [facility]. Please see a copy of my visit note below.    Video Start Time: MIGRAINE DISABILITY ASSESSMENT (MIDAS)    On how many days in the last 3 months did you miss work or school because of your headaches?  30    How many days in the last 3 months was your productivity at work or school reduced by half or more because of your headaches? (Do not include days you counted in question 1 where you missed work or school.)  30    On how many days in the last 3 months did you not do household work (such as housework, home repairs and maintenance, shopping, caring for children and relatives) because of your headaches?  35    How many days in the last 3 months was your productivity in household work reduced by half or more because of your headaches? (Do not include days you counted in question 3 where you did not do household work).  30    On how many days in the last 3 months did you miss family, social, or lesiure activities because of your headaches?  0    MIDAS Total Score:     On how many days in the last 3 months did you have a headache? (If a headache lasted more than 1 day, count each day.)   30    On a scale of 0 - 10, on average how painful were these headaches (where 0 = no pain at all, and 10 = pain as bad as it can be.)  8    Headache Clinic follow up   Patient reports that she has been feeling dizzy and like a whole room is spinnig and balance off. Dizziness with walking or exercise. Dizziness with laying in the bed and room shifting. Dizziness started since hospital discharge. Has to take breaks in between when exercises. Staying hydrated.   Headaches are better with treatment and not as frequent-topiramate.   No ringing in the ears now. No hearing  problems.   No nausea or vomiting.     Sister had balance/dizziness problems and was in PT    Plan:  Vestibular therapy referral for positional dizziness/vertigo  Headache treatment continue 100 mg in divided doses  Stay hydrated  Follow up after PT     Cervical MRI -Impression:   1.  Resolution of prevertebral fluid collection.   2.  Stable prominent cervical lymph nodes.  3.  No abnormal cord signal. No spinal canal or neural foraminal  narrowing.    IMPRESSION:  Normal head CT.    Impression:  1.  Resolution of findings of previous intracranial hypotension, as  seen on comparison 7/29/2021. Previous findings included enlargement  of the pituitary gland, dural enhancement, and a convex superior  sagittal sinus, all of which are no longer present.  2.  Stable nonspecific findings of prior petechial microhemorrhages  without associated acute signal changes. Favor sequela of prior ARDS  and associated critical illness, less likely diffuse axonal injury or  amyloidosis.    CTV   Impression:    1. No dural venous sinus thrombosis.  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  3. Enlarged right level 1A and 1B lymph nodes in addition to multiple  other prominent cervical chain lymph nodes which are likely reactive.    Patient appears alert and no in apparent acute distress,  mentation appears normal, judgement and insight intact, normal speech, face is symmetrical and symmetrical facial expressions, no weakness     allergies and current prescription medications reviewed    A/P as discussed above    I discussed all my recommendations with Leticia Morales who verbalizes understanding and comfortable with the plan.  All of patient's questions were answered from the best of my knowledge.  Patient is in agreement with the plan.     20 minutes spent on the date of the encounter doing video access, chart  review, exam, results review, meds review, treatment plan, documentation and further activities as noted  above    CONNIE Garcia, CNP [facility]  Headache certified  [facility]      Again, thank you for allowing me to participate in the care of your patient.      Sincerely,    CONNIE Meeks CNP

## 2021-08-26 NOTE — PROGRESS NOTES
Leticia is a 35 year old who is being evaluated via a billable video visit.      How would you like to obtain your AVS? MyChart  If the video visit is dropped, the invitation should be resent by: Text to cell phone: [phone]   Will anyone else be joining your video visit? No      Video Start Time: MIGRAINE DISABILITY ASSESSMENT (MIDAS)    On how many days in the last 3 months did you miss work or school because of your headaches?  30    How many days in the last 3 months was your productivity at work or school reduced by half or more because of your headaches? (Do not include days you counted in question 1 where you missed work or school.)  30    On how many days in the last 3 months did you not do household work (such as housework, home repairs and maintenance, shopping, caring for children and relatives) because of your headaches?  35    How many days in the last 3 months was your productivity in household work reduced by half or more because of your headaches? (Do not include days you counted in question 3 where you did not do household work).  30    On how many days in the last 3 months did you miss family, social, or lesiure activities because of your headaches?  0    MIDAS Total Score:     On how many days in the last 3 months did you have a headache? (If a headache lasted more than 1 day, count each day.)   30    On a scale of 0 - 10, on average how painful were these headaches (where 0 = no pain at all, and 10 = pain as bad as it can be.)  8  Video-Visit Details    Type of service:  Video Visit    Video start time 12:59 PM    Video End Time:1:16 PM    Originating Location (pt. Location): Home    Distant Location (provider location):  [facility] NEUROLOGY CLINIC [place]     Platform used for Video Visit: [facility]     Headache Clinic follow up   Patient reports that she has been feeling dizzy and like a whole room is spinnig and balance off. Dizziness with walking or exercise. Dizziness with laying  in the bed and room shifting. Dizziness started since hospital discharge. Has to take breaks in between when exercises. Staying hydrated.   Headaches are better with treatment and not as frequent-topiramate.   No ringing in the ears now. No hearing problems.   No nausea or vomiting.     Sister had balance/dizziness problems and was in PT    Plan:  Vestibular therapy referral for positional dizziness/vertigo  Headache treatment continue 100 mg in divided doses  Stay hydrated  Follow up after PT     Cervical MRI -Impression:   1.  Resolution of prevertebral fluid collection.   2.  Stable prominent cervical lymph nodes.  3.  No abnormal cord signal. No spinal canal or neural foraminal  narrowing.    IMPRESSION:  Normal head CT.    Impression:  1.  Resolution of findings of previous intracranial hypotension, as  seen on comparison 7/29/2021. Previous findings included enlargement  of the pituitary gland, dural enhancement, and a convex superior  sagittal sinus, all of which are no longer present.  2.  Stable nonspecific findings of prior petechial microhemorrhages  without associated acute signal changes. Favor sequela of prior ARDS  and associated critical illness, less likely diffuse axonal injury or  amyloidosis.    CTV   Impression:    1. No dural venous sinus thrombosis.  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  3. Enlarged right level 1A and 1B lymph nodes in addition to multiple  other prominent cervical chain lymph nodes which are likely reactive.    Patient appears alert and no in apparent acute distress,  mentation appears normal, judgement and insight intact, normal speech, face is symmetrical and symmetrical facial expressions, no weakness     allergies and current prescription medications reviewed    A/P as discussed above    I discussed all my recommendations with Leticia Morales who verbalizes understanding and comfortable with the plan.  All of patient's questions were answered from the  best of my knowledge.  Patient is in agreement with the plan.     20 minutes spent on the date of the encounter doing video access, chart  review, exam, results review, meds review, treatment plan, documentation and further activities as noted above    CONNIE Garcia, CNP [facility]  Headache certified  [facility]

## 2021-08-31 ENCOUNTER — OFFICE VISIT (OUTPATIENT)
Dept: NEUROLOGY | Facility: CLINIC | Age: 36
End: 2021-08-31
Payer: COMMERCIAL

## 2021-08-31 DIAGNOSIS — G43.709 CHRONIC MIGRAINE WITHOUT AURA WITHOUT STATUS MIGRAINOSUS, NOT INTRACTABLE: Primary | ICD-10-CM

## 2021-08-31 PROCEDURE — 64615 CHEMODENERV MUSC MIGRAINE: CPT | Performed by: NURSE PRACTITIONER

## 2021-08-31 NOTE — PROGRESS NOTES
[facility]    Leticia Morales MRN# [id number]   Age: 35 year old YOB: 1985     Botulinum Toxin Procedure    The procedure was explained to the patient. Benefits of the treatment were discussed including headache and migraine reduction. Risks of the procedure were reviewed including but not limited to pain, bruising, bleeding, infection, weakness of muscles injected or those distal to injection. The patient voiced understanding of the risks and benefits. All questions answered and the patient consented to proceed.     Prior to receiving Botox injections the patient reported the following:  Baseline headache frequency:30 days  Baseline headache duration: constant  Baseline MIDAS score: 150  Associated migrainous features:dizziness,     Previous treatment trials:  topiramate  emgality recommended in past but insurance denied.  Sumatriptan  Metoclopramide  doxycycline    Last Botox procedure: N/A thi if first one  Current migraine frequency: see above  Current migraine duration: see above    A 200 unit vial of Botox was diluted with 4ml of 0.9% sodium chloride    Botox lot # and expiration date: SEE MAR for details  0.9% sodium chloride lot # and expiration date: See MAR for details    The following muscles were injected using a 30 gauge needle:  Procerus 1 site 5 units   2 sites (bilat) 10 units  Frontalis 4 sites (bilat) 20 units  Temporalis 8 sites (bilat) 40 units  Trapezius muscles 6 sites (bilat) 30 units  Cervical paraspinals (bilat) 4 sites 20 units  Occipitalis 6 sites (bilat) 30 units    A total of 155 units were injected, 45 wasted.   The patient tolerated the injections well. I was present for and performed the entire procedure.  The patient reported pain during the procedure but discharged with pain under control and no complaints.     Avril Perez MD

## 2021-08-31 NOTE — LETTER
8/31/2021       RE: Leticia Morales  [address]  [place] [address]     Dear Colleague,    Thank you for referring your patient, Leticia Morales, to the [facility] NEUROLOGY CLINIC [place] at [facility]. Please see a copy of my visit note below.      [facility]    Leticia Morales MRN# [id number]   Age: 35 year old YOB: 1985     Botulinum Toxin Procedure    The procedure was explained to the patient. Benefits of the treatment were discussed including headache and migraine reduction. Risks of the procedure were reviewed including but not limited to pain, bruising, bleeding, infection, weakness of muscles injected or those distal to injection. The patient voiced understanding of the risks and benefits. All questions answered and the patient consented to proceed.     Prior to receiving Botox injections the patient reported the following:  Baseline headache frequency:30 days  Baseline headache duration: constant  Baseline MIDAS score: 150  Associated migrainous features:dizziness,     Previous treatment trials:  topiramate  emgality recommended in past but insurance denied.  Sumatriptan  Metoclopramide  doxycycline    Last Botox procedure: N/A thi if first one  Current migraine frequency: see above  Current migraine duration: see above    A 200 unit vial of Botox was diluted with 4ml of 0.9% sodium chloride    Botox lot # and expiration date: SEE MAR for details  0.9% sodium chloride lot # and expiration date: See MAR for details    The following muscles were injected using a 30 gauge needle:  Procerus 1 site 5 units   2 sites (bilat) 10 units  Frontalis 4 sites (bilat) 20 units  Temporalis 8 sites (bilat) 40 units  Trapezius muscles 6 sites (bilat) 30 units  Cervical paraspinals (bilat) 4 sites 20 units  Occipitalis 6 sites (bilat) 30 units    A total of 155 units were injected, 45 wasted.   The patient tolerated the  injections well. I was present for and performed the entire procedure.  The patient reported pain during the procedure but discharged with pain under control and no complaints.     Avril Perez MD        Again, thank you for allowing me to participate in the care of your patient.      Sincerely,    Avril Perez MD

## 2021-09-12 LAB — ACID FAST STAIN (ARUP): NORMAL

## 2021-09-15 NOTE — TELEPHONE ENCOUNTER
SW called pt. Unable to leave message as it never went to a voicemail.    NIKKI Mata  Pronouns: She/Her/Hers  , Care Coordination  [facility]  [phone]

## 2021-09-26 ENCOUNTER — HEALTH MAINTENANCE LETTER (OUTPATIENT)
Age: 36
End: 2021-09-26

## 2021-09-28 ENCOUNTER — THERAPY VISIT (OUTPATIENT)
Dept: PHYSICAL THERAPY | Facility: CLINIC | Age: 36
End: 2021-09-28
Attending: NURSE PRACTITIONER
Payer: COMMERCIAL

## 2021-09-28 DIAGNOSIS — H81.10 BPV (BENIGN POSITIONAL VERTIGO), UNSPECIFIED LATERALITY: ICD-10-CM

## 2021-09-28 DIAGNOSIS — R42 DIZZINESS: ICD-10-CM

## 2021-09-28 PROCEDURE — 97110 THERAPEUTIC EXERCISES: CPT | Mod: GP | Performed by: PHYSICAL THERAPIST

## 2021-09-28 PROCEDURE — 97162 PT EVAL MOD COMPLEX 30 MIN: CPT | Mod: GP | Performed by: PHYSICAL THERAPIST

## 2021-09-28 NOTE — DISCHARGE INSTRUCTIONS
9/28/21   - Get hat or visor to reduce light exposure when outside   - Balance: feet together eyes closed. Hold 60 seconds, repeat 2-3 times as able.   - Keep walking to the park as much as possible.

## 2021-09-30 NOTE — PROGRESS NOTES
09/28/21 1400   Quick Adds   Quick Adds Vestibular Eval   Type of Visit Initial OP PT Evaluation   General Information   Start of Care Date 09/28/21   Referring Physician CONNIE Garcia CNP    Orders Evaluate and Treat as Indicated   Order Date 08/26/21   Medical Diagnosis BPPV    Onset of illness/injury or Date of Surgery 08/26/21   Surgical/Medical history reviewed Yes   Pertinent history of current vestibular problem (include personal factors and/or comorbidities that impact the POC)  Migraines   Pertinent history of current problem (include personal factors and/or comorbidities that impact the POC) Reports that after discharge I was more concerned. In bed I feel like the whole room is shifting, up and walking the wall is moving. It is getting better. Today I had it, but I haven't had it all September. Was sitting up and laid down and the whole room shifted. Lost her balance. Comes and goes. Taking botox injections as well as meds. Before the dizziness was separate from the migraines, now its hard to tell apart when I do get it. Reports she gets weak in the knees.    Pertinent Visual History  Gets tunnel vision with migraines, wears glasses, trying to get eye appointment    Patient role/Employment history Other/comments  (not working right now d/t symptoms)   Living environment Apartment/condo   Home/Community Accessibility Comments Elevator and stair access, gets dizzy going downwards on the stairs    Patient/Family Goals Statement To not fall, Improve balance and dizziness, get back to doing what I was doing (walk 2 miles w/o break)    General Information Comments Stopped exercising because I get dizzy. Used to take long walks. Feels like she's tilting when she's walking.    Fall Risk Screen   Fall screen completed by PT   Have you fallen 2 or more times in the past year? No   Have you fallen and had an injury in the past year? No   Is patient a fall risk? No   Pain   Pain comments Migraines, mild low  back pain if I sit alot.    Cognitive Status Examination   Orientation orientation to person, place and time   Level of Consciousness alert   Follows Commands and Answers Questions 100% of the time   Personal Safety and Judgment intact   Memory impaired   Cognitive Comment Ever since I had H1N1 in 2010   Integumentary   Integumentary No deficits were identified   Range of Motion (ROM)   ROM Comment WFLs    Strength   Strength Comments WFLs   Bed Mobility   Bed Mobility Comments Independent   Transfer Skills   Transfer Comments Independent   Gait Special Tests   Gait Special Tests 25 FOOT TIMED WALK   Gait Special Tests 25 Foot Timed Walk   Seconds 10.6   Steps 18 Steps   Comments LOB when looking to the left    Balance Special Tests   Balance Special Tests Modified CTSIB Conditions   Balance Special Tests Modified CTSIB Conditions   Condition 1, seconds 30 Seconds   Condition 2, seconds 30 Seconds   Condition 4, seconds 27 Seconds   Condition 5, seconds 3 Seconds   Oculomotor Exam   Smooth Pursuit Normal   Smooth Pursuit Comment Reports pressure in eyes    Saccades Abnormal   Saccades Comments hypometric, significant increase in pressure    VOR Abnormal   VOR Comments inability to keep eyes fixated on target, Increased pressure    Convergence Testing Normal   Infrared Goggle Exam or Frenzel Lense Exam   Vestibular Suppressant in Last 24 Hours? No   Exam completed with Room Light   Spontaneous Nystagmus Negative   Fartun-Hallpike (right) Negative   Lancing-Hallpike (right) comments Reports feeling like she is going to pass out    Fartun-Hallpike (Left) Negative   Lancing-Hallpike (left) comments Reports feeling like she is going to pass out    Planned Therapy Interventions   Planned Therapy Interventions balance training;gait training;neuromuscular re-education;ROM;strengthening;stretching;transfer training;manual therapy;visual perception   Clinical Impression   Criteria for Skilled Therapeutic Interventions Met yes, treatment  indicated   PT Diagnosis Dizziness/imbalance   Influenced by the following impairments Migraines, dizziness, imbalance, pain, deconditioning   Functional limitations due to impairments unable to work, functional mobility, falls risk    Clinical Presentation Evolving/Changing   Clinical Presentation Rationale Progressive dizziness/imbalance since this summer    Clinical Decision Making (Complexity) Moderate complexity   Therapy Frequency 1 time/week   Predicted Duration of Therapy Intervention (days/wks) Up to 90 days    Risk & Benefits of therapy have been explained Yes   Patient, Family & other staff in agreement with plan of care Yes   Clinical Impression Comments Patient w/history of daily migraines comes to PT with c/o continued dizziness/imbalance since initial episode mid-summer. BPPV negative today. Suspect possible vestibular migraine. She will benefit from skilled PT servives to improve her dizziness, manage HAs/pain, improve balance and falls risk to return to [facility].    GOALS   PT Eval Goals 1;2;3   Goal 1   Goal Identifier Gait    Goal Description Pt to ambulate 25 feet in 8 seconds or less with no imbalance or dizziness noted in order to improve household ambulation    Target Date 12/28/21   Goal 2   Goal Identifier Balance    Goal Description Pt to perform condition 5 of mCTSIB for 30 seconds or longer to demo improved vestibular integration for balance assist.    Target Date 12/28/21   Goal 3   Goal Identifier Dizziness    Goal Description Pt to report a 50% or greater improvement in dizzy symptoms d/t reduction of overall migraines and headaches.    Target Date 12/28/21   Total Evaluation Time   PT Eval, Moderate Complexity Minutes ([id number]) 40

## 2021-10-07 ENCOUNTER — THERAPY VISIT (OUTPATIENT)
Dept: PHYSICAL THERAPY | Facility: CLINIC | Age: 36
End: 2021-10-07
Payer: COMMERCIAL

## 2021-10-07 DIAGNOSIS — R42 DIZZINESS: ICD-10-CM

## 2021-10-07 DIAGNOSIS — H81.10 BPV (BENIGN POSITIONAL VERTIGO), UNSPECIFIED LATERALITY: Primary | ICD-10-CM

## 2021-10-07 PROCEDURE — 97112 NEUROMUSCULAR REEDUCATION: CPT | Mod: GP | Performed by: PHYSICAL THERAPIST

## 2021-11-08 ENCOUNTER — OFFICE VISIT (OUTPATIENT)
Dept: OPHTHALMOLOGY | Facility: CLINIC | Age: 36
End: 2021-11-08
Attending: OPHTHALMOLOGY
Payer: COMMERCIAL

## 2021-11-08 DIAGNOSIS — H52.13 MYOPIA OF BOTH EYES: ICD-10-CM

## 2021-11-08 DIAGNOSIS — G43.109 MIGRAINE WITH AURA AND WITHOUT STATUS MIGRAINOSUS, NOT INTRACTABLE: Primary | ICD-10-CM

## 2021-11-08 PROCEDURE — G0463 HOSPITAL OUTPT CLINIC VISIT: HCPCS | Mod: 25

## 2021-11-08 PROCEDURE — 92015 DETERMINE REFRACTIVE STATE: CPT

## 2021-11-08 PROCEDURE — 92004 COMPRE OPH EXAM NEW PT 1/>: CPT | Performed by: OPHTHALMOLOGY

## 2021-11-08 ASSESSMENT — REFRACTION_MANIFEST
OD_CYLINDER: SPHERE
OD_SPHERE: -4.25
OS_CYLINDER: SPHERE
OS_SPHERE: -4.25

## 2021-11-08 ASSESSMENT — VISUAL ACUITY
OD_CC+: -1
CORRECTION_TYPE: GLASSES
METHOD: SNELLEN - LINEAR
OD_PH_CC+: -2
OD_PH_CC: 20/25
OD_CC: 20/30
OS_CC: 20/25

## 2021-11-08 ASSESSMENT — REFRACTION_WEARINGRX
SPECS_TYPE: SVL
OD_SPHERE: -4.50
OD_CYLINDER: SPHERE
OS_SPHERE: -4.50
OS_CYLINDER: SPHERE

## 2021-11-08 ASSESSMENT — SLIT LAMP EXAM - LIDS
COMMENTS: NORMAL
COMMENTS: NORMAL

## 2021-11-08 ASSESSMENT — CONF VISUAL FIELD
OD_NORMAL: 1
OS_NORMAL: 1

## 2021-11-08 ASSESSMENT — CUP TO DISC RATIO
OS_RATIO: 0.4
OD_RATIO: 0.45

## 2021-11-08 ASSESSMENT — EXTERNAL EXAM - LEFT EYE: OS_EXAM: NORMAL

## 2021-11-08 ASSESSMENT — TONOMETRY
OD_IOP_MMHG: 16
OS_IOP_MMHG: 18
IOP_METHOD: TONOPEN

## 2021-11-08 ASSESSMENT — EXTERNAL EXAM - RIGHT EYE: OD_EXAM: NORMAL

## 2021-11-08 NOTE — PROGRESS NOTES
HPI     Annual Eye Exam     In both eyes.  Since onset it is stable.  Associated symptoms include Negative for eye pain, headache, floaters and flashes.              Comments     Pt here for annual exam. Pt last exam was 3-4 years ago. She notes vision is declining and is having difficulty driving at night. Pt is prone to migraines and feels pressure on her eyes/ with ocular aura's.   Pt not using drops at this time.  MARY LOU PUENTE 1:12 PM November 8, 2021             Last edited by Jory Randall COA on 11/8/2021  1:12 PM. (History)        HPI:  Leticia Morales is a 35 year old female here for evaluation of blurred vision in both eyes, bothersome in dim light at night. No glare. She also notes bilateral pressure-like sensation in both eyes associated with migraine headaches. No flashes/floaters. No redness/discharge.    Assessment & Plan     (G43.109) Migraine with aura and without status migrainosus, not intractable  (primary encounter diagnosis)  Comment: No primary ocular etiology of pressure-like sensation identified.  Plan: Offered reassurance that her eye exam is within normal limites.    (H52.13) Myopia of both eyes  Comment: Good vision with refraction  Plan: Given updated glasses Rx. She would like CL Rx refilled - will refer to CL clinic.      -----------------------------------------------------------------------------------    Patient disposition:   Return for contact lens evaluation next available.    Teaching statement:  Complete documentation of historical and exam elements from today's encounter can be found in the full encounter summary report (not reduplicated in this progress note). I personally obtained the chief complaint(s) and history of present illness.  I confirmed and edited as necessary the review of systems, past medical/surgical history, family history, social history, and examination findings as documented by others; and I examined the patient myself. I personally reviewed  the relevant tests, images, and reports as documented above.     I formulated and edited as necessary the assessment and plan and discussed the findings and management plan with the patient and family.      Orly Conde MD  Comprehensive Ophthalmology & Ocular Pathology  Department of Ophthalmology and Visual Neurosciences  [email or web address]  Pager [phone]

## 2021-11-08 NOTE — NURSING NOTE
Chief Complaints and History of Present Illnesses   Patient presents with     Annual Eye Exam     Chief Complaint(s) and History of Present Illness(es)     Annual Eye Exam     Laterality: both eyes    Course: stable    Associated symptoms: Negative for eye pain, headache, floaters and flashes              Comments     Pt here for annual exam. Pt last exam was 3-4 years ago. She notes vision is declining and is having difficulty driving at night. Pt is prone to migraines and feels pressure on her eyes/ with ocular aura's.   Pt not using drops at this time.  MARY LOU PUENTE 1:12 PM November 8, 2021

## 2021-11-19 ENCOUNTER — THERAPY VISIT (OUTPATIENT)
Dept: PHYSICAL THERAPY | Facility: CLINIC | Age: 36
End: 2021-11-19
Payer: COMMERCIAL

## 2021-11-19 DIAGNOSIS — R42 DIZZINESS: Primary | ICD-10-CM

## 2021-11-19 PROCEDURE — 97112 NEUROMUSCULAR REEDUCATION: CPT | Mod: GP | Performed by: PHYSICAL THERAPIST

## 2021-11-24 ENCOUNTER — VIRTUAL VISIT (OUTPATIENT)
Dept: PSYCHOLOGY | Facility: CLINIC | Age: 36
End: 2021-11-24
Payer: COMMERCIAL

## 2021-11-24 DIAGNOSIS — F41.1 GAD (GENERALIZED ANXIETY DISORDER): Primary | ICD-10-CM

## 2021-11-24 DIAGNOSIS — F32.1 CURRENT MODERATE EPISODE OF MAJOR DEPRESSIVE DISORDER WITHOUT PRIOR EPISODE (H): ICD-10-CM

## 2021-11-24 DIAGNOSIS — F39 MOOD DISORDER (H): ICD-10-CM

## 2021-11-24 PROCEDURE — 90791 PSYCH DIAGNOSTIC EVALUATION: CPT | Mod: 95 | Performed by: COUNSELOR

## 2021-11-24 ASSESSMENT — ANXIETY QUESTIONNAIRES
6. BECOMING EASILY ANNOYED OR IRRITABLE: SEVERAL DAYS
3. WORRYING TOO MUCH ABOUT DIFFERENT THINGS: MORE THAN HALF THE DAYS
7. FEELING AFRAID AS IF SOMETHING AWFUL MIGHT HAPPEN: NOT AT ALL
GAD7 TOTAL SCORE: 6
1. FEELING NERVOUS, ANXIOUS, OR ON EDGE: SEVERAL DAYS
2. NOT BEING ABLE TO STOP OR CONTROL WORRYING: SEVERAL DAYS
GAD7 TOTAL SCORE: 6
4. TROUBLE RELAXING: SEVERAL DAYS
8. IF YOU CHECKED OFF ANY PROBLEMS, HOW DIFFICULT HAVE THESE MADE IT FOR YOU TO DO YOUR WORK, TAKE CARE OF THINGS AT HOME, OR GET ALONG WITH OTHER PEOPLE?: NOT DIFFICULT AT ALL
GAD7 TOTAL SCORE: 6
7. FEELING AFRAID AS IF SOMETHING AWFUL MIGHT HAPPEN: NOT AT ALL
5. BEING SO RESTLESS THAT IT IS HARD TO SIT STILL: NOT AT ALL

## 2021-11-24 ASSESSMENT — COLUMBIA-SUICIDE SEVERITY RATING SCALE - C-SSRS
3. HAVE YOU BEEN THINKING ABOUT HOW YOU MIGHT KILL YOURSELF?: NO
2. HAVE YOU ACTUALLY HAD ANY THOUGHTS OF KILLING YOURSELF?: NO
2. HAVE YOU ACTUALLY HAD ANY THOUGHTS OF KILLING YOURSELF LIFETIME?: NO
1. IN THE PAST MONTH, HAVE YOU WISHED YOU WERE DEAD OR WISHED YOU COULD GO TO SLEEP AND NOT WAKE UP?: NO
ATTEMPT PAST THREE MONTHS: NO
4. HAVE YOU HAD THESE THOUGHTS AND HAD SOME INTENTION OF ACTING ON THEM?: NO
ATTEMPT LIFETIME: NO
4. HAVE YOU HAD THESE THOUGHTS AND HAD SOME INTENTION OF ACTING ON THEM?: NO
1. IN THE PAST MONTH, HAVE YOU WISHED YOU WERE DEAD OR WISHED YOU COULD GO TO SLEEP AND NOT WAKE UP?: NO

## 2021-11-24 ASSESSMENT — PATIENT HEALTH QUESTIONNAIRE - PHQ9
SUM OF ALL RESPONSES TO PHQ QUESTIONS 1-9: 13
10. IF YOU CHECKED OFF ANY PROBLEMS, HOW DIFFICULT HAVE THESE PROBLEMS MADE IT FOR YOU TO DO YOUR WORK, TAKE CARE OF THINGS AT HOME, OR GET ALONG WITH OTHER PEOPLE: NOT DIFFICULT AT ALL
SUM OF ALL RESPONSES TO PHQ QUESTIONS 1-9: 13
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
## 2021-11-25 ASSESSMENT — PATIENT HEALTH QUESTIONNAIRE - PHQ9: SUM OF ALL RESPONSES TO PHQ QUESTIONS 1-9: 13

## 2021-11-25 ASSESSMENT — ANXIETY QUESTIONNAIRES: GAD7 TOTAL SCORE: 6

## 2021-12-01 ENCOUNTER — VIRTUAL VISIT (OUTPATIENT)
Dept: PSYCHOLOGY | Facility: CLINIC | Age: 36
End: 2021-12-01
Payer: COMMERCIAL

## 2021-12-01 DIAGNOSIS — F32.1 CURRENT MODERATE EPISODE OF MAJOR DEPRESSIVE DISORDER WITHOUT PRIOR EPISODE (H): ICD-10-CM

## 2021-12-01 DIAGNOSIS — F41.1 GAD (GENERALIZED ANXIETY DISORDER): Primary | ICD-10-CM

## 2021-12-01 DIAGNOSIS — F39 MOOD DISORDER (H): ICD-10-CM

## 2021-12-01 PROCEDURE — 90834 PSYTX W PT 45 MINUTES: CPT | Mod: 95 | Performed by: COUNSELOR

## 2021-12-01 NOTE — PROGRESS NOTES
Progress Note    Patient Name: Leticia Morales  Date: 12/1/2021         Service Type: Individual      Session Start Time: 3:04pm  Session End Time: 3:50pm     Session Length: 46 mins     Session #: 2    Attendees: Client attended alone    Service Modality:  Video Visit:      Provider verified identity through the following two step process.  Patient provided:  Patient is known previously to provider    Telemedicine Visit: The patient's condition can be safely assessed and treated via synchronous audio and visual telemedicine encounter.      Reason for Telemedicine Visit: Services only offered telehealth    Originating Site (Patient Location): Patient's home    Distant Site (Provider Location): Provider Remote Setting- Home Office    Consent:  The patient/guardian has verbally consented to: the potential risks and benefits of telemedicine (video visit) versus in person care; bill my insurance or make self-payment for services provided; and responsibility for payment of non-covered services.     Patient would like the video invitation sent by:  My Chart    Mode of Communication:  Video Conference via Amwell    As the provider I attest to compliance with applicable laws and regulations related to telemedicine.     Treatment Plan Last Reviewed: 12/1/2021        DATA  Interactive Complexity: No  Crisis: No       Progress Since Last Session (Related to Symptoms / Goals / Homework):   Symptoms: No change      Homework: Completed in session      Episode of Care Goals: No improvement - PREPARATION (Decided to change - considering how); Intervened by negotiating a change plan and determining options / strategies for behavior change, identifying triggers, exploring social supports, and working towards setting a date to begin behavior change     Current / Ongoing Stressors and Concerns:   Health concerns   Future plans    Cultural barriers to mental health      Treatment Objective(s)  Addressed in This Session:   information gathering and rapport building        Intervention:   Emotion Focused Therapy: supportive listening, emotional processing  Solution Focused: information gathering         ASSESSMENT: Current Emotional / Mental Status (status of significant symptoms):   Risk status (Self / Other harm or suicidal ideation)   Patient denies current fears or concerns for personal safety.   Patient denies current or recent suicidal ideation or behaviors.   Patient denies current or recent homicidal ideation or behaviors.   Patient denies current or recent self injurious behavior or ideation.   Patient denies other safety concerns.   Patient reports there has been no change in risk factors since their last session.     Patient reports there has been no change in protective factors since their last session.     Recommended that patient call 911 or go to the local ED should there be a change in any of these risk factors.     Appearance:   Appropriate    Eye Contact:   Good    Psychomotor Behavior: Normal    Attitude:   Cooperative    Orientation:   All   Speech    Rate / Production: Normal     Volume:  Normal    Mood:    Anxious  Depressed    Affect:    Appropriate    Thought Content:  Clear  Rumination    Thought Form:  Coherent  Logical    Insight:    Fair      Medication Review:   No current psychiatric medications prescribed     Medication Compliance:   NA     Changes in Health Issues:   Yes: Pain, Associated Psychological Distress     Chemical Use Review:   Substance Use: Chemical use reviewed, no active concerns identified      Tobacco Use: No current tobacco use.      Diagnosis:  1. GANESH (generalized anxiety disorder)    2. Current moderate episode of major depressive disorder without prior episode (H)    3. Mood disorder (H)        Collateral Reports Completed:   Communicated with: PCP regarding move to [place]     PLAN: (Patient Tasks / Therapist Tasks / Other)  Provider assigned client to  document mood and stressors between sessions       Thoughts, facts etc (short term therapy)  Rosenda Newman, [facility]

## 2021-12-06 ENCOUNTER — TELEPHONE (OUTPATIENT)
Dept: FAMILY MEDICINE | Facility: CLINIC | Age: 36
End: 2021-12-06
Payer: COMMERCIAL

## 2021-12-06 NOTE — TELEPHONE ENCOUNTER
Attempted to reach patient to inquire if they would like to schedule an appointment with Dr. Cuevas before they move to [place]. LM with the main clinic number, if patient returns call please schedule at next available with Dr. Cuevas.     Jessica Torres, Care Coordinator

## 2021-12-07 ENCOUNTER — OFFICE VISIT (OUTPATIENT)
Dept: NEUROLOGY | Facility: CLINIC | Age: 36
End: 2021-12-07
Payer: COMMERCIAL

## 2021-12-07 DIAGNOSIS — G43.709 CHRONIC MIGRAINE WITHOUT AURA WITHOUT STATUS MIGRAINOSUS, NOT INTRACTABLE: Primary | ICD-10-CM

## 2021-12-07 PROCEDURE — 64615 CHEMODENERV MUSC MIGRAINE: CPT | Performed by: PSYCHIATRY & NEUROLOGY

## 2021-12-07 NOTE — LETTER
12/7/2021       RE: Leticia Morales  [address]  [place] [address]     Dear Colleague,    Thank you for referring your patient, Leticia Morales, to the [facility] [place] [facility]. Please see a copy of my visit note below.         [facility]     Leticia Morales MRN# [id number]   Age: 35 year old YOB: 1985      Botulinum Toxin Procedure     The procedure was explained to the patient. Benefits of the treatment were discussed including headache and migraine reduction. Risks of the procedure were reviewed including but not limited to pain, bruising, bleeding, infection, weakness of muscles injected or those distal to injection. The patient voiced understanding of the risks and benefits. All questions answered and the patient consented to proceed.      Prior to receiving Botox injections the patient reported the following:  Baseline headache frequency:30 days  Baseline headache duration: constant  Baseline MIDAS score: 150  Associated migrainous features:dizziness,      Previous treatment trials:  topiramate  emgality recommended in past but insurance denied.  Sumatriptan  Metoclopramide  doxycycline     Last Botox procedure: 8/31/2021  Current migraine frequency: In September and October headaches were better, now worn off. Actually had a few days of now headaches  Current migraine duration: most days     A 200 unit vial of Botox was diluted with 4ml of 0.9% sodium chloride     Botox lot # and expiration date: SEE MAR for details  0.9% sodium chloride lot # and expiration date: See MAR for details     The following muscles were injected using a 30 gauge needle:  Procerus 1 site 5 units   2 sites (bilat) 10 units  Frontalis 4 sites (bilat) 20 units  Temporalis 8 sites (bilat) 40 units  Trapezius muscles 6 sites (bilat) 30 units  Cervical paraspinals (bilat) 4 sites 20 units  Occipitalis 6 sites (bilat)  30 units     A total of 155 units were injected, 45 units wasted.   The patient tolerated the injections well. I was present for and performed the entire procedure.       Avril Perez MD

## 2021-12-07 NOTE — PROGRESS NOTES
[facility]     Leticia Morales MRN# [id number]   Age: 35 year old YOB: 1985      Botulinum Toxin Procedure     The procedure was explained to the patient. Benefits of the treatment were discussed including headache and migraine reduction. Risks of the procedure were reviewed including but not limited to pain, bruising, bleeding, infection, weakness of muscles injected or those distal to injection. The patient voiced understanding of the risks and benefits. All questions answered and the patient consented to proceed.      Prior to receiving Botox injections the patient reported the following:  Baseline headache frequency:30 days  Baseline headache duration: constant  Baseline MIDAS score: 150  Associated migrainous features:dizziness,      Previous treatment trials:  topiramate  emgality recommended in past but insurance denied.  Sumatriptan  Metoclopramide  doxycycline     Last Botox procedure: 8/31/2021  Current migraine frequency: In September and October headaches were better, now worn off. Actually had a few days of now headaches  Current migraine duration: most days     A 200 unit vial of Botox was diluted with 4ml of 0.9% sodium chloride     Botox lot # and expiration date: SEE MAR for details  0.9% sodium chloride lot # and expiration date: See MAR for details     The following muscles were injected using a 30 gauge needle:  Procerus 1 site 5 units   2 sites (bilat) 10 units  Frontalis 4 sites (bilat) 20 units  Temporalis 8 sites (bilat) 40 units  Trapezius muscles 6 sites (bilat) 30 units  Cervical paraspinals (bilat) 4 sites 20 units  Occipitalis 6 sites (bilat) 30 units     A total of 155 units were injected, 45 units wasted.   The patient tolerated the injections well. I was present for and performed the entire procedure.       Avril Perez MD

## 2021-12-14 ENCOUNTER — VIRTUAL VISIT (OUTPATIENT)
Dept: PSYCHOLOGY | Facility: CLINIC | Age: 36
End: 2021-12-14
Payer: COMMERCIAL

## 2021-12-14 DIAGNOSIS — F32.1 CURRENT MODERATE EPISODE OF MAJOR DEPRESSIVE DISORDER WITHOUT PRIOR EPISODE (H): ICD-10-CM

## 2021-12-14 DIAGNOSIS — F39 MOOD DISORDER (H): ICD-10-CM

## 2021-12-14 DIAGNOSIS — F41.1 GAD (GENERALIZED ANXIETY DISORDER): Primary | ICD-10-CM

## 2021-12-14 PROCEDURE — 90834 PSYTX W PT 45 MINUTES: CPT | Mod: 95 | Performed by: COUNSELOR

## 2021-12-14 NOTE — PROGRESS NOTES
Progress Note    Patient Name: Leticia Morales  Date: 12/14/2021           Service Type: Individual      Session Start Time: 1:00pm  Session End Time: 1:52pm     Session Length: 52 mins     Session #: 3    Attendees: Client attended alone    Service Modality:  Video Visit:      Provider verified identity through the following two step process.  Patient provided:  Patient is known previously to provider    Telemedicine Visit: The patient's condition can be safely assessed and treated via synchronous audio and visual telemedicine encounter.      Reason for Telemedicine Visit: Services only offered telehealth    Originating Site (Patient Location): Patient's home    Distant Site (Provider Location): Provider Remote Setting- Home Office    Consent:  The patient/guardian has verbally consented to: the potential risks and benefits of telemedicine (video visit) versus in person care; bill my insurance or make self-payment for services provided; and responsibility for payment of non-covered services.     Patient would like the video invitation sent by:  My Chart    Mode of Communication:  Video Conference via Amwell    As the provider I attest to compliance with applicable laws and regulations related to telemedicine.     Treatment Plan Last Reviewed: 12/14/2021        DATA  Interactive Complexity: No  Crisis: No       Progress Since Last Session (Related to Symptoms / Goals / Homework):   Symptoms: Improving two down days between sessions     Homework: Achieved / completed to satisfaction  Completed in session      Episode of Care Goals: Satisfactory progress - ACTION (Actively working towards change); Intervened by reinforcing change plan / affirming steps taken     Current / Ongoing Stressors and Concerns:   Health concerns              Future plans               Cultural barriers to mental health      Treatment Objective(s) Addressed in This Session:   use cognitive strategies  identified in therapy to challenge anxious thoughts  Fact checking   Relationships     Intervention:   CBT: fact checking thoughts   Emotion Focused Therapy: supportive listening, emotional processing        ASSESSMENT: Current Emotional / Mental Status (status of significant symptoms):    Risk status (Self / Other harm or suicidal ideation)              Patient denies current fears or concerns for personal safety.              Patient denies current or recent suicidal ideation or behaviors.              Patient denies current or recent homicidal ideation or behaviors.              Patient denies current or recent self injurious behavior or ideation.              Patient denies other safety concerns.              Patient reports there has been no change in risk factors since their last session.                Patient reports there has been no change in protective factors since their last session.                Recommended that patient call 911 or go to the local ED should there be a change in any of these risk factors.     Appearance:   Appropriate    Eye Contact:   Good    Psychomotor Behavior: Normal    Attitude:   Cooperative    Orientation:   All   Speech    Rate / Production: Normal     Volume:  Normal    Mood:    Anxious  Depressed  Normal   Affect:    Appropriate    Thought Content:  Clear  Rumination    Thought Form:  Coherent    Insight:    Good  and Fair      Medication Review:   No changes to current psychiatric medication(s)     Medication Compliance:   Yes     Changes in Health Issues:   Yes: Pain, Associated Psychological Distress     Chemical Use Review:   Substance Use: Chemical use reviewed, no active concerns identified      Tobacco Use: No current tobacco use.      Diagnosis:  1. GANESH (generalized anxiety disorder)    2. Current moderate episode of major depressive disorder without prior episode (H)    3. Mood disorder (H)        Collateral Reports Completed:   Not Applicable    PLAN: (Patient  Tasks / Therapist Tasks / Other)  Provider assigned client to use fact checking       Deep breathing, neuroplasticity    NOEMÍ Gracia 12/14/2021                                                           ______________________________________________________________________    Treatment Plan    Patient's Name: Leticia Morales  YOB: 1985    Date: 12/14/2021      DSM5 Diagnoses:   1. GANESH (generalized anxiety disorder)    2. Current moderate episode of major depressive disorder without prior episode (H)    3. Mood disorder (H)      Psychosocial / Contextual Factors: cultural beliefs, headaches and health issues, future relationships       Referral / Collaboration:  Referral to another professional/service is not indicated at this time..    Anticipated number of session or this episode of care: 8      MeasurableTreatment Goal(s) related to diagnosis / functional impairment(s)  Goal 1: Patient will engage in short term therapy to develop coping skills     I will know I've met my goal when I have skills to use for emotions. Do not have so many down days.      Objective #A (Patient Action)    Patient will identify   fears / thoughts that contribute to feeling anxious.  Status: New - Date: 12/14/21     Intervention(s)  Therapist will teach emotional recognition/identification.  .    Objective #B  Patient will use cognitive strategies identified in therapy to challenge anxious thoughts.  Status: New - Date: 12/14/21     Intervention(s)  Therapist will teach emotional regulation skills.  .    Objective #C  Patient will Decrease frequency and intensity of feeling down, depressed, hopeless.  Status: New - Date: 12/14/21     Intervention(s)  Therapist will teach distraction skills.  .      Patient has reviewed and agreed to the above plan.      NOEMÍ Gracia  December 14, 2021

## 2022-01-04 ENCOUNTER — TELEPHONE (OUTPATIENT)
Dept: FAMILY MEDICINE | Facility: CLINIC | Age: 37
End: 2022-01-04
Payer: COMMERCIAL

## 2022-01-04 NOTE — TELEPHONE ENCOUNTER
[facility] phone call message- patient requesting a refill:    Full Medication Name: omeprazole (PRILOSEC) 20 MG DR capsule    Dose: See chart    Pharmacy confirmed as     [id number] - [place], [place] - [address] [facility] [address]  [address] [facility] [address]  Phone: [phone] Fax: [phone]      : Yes    Additional Comments: Pt is completley out of this medication. The pt has an upcoming appt with Dr Cuevas on 01/20/2022. The pt is asking for a refill before the appt.    OK to leave a message on voice mail? Yes    Primary language: Kittitian      needed? No    Call taken on January 4, 2022 at 4:19 PM by Felicia Perry

## 2022-01-06 ENCOUNTER — VIRTUAL VISIT (OUTPATIENT)
Dept: PSYCHOLOGY | Facility: CLINIC | Age: 37
End: 2022-01-06
Payer: COMMERCIAL

## 2022-01-06 DIAGNOSIS — F41.1 GAD (GENERALIZED ANXIETY DISORDER): Primary | ICD-10-CM

## 2022-01-06 DIAGNOSIS — F39 MOOD DISORDER (H): ICD-10-CM

## 2022-01-06 DIAGNOSIS — F32.1 CURRENT MODERATE EPISODE OF MAJOR DEPRESSIVE DISORDER WITHOUT PRIOR EPISODE (H): ICD-10-CM

## 2022-01-06 PROCEDURE — 90834 PSYTX W PT 45 MINUTES: CPT | Mod: 95 | Performed by: COUNSELOR

## 2022-01-06 ASSESSMENT — PATIENT HEALTH QUESTIONNAIRE - PHQ9
SUM OF ALL RESPONSES TO PHQ QUESTIONS 1-9: 7
SUM OF ALL RESPONSES TO PHQ QUESTIONS 1-9: 7
10. IF YOU CHECKED OFF ANY PROBLEMS, HOW DIFFICULT HAVE THESE PROBLEMS MADE IT FOR YOU TO DO YOUR WORK, TAKE CARE OF THINGS AT HOME, OR GET ALONG WITH OTHER PEOPLE: SOMEWHAT DIFFICULT

## 2022-01-06 NOTE — PROGRESS NOTES
Progress Note    Patient Name: Leticia Morales  Date: 1/6/2022           Service Type: Individual      Session Start Time: 4:00pm  Session End Time: 4:52pm     Session Length: 52 mins     Session #: 4    Attendees: Client attended alone    Service Modality:  Video Visit:      Provider verified identity through the following two step process.  Patient provided:  Patient is known previously to provider    Telemedicine Visit: The patient's condition can be safely assessed and treated via synchronous audio and visual telemedicine encounter.      Reason for Telemedicine Visit: Services only offered telehealth    Originating Site (Patient Location): Patient's home    Distant Site (Provider Location): Provider Remote Setting- Home Office    Consent:  The patient/guardian has verbally consented to: the potential risks and benefits of telemedicine (video visit) versus in person care; bill my insurance or make self-payment for services provided; and responsibility for payment of non-covered services.     Patient would like the video invitation sent by:  My Chart    Mode of Communication:  Video Conference via Amwell    As the provider I attest to compliance with applicable laws and regulations related to telemedicine.     Treatment Plan Last Reviewed: 12/14/21  PHQ-9 / GANESH-7 : 7    DATA  Interactive Complexity: No  Crisis: No       Progress Since Last Session (Related to Symptoms / Goals / Homework):   Symptoms: Improving assessment scores lowered, less headache episodes (2 per month) used to be 5    Homework: Achieved / completed to satisfaction      Episode of Care Goals: Minimal progress - PREPARATION (Decided to change - considering how); Intervened by negotiating a change plan and determining options / strategies for behavior change, identifying triggers, exploring social supports, and working towards setting a date to begin behavior change     Current / Ongoing Stressors and  Concerns:   Health concerns              Future plans               Cultural barriers to mental health     Treatment Objective(s) Addressed in This Session:   use cognitive strategies identified in therapy to challenge anxious thoughts       Intervention:   CBT: cuca morrow  Emotion Focused Therapy: supportive listening        ASSESSMENT: Current Emotional / Mental Status (status of significant symptoms):   Risk status (Self / Other harm or suicidal ideation)              Patient denies current fears or concerns for personal safety.              Patient denies current or recent suicidal ideation or behaviors.              Patient denies current or recent homicidal ideation or behaviors.              Patient denies current or recent self injurious behavior or ideation.              Patient denies other safety concerns.              Patient reports there has been no change in risk factors since their last session.                Patient reports there has been no change in protective factors since their last session.                Recommended that patient call 911 or go to the local ED should there be a change in any of these risk factors.        Appearance:   Appropriate    Eye Contact:   Good    Psychomotor Behavior: Normal    Attitude:   Cooperative    Orientation:   All   Speech    Rate / Production: Normal     Volume:  Normal    Mood:    Depressed  Normal   Affect:    Appropriate    Thought Content:  Clear  Rumination    Thought Form:  Coherent  Logical    Insight:    Fair      Medication Review:   No current psychiatric medications prescribed     Medication Compliance:   NA     Changes in Health Issues:   Yes: Pain, Associated Psychological Distress     Chemical Use Review:   Substance Use: Chemical use reviewed, no active concerns identified      Tobacco Use: No current tobacco use.      Diagnosis:  1. GANESH (generalized anxiety disorder)    2. Current moderate episode of major depressive disorder without  prior episode (H)    3. Mood disorder (H)        Collateral Reports Completed:   Not Applicable    PLAN: (Patient Tasks / Therapist Tasks / Other)  Provider assigned client to use thought record  Provider assigned client to use neuroplasitcity video   Provider assigned client to document emotions between sessions       Torey Newman [facility] 1/6/2022                                                             ______________________________________________________________________     Treatment Plan     Patient's Name: Leticia Morales                     YOB: 1985     Date: 12/14/2021        DSM5 Diagnoses:   1. GANESH (generalized anxiety disorder)    2. Current moderate episode of major depressive disorder without prior episode (H)    3. Mood disorder (H)       Psychosocial / Contextual Factors: cultural beliefs, headaches and health issues, future relationships         Referral / Collaboration:  Referral to another professional/service is not indicated at this time..     Anticipated number of session or this episode of care: 8        MeasurableTreatment Goal(s) related to diagnosis / functional impairment(s)  Goal 1: Patient will engage in short term therapy to develop coping skills     I will know I've met my goal when I have skills to use for emotions. Do not have so many down days.       Objective #A (Patient Action)                          Patient will identify   fears / thoughts that contribute to feeling anxious.  Status: New - Date: 12/14/21      Intervention(s)  Therapist will teach emotional recognition/identification.  .     Objective #B  Patient will use cognitive strategies identified in therapy to challenge anxious thoughts.  Status: New - Date: 12/14/21      Intervention(s)  Therapist will teach emotional regulation skills.  .     Objective #C  Patient will Decrease frequency and intensity of feeling down, depressed, hopeless.  Status: New - Date: 12/14/21       Intervention(s)  Therapist will teach distraction skills.  .        Patient has reviewed and agreed to the above plan.        Rosenda Newman, [facility]                       December 14, 2021      Answers for HPI/ROS submitted by the patient on 1/6/2022  If you checked off any problems, how difficult have these problems made it for you to do your work, take care of things at home, or get along with other people?: Somewhat difficult  PHQ9 TOTAL SCORE: 7

## 2022-01-07 ASSESSMENT — PATIENT HEALTH QUESTIONNAIRE - PHQ9: SUM OF ALL RESPONSES TO PHQ QUESTIONS 1-9: 7

## 2022-01-16 ENCOUNTER — HEALTH MAINTENANCE LETTER (OUTPATIENT)
Age: 37
End: 2022-01-16

## 2022-01-24 ENCOUNTER — OFFICE VISIT (OUTPATIENT)
Dept: FAMILY MEDICINE | Facility: CLINIC | Age: 37
End: 2022-01-24
Payer: COMMERCIAL

## 2022-01-24 VITALS
WEIGHT: 119.6 LBS | HEART RATE: 97 BPM | SYSTOLIC BLOOD PRESSURE: 112 MMHG | RESPIRATION RATE: 16 BRPM | BODY MASS INDEX: 20.53 KG/M2 | DIASTOLIC BLOOD PRESSURE: 79 MMHG | TEMPERATURE: 98.1 F | OXYGEN SATURATION: 95 %

## 2022-01-24 DIAGNOSIS — R00.0 TACHYCARDIA: ICD-10-CM

## 2022-01-24 DIAGNOSIS — R55 SYNCOPE, UNSPECIFIED SYNCOPE TYPE: ICD-10-CM

## 2022-01-24 DIAGNOSIS — G43.109 MIGRAINE WITH AURA AND WITHOUT STATUS MIGRAINOSUS, NOT INTRACTABLE: Primary | ICD-10-CM

## 2022-01-24 DIAGNOSIS — K21.9 GASTROESOPHAGEAL REFLUX DISEASE WITHOUT ESOPHAGITIS: ICD-10-CM

## 2022-01-24 LAB
ANION GAP SERPL CALCULATED.3IONS-SCNC: 7 MMOL/L (ref 3–14)
BUN SERPL-MCNC: 12 MG/DL (ref 7–30)
CALCIUM SERPL-MCNC: 9.4 MG/DL (ref 8.5–10.1)
CHLORIDE BLD-SCNC: 112 MMOL/L
CO2 SERPL-SCNC: 22 MMOL/L (ref 20–32)
CREAT SERPL-MCNC: 0.7 MG/DL
ERYTHROCYTE [DISTWIDTH] IN BLOOD BY AUTOMATED COUNT: 15.1 % (ref 10–15)
GFR SERPL CREATININE-BSD FRML MDRD: >90 ML/MIN/1.73M2
GLUCOSE BLD-MCNC: 99 MG/DL (ref 70–99)
HCT VFR BLD AUTO: 37.2 %
HGB BLD-MCNC: 11.5 G/DL (ref 11.7–15.7)
MCH RBC QN AUTO: 22.4 PG (ref 26.5–33)
MCHC RBC AUTO-ENTMCNC: 30.9 G/DL (ref 31.5–36.5)
MCV RBC AUTO: 72 FL (ref 78–100)
PLATELET # BLD AUTO: 262 10E3/UL (ref 150–450)
POTASSIUM BLD-SCNC: 4.2 MMOL/L (ref 3.4–5.3)
RBC # BLD AUTO: 5.14 10E6/UL (ref 3.8–5.2)
SODIUM SERPL-SCNC: 141 MMOL/L (ref 133–144)
WBC # BLD AUTO: 7.5 10E3/UL (ref 4–11)

## 2022-01-24 PROCEDURE — 93000 ELECTROCARDIOGRAM COMPLETE: CPT | Mod: GC | Performed by: STUDENT IN AN ORGANIZED HEALTH CARE EDUCATION/TRAINING PROGRAM

## 2022-01-24 PROCEDURE — 80048 BASIC METABOLIC PNL TOTAL CA: CPT | Performed by: STUDENT IN AN ORGANIZED HEALTH CARE EDUCATION/TRAINING PROGRAM

## 2022-01-24 PROCEDURE — 36415 COLL VENOUS BLD VENIPUNCTURE: CPT | Performed by: STUDENT IN AN ORGANIZED HEALTH CARE EDUCATION/TRAINING PROGRAM

## 2022-01-24 PROCEDURE — 99214 OFFICE O/P EST MOD 30 MIN: CPT | Mod: GC | Performed by: STUDENT IN AN ORGANIZED HEALTH CARE EDUCATION/TRAINING PROGRAM

## 2022-01-24 PROCEDURE — 85027 COMPLETE CBC AUTOMATED: CPT | Performed by: STUDENT IN AN ORGANIZED HEALTH CARE EDUCATION/TRAINING PROGRAM

## 2022-01-24 RX ORDER — METOCLOPRAMIDE 10 MG/1
10 TABLET ORAL EVERY 6 HOURS PRN
Qty: 10 TABLET | Refills: 3 | Status: SHIPPED | OUTPATIENT
Start: 2022-01-24 | End: 2022-08-31

## 2022-01-24 NOTE — PROGRESS NOTES
Assessment & Plan     Syncope, unspecified syncope type  Two episodes of unwitnessed syncope in early hours of the morning today. Patient in the shower when she began to feel lightheaded followed by syncopal episode. Second syncopal episode followed shortly after regaining consciousness after leaving the shower. Concerning for vasovagal vs cardiac arrhythmia vs neurologic/seizure vs medication side effect. Patient reports severely decreased appetite in recent months, at times going more than a day without eating. She reports good fluid intake however states yesterday she only had tea. EKG obtained in clinic today showed normal sinus rhythm and was overall reassuring. BMP showed normal electrolytes. Topiramate and TSH labs added on after visit however unable to be obtained due to insufficient sample. Would recommend obtaining at follow up. Encouraged patient to track daily food/fluid intake for the next two weeks and follow up after two weeks if continuing to have lightheadedness and decreased appetite. If she has additional episodes of syncope would recommend going to ED for evaluation and EEG to assess for possible seizure-like activity, however reports of light-headedness prior to episodes less consistent with seizure.  - Basic metabolic panel  - EKG 12-lead complete w/read - Clinics  - Topiramate Level  - TSH with free T4 reflex      Tachycardia  Elevated heart rate in clinic today with recent syncopal episode. CBC obtained showing mild anemia. TSH ordered as an add-on however unable to be completed due to inadequate sample. Would recommend TSH at follow up. EKG today reassuring as discussed above.   - CBC with platelets  - EKG 12-lead complete w/read - Clinics  - TSH with free T4 reflex    Gastroesophageal reflux disease without esophagitis  Refill sent to pharmacy today.   - omeprazole (PRILOSEC) 20 MG DR capsule  Dispense: 90 capsule; Refill: 0    Migraine with aura and without status migrainosus, not  "intractable  Refill sent to pharmacy today. Discussed syncope as a possible side effect of Reglan and encouraged her to continue to monitor symptoms and correlation with Reglan use.   - metoclopramide (REGLAN) 10 MG tablet  Dispense: 10 tablet; Refill: 3        Follow up: 2 weeks     No follow-ups on file.    Angeles Jacob MD  [facility] RENATA Kelly is a 36 year old who presents for the following health issues:    HPI     This morning around [id number] was taking a shower and had a period of syncope. Had some darkening vision and nausea.  Sudden realization that she was on the floor of the tube. Got up and crawled out of the tub and next thing she recalls was her mom trying to get into the bathroom and asking if she was ok. Again was on the ground. Never had similar experience in the past (will have rapid heartbeat and dizziness but never passed out). Shower water was \"regular/warm\" not hot. Did note rapid heartbeat.     Only had tea yesterday. Drinks about 3 16 oz bottles of water a day but will often go several days without solid food. This has     Was recently told by her psychiatrist that she may have a mood disorder, like bipolar disorder. Not taking any medications for this at this time.       Review of Systems   ROS otherwise negative if not stated in HPI        Objective    /79 (BP Location: Right arm, Patient Position: Sitting, Cuff Size: Adult Small)   Pulse 97   Temp 98.1  F (36.7  C) (Oral)   Resp 16   Wt 54.3 kg (119 lb 9.6 oz)   LMP 01/04/2022 (Within Days)   SpO2 95%   Breastfeeding No   BMI 20.53 kg/m    Body mass index is 20.53 kg/m .  Physical Exam   GENERAL: healthy, alert and no distress  NECK: no adenopathy, no asymmetry, masses, or scars and thyroid normal to palpation  RESP: lungs clear to auscultation - no rales, rhonchi or wheezes  CV: regular rate and rhythm, normal S1 S2, no S3 or S4, no murmur, click or rub, no peripheral edema and " peripheral pulses strong  ABDOMEN: soft, nontender, no hepatosplenomegaly, no masses and bowel sounds normal  MS: no gross musculoskeletal defects noted, no edema  SKIN: no suspicious lesions or rashes  NEURO: Normal strength and tone, mentation intact and speech normal  PSYCH: mentation appears normal, affect normal/bright    Results for orders placed or performed in visit on 01/24/22   Basic metabolic panel     Status: None   Result Value Ref Range    Sodium 141 133 - 144 mmol/L    Potassium 4.2 3.4 - 5.3 mmol/L    Chloride 112 mmol/L    Carbon Dioxide (CO2) 22 20 - 32 mmol/L    Anion Gap 7 3 - 14 mmol/L    Urea Nitrogen 12 7 - 30 mg/dL    Creatinine 0.70 mg/dL    Calcium 9.4 8.5 - 10.1 mg/dL    Glucose 99 70 - 99 mg/dL    GFR Estimate >90 >60 mL/min/1.73m2   CBC with platelets     Status: Abnormal   Result Value Ref Range    WBC Count 7.5 4.0 - 11.0 10e3/uL    RBC Count 5.14 3.80 - 5.20 10e6/uL    Hemoglobin 11.5 (L) 11.7 - 15.7 g/dL    Hematocrit 37.2 %    MCV 72 (L) 78 - 100 fL    MCH 22.4 (L) 26.5 - 33.0 pg    MCHC 30.9 (L) 31.5 - 36.5 g/dL    RDW 15.1 (H) 10.0 - 15.0 %    Platelet Count 262 150 - 450 10e3/uL     EKG obtained in clinic normal sinus rhythm

## 2022-01-24 NOTE — PROGRESS NOTES
Preceptor Attestation:    I discussed the patient with the resident and evaluated the patient in person. I personally viewed the EKG and agree with the interpretation documented by the resident. I have verified the content of the note, which accurately reflects my assessment of the patient and the plan of care.   Supervising Physician:  Donta Kc MD.

## 2022-01-28 ENCOUNTER — THERAPY VISIT (OUTPATIENT)
Dept: PHYSICAL THERAPY | Facility: CLINIC | Age: 37
End: 2022-01-28
Payer: COMMERCIAL

## 2022-01-28 DIAGNOSIS — R42 DIZZINESS: Primary | ICD-10-CM

## 2022-02-02 ENCOUNTER — VIRTUAL VISIT (OUTPATIENT)
Dept: PSYCHOLOGY | Facility: CLINIC | Age: 37
End: 2022-02-02
Payer: COMMERCIAL

## 2022-02-02 DIAGNOSIS — F39 MOOD DISORDER (H): ICD-10-CM

## 2022-02-02 DIAGNOSIS — F32.1 CURRENT MODERATE EPISODE OF MAJOR DEPRESSIVE DISORDER WITHOUT PRIOR EPISODE (H): ICD-10-CM

## 2022-02-02 DIAGNOSIS — F41.1 GAD (GENERALIZED ANXIETY DISORDER): Primary | ICD-10-CM

## 2022-02-02 PROCEDURE — 90834 PSYTX W PT 45 MINUTES: CPT | Mod: 95 | Performed by: COUNSELOR

## 2022-02-02 ASSESSMENT — ANXIETY QUESTIONNAIRES
5. BEING SO RESTLESS THAT IT IS HARD TO SIT STILL: SEVERAL DAYS
3. WORRYING TOO MUCH ABOUT DIFFERENT THINGS: SEVERAL DAYS
GAD7 TOTAL SCORE: 8
7. FEELING AFRAID AS IF SOMETHING AWFUL MIGHT HAPPEN: SEVERAL DAYS
GAD7 TOTAL SCORE: 8
2. NOT BEING ABLE TO STOP OR CONTROL WORRYING: SEVERAL DAYS
GAD7 TOTAL SCORE: 8
1. FEELING NERVOUS, ANXIOUS, OR ON EDGE: MORE THAN HALF THE DAYS
7. FEELING AFRAID AS IF SOMETHING AWFUL MIGHT HAPPEN: SEVERAL DAYS
4. TROUBLE RELAXING: SEVERAL DAYS
6. BECOMING EASILY ANNOYED OR IRRITABLE: SEVERAL DAYS

## 2022-02-02 NOTE — PROGRESS NOTES
Progress Note    Patient Name: Leticia Morales  Date: 2/2/2022           Service Type: Individual      Session Start Time: 3:03pm  Session End Time: 3:50pm     Session Length: 47 mins     Session #: 5    Attendees: Client attended alone    Service Modality:  Video Visit:      Provider verified identity through the following two step process.  Patient provided:  Patient is known previously to provider    Telemedicine Visit: The patient's condition can be safely assessed and treated via synchronous audio and visual telemedicine encounter.      Reason for Telemedicine Visit: Services only offered telehealth    Originating Site (Patient Location): Patient's home    Distant Site (Provider Location): Provider Remote Setting- Home Office    Consent:  The patient/guardian has verbally consented to: the potential risks and benefits of telemedicine (video visit) versus in person care; bill my insurance or make self-payment for services provided; and responsibility for payment of non-covered services.     Patient would like the video invitation sent by:  My Chart    Mode of Communication:  Video Conference via Amwell    As the provider I attest to compliance with applicable laws and regulations related to telemedicine.     Treatment Plan Last Reviewed: 12/14/21  PHQ-9 / GANESH-7 : G=8    DATA  Interactive Complexity: No  Crisis: No       Progress Since Last Session (Related to Symptoms / Goals / Homework):   Symptoms: Worsening more down days, trouble with getting out of bed, trouble with constant rumination     Homework: Partially completed      Episode of Care Goals: Minimal progress - PREPARATION (Decided to change - considering how); Intervened by negotiating a change plan and determining options / strategies for behavior change, identifying triggers, exploring social supports, and working towards setting a date to begin behavior change     Current / Ongoing Stressors and  Concerns:   Health concerns              Future plans               Cultural barriers to mental health     Treatment Objective(s) Addressed in This Session:   Improve diet, appetite, mindful eating, and / or meal planning  Discuss motivation / ambivalence about taking anti-depressant / mood stabilizer medication(s)  vitamin D and mental health        Intervention:   Emotion Focused Therapy: supportive listening, emotional processing, validation   Solution Focused: mood stabilization via medication trial , education around medication and referral to PCP        ASSESSMENT: Current Emotional / Mental Status (status of significant symptoms):        Risk status (Self / Other harm or suicidal ideation)              Patient denies current fears or concerns for personal safety.              Patient denies current or recent suicidal ideation or behaviors.              Patient denies current or recent homicidal ideation or behaviors.              Patient denies current or recent self injurious behavior or ideation.              Patient denies other safety concerns.              Patient reports there has been no change in risk factors since their last session.                Patient reports there has been no change in protective factors since their last session.                Recommended that patient call 911 or go to the local ED should there be a change in any of these risk factors.     Appearance:   Appropriate    Eye Contact:   Good    Psychomotor Behavior: Normal    Attitude:   Friendly Pleasant   Orientation:   All   Speech    Rate / Production: Talkative    Volume:  Normal    Mood:    Anxious  Depressed    Affect:    Appropriate    Thought Content:  Rumination    Thought Form:  Coherent    Insight:    Fair      Medication Review:   No current psychiatric medications prescribed     Medication Compliance:   NA     Changes in Health Issues:   None reported     Chemical Use Review:   Substance Use: Chemical use reviewed,  no active concerns identified      Tobacco Use: No current tobacco use.      Diagnosis:  1. GANESH (generalized anxiety disorder)    2. Current moderate episode of major depressive disorder without prior episode (H)    3. Mood disorder (H)        Collateral Reports Completed:   Communicated with: PCP    PLAN: (Patient Tasks / Therapist Tasks / Other)  Provider assigned client to care for vitamin D deficiency   Provider assigned client to try pod casts sent my provider         Rosenda Newman [facility] 2/2/2022                                                           ______________________________________________________________________     Treatment Plan     Patient's Name: Leticia Morales                     YOB: 1985     Date: 12/14/2021        DSM5 Diagnoses:   1. GANESH (generalized anxiety disorder)    2. Current moderate episode of major depressive disorder without prior episode (H)    3. Mood disorder (H)       Psychosocial / Contextual Factors: cultural beliefs, headaches and health issues, future relationships         Referral / Collaboration:  Referral to another professional/service is not indicated at this time..     Anticipated number of session or this episode of care: 8        MeasurableTreatment Goal(s) related to diagnosis / functional impairment(s)  Goal 1: Patient will engage in short term therapy to develop coping skills     I will know I've met my goal when I have skills to use for emotions. Do not have so many down days.       Objective #A (Patient Action)                          Patient will identify   fears / thoughts that contribute to feeling anxious.  Status: New - Date: 12/14/21      Intervention(s)  Therapist will teach emotional recognition/identification.  .     Objective #B  Patient will use cognitive strategies identified in therapy to challenge anxious thoughts.  Status: New - Date: 12/14/21      Intervention(s)  Therapist will teach emotional regulation skills.  .     Objective  #C  Patient will Decrease frequency and intensity of feeling down, depressed, hopeless.  Status: New - Date: 12/14/21      Intervention(s)  Therapist will teach distraction skills.  .        Patient has reviewed and agreed to the above plan.        Rosenda Newman, [facility]                       December 14, 2021        Answers for HPI/ROS submitted by the patient on 1/6/2022  If you checked off any problems, how difficult have these problems made it for you to do your work, take care of things at home, or get along with other people?: Somewhat difficult  PHQ9 TOTAL SCORE: 7      Answers for HPI/ROS submitted by the patient on 2/2/2022  GANESH 7 TOTAL SCORE: 8

## 2022-02-03 ASSESSMENT — ANXIETY QUESTIONNAIRES: GAD7 TOTAL SCORE: 8

## 2022-02-08 NOTE — PATIENT INSTRUCTIONS
Patient Education   Here is the plan from today's visit    1. Syncope, unspecified syncope type  Labs today and your EKG were reassuring but we still don't know why you are having these episodes where you pass out. I'd like you to keep a detailed account of your food and fluid intake for the next two weeks and then follow up with us in clinic. Even if you don't have any more of these episodes we still need to help get your appetite back up, because it really isn't good to go several days without eating and this could be the cause of your fainting. Your EKG today was reassuring as were your labs, showing normal electrolyte levels, but unfortunately the sample wasn't enough us to add on the TSH (thyroid level) or the topiramate level, so I'd recommend we get these drawn when you come back. If you have any more fainting episodes I'd recommend you go to the ED to be evaluated and we should also have you see the neurologists for an EEG to make sure you aren't having seizures. I'm less worried about this being the cause but it's something we wouldn't want to miss. Please reach out if you have questions and follow up in 2 weeks.   - Basic metabolic panel; Future  - EKG 12-lead complete w/read - Clinics  - Topiramate Level; Future  - TSH with free T4 reflex; Future    2. Tachycardia  Mild anemia. Try to increase daily iron intake and follow up with us in 2 weeks  - CBC with platelets; Future  - EKG 12-lead complete w/read - Clinics  - TSH with free T4 reflex; Future    3. Gastroesophageal reflux disease without esophagitis  Refill sent to pharmacy today.   - omeprazole (PRILOSEC) 20 MG DR capsule; Take 1 capsule (20 mg) by mouth daily  Dispense: 90 capsule; Refill: 0    4. Migraine with aura and without status migrainosus, not intractable  Take care when using Reglan as the light-headedness can be a side effect. Try to monitor when you take this and when you notice symptoms and follow up in 2 weeks.   - metoclopramide  (REGLAN) 10 MG tablet; Take 1 tablet (10 mg) by mouth every 6 hours as needed (migraine, nausea)  Dispense: 10 tablet; Refill: 3        Please call or return to clinic if your symptoms don't go away.    Follow up plan  No follow-ups on file.    Thank you for coming to [facility] today.  Lab Testing:  **If you had lab testing today and your results are reassuring or normal they will be mailed to you or sent through [facility]ST[facility] within 7 days.   **If the lab tests need quick action we will call you with the results.  **If you are having labs done on a different day, please call [phone] to schedule at [facility] or [phone] for other [facility] locations. Labs do not offer walk-in appointments.  The phone number we will call with results is # [phone] (home) . If this is not the best number please call our clinic and change the number.  Medication Refills:  If you need any refills please call your pharmacy and they will contact us.   If you need to  your refill at a new pharmacy, please contact the new pharmacy directly. The new pharmacy will help you get your medications transferred faster.   Scheduling:  If you have any concerns about today's visit or wish to schedule another appointment please call our office during normal business hours [phone] (8-5:00 M-F)  If a referral was made to an [facility] specialty provider and you do not get a call from central scheduling, please refer to directions on your visit summary or call our office during normal business hours for assistance.   If a Mammogram was ordered for you at the [facility] call [phone] to schedule or change your appointment.  If you had an XRay/CT/Ultrasound/MRI ordered the number is [phone] to schedule or change your radiology appointment.   [facility] has limited ultrasound appointments available on Wednesdays, if you would like your ultrasound at [facility], please call  [phone] to schedule.   Medical Concerns:  If you have urgent medical concerns please call [phone] at any time of the day.    Angeles Jacob MD

## 2022-02-16 ENCOUNTER — TELEPHONE (OUTPATIENT)
Dept: NEUROLOGY | Facility: CLINIC | Age: 37
End: 2022-02-16

## 2022-02-16 ENCOUNTER — VIRTUAL VISIT (OUTPATIENT)
Dept: NEUROLOGY | Facility: CLINIC | Age: 37
End: 2022-02-16
Payer: COMMERCIAL

## 2022-02-16 ENCOUNTER — OFFICE VISIT (OUTPATIENT)
Dept: FAMILY MEDICINE | Facility: CLINIC | Age: 37
End: 2022-02-16
Payer: COMMERCIAL

## 2022-02-16 VITALS
RESPIRATION RATE: 16 BRPM | DIASTOLIC BLOOD PRESSURE: 76 MMHG | HEIGHT: 64 IN | HEART RATE: 89 BPM | WEIGHT: 121 LBS | SYSTOLIC BLOOD PRESSURE: 108 MMHG | BODY MASS INDEX: 20.66 KG/M2 | TEMPERATURE: 98 F | OXYGEN SATURATION: 97 %

## 2022-02-16 DIAGNOSIS — G43.711 INTRACTABLE CHRONIC MIGRAINE WITHOUT AURA AND WITH STATUS MIGRAINOSUS: Primary | ICD-10-CM

## 2022-02-16 DIAGNOSIS — E44.1 MILD PROTEIN-CALORIE MALNUTRITION (H): ICD-10-CM

## 2022-02-16 DIAGNOSIS — Z00.00 HEALTHCARE MAINTENANCE: ICD-10-CM

## 2022-02-16 DIAGNOSIS — F41.8 DEPRESSION WITH ANXIETY: Primary | ICD-10-CM

## 2022-02-16 DIAGNOSIS — Z23 HIGH PRIORITY FOR 2019-NCOV VACCINE: ICD-10-CM

## 2022-02-16 DIAGNOSIS — E55.9 VITAMIN D DEFICIENCY: ICD-10-CM

## 2022-02-16 PROCEDURE — 99214 OFFICE O/P EST MOD 30 MIN: CPT | Mod: 25 | Performed by: STUDENT IN AN ORGANIZED HEALTH CARE EDUCATION/TRAINING PROGRAM

## 2022-02-16 PROCEDURE — 82306 VITAMIN D 25 HYDROXY: CPT | Performed by: STUDENT IN AN ORGANIZED HEALTH CARE EDUCATION/TRAINING PROGRAM

## 2022-02-16 PROCEDURE — 99214 OFFICE O/P EST MOD 30 MIN: CPT | Mod: 95 | Performed by: NURSE PRACTITIONER

## 2022-02-16 PROCEDURE — 91306 COVID-19,PF,MODERNA (18+ YRS BOOSTER .25ML): CPT | Performed by: STUDENT IN AN ORGANIZED HEALTH CARE EDUCATION/TRAINING PROGRAM

## 2022-02-16 PROCEDURE — 0064A COVID-19,PF,MODERNA (18+ YRS BOOSTER .25ML): CPT | Performed by: STUDENT IN AN ORGANIZED HEALTH CARE EDUCATION/TRAINING PROGRAM

## 2022-02-16 PROCEDURE — 36415 COLL VENOUS BLD VENIPUNCTURE: CPT | Performed by: STUDENT IN AN ORGANIZED HEALTH CARE EDUCATION/TRAINING PROGRAM

## 2022-02-16 RX ORDER — UBROGEPANT 50 MG/1
50-100 TABLET ORAL
Qty: 16 TABLET | Refills: 6 | Status: SHIPPED | OUTPATIENT
Start: 2022-02-16 | End: 2022-08-31

## 2022-02-16 RX ORDER — SERTRALINE HYDROCHLORIDE 25 MG/1
25 TABLET, FILM COATED ORAL DAILY
Qty: 90 TABLET | Refills: 0 | Status: SHIPPED | OUTPATIENT
Start: 2022-02-16 | End: 2022-04-15

## 2022-02-16 RX ORDER — GABAPENTIN 100 MG/1
CAPSULE ORAL
Qty: 90 CAPSULE | Refills: 3 | Status: SHIPPED | OUTPATIENT
Start: 2022-02-16 | End: 2022-08-31

## 2022-02-16 RX ORDER — PRENATAL VIT/IRON FUM/FOLIC AC 27MG-0.8MG
1 TABLET ORAL DAILY
Qty: 90 TABLET | Refills: 3 | Status: SHIPPED | OUTPATIENT
Start: 2022-02-16 | End: 2024-07-19

## 2022-02-16 ASSESSMENT — ENCOUNTER SYMPTOMS
FATIGUE: 1
SEIZURES: 0
BLOOD IN STOOL: 0
HEARTBURN: 1
BACK PAIN: 0
WEAKNESS: 1
VOMITING: 0
JOINT SWELLING: 0
BOWEL INCONTINENCE: 0
TINGLING: 0
STIFFNESS: 0
ABDOMINAL PAIN: 0
RECTAL PAIN: 0
DECREASED APPETITE: 1
MEMORY LOSS: 0
SORE THROAT: 0
ARTHRALGIAS: 0
SINUS CONGESTION: 0
MUSCLE CRAMPS: 1
BLOATING: 1
DIZZINESS: 1
DISTURBANCES IN COORDINATION: 0
INCREASED ENERGY: 1
INSOMNIA: 1
EYE IRRITATION: 0
NECK PAIN: 0
HALLUCINATIONS: 0
NERVOUS/ANXIOUS: 1
SPEECH CHANGE: 0
LOSS OF CONSCIOUSNESS: 1
PANIC: 0
SWOLLEN GLANDS: 0
DECREASED CONCENTRATION: 1
DEPRESSION: 1
DIARRHEA: 0
EYE WATERING: 0
TREMORS: 0
EYE PAIN: 0
NECK MASS: 0
POLYDIPSIA: 0
NIGHT SWEATS: 0
NAUSEA: 1
DOUBLE VISION: 0
ALTERED TEMPERATURE REGULATION: 1
MYALGIAS: 1
WEIGHT LOSS: 1
WEIGHT GAIN: 0
SINUS PAIN: 0
TROUBLE SWALLOWING: 0
FEVER: 0
BRUISES/BLEEDS EASILY: 0
CONSTIPATION: 0
EYE REDNESS: 0
HOT FLASHES: 0
MUSCLE WEAKNESS: 1
HOARSE VOICE: 0
JAUNDICE: 0
POLYPHAGIA: 0
NUMBNESS: 0
TASTE DISTURBANCE: 0
HEADACHES: 1
PARALYSIS: 0
SMELL DISTURBANCE: 1

## 2022-02-16 ASSESSMENT — HEADACHE IMPACT TEST (HIT 6)
WHEN YOU HAVE A HEADACHE HOW OFTEN DO YOU WISH YOU COULD LIE DOWN: ALWAYS
HOW OFTEN DO HEADACHES LIMIT YOUR DAILY ACTIVITIES: ALWAYS
HOW OFTEN DID HEADACHS LIMIT CONCENTRATION ON WORK OR DAILY ACTIVITY: VERY OFTEN
WHEN YOU HAVE HEADACHES HOW OFTEN IS THE PAIN SEVERE: ALWAYS
HOW OFTEN HAVE YOU FELT TOO TIRED TO WORK BECAUSE OF YOUR HEADACHES: VERY OFTEN
HOW OFTEN HAVE YOU FELT FED UP OR IRRITATED BECAUSE OF YOUR HEADACHES: VERY OFTEN
HIT6 TOTAL SCORE: 72

## 2022-02-16 ASSESSMENT — PATIENT HEALTH QUESTIONNAIRE - PHQ9
10. IF YOU CHECKED OFF ANY PROBLEMS, HOW DIFFICULT HAVE THESE PROBLEMS MADE IT FOR YOU TO DO YOUR WORK, TAKE CARE OF THINGS AT HOME, OR GET ALONG WITH OTHER PEOPLE: SOMEWHAT DIFFICULT
SUM OF ALL RESPONSES TO PHQ QUESTIONS 1-9: 13

## 2022-02-16 NOTE — PROGRESS NOTES
Leticia is a 36 year old who is being evaluated via a billable telephone visit.      How would you like to obtain your AVS? [place]  If the video visit is dropped, the invitation should be resent by: Text to cell phone: [phone]  Will anyone else be joining your video visit? No      Start Time: 2:06 PM  Video-Visit Details    Type of service:  Video Visit     End Time:2:36 PM    Originating Location (pt. Location): Home    Distant Location (provider location):  [facility] NEUROLOGY [facility]     Platform used for Video Visit: Angie         MIGRAINE DISABILITY ASSESSMENT (MIDAS)    On how many days in the last 3 months did you miss work or school because of your headaches?  0    How many days in the last 3 months was your productivity at work or school reduced by half or more because of your headaches? (Do not include days you counted in question 1 where you missed work or school.)  0    On how many days in the last 3 months did you not do household work (such as housework, home repairs and maintenance, shopping, caring for children and relatives) because of your headaches?  30    How many days in the last 3 months was your productivity in household work reduced by half or more because of your headaches? (Do not include days you counted in question 3 where you did not do household work).  30    On how many days in the last 3 months did you miss family, social, or lesiure activities because of your headaches?  30    MIDAS Total Score: 90    On how many days in the last 3 months did you have a headache? (If a headache lasted more than 1 day, count each day.)   30    On a scale of 0 - 10, on average how painful were these headaches (where 0 = no pain at all, and 10 = pain as bad as it can be.)  10      Last Patient-Answered HIT-6 Questionnaire  HIT-6 2/16/2022   When you have headaches, how often is the pain severe 13   How often do headaches limit your ability to do usual daily activities including  "household work, work, school, or social activities? 13   When you have a headache, how often do you wish you could lie down? 13   In the past 4 weeks, how often have you felt too tired to do work or daily activities because of your headaches 11   In the past 4 weeks, how often have you felt fed up or irritated because of your headaches 11   In the past 4 weeks, how often did headaches limit your ability to concentrate on work or daily activities 11   HIT-6 Total Score 72     [facility] follow up:  Last visit 8/26/2021. Episodes decreased - not constant and now 2-3/week vs 4-5/week before Botox. No problems with Botox injections.   Rescue treatment -rizatriptan and \"finishes\" in the first 2 weeks.   Patient has been taking topiramate 50 mg BID and no side effects but its not doing anything.   Tried sumatripan in the past and was not effective.   Patient reports anemia and weight loss. Would recommend to wean topiramate off might cause weight loss.   Previous treatment with amitriptyline-side effects, venlafaxine-side effects \"emotional unstable\", topiramate-possibly weight loss and decreased appetite  Botox currently    Plan:  Headache prevention  Wean off topiramate by taking 1/2 tab every week than stop it  Start gabapentin 100 mg at bedtime - Take one cap at bedtime for one week, than two caps at bedtime for one week than one cap am and two caps at bedtime if tolerated  Side effects reviewed.   Continue Botox for migraine headache prevention   Rescue treatment -Ubrelvy as needed (insurance preferred) and to limit rizatriptan use  Stop rizatriptan   Naproxen 2 tabs every 12 hours or ibuprofen 2-3 tabs every 6-8 hours as needed -but limit use to no more than 14 days per month  Naproxen+Metoclopramide and may take it with benadryl as needed for nausea/migraine headache when severe    Follow up in 6-8 weeks or sooner if needed     Patient is traveling to [place] to get . Would need to stop " gabapentin, topiramate, Ubrelvy, Botox if planning pregnancy. Would need to discuss risk with Botox in pregnancy with OB/GYn and patient needs to except the risk -unfortunately studies are limited before continue Botox. Patient understands the risks.       Past Medical History reviewed   Migraine headaches  Depression  ICU hospitalization for H1N1 respiratory distress  Reports COVID19 positive in July 2020 and recovered    Brain MRI 8/22/2021  MR BRAIN W/O & W CONTRAST 8/22/2021 9:30 AM     Provided History: Abnormal brain MRI; History of recent  hospitalization.  ICD-10: Abnormal brain MRI; History of recent hospitalization     Comparison: CTV 7/20/2021; MRI brain 7/19/2021, 7/15/2021.     Technique: Multiplanar T1-weighted, axial FLAIR, and susceptibility  images were obtained without intravenous contrast. Following  intravenous gadolinium-based contrast administration, axial  T2-weighted, diffusion, and T1-weighted images (in multiple planes)  were obtained.     Contrast: 5 Gadavist      Findings:  There is no mass effect, midline shift, or evidence of acute  intracranial hemorrhage. Diffuse punctate foci of hemosiderin  deposition in the cerebral subcortical white matter, corpus callosum,  and basal ganglia. Particularly prominent in the anterior bifrontal  lobes and splenium of the corpus callosum. The ventricles are  proportionate to the cerebral sulci. Normal major vascular  intracranial flow-voids. Question small left anterior frontal  convexity arachnoid cyst with minimal mass effect on the underlying  gyrus (series 14,001 image 94).     Postcontrast images demonstrate no abnormal intracranial enhancement.  No residual dural enhancement. Normalized size of pituitary gland, now  measures 9 x 7 mm, previously 11 x 11 mm on comparison 7/19/2021.  Posterior sagittal sinus is concave bilaterally, previously this  appeared convex on comparison 7/19/2021.     No abnormality of the skull marrow signal. The  visualized portions of  paranasal sinuses, and mastoid air cells are relatively clear. The  orbits are grossly unremarkable.                                                                      Impression:  1.  Resolution of findings of previous intracranial hypotension, as  seen on comparison 7/29/2021. Previous findings included enlargement  of the pituitary gland, dural enhancement, and a convex superior  sagittal sinus, all of which are no longer present.  2.  Stable nonspecific findings of prior petechial microhemorrhages  without associated acute signal changes. Favor sequela of prior ARDS  and associated critical illness, less likely diffuse axonal injury or  amyloidosis.     I discussed all my recommendations with Leticia Morales who verbalizes understanding and comfortable with the plan.  All of patient's questions were answered from the best of my knowledge.  Patient is in agreement with the plan.     30 minutes spent on the date of the encounter doing video access, chart  review, exam, results review,  meds review, treatment plan, documentation and further activities as noted above    CONNIE Garcia, CNP [facility]  Headache certified  [facility]

## 2022-02-16 NOTE — LETTER
2/16/2022       RE: Leticia Morales  [address]  [place] [address]     Dear Colleague,    Thank you for referring your patient, Leticia Morales, to the [facility]. Please see a copy of my visit note below.    Leticia is a 36 year old who is being evaluated via a billable telephone visit.      How would you like to obtain your AVS? MyChart  If the video visit is dropped, the invitation should be resent by: Text to cell phone: [phone]  Will anyone else be joining your video visit? No      Start Time: 2:06 PM  Video-Visit Details    Type of service:  Video Visit     End Time:2:36 PM    Originating Location (pt. Location): Home    Distant Location (provider location):  [facility] NEUROLOG[facility]     Platform used for Video Visit: Angie         MIGRAINE DISABILITY ASSESSMENT (MIDAS)    On how many days in the last 3 months did you miss work or school because of your headaches?  0    How many days in the last 3 months was your productivity at work or school reduced by half or more because of your headaches? (Do not include days you counted in question 1 where you missed work or school.)  0    On how many days in the last 3 months did you not do household work (such as housework, home repairs and maintenance, shopping, caring for children and relatives) because of your headaches?  30    How many days in the last 3 months was your productivity in household work reduced by half or more because of your headaches? (Do not include days you counted in question 3 where you did not do household work).  30    On how many days in the last 3 months did you miss family, social, or lesiure activities because of your headaches?  30    MIDAS Total Score: 90    On how many days in the last 3 months did you have a headache? (If a headache lasted more than 1 day, count each day.)   30    On a scale of 0 - 10, on  "average how painful were these headaches (where 0 = no pain at all, and 10 = pain as bad as it can be.)  10      Last Patient-Answered HIT-6 Questionnaire  HIT-6 2/16/2022   When you have headaches, how often is the pain severe 13   How often do headaches limit your ability to do usual daily activities including household work, work, school, or social activities? 13   When you have a headache, how often do you wish you could lie down? 13   In the past 4 weeks, how often have you felt too tired to do work or daily activities because of your headaches 11   In the past 4 weeks, how often have you felt fed up or irritated because of your headaches 11   In the past 4 weeks, how often did headaches limit your ability to concentrate on work or daily activities 11   HIT-6 Total Score 72     [facility] follow up:  Last visit 8/26/2021. Episodes decreased - not constant and now 2-3/week vs 4-5/week before Botox. No problems with Botox injections.   Rescue treatment -rizatriptan and \"finishes\" in the first 2 weeks.   Patient has been taking topiramate 50 mg BID and no side effects but its not doing anything.   Tried sumatripan in the past and was not effective.   Patient reports anemia and weight loss. Would recommend to wean topiramate off might cause weight loss.   Previous treatment with amitriptyline-side effects, venlafaxine-side effects \"emotional unstable\", topiramate-possibly weight loss and decreased appetite  Botox currently    Plan:  Headache prevention  Wean off topiramate by taking 1/2 tab every week than stop it  Start gabapentin 100 mg at bedtime - Take one cap at bedtime for one week, than two caps at bedtime for one week than one cap am and two caps at bedtime if tolerated  Side effects reviewed.   Continue Botox for migraine headache prevention   Rescue treatment -Ubrelvy as needed (insurance preferred) and to limit rizatriptan use  Stop rizatriptan   Naproxen 2 tabs every 12 hours or ibuprofen " 2-3 tabs every 6-8 hours as needed -but limit use to no more than 14 days per month  Naproxen+Metoclopramide and may take it with benadryl as needed for nausea/migraine headache when severe    Follow up in 6-8 weeks or sooner if needed     Patient is traveling to [place] to get . Would need to stop gabapentin, topiramate, Ubrelvy, Botox if planning pregnancy. Would need to discuss risk with Botox in pregnancy with OB/GYn and patient needs to except the risk -unfortunately studies are limited before continue Botox. Patient understands the risks.       Past Medical History reviewed   Migraine headaches  Depression  ICU hospitalization for H1N1 respiratory distress  Reports COVID19 positive in July 2020 and recovered    Brain MRI 8/22/2021  MR BRAIN W/O & W CONTRAST 8/22/2021 9:30 AM     Provided History: Abnormal brain MRI; History of recent  hospitalization.  ICD-10: Abnormal brain MRI; History of recent hospitalization     Comparison: CTV 7/20/2021; MRI brain 7/19/2021, 7/15/2021.     Technique: Multiplanar T1-weighted, axial FLAIR, and susceptibility  images were obtained without intravenous contrast. Following  intravenous gadolinium-based contrast administration, axial  T2-weighted, diffusion, and T1-weighted images (in multiple planes)  were obtained.     Contrast: 5 Gadavist      Findings:  There is no mass effect, midline shift, or evidence of acute  intracranial hemorrhage. Diffuse punctate foci of hemosiderin  deposition in the cerebral subcortical white matter, corpus callosum,  and basal ganglia. Particularly prominent in the anterior bifrontal  lobes and splenium of the corpus callosum. The ventricles are  proportionate to the cerebral sulci. Normal major vascular  intracranial flow-voids. Question small left anterior frontal  convexity arachnoid cyst with minimal mass effect on the underlying  gyrus (series 14,001 image 94).     Postcontrast images demonstrate no abnormal intracranial  enhancement.  No residual dural enhancement. Normalized size of pituitary gland, now  measures 9 x 7 mm, previously 11 x 11 mm on comparison 7/19/2021.  Posterior sagittal sinus is concave bilaterally, previously this  appeared convex on comparison 7/19/2021.     No abnormality of the skull marrow signal. The visualized portions of  paranasal sinuses, and mastoid air cells are relatively clear. The  orbits are grossly unremarkable.                                                                      Impression:  1.  Resolution of findings of previous intracranial hypotension, as  seen on comparison 7/29/2021. Previous findings included enlargement  of the pituitary gland, dural enhancement, and a convex superior  sagittal sinus, all of which are no longer present.  2.  Stable nonspecific findings of prior petechial microhemorrhages  without associated acute signal changes. Favor sequela of prior ARDS  and associated critical illness, less likely diffuse axonal injury or  amyloidosis.     I discussed all my recommendations with Leticia Morales who verbalizes understanding and comfortable with the plan.  All of patient's questions were answered from the best of my knowledge.  Patient is in agreement with the plan.     30 minutes spent on the date of the encounter doing video access, chart  review, exam, results review,  meds review, treatment plan, documentation and further activities as noted above    CONNIE Garcia, CNP [facility]  Headache certified  [facility]

## 2022-02-16 NOTE — TELEPHONE ENCOUNTER
Prior Authorization Retail Medication Request    Medication/Dose: ubrogepant (UBRELVY) 50 MG tablet  ICD code (if different than what is on RX):    G43.711  Previously Tried and Failed:  topiramate  emgality recommended in past but insurance denied.  Sumatriptan  Metoclopramide  Doxycycline  rizatriptan  Rationale:  migraine    Insurance Name:  [facility]  Insurance ID: [id number]        Pharmacy Information (if different than what is on RX)  Name:    Phone:

## 2022-02-16 NOTE — PROGRESS NOTES
"  Assessment & Plan     Depression with anxiety  Already working with therapy. Follow up in 1 month for mental health check in before moving/traveling to [place]. Denies SI/HI.   - sertraline (ZOLOFT) 25 MG tablet; Take 1 tablet (25 mg) by mouth daily For 1 week, then increase to 2 tablets daily if not noticing side effects    Vitamin D deficiency  - Vitamin D deficiency screening; Future    High priority for 2019-nCoV vaccine  - COVID-19,PF,MODERNA (18+ Yrs BOOSTER .25mL)    Healthcare maintenance  Prepregnancy counseling   Hoping to have a child within the next year, discussed importance of prenatal vitamin (particularly folic acid). Also encouraged her to discuss with Neurology given multiple potentially teratogenic medications and plan to travel/move to [place] for 6-12 months.   - Prenatal Vit-Fe Fumarate-FA (PRENATAL MULTIVITAMIN W/IRON) 27-0.8 MG tablet; Take 1 tablet by mouth daily    Return in about 4 weeks (around 3/16/2022) for Follow up.    Yashira Cuevas MD  [facility] RENATA Kelly is a 36 year old who presents for the following health issues     HPI   Patient presents with:  RECHECK: from last visit about fainting. Needs to have her TSH checked as they did not have time last visit. Discuss lab results from last visit. Also seeing a mental health provider per pt for her mood.   Imm/Inj: COVID-19 VACCINE      Mental health  - mood down, days she does not want to do anything that are becoming more frequent  - fatigue, sometimes related to migraine  - \"high days\" (other people's normal) overeating some days, other days doesn't eat at all due to loss of appetite   - \"I don't want to talk to any humans\" - goes to her room except for \"important events like doctor's appointments\" - sometimes cancels appointments because she can't get out of bed  - meets with Rosenda her counselor 1-2x/month  - sleep: \"not good\", will sleep for 2 hours and then wakes up for no reason and " "can't fall back asleep due to racing thoughts. Other times she will sleep several hours during the day.   - has never been on medication to help with mood but is interested   - no thoughts of harm, no SI   - planning to go to [facility] [place] for 6-12 months, but took months to renew her passport, planning to leave late April May. Tells me she wants to get out of [place] to try a new environment and see if it helps her mood. Planning arranged marriage and is hopeful to get pregnant shortly thereafter. Start folic acid 1,000 mg and vitamin D this week.         Objective    /76   Pulse 89   Temp 98  F (36.7  C) (Oral)   Resp 16   Ht 1.62 m (5' 3.78\")   Wt 54.9 kg (121 lb)   LMP 02/12/2022 (Approximate)   SpO2 97%   Breastfeeding No   BMI 20.91 kg/m    Body mass index is 20.91 kg/m .  Physical Exam  Constitutional:       General: She is not in acute distress.     Appearance: She is well-developed. She is not diaphoretic.      Comments: Appears younger than stated age   Cardiovascular:      Rate and Rhythm: Normal rate.   Pulmonary:      Effort: Pulmonary effort is normal. No respiratory distress.   Neurological:      General: No focal deficit present.      Mental Status: She is alert.   Psychiatric:         Attention and Perception: Attention normal.         Mood and Affect: Mood normal.         Speech: Speech normal.         Behavior: Behavior normal.         Thought Content: Thought content does not include homicidal or suicidal ideation. Thought content does not include suicidal plan.        No results found for this or any previous visit (from the past 24 hour(s)).          "

## 2022-02-16 NOTE — TELEPHONE ENCOUNTER
[facility] Call Center    Phone Message    May a detailed message be left on voicemail: yes     Reason for Call: Other: pt is waiting, please have clinic reach out to pt. Thank you.     Action Taken: Message routed to:  [facility]    Travel Screening: Not Applicable

## 2022-02-16 NOTE — PATIENT INSTRUCTIONS
Plan:  Headache prevention  Wean off topiramate by taking 1/2 tab every week than stop it  Start gabapentin 100 mg at bedtime - Take one cap at bedtime for one week, than two caps at bedtime for one week than one cap am and two caps at bedtime if tolerated  Side effects reviewed.   Continue Botox for migraine headache prevention   Rescue treatment -Ubrelvy as needed (insurance preferred) and to limit rizatriptan use  Stop rizatriptan   Naproxen 2 tabs every 12 hours or ibuprofen 2-3 tabs every 6-8 hours as needed -but limit use to no more than 14 days per month  Naproxen+Metoclopramide and may take it with benadryl as needed for nausea/migraine headache when severe    Follow up in 6-8 weeks or sooner if needed

## 2022-02-17 ENCOUNTER — VIRTUAL VISIT (OUTPATIENT)
Dept: PHYSICAL THERAPY | Facility: CLINIC | Age: 37
End: 2022-02-17
Payer: COMMERCIAL

## 2022-02-17 DIAGNOSIS — R42 DIZZINESS: Primary | ICD-10-CM

## 2022-02-17 DIAGNOSIS — H81.10 BPV (BENIGN POSITIONAL VERTIGO), UNSPECIFIED LATERALITY: ICD-10-CM

## 2022-02-17 LAB — DEPRECATED CALCIDIOL+CALCIFEROL SERPL-MC: 45 UG/L (ref 20–75)

## 2022-02-17 PROCEDURE — 97112 NEUROMUSCULAR REEDUCATION: CPT | Mod: GP | Performed by: PHYSICAL THERAPIST

## 2022-02-17 ASSESSMENT — PATIENT HEALTH QUESTIONNAIRE - PHQ9: SUM OF ALL RESPONSES TO PHQ QUESTIONS 1-9: 13

## 2022-02-17 NOTE — PROGRESS NOTES
Leticia Morales is a 36 year old female who is being seen via a billable video visit.      Patient has given verbal consent for Video visit? Yes    Video Start Time: 2:16pm    Telehealth Visit Details    Type of Service:  Telehealth    Video End Time (time video stopped): 2:37pm    Originating Location (pt. location): Mothers house    Additional Participants in Telehealth Visit: None    Distant Location (provider location):  [facility]     Mode of Communication (Audio Visual or Audio Only):  Audio visual    Tanja Shah, PT, DPT  February 17, 2022

## 2022-02-19 NOTE — TELEPHONE ENCOUNTER
PA Initiation    Medication: ubrogepant (UBRELVY) 50 MG tablet   Insurance Company: [employer] - Phone [phone] Fax [phone]  Pharmacy Filling the Rx: [id number] [place] [id number] AT [facility] [address]  Filling Pharmacy Phone: [phone]  Filling Pharmacy Fax: [phone]  Start Date: 2/19/2022

## 2022-02-21 NOTE — TELEPHONE ENCOUNTER
Prior Authorization Approval    Authorization Effective Date: 1/20/2022  Authorization Expiration Date: 3/19/2022  Medication: ubrogepant (UBRELVY) 50 MG tablet--APPROVED  Approved Dose/Quantity:   Reference #:     Insurance Company: [employer] - Phone [phone] Fax [phone]  Expected CoPay:       CoPay Card Available:      Foundation Assistance Needed:    Which Pharmacy is filling the prescription (Not needed for infusion/clinic administered): [id number] - [facility] [address] AT [facility] OF [address] [facility] [address]  Pharmacy Notified: Yes  Patient Notified: Yes **Instructed pharmacy to notify patient when script is ready to /ship.**

## 2022-03-24 ENCOUNTER — OFFICE VISIT (OUTPATIENT)
Dept: NEUROLOGY | Facility: CLINIC | Age: 37
End: 2022-03-24
Payer: COMMERCIAL

## 2022-03-24 VITALS
SYSTOLIC BLOOD PRESSURE: 114 MMHG | DIASTOLIC BLOOD PRESSURE: 83 MMHG | RESPIRATION RATE: 16 BRPM | HEART RATE: 89 BPM | OXYGEN SATURATION: 96 %

## 2022-03-24 DIAGNOSIS — R55 SYNCOPE, UNSPECIFIED SYNCOPE TYPE: Primary | ICD-10-CM

## 2022-03-24 DIAGNOSIS — F41.8 DEPRESSION WITH ANXIETY: ICD-10-CM

## 2022-03-24 PROCEDURE — 99214 OFFICE O/P EST MOD 30 MIN: CPT | Mod: GC | Performed by: STUDENT IN AN ORGANIZED HEALTH CARE EDUCATION/TRAINING PROGRAM

## 2022-03-24 ASSESSMENT — PAIN SCALES - GENERAL: PAINLEVEL: NO PAIN (0)

## 2022-03-24 NOTE — LETTER
3/24/2022       RE: Leticia Morales  [address]  [place] [address]     Dear Colleague,    Thank you for referring your patient, Leticia Morales, to the [facility] [place] at [facility]. Please see a copy of my visit note below.    DEPARTMENT OF NEUROLOGY  Referral for: syncope  Patient Name:  Leticia Morales  MRN:  [id number]    :  1985  Date of Clinic Visit:  2022  Primary Care Provider:  Yashira Cuevas  Referring Provider: Self    ASSESSMENT AND PLAN:  Leticia Morales is a 36 year old female with PMH significant for migraines with aura, hx of status migrainosus, depression, who presents to the neurology clinic with concern for new episodes. Neurologic exam today is unremarkable. Based off history, differential for episodes include syncope vs convulsive syncope, and less likely seizure. Orthostatic vitals were partially positive today with significant increase in heart rate upon standing (89 -> 112), also with reported history of poor PO intake of both solids and liquids suggest likely volume down status that could contribute to syncope. In addition with pre-syncope symptoms prior to episode including palpitations, tunnel vision, and lightheadedness more suggestive of syncope. With retained consciousness, lack of post ictal state and questionable shaking, I have a lower suspicion for these episodes being seizures. However, with that being said patient does have history of small dural thickening as sequela of prior subdural therefore it would be reasonable to obtain a routine EEG to rule out any epileptiform activity or potential. I will also recommend a 14 day cardiac monitor to evaluate for any arrhythmias that may be present prior to episode. Discussed with patient importance of fluid intake as well as salt, to maintain a normal blood pressure. Also discussed continued discussions with psychology/psychiatry  to optimize mood.    Plan:  - Routine EEG   - 14 day cardiac monitor  - Encourage good PO intake of fluids, salt on foods, as well as compression stockings.   - Continue migraine management with Juan Velasco. Recommend discussing brain fog side effects that were experienced with gabapentin.  - Continue optimization of mood symptoms with psychologist.  - Follow up after the above studies are completed. Visit can be video if more convenient for patient.     Patient was seen and discussed with Dr. Santos.    Shavon Candelario MD  Neurology Resident, PGY-2  [facility] Department of Neurology    ___________________________________________    CHIEF COMPLAINT: syncope    HISTORY OF PRESENT ILLNESS:  Leticia Morales is a  36 year old female with PMH migraine with aura, GERD, PTSD, depression presenting as a hospital follow up from admission for status migrainosus. At that time work up included MRI brain that showed small area of pacyhmeningeal enhancement in the left frontal region, LP that was bland. Area of pacyhmeningeal enhancement thought to be due to local hemosiderin deposition from probable history of head injury, rather than acute infectious or inflammatory process, and has been stable on repeat imaging. Patient ultimately discharged and has been following with with CONNIE Sarah CNP for migraines. She is currently managed on Botox [id number], gabapentin, and ubrelvy as rescue treatment.     Today, patient reports that she would like to discuss different symptoms that have been present since hospital discharge in July 2021. Patient is accompanied by sister who also provides history. Patient notes that over the last few months she started to have episodes of loss of consciousness. Upon further questioning, patient describes a sensation of lightheadedness, palpitations, and tunnel vision followed by falling. After the episode she wakes up on the ground and is unsure what happened. Notes feeling  that she returns to baseline about 30 minutes after the episode occurs. Denies loss of bowel or bladder. No tongue biting. She does note that she can hear others around her talking to her during episode but is unable to talk.     Patient sister notes that episodes last less than 1 minute. Also notes that when she is on the ground, there is rhythmic shaking of her arms, legs and head. The patient appears to be able to understand sister during episode, opens her eyes, and follows commands.     Additional symptoms today include forgetfulness that has been present since patient had H1N1 flu in 2009, as well as brain fog that has worsened since starting gabapentin for migraines. Sister also notes that patient spends most of the day in bed, requires a lot of encouragement to complete tasks and join family during gatherings, and has a poor appetite.     Denies history of seizure, no family history of seizures, no developmental delay or learning difficulties.       PHYSICAL EXAMINATION:  Vitals: /83   Pulse 89   Resp 16   SpO2 96%    Laying: /80, HR 76  Sitting: /78, HR 89  Standing: /89,     General: no acute distress    Neurologic Exam:  Mental status: Patient is oriented to person, place and time and able to provide a detailed account of history of illness. Attention and fund of knowledge are normal. Speech is fluent.    Cranial nerves: Pupils are round and react normally to light and accommodation, with some sensitivity to direct light. Visual fields are full and extraocular movements are normal. Facial strength and sensation are normal. Hearing is normal to conversation. Palate rises symmetrically and there is no dysarthria. Tongue protrusion is normal.    Motor/Strength: Strength is 5/5 throughout with significant give away strength throughout. No tremors, myoclonus, or other abnormal movements. Muscle bulk decreased throughout, and tone normal.     Sensation: Sensation is intact to  light touch and vibration in all four extremities.     Reflexes: Biceps, brachioradialis, triceps, patellae, and ankle jerks 2+ bilaterally. Plantar responses are flexor bilaterally.    Coordination: Finger-to-nose symmetric and normal bilaterally. Rapid alternating movements are symmetric in the extremities. No pronator drift.    Gait: Gait is narrow based and steady and the patient is able to walk on heels, toes and in tandem. Romberg is negative.       INVESTIGATIONS:   MR BRAIN W/O & W CONTRAST 7/19/2021 10:32 PM  Impression: No acute intracranial abnormality. Decreased, now minimal,  left frontal dural thickening and enhancement, perhaps sequelae of  prior subdural hemorrhage.     MR BRAIN W/O & W CONTRAST 8/22/2021 9:30 AM  Impression:  1.  Resolution of findings of previous intracranial hypotension, as  seen on comparison 7/29/2021. Previous findings included enlargement  of the pituitary gland, dural enhancement, and a convex superior  sagittal sinus, all of which are no longer present.  2.  Stable nonspecific findings of prior petechial microhemorrhages  without associated acute signal changes. Favor sequela of prior ARDS  and associated critical illness, less likely diffuse axonal injury or  Amyloidosis.    MR CERVICAL SPINE W/O CONTRAST 8/22/2021 9:02 AM  Impression:   1.  Resolution of prevertebral fluid collection.   2.  Stable prominent cervical lymph nodes.  3.  No abnormal cord signal. No spinal canal or neural foraminal  narrowing.    REVIEW OF SYSTEMS: 12-point RoS negative except as per HPI.    ALLERGIES:  Allergies   Allergen Reactions     Macrodantin [Nitrofurantoin Macrocrystal] Other (See Comments)     itching     MEDICATIONS:  Current Outpatient Medications   Medication Sig Dispense Refill     acetaminophen (TYLENOL) 500 MG tablet Take 2 tablets (1,000 mg) by mouth every 6 hours as needed for mild pain 180 tablet 0     benzoyl peroxide (BENZOYL PEROXIDE WASH) 5 % external liquid Use to wash  face once daily in the morning       metoclopramide (REGLAN) 10 MG tablet Take 1 tablet (10 mg) by mouth every 6 hours as needed (migraine, nausea) 10 tablet 3     omeprazole (PRILOSEC) 20 MG DR capsule Take 1 capsule (20 mg) by mouth daily 90 capsule 0     Prenatal Vit-Fe Fumarate-FA (PRENATAL MULTIVITAMIN W/IRON) 27-0.8 MG tablet Take 1 tablet by mouth daily 90 tablet 3     sertraline (ZOLOFT) 25 MG tablet Take 1 tablet (25 mg) by mouth daily For 1 week, then increase to 2 tablets daily if not noticing side effects 90 tablet 0     topiramate (TOPAMAX) 50 MG tablet Take 1 tablet (50 mg) by mouth 2 times daily 60 tablet 6     tretinoin (RETIN-A) 0.025 % external cream Apply topically At Bedtime       ubrogepant (UBRELVY) 50 MG tablet Take 1-2 tablets ( mg) by mouth at onset of headache (migraine) if needed a second dose may be taken at least 2 hours after the initial dose; MAX, 4 tablets /24 hrs 16 tablet 6     gabapentin (NEURONTIN) 100 MG capsule Take one cap at bedtime for one week, than two caps at bedtime for one week than one cap am and two caps at bedtime if tolerated (Patient not taking: Reported on 3/24/2022) 90 capsule 3     PAST MEDICAL HISTORY:  Past Medical History:   Diagnosis Date     ARDS (adult respiratory distress syndrome) (H) 2010    related to H1N1 infection     H1N1 influenza 2010    prolonged ICU stay, medically induced coma     Iron deficiency anemia      Migraines      PAST SURGICAL HISTORY:  Past Surgical History:   Procedure Laterality Date     THORACIC SURGERY      trach     SOCIAL HISTORY:  Social History     Socioeconomic History     Marital status: Single   Tobacco Use     Smoking status: Former Smoker     Types: Hookah     Smokeless tobacco: Never Used   Vaping Use     Vaping Use: Never used   Substance and Sexual Activity     Alcohol use: No     Drug use: No     Sexual activity: Never   Other Topics Concern     Parent/sibling w/ CABG, MI or angioplasty before 65F 55M? Not Asked      FAMILY HISTORY:  Family History   Problem Relation Age of Onset     Diabetes Father      Hypertension Father      Cancer Father      Other - See Comments Father         Epistaxis when Angry     Glaucoma Father      Migraines Mother            Attestation signed by Maximo Santos MD at 3/25/2022  7:54 AM (Updated):  Attending Attestation    I saw and evaluated the patient on 3/24/22 and agree with the findings and the plan of care as documented in the resident's note.       I personally reviewed vital signs, medications, labs and pertinent imaging.    As outlined in Dr. Cnadelario' excellent note these episodes are more likely to represent syncope given her preceding feeling of lightheadedness/palpitations and preserved consciousness during these episodes/lack of post ictal confusion or clear semiology.  She does describe poor oral intake stemming from poor appetite which is unto itself likely secondary to mood issues.  Discussed strategies to improve oral intake, compression stockings.  Will obtain event monitor and outpatient EEG as well to further exclude less likely arrhthymia vs epileptogenic causes.  She will continue to work with her mental health team as well.      Francisco Santos MD   of Neurology  [facility]      Shavon Candelario MD

## 2022-03-24 NOTE — PROGRESS NOTES
DEPARTMENT OF NEUROLOGY  Referral for: syncope  Patient Name:  Leticia Morales  MRN:  [id number]    :  1985  Date of Clinic Visit:  2022  Primary Care Provider:  Yashira Cuevas  Referring Provider: Self    ASSESSMENT AND PLAN:  Leticia Morales is a 36 year old female with PMH significant for migraines with aura, hx of status migrainosus, depression, who presents to the neurology clinic with concern for new episodes. Neurologic exam today is unremarkable. Based off history, differential for episodes include syncope vs convulsive syncope, and less likely seizure. Orthostatic vitals were partially positive today with significant increase in heart rate upon standing (89 -> 112), also with reported history of poor PO intake of both solids and liquids suggest likely volume down status that could contribute to syncope. In addition with pre-syncope symptoms prior to episode including palpitations, tunnel vision, and lightheadedness more suggestive of syncope. With retained consciousness, lack of post ictal state and questionable shaking, I have a lower suspicion for these episodes being seizures. However, with that being said patient does have history of small dural thickening as sequela of prior subdural therefore it would be reasonable to obtain a routine EEG to rule out any epileptiform activity or potential. I will also recommend a 14 day cardiac monitor to evaluate for any arrhythmias that may be present prior to episode. Discussed with patient importance of fluid intake as well as salt, to maintain a normal blood pressure. Also discussed continued discussions with psychology/psychiatry to optimize mood.    Plan:  - Routine EEG   - 14 day cardiac monitor  - Encourage good PO intake of fluids, salt on foods, as well as compression stockings.   - Continue migraine management with Juan Velasco. Recommend discussing brain fog side effects that were experienced with gabapentin.  - Continue  optimization of mood symptoms with psychologist.  - Follow up after the above studies are completed. Visit can be video if more convenient for patient.     Patient was seen and discussed with Dr. Santos.    Shavon Candelario MD  Neurology Resident, PGY-2  [facility] Department of Neurology    ___________________________________________    CHIEF COMPLAINT: syncope    HISTORY OF PRESENT ILLNESS:  Leticia Morales is a  36 year old female with PMH migraine with aura, GERD, PTSD, depression presenting as a hospital follow up from admission for status migrainosus. At that time work up included MRI brain that showed small area of pacyhmeningeal enhancement in the left frontal region, LP that was bland. Area of pacyhmeningeal enhancement thought to be due to local hemosiderin deposition from probable history of head injury, rather than acute infectious or inflammatory process, and has been stable on repeat imaging. Patient ultimately discharged and has been following with with CONNIE Sarah CNP for migraines. She is currently managed on Botox [id number], gabapentin, and ubrelvy as rescue treatment.     Today, patient reports that she would like to discuss different symptoms that have been present since hospital discharge in July 2021. Patient is accompanied by sister who also provides history. Patient notes that over the last few months she started to have episodes of loss of consciousness. Upon further questioning, patient describes a sensation of lightheadedness, palpitations, and tunnel vision followed by falling. After the episode she wakes up on the ground and is unsure what happened. Notes feeling that she returns to baseline about 30 minutes after the episode occurs. Denies loss of bowel or bladder. No tongue biting. She does note that she can hear others around her talking to her during episode but is unable to talk.     Patient sister notes that episodes last less than 1 minute. Also notes that  when she is on the ground, there is rhythmic shaking of her arms, legs and head. The patient appears to be able to understand sister during episode, opens her eyes, and follows commands.     Additional symptoms today include forgetfulness that has been present since patient had H1N1 flu in 2009, as well as brain fog that has worsened since starting gabapentin for migraines. Sister also notes that patient spends most of the day in bed, requires a lot of encouragement to complete tasks and join family during gatherings, and has a poor appetite.     Denies history of seizure, no family history of seizures, no developmental delay or learning difficulties.       PHYSICAL EXAMINATION:  Vitals: /83   Pulse 89   Resp 16   SpO2 96%    Laying: /80, HR 76  Sitting: /78, HR 89  Standing: /89,     General: no acute distress    Neurologic Exam:  Mental status: Patient is oriented to person, place and time and able to provide a detailed account of history of illness. Attention and fund of knowledge are normal. Speech is fluent.    Cranial nerves: Pupils are round and react normally to light and accommodation, with some sensitivity to direct light. Visual fields are full and extraocular movements are normal. Facial strength and sensation are normal. Hearing is normal to conversation. Palate rises symmetrically and there is no dysarthria. Tongue protrusion is normal.    Motor/Strength: Strength is 5/5 throughout with significant give away strength throughout. No tremors, myoclonus, or other abnormal movements. Muscle bulk decreased throughout, and tone normal.     Sensation: Sensation is intact to light touch and vibration in all four extremities.     Reflexes: Biceps, brachioradialis, triceps, patellae, and ankle jerks 2+ bilaterally. Plantar responses are flexor bilaterally.    Coordination: Finger-to-nose symmetric and normal bilaterally. Rapid alternating movements are symmetric in the  extremities. No pronator drift.    Gait: Gait is narrow based and steady and the patient is able to walk on heels, toes and in tandem. Romberg is negative.       INVESTIGATIONS:   MR BRAIN W/O & W CONTRAST 7/19/2021 10:32 PM  Impression: No acute intracranial abnormality. Decreased, now minimal,  left frontal dural thickening and enhancement, perhaps sequelae of  prior subdural hemorrhage.     MR BRAIN W/O & W CONTRAST 8/22/2021 9:30 AM  Impression:  1.  Resolution of findings of previous intracranial hypotension, as  seen on comparison 7/29/2021. Previous findings included enlargement  of the pituitary gland, dural enhancement, and a convex superior  sagittal sinus, all of which are no longer present.  2.  Stable nonspecific findings of prior petechial microhemorrhages  without associated acute signal changes. Favor sequela of prior ARDS  and associated critical illness, less likely diffuse axonal injury or  Amyloidosis.    MR CERVICAL SPINE W/O CONTRAST 8/22/2021 9:02 AM  Impression:   1.  Resolution of prevertebral fluid collection.   2.  Stable prominent cervical lymph nodes.  3.  No abnormal cord signal. No spinal canal or neural foraminal  narrowing.    REVIEW OF SYSTEMS: 12-point RoS negative except as per HPI.    ALLERGIES:  Allergies   Allergen Reactions     Macrodantin [Nitrofurantoin Macrocrystal] Other (See Comments)     itching     MEDICATIONS:  Current Outpatient Medications   Medication Sig Dispense Refill     acetaminophen (TYLENOL) 500 MG tablet Take 2 tablets (1,000 mg) by mouth every 6 hours as needed for mild pain 180 tablet 0     benzoyl peroxide (BENZOYL PEROXIDE WASH) 5 % external liquid Use to wash face once daily in the morning       metoclopramide (REGLAN) 10 MG tablet Take 1 tablet (10 mg) by mouth every 6 hours as needed (migraine, nausea) 10 tablet 3     omeprazole (PRILOSEC) 20 MG DR capsule Take 1 capsule (20 mg) by mouth daily 90 capsule 0     Prenatal Vit-Fe Fumarate-FA (PRENATAL  MULTIVITAMIN W/IRON) 27-0.8 MG tablet Take 1 tablet by mouth daily 90 tablet 3     sertraline (ZOLOFT) 25 MG tablet Take 1 tablet (25 mg) by mouth daily For 1 week, then increase to 2 tablets daily if not noticing side effects 90 tablet 0     topiramate (TOPAMAX) 50 MG tablet Take 1 tablet (50 mg) by mouth 2 times daily 60 tablet 6     tretinoin (RETIN-A) 0.025 % external cream Apply topically At Bedtime       ubrogepant (UBRELVY) 50 MG tablet Take 1-2 tablets ( mg) by mouth at onset of headache (migraine) if needed a second dose may be taken at least 2 hours after the initial dose; MAX, 4 tablets /24 hrs 16 tablet 6     gabapentin (NEURONTIN) 100 MG capsule Take one cap at bedtime for one week, than two caps at bedtime for one week than one cap am and two caps at bedtime if tolerated (Patient not taking: Reported on 3/24/2022) 90 capsule 3     PAST MEDICAL HISTORY:  Past Medical History:   Diagnosis Date     ARDS (adult respiratory distress syndrome) (H) 2010    related to H1N1 infection     H1N1 influenza 2010    prolonged ICU stay, medically induced coma     Iron deficiency anemia      Migraines      PAST SURGICAL HISTORY:  Past Surgical History:   Procedure Laterality Date     THORACIC SURGERY      trach     SOCIAL HISTORY:  Social History     Socioeconomic History     Marital status: Single   Tobacco Use     Smoking status: Former Smoker     Types: Hookah     Smokeless tobacco: Never Used   Vaping Use     Vaping Use: Never used   Substance and Sexual Activity     Alcohol use: No     Drug use: No     Sexual activity: Never   Other Topics Concern     Parent/sibling w/ CABG, MI or angioplasty before 65F 55M? Not Asked     FAMILY HISTORY:  Family History   Problem Relation Age of Onset     Diabetes Father      Hypertension Father      Cancer Father      Other - See Comments Father         Epistaxis when Angry     Glaucoma Father      Migraines Mother

## 2022-04-13 NOTE — PLAN OF CARE
Pt alert and oriented, VSS on RA. Up SBA in room and to bathroom. Voiding spontaneously, no BM this shift. Regular diet and tolerating well. Complaints of migraine overnight, one time dose Rizatriptan given with some relief. Tums and GI cocktail given for complaints of GERD. Utilizing aromatherapy for pain and discomfort as well. New PIV placed this shift, saline locked. Neuro and Rheum following. Continue workup for migraines, and continue with POC.   Unique Flap 1 Text: A long triangle was marked on the dorsum of the nose extending up form the superior edge of the defect. First excision made on lateral edge of triangle to the superficial fat and undermined in the subcutaenous layer over the nasalis muscle. The medial edge is incised down to the cartilage and nasal bone. Undermining was performed beneath the nasalis muscle down the sidewall of the nose to the nasofacial sulcus. Once undermined, the myocutaneous flap is based on the angular artery in the nasofacial sulcus.

## 2022-04-14 ENCOUNTER — VIRTUAL VISIT (OUTPATIENT)
Dept: NEUROLOGY | Facility: CLINIC | Age: 37
End: 2022-04-14
Payer: COMMERCIAL

## 2022-04-14 ENCOUNTER — TELEPHONE (OUTPATIENT)
Dept: NEUROLOGY | Facility: CLINIC | Age: 37
End: 2022-04-14

## 2022-04-14 DIAGNOSIS — F41.8 DEPRESSION WITH ANXIETY: ICD-10-CM

## 2022-04-14 DIAGNOSIS — G43.709 CHRONIC MIGRAINE WITHOUT AURA WITHOUT STATUS MIGRAINOSUS, NOT INTRACTABLE: ICD-10-CM

## 2022-04-14 DIAGNOSIS — G43.711 INTRACTABLE CHRONIC MIGRAINE WITHOUT AURA AND WITH STATUS MIGRAINOSUS: Primary | ICD-10-CM

## 2022-04-14 PROCEDURE — 99213 OFFICE O/P EST LOW 20 MIN: CPT | Mod: 95 | Performed by: NURSE PRACTITIONER

## 2022-04-14 RX ORDER — NARATRIPTAN 2.5 MG/1
2.5 TABLET ORAL
Qty: 18 TABLET | Refills: 6 | Status: SHIPPED | OUTPATIENT
Start: 2022-04-14 | End: 2022-08-31

## 2022-04-14 ASSESSMENT — HEADACHE IMPACT TEST (HIT 6)
HIT6 TOTAL SCORE: 68
WHEN YOU HAVE HEADACHES HOW OFTEN IS THE PAIN SEVERE: VERY OFTEN
HOW OFTEN DO HEADACHES LIMIT YOUR DAILY ACTIVITIES: VERY OFTEN
HOW OFTEN DID HEADACHS LIMIT CONCENTRATION ON WORK OR DAILY ACTIVITY: VERY OFTEN
HOW OFTEN HAVE YOU FELT TOO TIRED TO WORK BECAUSE OF YOUR HEADACHES: VERY OFTEN
HOW OFTEN HAVE YOU FELT FED UP OR IRRITATED BECAUSE OF YOUR HEADACHES: VERY OFTEN
WHEN YOU HAVE A HEADACHE HOW OFTEN DO YOU WISH YOU COULD LIE DOWN: ALWAYS

## 2022-04-14 ASSESSMENT — PATIENT HEALTH QUESTIONNAIRE - PHQ9
SUM OF ALL RESPONSES TO PHQ QUESTIONS 1-9: 11
10. IF YOU CHECKED OFF ANY PROBLEMS, HOW DIFFICULT HAVE THESE PROBLEMS MADE IT FOR YOU TO DO YOUR WORK, TAKE CARE OF THINGS AT HOME, OR GET ALONG WITH OTHER PEOPLE: SOMEWHAT DIFFICULT
SUM OF ALL RESPONSES TO PHQ QUESTIONS 1-9: 11

## 2022-04-14 NOTE — LETTER
4/14/2022     RE: Leticia Morales  [address]  [place] [address]     Dear Colleague,    Thank you for referring your patient, Leticia Morales, to the [facility] NEUROLOGY CLINIC [place] at [facility]. Please see a copy of my visit note below.    Leticia is a 36 year old who is being evaluated via a billable video visit.      How would you like to obtain your AVS? MyChart  If the video visit is dropped, the invitation should be resent by: Send to e-mail at: [email or web address]  Will anyone else be joining your video visit? No      Video Start Time: 3:14 PM but no sound and changed to a telephone visit.       Subjective   Leticia is a 36 year old who presents for the following health issues -headache follow up   Having more good days. Stopped taking gabapentin and brain fog improved. Had only 4 headaches last month and only 2 out of 4 headaches were extreme. Feeling better now  Rescue treatment -ubrelvy -not sure if helped and used acetaminophen helped -not sure if in combination.   Took rizatriptan in the past and would work better than Ubrelvy  Sumatriptan -works the best of other treatments -took it to much. Patient is interested in trying another triptan.     Plan:  Rescue treatment -a trial of naratriptan as needed. Limit use to no more than 9 days per month. Stop if any chest pain or heart problems.   Side effects reviewed.   Follow up as needed -send [employer] message if any concerns with naratriptan use or headaches were getting worse.       Objective       Vitals:  No vitals were obtained today due to virtual visit.    Physical Exam   GENERAL: Healthy, alert and no distress, Mentation appears normal, affect normal, judgement and insight intact, normal speech  I discussed all my recommendations with Leticia Morales who verbalizes understanding and comfortable with the plan.  All of patient's questions were answered from the best of my  knowledge.  Patient is in agreement with the plan.     20 minutes spent on the date of the encounter     CONNIE Garcia, CNP [facility]  Headache certified  [facility]      Video-Visit Details    Type of service:  Telephone note    End Time:3:32 PM    Originating Location (pt. Location): Home    Distant Location (provider location):  [facility] NEUROLOGY CLINIC [place]     Platform used for Video Visit: [employer]

## 2022-04-14 NOTE — PROGRESS NOTES
Leticia is a 36 year old who is being evaluated via a billable video visit.      How would you like to obtain your AVS? MyChart  If the video visit is dropped, the invitation should be resent by: Send to e-mail at: [email or web address]  Will anyone else be joining your video visit? No      Video Start Time: 3:14 PM but no sound and changed to a telephone visit.       Subjective   Leticia is a 36 year old who presents for the following health issues -headache follow up   Having more good days. Stopped taking gabapentin and brain fog improved. Had only 4 headaches last month and only 2 out of 4 headaches were extreme. Feeling better now  Rescue treatment -ubrelvy -not sure if helped and used acetaminophen helped -not sure if in combination.   Took rizatriptan in the past and would work better than Ubrelvy  Sumatriptan -works the best of other treatments -took it to much. Patient is interested in trying another triptan.     Plan:  Rescue treatment -a trial of naratriptan as needed. Limit use to no more than 9 days per month. Stop if any chest pain or heart problems.   Side effects reviewed.   Follow up as needed -send [employer] message if any concerns with naratriptan use or headaches were getting worse.       Objective       Vitals:  No vitals were obtained today due to virtual visit.    Physical Exam   GENERAL: Healthy, alert and no distress, Mentation appears normal, affect normal, judgement and insight intact, normal speech  I discussed all my recommendations with Leticia Morales who verbalizes understanding and comfortable with the plan.  All of patient's questions were answered from the best of my knowledge.  Patient is in agreement with the plan.     20 minutes spent on the date of the encounter     CONNIE Garcia, CNP [facility]  Headache certified  [facility] [place] [facility]      Video-Visit Details    Type of service:  Telephone note    End Time:3:32 PM    Originating Location (pt. Location): Home    Distant Location  (provider location):  [facility] NEUROLOGY [facility]     Platform used for Video Visit: [employer]

## 2022-04-14 NOTE — TELEPHONE ENCOUNTER
"Request for medication refill:  sertraline (ZOLOFT) 25 MG tablet  Providers if patient needs an appointment and you are willing to give a one month supply please refill for one month and  send a letter/MyChart using \".SMILLIMITEDREFILL\" .smillimited and route chart to \"P [facility] \" (Giving one month refill in non controlled medications is strongly recommended before denial)    If refill has been denied, meaning absolutely no refills without visit, please complete the smart phrase \".smirxrefuse\" and route it to the \"P [facility] MED REFILLS\"  pool to inform the patient and the pharmacy.    Janki Landeros        "

## 2022-04-14 NOTE — TELEPHONE ENCOUNTER
Prior Authorization Retail Medication Request    Medication/Dose: naratriptan; (AMERGE) 2.5 MG tablet  ICD code (if different than what is on RX):    Previously Tried and Failed:  taking gabapentin - brain fog  Rescue treatment -ubrelvy -not sure if helped and used acetaminophen helped -not sure if in combination.   Took rizatriptan in the past and would work better than Ubrelvy  Sumatriptan -works the best of other treatments -took it to much. Patient is interested in trying another triptan.   Rationale:  migraine    Insurance Name:  [facility]  Insurance ID:  [id number]       Pharmacy Information (if different than what is on RX)  Name:    Phone:

## 2022-04-15 DIAGNOSIS — K21.9 GASTROESOPHAGEAL REFLUX DISEASE WITHOUT ESOPHAGITIS: ICD-10-CM

## 2022-04-15 RX ORDER — SERTRALINE HYDROCHLORIDE 25 MG/1
25 TABLET, FILM COATED ORAL DAILY
Qty: 90 TABLET | Refills: 0 | Status: SHIPPED | OUTPATIENT
Start: 2022-04-15 | End: 2022-06-14

## 2022-04-15 ASSESSMENT — PATIENT HEALTH QUESTIONNAIRE - PHQ9: SUM OF ALL RESPONSES TO PHQ QUESTIONS 1-9: 11

## 2022-04-15 NOTE — TELEPHONE ENCOUNTER
"Request for medication refill:  omeprazole (PRILOSEC) 20 MG DR capsule  Providers if patient needs an appointment and you are willing to give a one month supply please refill for one month and  send a letter/MyChart using \".SMILLIMITEDREFILL\" .smillimited and route chart to \"P [facility] \" (Giving one month refill in non controlled medications is strongly recommended before denial)    If refill has been denied, meaning absolutely no refills without visit, please complete the smart phrase \".smirxrefuse\" and route it to the \"P [facility] MED REFILLS\"  pool to inform the patient and the pharmacy.    Janki Landeros        "

## 2022-04-18 NOTE — TELEPHONE ENCOUNTER
PA Initiation    Medication: naratriptan (AMERGE) 2.5 MG tablet   Insurance Company: [employer] - Phone [phone] Fax [phone]  Pharmacy Filling the Rx: [id number] [place], [place] - [address] AT [facility] OF [address]  Filling Pharmacy Phone: [phone]  Filling Pharmacy Fax: [phone]  Start Date: 4/18/2022

## 2022-04-19 NOTE — TELEPHONE ENCOUNTER
Prior Authorization Approval    Authorization Effective Date: 3/19/2022  Authorization Expiration Date: 4/18/2023  Medication: naratriptan (AMERGE) 2.5 MG tablet--APPROVED  Approved Dose/Quantity:   Reference #:     Insurance Company: [employer] - Phone [phone] Fax [phone]  Expected CoPay:       CoPay Card Available:      Foundation Assistance Needed:    Which Pharmacy is filling the prescription (Not needed for infusion/clinic administered): [id number] - [facility] [address] AT [facility] [address] [facility] [address]  Pharmacy Notified: Yes  Patient Notified: Yes **Instructed pharmacy to notify patient when script is ready to /ship.**

## 2022-05-24 ENCOUNTER — OFFICE VISIT (OUTPATIENT)
Dept: NEUROLOGY | Facility: CLINIC | Age: 37
End: 2022-05-24
Payer: COMMERCIAL

## 2022-05-24 DIAGNOSIS — G43.709 CHRONIC MIGRAINE WITHOUT AURA WITHOUT STATUS MIGRAINOSUS, NOT INTRACTABLE: Primary | ICD-10-CM

## 2022-05-24 PROCEDURE — 64615 CHEMODENERV MUSC MIGRAINE: CPT | Performed by: NURSE PRACTITIONER

## 2022-05-24 PROCEDURE — 99207 PR SATISFY VISIT NUMBER: CPT | Performed by: PSYCHIATRY & NEUROLOGY

## 2022-05-24 NOTE — PROGRESS NOTES
[facility] Physicians     Leticia Morales MRN# [id number]   Age: 35 year old YOB: 1985      Botulinum Toxin Procedure     The procedure was explained to the patient. Benefits of the treatment were discussed including headache and migraine reduction. Risks of the procedure were reviewed including but not limited to pain, bruising, bleeding, infection, weakness of muscles injected or those distal to injection. The patient voiced understanding of the risks and benefits. All questions answered and the patient consented to proceed.      Prior to receiving Botox injections the patient reported the following:  Baseline headache frequency:30 days  Baseline headache duration: constant  Baseline MIDAS score: 150  Associated migrainous features:dizziness,      Previous treatment trials:  topiramate  emgality recommended in past but insurance denied.  Sumatriptan  Metoclopramide  doxycycline     Last Botox procedure: 12/7/2021  Current migraine frequency: not sure, she thinks Botox helped but hasn't tracked.   Current migraine duration: hasn't tracked, no longer constant     A 200 unit vial of Botox was diluted with 4ml of 0.9% sodium chloride     Botox lot # and expiration date: SEE MAR for details  0.9% sodium chloride lot # and expiration date: See MAR for details     The following muscles were injected using a 30 gauge needle:  Procerus 1 site 5 units   2 sites (bilat) 10 units  Frontalis 4 sites (bilat) 20 units  Temporalis 8 sites (bilat) 40 units  Trapezius muscles 6 sites (bilat) 30 units  Cervical paraspinals (bilat) 4 sites 20 units  Occipitalis 6 sites (bilat) 30 units     A total of 155 units were injected, 45 units wasted.   The patient tolerated the injections well. I was present for and performed the entire procedure.        Avril Perez MD

## 2022-05-24 NOTE — LETTER
5/24/2022       RE: Leticia Morales  [address]  [place] [address]     Dear Colleague,    Thank you for referring your patient, Leticia Morales, to the [facility] NEUROLOGY CLINIC [place] at [facility]. Please see a copy of my visit note below.         [facility] Phys[facility]     Leticia Morales MRN# [id number]   Age: 35 year old YOB: 1985      Botulinum Toxin Procedure     The procedure was explained to the patient. Benefits of the treatment were discussed including headache and migraine reduction. Risks of the procedure were reviewed including but not limited to pain, bruising, bleeding, infection, weakness of muscles injected or those distal to injection. The patient voiced understanding of the risks and benefits. All questions answered and the patient consented to proceed.      Prior to receiving Botox injections the patient reported the following:  Baseline headache frequency:30 days  Baseline headache duration: constant  Baseline MIDAS score: 150  Associated migrainous features:dizziness,      Previous treatment trials:  topiramate  emgality recommended in past but insurance denied.  Sumatriptan  Metoclopramide  doxycycline     Last Botox procedure: 12/7/2021  Current migraine frequency: not sure, she thinks Botox helped but hasn't tracked.   Current migraine duration: hasn't tracked, no longer constant     A 200 unit vial of Botox was diluted with 4ml of 0.9% sodium chloride     Botox lot # and expiration date: SEE MAR for details  0.9% sodium chloride lot # and expiration date: See MAR for details     The following muscles were injected using a 30 gauge needle:  Procerus 1 site 5 units   2 sites (bilat) 10 units  Frontalis 4 sites (bilat) 20 units  Temporalis 8 sites (bilat) 40 units  Trapezius muscles 6 sites (bilat) 30 units  Cervical paraspinals (bilat) 4 sites 20 units  Occipitalis 6 sites (bilat) 30 units     A total  of 155 units were injected, 45 units wasted.   The patient tolerated the injections well. I was present for and performed the entire procedure.        Avril Perez MD

## 2022-06-10 DIAGNOSIS — F41.8 DEPRESSION WITH ANXIETY: ICD-10-CM

## 2022-06-10 NOTE — TELEPHONE ENCOUNTER
"Request for medication refill:  sertraline (ZOLOFT) 25 MG tablet    Providers if patient needs an appointment and you are willing to give a one month supply please refill for one month and  send a letter/MyChart using \".SMILLIMITEDREFILL\" .smillimited and route chart to \"P [facility] \" (Giving one month refill in non controlled medications is strongly recommended before denial)    If refill has been denied, meaning absolutely no refills without visit, please complete the smart phrase \".smirxrefuse\" and route it to the \"P [facility] MED REFILLS\"  pool to inform the patient and the pharmacy.    Dinorah Michaud MA        "

## 2022-06-14 RX ORDER — SERTRALINE HYDROCHLORIDE 25 MG/1
25 TABLET, FILM COATED ORAL DAILY
Qty: 90 TABLET | Refills: 0 | Status: SHIPPED | OUTPATIENT
Start: 2022-06-14 | End: 2022-08-31

## 2022-06-14 NOTE — TELEPHONE ENCOUNTER
Sertraline 3 mo supply sent. Had recommended taper up on medication if not noticing side effects so unclear what dose patient is taking, sent 25 mg daily (starting dose). Patient started on sertraline 2/2022, recommended follow up in 4-6 weeks for med/depression recheck though she has not been seen since that time. Will route to  to call patient to schedule.   Yashira Cuevas MD

## 2022-08-31 ENCOUNTER — OFFICE VISIT (OUTPATIENT)
Dept: FAMILY MEDICINE | Facility: CLINIC | Age: 37
End: 2022-08-31
Payer: COMMERCIAL

## 2022-08-31 VITALS
OXYGEN SATURATION: 97 % | HEIGHT: 64 IN | BODY MASS INDEX: 21.21 KG/M2 | TEMPERATURE: 98.2 F | DIASTOLIC BLOOD PRESSURE: 80 MMHG | SYSTOLIC BLOOD PRESSURE: 111 MMHG | WEIGHT: 124.2 LBS | HEART RATE: 111 BPM

## 2022-08-31 DIAGNOSIS — L70.9 ACNE, UNSPECIFIED ACNE TYPE: ICD-10-CM

## 2022-08-31 DIAGNOSIS — R39.9 URINARY TRACT INFECTION SYMPTOMS: Primary | ICD-10-CM

## 2022-08-31 DIAGNOSIS — G43.709 CHRONIC MIGRAINE WITHOUT AURA WITHOUT STATUS MIGRAINOSUS, NOT INTRACTABLE: ICD-10-CM

## 2022-08-31 DIAGNOSIS — R30.0 DYSURIA: ICD-10-CM

## 2022-08-31 DIAGNOSIS — G43.109 MIGRAINE WITH AURA AND WITHOUT STATUS MIGRAINOSUS, NOT INTRACTABLE: ICD-10-CM

## 2022-08-31 DIAGNOSIS — K21.9 GASTROESOPHAGEAL REFLUX DISEASE WITHOUT ESOPHAGITIS: ICD-10-CM

## 2022-08-31 LAB
ALBUMIN UR-MCNC: 100 MG/DL
APPEARANCE UR: CLEAR
BACTERIA #/AREA URNS HPF: ABNORMAL /HPF
BILIRUB UR QL STRIP: ABNORMAL
COLOR UR AUTO: YELLOW
GLUCOSE UR STRIP-MCNC: NEGATIVE MG/DL
HGB UR QL STRIP: ABNORMAL
KETONES UR STRIP-MCNC: 15 MG/DL
LEUKOCYTE ESTERASE UR QL STRIP: ABNORMAL
NITRATE UR QL: NEGATIVE
PH UR STRIP: 6 [PH] (ref 5–8)
RBC #/AREA URNS AUTO: ABNORMAL /HPF
SP GR UR STRIP: >=1.03 (ref 1–1.03)
SQUAMOUS #/AREA URNS AUTO: ABNORMAL /LPF
UROBILINOGEN UR STRIP-ACNC: 0.2 E.U./DL
WBC #/AREA URNS AUTO: >100 /HPF

## 2022-08-31 PROCEDURE — 99214 OFFICE O/P EST MOD 30 MIN: CPT | Mod: GC | Performed by: STUDENT IN AN ORGANIZED HEALTH CARE EDUCATION/TRAINING PROGRAM

## 2022-08-31 PROCEDURE — 87086 URINE CULTURE/COLONY COUNT: CPT | Performed by: STUDENT IN AN ORGANIZED HEALTH CARE EDUCATION/TRAINING PROGRAM

## 2022-08-31 PROCEDURE — 81001 URINALYSIS AUTO W/SCOPE: CPT | Performed by: STUDENT IN AN ORGANIZED HEALTH CARE EDUCATION/TRAINING PROGRAM

## 2022-08-31 PROCEDURE — 87186 SC STD MICRODIL/AGAR DIL: CPT | Performed by: STUDENT IN AN ORGANIZED HEALTH CARE EDUCATION/TRAINING PROGRAM

## 2022-08-31 RX ORDER — SULFAMETHOXAZOLE/TRIMETHOPRIM 800-160 MG
1 TABLET ORAL 2 TIMES DAILY
Qty: 6 TABLET | Refills: 0 | Status: SHIPPED | OUTPATIENT
Start: 2022-08-31 | End: 2022-08-31

## 2022-08-31 RX ORDER — NARATRIPTAN 2.5 MG/1
2.5 TABLET ORAL
Qty: 18 TABLET | Refills: 6 | Status: SHIPPED | OUTPATIENT
Start: 2022-08-31 | End: 2022-11-03

## 2022-08-31 RX ORDER — PHENAZOPYRIDINE HYDROCHLORIDE 95 MG/1
190 TABLET ORAL 3 TIMES DAILY PRN
Qty: 15 TABLET | Refills: 0 | Status: SHIPPED | OUTPATIENT
Start: 2022-08-31 | End: 2023-12-08

## 2022-08-31 RX ORDER — PHENAZOPYRIDINE HYDROCHLORIDE 95 MG/1
190 TABLET ORAL 3 TIMES DAILY PRN
Qty: 15 TABLET | Refills: 0 | Status: SHIPPED | OUTPATIENT
Start: 2022-08-31 | End: 2022-08-31

## 2022-08-31 RX ORDER — PHENAZOPYRIDINE HYDROCHLORIDE 95 MG/1
190 TABLET ORAL 3 TIMES DAILY PRN
Qty: 15 TABLET | Refills: 0 | Status: SHIPPED | OUTPATIENT
Start: 2022-08-31 | End: 2023-05-11

## 2022-08-31 RX ORDER — TRETINOIN 0.25 MG/G
CREAM TOPICAL AT BEDTIME
Qty: 45 G | Refills: 1 | Status: SHIPPED | OUTPATIENT
Start: 2022-08-31 | End: 2023-05-02

## 2022-08-31 RX ORDER — SULFAMETHOXAZOLE/TRIMETHOPRIM 800-160 MG
1 TABLET ORAL 2 TIMES DAILY
Qty: 6 TABLET | Refills: 0 | Status: SHIPPED | OUTPATIENT
Start: 2022-08-31 | End: 2023-05-11

## 2022-08-31 RX ORDER — METOCLOPRAMIDE 10 MG/1
10 TABLET ORAL EVERY 6 HOURS PRN
Qty: 10 TABLET | Refills: 3 | Status: SHIPPED | OUTPATIENT
Start: 2022-08-31 | End: 2022-11-03

## 2022-08-31 NOTE — PATIENT INSTRUCTIONS
UTI/Bladder infection  - Take Azo up to three times per day for buring when you pee  - Ok to take tylenol and ibuprofen for abdominal pain/cramping  - Take antibiotic as prescribed  - Symptoms should improve in 48 hours. If no improvement, call clinic.   - If you get fever, vomiting, or significantly worse pain, go to urgent care or ER to be checked out further.     Migraines  I ordered refills of your meds. I'd recommend either follow-up with your primary doctor (one of us at [facility]) or with neurology specifically to discuss migraines.     Acid reflux  Continue using omeprazole as needed. If you are needing it several days per week, we should do further testing.   Can test for H pylori.  Wait 10-14d of not taking omeprazole or famotidine before coming in.

## 2022-08-31 NOTE — PROGRESS NOTES
"Assessment & Plan     UTI/Bladder infection  - Take Azo up to three times per day for buring when you pee  - Ok to take tylenol and ibuprofen for abdominal pain/cramping  - Take antibiotic as prescribed (bactrim BIDx3d, chose b/c has allergy to macrobid)  - Symptoms should improve in 48 hours. If no improvement, call clinic.   - If you get fever, vomiting, or significantly worse pain, go to urgent care or ER to be checked out further.     - Urine culture pending. Will adjust abx if needed based on results.     Migraines  I ordered refills of your meds. I'd recommend either follow-up with your primary doctor (one of us at [facility]) or with neurology specifically to discuss migraines.     Dyspepsia  Continue using omeprazole as needed. If you are needing it several days per week, we should do further testing.   Ordered test for H pylori.  Wait 10-14d of not taking omeprazole or famotidine before coming in.   6}    Jimena Urena MD  [facility] RENATA Kelly is a 36 year old, presenting for the following health issues:  UTI (Pt is present today for pain when urinating and abdominal pain)    Symptoms for 2 days. Started Monday, but barely noticed--just abdominal discomfort.   Tuesday symptoms worsened and almost went to ED. But decided to wait until today.   Having suprapubic pain, dysuria, some pan wraps around th back, urinary frequency. Has never had UTI before.     No fevers. No vaginal discharge. No hematuria.   No vomiting.   Having some nausea starting today and also now some upper abdominal pain.   Appetite not good.   Trying to stay well-hydrated.   Last BM this AM. Soft. Regular.   Has never been sexually active.     Migraines happen most weekends. Fridays or Saturdays and last til Sundays.     Still w/ epigastric abdominal pain.          Objective    /80   Pulse 111   Temp 98.2  F (36.8  C)   Ht 1.626 m (5' 4\")   Wt 56.3 kg (124 lb 3.2 oz)   SpO2 97%   BMI 21.32 " kg/m    Body mass index is 21.32 kg/m .  Physical Exam   Appears uncomfortable, antsy, but well overall and non-toxic. Anxious.   Pulse slower than above when I checked it (98).   Suprapubic TTP. Epigastric TTP. No CVA tenderness.     Results for orders placed or performed in visit on 08/31/22 (from the past 24 hour(s))   UA reflex to Microscopic and Culture    Specimen: Urine, Midstream   Result Value Ref Range    Color Urine Yellow Colorless, Straw, Light Yellow, Yellow    Appearance Urine Clear Clear    Glucose Urine Negative Negative mg/dL    Bilirubin Urine Small (A) Negative    Ketones Urine 15  (A) Negative mg/dL    Specific Gravity Urine >=1.030 1.005 - 1.030    Blood Urine Large (A) Negative    pH Urine 6.0 5.0 - 8.0    Protein Albumin Urine 100  (A) Negative mg/dL    Urobilinogen Urine 0.2 0.2, 1.0 E.U./dL    Nitrite Urine Negative Negative    Leukocyte Esterase Urine Moderate (A) Negative   Urine Microscopic Exam   Result Value Ref Range    Bacteria Urine Moderate (A) None Seen /HPF    RBC Urine 25-50 (A) 0-2 /HPF /HPF    WBC Urine >100 (A) 0-5 /HPF /HPF    Squamous Epithelials Urine Few (A) None Seen /LPF                   .  ..

## 2022-09-02 LAB — BACTERIA UR CULT: ABNORMAL

## 2022-09-06 ENCOUNTER — OFFICE VISIT (OUTPATIENT)
Dept: NEUROLOGY | Facility: CLINIC | Age: 37
End: 2022-09-06
Payer: COMMERCIAL

## 2022-09-06 DIAGNOSIS — G43.709 CHRONIC MIGRAINE WITHOUT AURA WITHOUT STATUS MIGRAINOSUS, NOT INTRACTABLE: Primary | ICD-10-CM

## 2022-09-06 PROCEDURE — 64615 CHEMODENERV MUSC MIGRAINE: CPT | Performed by: PSYCHIATRY & NEUROLOGY

## 2022-09-06 PROCEDURE — 99207 PR SATISFY VISIT NUMBER: CPT | Performed by: PSYCHIATRY & NEUROLOGY

## 2022-09-06 NOTE — LETTER
9/6/2022       RE: Leticia Morales  [address]  [place] [address]     Dear Colleague,    Thank you for referring your patient, Leticia Morales, to the [facility] NEUROLOGY CLINIC [place] at [facility]. Please see a copy of my visit note below.          Physicians     Leticia Morales MRN# [id number]   Age: 35 year old YOB: 1985      Botulinum Toxin Procedure     The procedure was explained to the patient. Benefits of the treatment were discussed including headache and migraine reduction. Risks of the procedure were reviewed including but not limited to pain, bruising, bleeding, infection, weakness of muscles injected or those distal to injection. The patient voiced understanding of the risks and benefits. All questions answered and the patient consented to proceed.      Prior to receiving Botox injections the patient reported the following:  Baseline headache frequency:30 days  Baseline headache duration: constant  Baseline MIDAS score: 150  Associated migrainous features:dizziness,      Previous treatment trials:  topiramate  emgality recommended in past but insurance denied.  Sumatriptan  Metoclopramide  doxycycline     Last Botox procedure: 5/24/2021  Current migraine frequency: she isn't ure the Botox is helping. She had a 6 month break and restarted in May. Will give it one more try and see Ms Rod for a virtual appointment     A 200 unit vial of Botox was diluted with 4ml of 0.9% sodium chloride     Botox lot # and expiration date: SEE MAR for details  0.9% sodium chloride lot # and expiration date: See MAR for details     The following muscles were injected using a 30 gauge needle:  Procerus 1 site 5 units   2 sites (bilat) 10 units  Frontalis 4 sites (bilat) 20 units  Temporalis 8 sites (bilat) 40 units  Trapezius muscles 6 sites (bilat) 30 units  Cervical paraspinals (bilat) 4 sites 20 units  Occipitalis 6 sites  (bilat) 30 units     A total of 155 units were injected, 45 units wasted.   The patient tolerated the injections well. I was present for and performed the entire procedure.    Avril Perez MD

## 2022-11-03 ENCOUNTER — VIRTUAL VISIT (OUTPATIENT)
Dept: NEUROLOGY | Facility: CLINIC | Age: 37
End: 2022-11-03
Payer: COMMERCIAL

## 2022-11-03 DIAGNOSIS — G43.109 MIGRAINE WITH AURA AND WITHOUT STATUS MIGRAINOSUS, NOT INTRACTABLE: ICD-10-CM

## 2022-11-03 DIAGNOSIS — G43.709 CHRONIC MIGRAINE WITHOUT AURA WITHOUT STATUS MIGRAINOSUS, NOT INTRACTABLE: Primary | ICD-10-CM

## 2022-11-03 PROCEDURE — 99214 OFFICE O/P EST MOD 30 MIN: CPT | Mod: 95 | Performed by: NURSE PRACTITIONER

## 2022-11-03 RX ORDER — METOCLOPRAMIDE 10 MG/1
10 TABLET ORAL EVERY 6 HOURS PRN
Qty: 20 TABLET | Refills: 3 | Status: SHIPPED | OUTPATIENT
Start: 2022-11-03 | End: 2023-03-22

## 2022-11-03 RX ORDER — PROPRANOLOL HCL 60 MG
60 CAPSULE, EXTENDED RELEASE 24HR ORAL AT BEDTIME
Qty: 30 CAPSULE | Refills: 3 | Status: SHIPPED | OUTPATIENT
Start: 2022-11-03 | End: 2023-03-22

## 2022-11-03 RX ORDER — FREMANEZUMAB-VFRM 225 MG/1.5ML
225 INJECTION SUBCUTANEOUS
Qty: 1.5 ML | Refills: 11 | Status: SHIPPED | OUTPATIENT
Start: 2022-11-03 | End: 2022-11-03

## 2022-11-03 RX ORDER — NARATRIPTAN 2.5 MG/1
2.5 TABLET ORAL
Qty: 18 TABLET | Refills: 6 | Status: SHIPPED | OUTPATIENT
Start: 2022-11-03 | End: 2023-01-11

## 2022-11-03 ASSESSMENT — HEADACHE IMPACT TEST (HIT 6)
WHEN YOU HAVE A HEADACHE HOW OFTEN DO YOU WISH YOU COULD LIE DOWN: ALWAYS
HOW OFTEN DO HEADACHES LIMIT YOUR DAILY ACTIVITIES: VERY OFTEN
WHEN YOU HAVE HEADACHES HOW OFTEN IS THE PAIN SEVERE: VERY OFTEN
HIT6 TOTAL SCORE: 70
HOW OFTEN HAVE YOU FELT TOO TIRED TO WORK BECAUSE OF YOUR HEADACHES: VERY OFTEN
HOW OFTEN HAVE YOU FELT FED UP OR IRRITATED BECAUSE OF YOUR HEADACHES: ALWAYS
HOW OFTEN DID HEADACHS LIMIT CONCENTRATION ON WORK OR DAILY ACTIVITY: VERY OFTEN

## 2022-11-03 ASSESSMENT — MIGRAINE DISABILITY ASSESSMENT (MIDAS)
ON A SCALE FROM 0-10 ON AVERAGE HOW PAINFUL WERE HEADACHES: 7
HOW MANY DAYS DID YOU NOT DO HOUSEWORK BECAUSE OF HEADACHES: 30
HOW OFTEN WERE SOCIAL ACTIVITIES MISSED DUE TO HEADACHES: 30
HOW MANY DAYS WAS YOUR PRODUCTIVITY CUT IN HALF BECAUSE OF HEADACHES: 30
HOW MANY DAYS IN THE PAST 3 MONTHS HAVE YOU HAD A HEADACHE: 30
TOTAL SCORE: 150
HOW MANY DAYS DID YOU MISS WORK OR SCHOOL BECAUSE OF HEADACHES: 30
HOW MANY DAYS WAS HOUSEWORK PRODUCTIVITY CUT IN HALF DUE TO HEADACHES: 30

## 2022-11-03 NOTE — PROGRESS NOTES
"Leticia is a 36 year old who is being evaluated via a billable video visit.      How would you like to obtain your AVS? MyChart  If the video visit is dropped, the invitation should be resent by: Text to cell phone: [phone]  Will anyone else be joining your video visit? No        Video-Visit Details    Video Start Time: 2:11 PM    Type of service:  Video Visit    Video End Time:2:45 PM    Originating Location (pt. Location): Home      Distant Location (provider location):  On-site    Platform used for Video Visit: [employer] follow up visit:  In the couple of month headaches increased -headache every single day for at least 3 months.   Had so far 2 rounds of Botox uniterrupted and prior to 8/31/2021 [place]. Botox worked before but not recently -did find it beneficial a couple of weeks but than effect wears off.   Last Botox 9/6/2022 and headaches daily but severe once per week and duration a couple hours to couple days.   Daily headache pain level 6-7/10 and tolerable and untolerable pain level 9-10 or higher out 10 on the numeric pain scale.    Headache treatment -naratriptan and works for a couple of hours and pain comes back but takes 5 mg instead 2.5 mg as prescribed. Discussed with the patient that she cannot take 5 mg -its overdosing. Max single dose is 2.5 mg and than can be repeated in 4 hours if needed. Safety concerns.    Acetaminophen once per day 1000 mg       Previous treatment with amitriptyline-side effects, venlafaxine-side effects \"emotional unstable\",Was on topiramate in the past but weight loss  Rescue treatment -ubrelvy -not sure if helped and used acetaminophen helped -not sure if in combination.   Took rizatriptan in the past and would work better than Ubrelvy but was not helpful.   Sumatriptan -works the best of other treatments -took it to much.      is coming back in Dec -unknown pregnancy plan.         Plan:  Get " Botox one more time in a higher dose and will submit PA for a higher dose   injectable CGRPs are an option but would hold off due to possible pregnancy plans. Would switch to injectable CGRPs injections for migraine headache prevention if no pregnancy plan in place and Botox response in a higher dose poor.. Discuss pregnancy plans with your .   We can try propranolol 60 mg ER at bedtime and will increase dose per tolerance but would needed to stop it if pregnant. Side effects-dizziness, drowsiness, fatigue, mood changes  Rescue treatment -naratriptan one tab at headache onset and may repeat one tab in 4 hours as needed. Max 2 tabs in 24 hours. Limit use to no more than 9 days per month.   Acetaminophen +reglan+benedryl as needed for migraine and nausea/vomiting   Keep me updated about pregnancy plans  May try peripheral nerve stimulators-Cefaly, Nerivio -other options  Follow up in 3 months     I discussed all my recommendations with Leticia Morales who verbalizes understanding and comfortable with the plan.  All of patient's questions were answered from the best of my knowledge.  Patient is in agreement with the plan.     35 minutes spent on the date of the encounter doing video access, chart  review, treatment plan, documentation and further activities as noted above    CONNIE Garcia, CNP [facility]  Headache certified  [facility]

## 2022-11-03 NOTE — LETTER
"11/3/2022       RE: Leticia Morales  [address]  [place] [address]     Dear Colleague,    Thank you for referring your patient, Leticia Morales, to the [facility] NEUROLOGY CLINIC [place] at [facility]. Please see a copy of my visit note below.    [facility] follow up visit:  In the couple of month headaches increased -headache every single day for at least 3 months.   Had so far 2 rounds of Botox uniterrupted and prior to 8/31/2021 was on [place]. Botox worked before but not recently -did find it beneficial a couple of weeks but than effect wears off.   Last Botox 9/6/2022 and headaches daily but severe once per week and duration a couple hours to couple days.   Daily headache pain level 6-7/10 and tolerable and untolerable pain level 9-10 or higher out 10 on the numeric pain scale.    Headache treatment -naratriptan and works for a couple of hours and pain comes back but takes 5 mg instead 2.5 mg as prescribed. Discussed with the patient that she cannot take 5 mg -its overdosing. Max single dose is 2.5 mg and than can be repeated in 4 hours if needed. Safety concerns.    Acetaminophen once per day 1000 mg       Previous treatment with amitriptyline-side effects, venlafaxine-side effects \"emotional unstable\",Was on topiramate in the past but weight loss  Rescue treatment -ubrelvy -not sure if helped and used acetaminophen helped -not sure if in combination.   Took rizatriptan in the past and would work better than Ubrelvy but was not helpful.   Sumatriptan -works the best of other treatments -took it to much.      is coming back in Dec -unknown pregnancy plan.         Plan:  Get Botox one more time in a higher dose and will submit PA for a higher dose   injectable CGRPs are an option but would hold off due to possible pregnancy plans. Would switch to injectable CGRPs injections for migraine " headache prevention if no pregnancy plan in place and Botox response in a higher dose poor.. Discuss pregnancy plans with your .   We can try propranolol 60 mg ER at bedtime and will increase dose per tolerance but would needed to stop it if pregnant. Side effects-dizziness, drowsiness, fatigue, mood changes  Rescue treatment -naratriptan one tab at headache onset and may repeat one tab in 4 hours as needed. Max 2 tabs in 24 hours. Limit use to no more than 9 days per month.   Acetaminophen +reglan+benedryl as needed for migraine and nausea/vomiting   Keep me updated about pregnancy plans  May try peripheral nerve stimulators-Cefaly, Nerivio -other options  Follow up in 3 months     I discussed all my recommendations with Leticia Morales who verbalizes understanding and comfortable with the plan.  All of patient's questions were answered from the best of my knowledge.  Patient is in agreement with the plan.     35 minutes spent on the date of the encounter doing video access, chart  review, treatment plan, documentation and further activities as noted above    CONNIE Garcia, CNP [facility]  Headache certified  [facility]

## 2022-11-29 ENCOUNTER — OFFICE VISIT (OUTPATIENT)
Dept: NEUROLOGY | Facility: CLINIC | Age: 37
End: 2022-11-29
Payer: COMMERCIAL

## 2022-11-29 DIAGNOSIS — G43.709 CHRONIC MIGRAINE WITHOUT AURA WITHOUT STATUS MIGRAINOSUS, NOT INTRACTABLE: Primary | ICD-10-CM

## 2022-11-29 PROCEDURE — 99207 PR SATISFY VISIT NUMBER: CPT | Performed by: PSYCHIATRY & NEUROLOGY

## 2022-11-29 PROCEDURE — 64615 CHEMODENERV MUSC MIGRAINE: CPT | Performed by: PSYCHIATRY & NEUROLOGY

## 2022-11-29 NOTE — LETTER
11/29/2022       RE: Leticia Morales  [address]  [place] [address]     Dear Colleague,    Thank you for referring your patient, Leticia Morales, to the [facility] NEUROLOGY CLINIC [place] at [facility]. Please see a copy of my visit note below.        Physicians     Leitcia Morales MRN# [id number]   Age: 35 year old YOB: 1985      Botulinum Toxin Procedure     The procedure was explained to the patient. Benefits of the treatment were discussed including headache and migraine reduction. Risks of the procedure were reviewed including but not limited to pain, bruising, bleeding, infection, weakness of muscles injected or those distal to injection. The patient voiced understanding of the risks and benefits. All questions answered and the patient consented to proceed.      Prior to receiving Botox injections the patient reported the following:  Baseline headache frequency:30 days  Baseline headache duration: constant  Baseline MIDAS score: 150  Associated migrainous features:dizziness,      Previous treatment trials:  topiramate  emgality recommended in past but insurance denied.  Sumatriptan  Metoclopramide  doxycycline     Last Botox procedure: 9/6/2022  Current migraine frequency:      A 200 unit vial of Botox was diluted with 4ml of 0.9% sodium chloride     Botox lot # and expiration date: SEE MAR for details  0.9% sodium chloride lot # and expiration date: See MAR for details     The following muscles were injected using a 30 gauge needle:  Procerus 1 site 5 units   2 sites (bilat) 10 units  Frontalis 4 sites (bilat) 20 units  Temporalis 8 sites (bilat) 50 units  Trapezius muscles 6 sites (bilat) 40 units  Cervical paraspinals (bilat) 4 sites 20 units  Occipitalis 6 sites (bilat) 30 units     A total of 175 units were injected, 25 units wasted.   The patient tolerated the injections well. I was present for and performed  the entire procedure.           Again, thank you for allowing me to participate in the care of your patient.      Sincerely,    Avril Perez MD

## 2022-11-29 NOTE — PROGRESS NOTES
[facility] Kelly DONNA Morales MRN# [id number]   Age: 35 year old YOB: 1985      Botulinum Toxin Procedure     The procedure was explained to the patient. Benefits of the treatment were discussed including headache and migraine reduction. Risks of the procedure were reviewed including but not limited to pain, bruising, bleeding, infection, weakness of muscles injected or those distal to injection. The patient voiced understanding of the risks and benefits. All questions answered and the patient consented to proceed.      Prior to receiving Botox injections the patient reported the following:  Baseline headache frequency:30 days  Baseline headache duration: constant  Baseline MIDAS score: 150  Associated migrainous features:dizziness,      Previous treatment trials:  topiramate  emgality recommended in past but insurance denied.  Sumatriptan  Metoclopramide  doxycycline     Last Botox procedure: 9/6/2022  Current migraine frequency:      A 200 unit vial of Botox was diluted with 4ml of 0.9% sodium chloride     Botox lot # and expiration date: SEE MAR for details  0.9% sodium chloride lot # and expiration date: See MAR for details     The following muscles were injected using a 30 gauge needle:  Procerus 1 site 5 units   2 sites (bilat) 10 units  Frontalis 4 sites (bilat) 20 units  Temporalis 8 sites (bilat) 50 units  Trapezius muscles 6 sites (bilat) 40 units  Cervical paraspinals (bilat) 4 sites 20 units  Occipitalis 6 sites (bilat) 30 units     A total of 175 units were injected, 25 units wasted.   The patient tolerated the injections well. I was present for and performed the entire procedure.     Avril Perez MD

## 2023-01-05 ENCOUNTER — TELEPHONE (OUTPATIENT)
Dept: NEUROLOGY | Facility: CLINIC | Age: 38
End: 2023-01-05

## 2023-01-05 DIAGNOSIS — G43.709 CHRONIC MIGRAINE WITHOUT AURA WITHOUT STATUS MIGRAINOSUS, NOT INTRACTABLE: ICD-10-CM

## 2023-01-05 DIAGNOSIS — G43.109 MIGRAINE WITH AURA AND WITHOUT STATUS MIGRAINOSUS, NOT INTRACTABLE: Primary | ICD-10-CM

## 2023-01-05 NOTE — TELEPHONE ENCOUNTER
Health Call Center    Phone Message    May a detailed message be left on voicemail: yes     Reason for Call: Medication Refill Request    Has the patient contacted the pharmacy for the refill? Yes   Name of medication being requested: naratriptan (AMERGE) 2.5 MG tablet  Provider who prescribed the medication: Silke Velasco  Pharmacy: [id number] [place], [place] - [address] AT [facility] OF [address]  Date medication is needed: ASAP       Patient is requesting a prior authorization for the medication above.     Patient is also requesting a previous medication called UBRELVY to be re prescribed to her. Writer doesn't see medication in pt's chart. Please call back to advise at # [phone]      Action Taken: Message routed to:  [facility] neurology     Travel Screening: Not Applicable

## 2023-01-10 NOTE — TELEPHONE ENCOUNTER
[facility] Call Center    Phone Message    May a detailed message be left on voicemail: yes     Reason for Call: Other: Patient is calling again to get a refill for the medication UBREVLY and the  naratriptan (AMERGE) 2.5 MG tablet. Patient is requesting a call back at # [phone]    Action Taken: Message routed to:  [facility] neurology     Travel Screening: Not Applicable

## 2023-01-11 ENCOUNTER — TELEPHONE (OUTPATIENT)
Dept: NEUROLOGY | Facility: CLINIC | Age: 38
End: 2023-01-11

## 2023-01-11 DIAGNOSIS — G43.709 CHRONIC MIGRAINE WITHOUT AURA WITHOUT STATUS MIGRAINOSUS, NOT INTRACTABLE: ICD-10-CM

## 2023-01-11 RX ORDER — NARATRIPTAN 2.5 MG/1
2.5 TABLET ORAL
Qty: 18 TABLET | Refills: 6 | Status: CANCELLED | OUTPATIENT
Start: 2023-01-11

## 2023-01-11 RX ORDER — NARATRIPTAN 2.5 MG/1
2.5 TABLET ORAL
Qty: 18 TABLET | Refills: 11 | Status: SHIPPED | OUTPATIENT
Start: 2023-01-11 | End: 2023-10-31

## 2023-01-11 NOTE — TELEPHONE ENCOUNTER
"Prior Authorization Retail Medication Request    Medication/Dose: ubrogepant (UBRELVY) 50 MG tablet 16 tablet   ICD code (if different than what is on RX):    Previously Tried and Failed:  Previous treatment with amitriptyline-side effects, venlafaxine-side effects \"emotional unstable\",Was on topiramate in the past but weight loss  Took rizatriptan in the past   Sumatriptan -works the best of other treatments -took it to much.     Rationale: migraine        Insurance Name: [facility]   Insurance ID: [id number]       Pharmacy Information (if different than what is on RX)  Name:    Phone:      "

## 2023-01-11 NOTE — TELEPHONE ENCOUNTER
"Prior Authorization Retail Medication Request    Medication/Dose: naratriptan (AMERGE) 2.5 MG tablet  ICD code (if different than what is on RX):  Previously Tried and Failed:Previous treatment with amitriptyline-side effects, venlafaxine-side effects \"emotional unstable\",Was on topiramate in the past but weight loss  Rescue treatment -ubrelvy -not sure if helped and used acetaminophen helped -not sure if in combination.   Took rizatriptan in the past and would work better than Ubrelvy but was not helpful.   Sumatriptan -works the best of other treatments -took it to much.     Rationale: migraine    Insurance Name:[facility]    Insurance ID: [id number]        Pharmacy Information (if different than what is on RX)  Name:    Phone:     "

## 2023-01-11 NOTE — TELEPHONE ENCOUNTER
Rx Authorization:    Requested Medication/ Dosenaratriptan (AMERGE) 2.5 MG tablet    Date last refill ordered: 11/3/22    Quantity ordered: 18 tablets    # refills: 6    Date of last clinic visit with ordering provider: 11/22/23    Date of next clinic visit with ordering provider:     All pertinent protocol data (lab date/result):     Include pertinent information from patients message:

## 2023-01-11 NOTE — TELEPHONE ENCOUNTER
Called pt and she stated she needs a script for Naratriptan and Ubrelvy.  She stated she was told by her pharmacy, needs PA's.  Verified pt's insurance and pt stated no change in insurance. Pt stated she had stopped the Ubrelvy because she wanted to see if Botox would help.  Per chart, pt's Ubrelvy was discontinued by Dr. Urena for a med rec clean up. Informed pt I will send her medication requests to Silke

## 2023-01-11 NOTE — TELEPHONE ENCOUNTER
I have sent a my chart letting her know that Silke sent both prescriptions to her pharmacy and we have initiated prior authorizations and we are waiting to hear from her insurance.

## 2023-01-12 NOTE — TELEPHONE ENCOUNTER
Prior Authorization Not Needed per Insurance    Medication: ubrogepant (UBRELVY) 50 MG tablet-PA Not Needed  Insurance Company:    Expected CoPay:      Pharmacy Filling the Rx: [facility] #[id number] - [place], [place] - [address] AT [address] [facility] [address]  Pharmacy Notified: Yes  Patient Notified: No    Pharmacy was able to get a paid claim. They will get medication ready and notify patient when ready for .

## 2023-01-12 NOTE — TELEPHONE ENCOUNTER
Prior Authorization Not Needed per Insurance    Medication: naratriptan (AMERGE) 2.5 MG tablet- PA Not Needed  Insurance Company:    Expected CoPay:      Pharmacy Filling the Rx: [facility] #[id number] - [place], [place] - [address] AT [facility] OF [address]  Pharmacy Notified: Yes  Patient Notified: No    Pharmacy was able to get a paid claim.

## 2023-01-14 ENCOUNTER — HEALTH MAINTENANCE LETTER (OUTPATIENT)
Age: 38
End: 2023-01-14

## 2023-02-28 DIAGNOSIS — G43.709 CHRONIC MIGRAINE WITHOUT AURA WITHOUT STATUS MIGRAINOSUS, NOT INTRACTABLE: Primary | ICD-10-CM

## 2023-03-03 ENCOUNTER — OFFICE VISIT (OUTPATIENT)
Dept: NEUROLOGY | Facility: CLINIC | Age: 38
End: 2023-03-03
Payer: COMMERCIAL

## 2023-03-03 DIAGNOSIS — G43.709 CHRONIC MIGRAINE WITHOUT AURA WITHOUT STATUS MIGRAINOSUS, NOT INTRACTABLE: Primary | ICD-10-CM

## 2023-03-03 PROCEDURE — 64615 CHEMODENERV MUSC MIGRAINE: CPT

## 2023-03-03 NOTE — PROGRESS NOTES
[facility]  Botulinum Toxin Procedure    Laurence Kramer PA-C  Headache Neurology    March 3, 2023    Procedure: OnabotulinumtoxinA injections for chronic migraine  Indication: Chronic migraine    Leticia Morales suffers from severe intractable headaches. She was referred by Silke Velasco NP for onabotulinumtoxinA injections for headache.      Prior to initiation of botulinum toxin injections, Leticia reported 30/30 headache days per month. Her headaches are quite disabling and often interfere with her ability to function normally.    Their last round of botulinum toxin injections was on 11/29/2022, where dose was increased to 175 units from 155 units. She does think she has noted more improvement of her symptoms with this higher dose, especially during the second month.     Leticia reports 8-9/30 headache days per month currently, with 6/30 severe headache days per month. She has noticed a wearing off phenomenon prior to this round of botulinum toxin injections, lasting 4 weeks.     She has attempted other migraine prophylactic treatments in the past, which have included: topiramate. Emgality previously denied by insurance.    She currently takes propranolol for headache prevention but this is making her dizzy and fatigued. She has upcoming follow-up with Silke Velasco.     Patient's pain was assessed prior to the procedure. She rated her pain today as 0 out of 10.      The procedure was explained to the patient. Benefits of the treatment were discussed including headache and migraine reduction. Risks of the procedure were reviewed including but not limited to pain, bruising, bleeding, infection, and weakness of muscles injected or those distal to injection sites. Alternatives were discussed. The patient voiced understanding of the risks and benefits. All questions answered and patient consented to proceed.    Procedural Pause: Procedural pause was conducted to verify correct patient identity, procedure to be  performed, correct side and site, correct patient position, and special requirements. Appropriate hand hygiene was utilized, and each injection site was prepped with alcohol wipes or Chloraprep swab.     Procedure Details:   200 units of onabotulinumtoxinA was diluted in 4 mL 0.9% normal saline.   A total of 175 units of onabotulinumtoxinA were injected using 30 gauge 0.5 inch needles into the muscles listed below. 25 units of onabotulinumtoxinA were wasted.     Injection Sites: Total = 175 units onabotulinumtoxinA     Procerus muscles - 5 units into the procerus muscle (5 units total)     muscles - 5 units into the left  muscle and 5 units into the right  muscle (1 injection site per muscle) (10 units total)    Frontalis muscles - 10 units into the left superior frontalis muscle and 10 units into the right superior frontalis muscle (2 injection sites per muscle) (20 units total)    **Temporalis muscles - 25 units into the left temporalis muscle and 25 units into the right temporalis muscle (3 injection sites per muscle) (50 units total)    Occipitalis muscles - 15 units into the left occipitalis muscle and 15 units into the right occipitalis muscle (3 injection sites per muscle) (30 units total)    Splenius Capitis muscles - 10 units into the left splenius capitis muscle and 10 units into the right splenius capitis muscle (2 injection sites per muscle) (20 units total)      **Trapezius muscles - 20 units into the left trapezius muscle and 20 units into the right trapezius muscle (3 injection sites per muscle, divided into 5 units, 10 units, 5 units, medial to lateral) (30 units total)      Patient tolerated the procedure well without immediate complications. She will follow up in clinic for assessment of the effectiveness of treatment. She did not report any change in her pain level after the botulinumtoxinA injection procedure.      Laurence Kramer PA-C  [facility] Neurology  [facility]  [facility]

## 2023-03-03 NOTE — LETTER
3/3/2023         RE: Leticia Morales  [address]  [place] [address]        Dear Colleague,    Thank you for referring your patient, Leticia Morales, to the [facility] NEUROLOGY CLINICS [facility]. Please see a copy of my visit note below.    [facility]  Botulinum Toxin Procedure    Laurence Kramer PA-C  Headache Neurology    March 3, 2023    Procedure: OnabotulinumtoxinA injections for chronic migraine  Indication: Chronic migraine    Leticia Morales suffers from severe intractable headaches. She was referred by Silke Velasco NP for onabotulinumtoxinA injections for headache.      Prior to initiation of botulinum toxin injections, Leticia reported 30/30 headache days per month. Her headaches are quite disabling and often interfere with her ability to function normally.    Their last round of botulinum toxin injections was on 11/29/2022, where dose was increased to 175 units from 155 units. She does think she has noted more improvement of her symptoms with this higher dose, especially during the second month.     Leticia reports 8-9/30 headache days per month currently, with 6/30 severe headache days per month. She has noticed a wearing off phenomenon prior to this round of botulinum toxin injections, lasting 4 weeks.     She has attempted other migraine prophylactic treatments in the past, which have included: topiramate. Emgality previously denied by insurance.    She currently takes propranolol for headache prevention but this is making her dizzy and fatigued. She has upcoming follow-up with Silke Velasco.     Patient's pain was assessed prior to the procedure. She rated her pain today as 0 out of 10.      The procedure was explained to the patient. Benefits of the treatment were discussed including headache and migraine reduction. Risks of the procedure were reviewed including but not limited to pain, bruising, bleeding, infection, and weakness of muscles injected or those distal to  injection sites. Alternatives were discussed. The patient voiced understanding of the risks and benefits. All questions answered and patient consented to proceed.    Procedural Pause: Procedural pause was conducted to verify correct patient identity, procedure to be performed, correct side and site, correct patient position, and special requirements. Appropriate hand hygiene was utilized, and each injection site was prepped with alcohol wipes or Chloraprep swab.     Procedure Details:   200 units of onabotulinumtoxinA was diluted in 4 mL 0.9% normal saline.   A total of 175 units of onabotulinumtoxinA were injected using 30 gauge 0.5 inch needles into the muscles listed below. 25 units of onabotulinumtoxinA were wasted.     Injection Sites: Total = 175 units onabotulinumtoxinA     Procerus muscles - 5 units into the procerus muscle (5 units total)     muscles - 5 units into the left  muscle and 5 units into the right  muscle (1 injection site per muscle) (10 units total)    Frontalis muscles - 10 units into the left superior frontalis muscle and 10 units into the right superior frontalis muscle (2 injection sites per muscle) (20 units total)    **Temporalis muscles - 25 units into the left temporalis muscle and 25 units into the right temporalis muscle (3 injection sites per muscle) (50 units total)    Occipitalis muscles - 15 units into the left occipitalis muscle and 15 units into the right occipitalis muscle (3 injection sites per muscle) (30 units total)    Splenius Capitis muscles - 10 units into the left splenius capitis muscle and 10 units into the right splenius capitis muscle (2 injection sites per muscle) (20 units total)      **Trapezius muscles - 20 units into the left trapezius muscle and 20 units into the right trapezius muscle (3 injection sites per muscle, divided into 5 units, 10 units, 5 units, medial to lateral) (30 units total)      Patient tolerated the procedure  well without immediate complications. She will follow up in clinic for assessment of the effectiveness of treatment. She did not report any change in her pain level after the botulinumtoxinA injection procedure.      Arabella Kramer PA-C  Headache Neurology  [facility]rology [facility]      Again, thank you for allowing me to participate in the care of your patient.        Sincerely,        ARABELLA KRAMER PA-C

## 2023-03-06 ENCOUNTER — TELEPHONE (OUTPATIENT)
Dept: NEUROLOGY | Facility: CLINIC | Age: 38
End: 2023-03-06
Payer: COMMERCIAL

## 2023-03-06 NOTE — TELEPHONE ENCOUNTER
Called pt to schedule future/follow up headache and Botox appointments with provider Laurence Kramer per pt request. Patient did not answer, left message for pt to contact me back so I can get her appointment scheduled. Will call back again this afternoon.     Meaghan Rosario MA

## 2023-03-22 ENCOUNTER — TELEPHONE (OUTPATIENT)
Dept: NEUROLOGY | Facility: CLINIC | Age: 38
End: 2023-03-22

## 2023-03-22 ENCOUNTER — VIRTUAL VISIT (OUTPATIENT)
Dept: NEUROLOGY | Facility: CLINIC | Age: 38
End: 2023-03-22
Payer: COMMERCIAL

## 2023-03-22 DIAGNOSIS — G43.109 MIGRAINE WITH AURA AND WITHOUT STATUS MIGRAINOSUS, NOT INTRACTABLE: ICD-10-CM

## 2023-03-22 PROCEDURE — 99442 PR PHYSICIAN TELEPHONE EVALUATION 11-20 MIN: CPT | Mod: 93 | Performed by: NURSE PRACTITIONER

## 2023-03-22 RX ORDER — METOCLOPRAMIDE 10 MG/1
10 TABLET ORAL EVERY 6 HOURS PRN
Qty: 20 TABLET | Refills: 3 | Status: SHIPPED | OUTPATIENT
Start: 2023-03-22 | End: 2023-12-09

## 2023-03-22 ASSESSMENT — MIGRAINE DISABILITY ASSESSMENT (MIDAS)
HOW MANY DAYS IN THE PAST 3 MONTHS HAVE YOU HAD A HEADACHE: 45
HOW MANY DAYS WAS HOUSEWORK PRODUCTIVITY CUT IN HALF DUE TO HEADACHES: 45
TOTAL SCORE: 135
HOW MANY DAYS DID YOU MISS WORK OR SCHOOL BECAUSE OF HEADACHES: 0
HOW MANY DAYS DID YOU NOT DO HOUSEWORK BECAUSE OF HEADACHES: 45
ON A SCALE FROM 0-10 ON AVERAGE HOW PAINFUL WERE HEADACHES: 9
HOW OFTEN WERE SOCIAL ACTIVITIES MISSED DUE TO HEADACHES: 45
HOW MANY DAYS WAS YOUR PRODUCTIVITY CUT IN HALF BECAUSE OF HEADACHES: 0

## 2023-03-22 NOTE — NURSING NOTE
Is the patient currently in the state of [place]? YES    Visit mode:VIDEO    If the visit is dropped, the patient can be reconnected by: TELEPHONE VISIT: Phone number: [phone]    Will anyone else be joining the visit? NO      How would you like to obtain your AVS? MyChart    Are changes needed to the allergy or medication list? NO    Reason for visit:

## 2023-03-22 NOTE — TELEPHONE ENCOUNTER
[facility]    Phone Message    May a detailed message be left on voicemail: yes     Reason for Call: Other: Leticia calling to change her 2:30 pm video visit to a telephone visit due to not having internet. Writer was unable to change due to someone being in her chart. Please contact Leticia to discuss.    Action Taken: Message routed to:  [facility]    Travel Screening: Not Applicable

## 2023-03-22 NOTE — PROGRESS NOTES
Virtual Visit Details    Type of service:  Telephone  Visit     Originating Location (pt. Location): Home  Distant Location (provider location):  Off-site  Platform used for Video Visit: [facility] follow up note:  Established patient in our Headache Clinic. Last Virtual visit 11/03/2022, see note for details. Recommended to continue with Botox and increase Botox dose.   Today patient reports that Botox has been effective -headaches but not as painful. Botox increased from 155 to 175 units. On 3/3/2023 was second round of Botox at increased dose. No side effects.   Stopped propranolol because cause feeling of fatigue  Rescue treatment -naratriptan and ubrelvy -work in combination but not itself   metoclopramide +benedryl -help with migraine symptoms. No side effects.   Pregnancy-still working   Discussed medication risk in pregnancy and patient will keep us in the loop about her progress.   No new changes in headache treatment plan-patient is satisfied with current treatment results    Follow up in 6-12 months or sooner if needed    Patient sounds alert and no in apparent acute distress,  mentation appears normal, judgement and insight intact, normal speech.    Visit duration 15 minutes    CONNIE Garcia, CNP [facility]  Headache certified  [facility]

## 2023-03-22 NOTE — LETTER
3/22/2023       RE: Leticia Morales  [address]  [place] [address]     Dear Colleague,    Thank you for referring your patient, Leticia Morales, to the [facility] NEUROLOGY [facility] [place] at [facility]. Please see a copy of my visit note below.    [facility] follow up note:    Established patient in our Headache Clinic. Last Virtual visit 11/03/2022, see note for details. Recommended to continue with Botox and increase Botox dose.   Today patient reports that Botox has been effective -headaches but not as painful. Botox increased from 155 to 175 units. On 3/3/2023 was second round of Botox at increased dose. No side effects.   Stopped propranolol because cause feeling of fatigue  Rescue treatment -naratriptan and ubrelvy -work in combination but not itself   metoclopramide +benedryl -help with migraine symptoms. No side effects.   Pregnancy-still working   Discussed medication risk in pregnancy and patient will keep us in the loop about her progress.   No new changes in headache treatment plan-patient is satisfied with current treatment results    Follow up in 6-12 months or sooner if needed    Patient sounds alert and no in apparent acute distress,  mentation appears normal, judgement and insight intact, normal speech.    Visit duration 15 minutes      CONNIE Garcia, CNP [facility]  Headache certified  [facility]

## 2023-04-23 ENCOUNTER — HEALTH MAINTENANCE LETTER (OUTPATIENT)
Age: 38
End: 2023-04-23

## 2023-05-02 ENCOUNTER — OFFICE VISIT (OUTPATIENT)
Dept: FAMILY MEDICINE | Facility: CLINIC | Age: 38
End: 2023-05-02
Payer: COMMERCIAL

## 2023-05-02 VITALS
SYSTOLIC BLOOD PRESSURE: 112 MMHG | HEART RATE: 100 BPM | OXYGEN SATURATION: 100 % | DIASTOLIC BLOOD PRESSURE: 82 MMHG | BODY MASS INDEX: 21.58 KG/M2 | WEIGHT: 126.4 LBS | TEMPERATURE: 98.2 F | RESPIRATION RATE: 16 BRPM | HEIGHT: 64 IN

## 2023-05-02 DIAGNOSIS — D50.9 MICROCYTIC ANEMIA: ICD-10-CM

## 2023-05-02 DIAGNOSIS — K21.9 GASTROESOPHAGEAL REFLUX DISEASE WITHOUT ESOPHAGITIS: ICD-10-CM

## 2023-05-02 DIAGNOSIS — R59.0 LYMPHADENOPATHY, SUBMENTAL: ICD-10-CM

## 2023-05-02 DIAGNOSIS — R53.83 FATIGUE, UNSPECIFIED TYPE: Primary | ICD-10-CM

## 2023-05-02 DIAGNOSIS — G43.109 MIGRAINE WITH AURA AND WITHOUT STATUS MIGRAINOSUS, NOT INTRACTABLE: ICD-10-CM

## 2023-05-02 DIAGNOSIS — L70.9 ACNE, UNSPECIFIED ACNE TYPE: ICD-10-CM

## 2023-05-02 LAB
ALBUMIN SERPL BCG-MCNC: 4.3 G/DL (ref 3.5–5.2)
ALP SERPL-CCNC: 73 U/L (ref 35–104)
ALT SERPL W P-5'-P-CCNC: 12 U/L (ref 10–35)
ANION GAP SERPL CALCULATED.3IONS-SCNC: 15 MMOL/L (ref 7–15)
AST SERPL W P-5'-P-CCNC: 26 U/L (ref 10–35)
BILIRUB SERPL-MCNC: 0.2 MG/DL
BUN SERPL-MCNC: 8.1 MG/DL (ref 6–20)
CALCIUM SERPL-MCNC: 9.6 MG/DL (ref 8.6–10)
CHLORIDE SERPL-SCNC: 106 MMOL/L (ref 98–107)
CREAT SERPL-MCNC: 0.81 MG/DL (ref 0.51–0.95)
DEPRECATED HCO3 PLAS-SCNC: 20 MMOL/L (ref 22–29)
ERYTHROCYTE [DISTWIDTH] IN BLOOD BY AUTOMATED COUNT: 16.9 % (ref 10–15)
FERRITIN SERPL-MCNC: 9 NG/ML (ref 6–175)
FOLATE SERPL-MCNC: 5.1 NG/ML (ref 4.6–34.8)
GFR SERPL CREATININE-BSD FRML MDRD: >90 ML/MIN/1.73M2
GLUCOSE SERPL-MCNC: 113 MG/DL (ref 70–99)
HCT VFR BLD AUTO: 37.2 % (ref 35–47)
HGB BLD-MCNC: 11.4 G/DL (ref 11.7–15.7)
HOLD SPECIMEN: NORMAL
IRON BINDING CAPACITY (ROCHE): 349 UG/DL (ref 240–430)
IRON SATN MFR SERPL: 5 % (ref 15–46)
IRON SERPL-MCNC: 18 UG/DL (ref 37–145)
MCH RBC QN AUTO: 22 PG (ref 26.5–33)
MCHC RBC AUTO-ENTMCNC: 30.6 G/DL (ref 31.5–36.5)
MCV RBC AUTO: 72 FL (ref 78–100)
PLATELET # BLD AUTO: 392 10E3/UL (ref 150–450)
POTASSIUM SERPL-SCNC: 3.7 MMOL/L (ref 3.4–5.3)
PROT SERPL-MCNC: 7.7 G/DL (ref 6.4–8.3)
RBC # BLD AUTO: 5.18 10E6/UL (ref 3.8–5.2)
SODIUM SERPL-SCNC: 141 MMOL/L (ref 136–145)
TSH SERPL DL<=0.005 MIU/L-ACNC: 1.54 UIU/ML (ref 0.3–4.2)
WBC # BLD AUTO: 5.7 10E3/UL (ref 4–11)

## 2023-05-02 PROCEDURE — 82306 VITAMIN D 25 HYDROXY: CPT

## 2023-05-02 PROCEDURE — 83540 ASSAY OF IRON: CPT

## 2023-05-02 PROCEDURE — 99214 OFFICE O/P EST MOD 30 MIN: CPT | Mod: GC

## 2023-05-02 PROCEDURE — 83550 IRON BINDING TEST: CPT

## 2023-05-02 PROCEDURE — 82728 ASSAY OF FERRITIN: CPT

## 2023-05-02 PROCEDURE — 86140 C-REACTIVE PROTEIN: CPT

## 2023-05-02 PROCEDURE — 85027 COMPLETE CBC AUTOMATED: CPT

## 2023-05-02 PROCEDURE — 84443 ASSAY THYROID STIM HORMONE: CPT

## 2023-05-02 PROCEDURE — 80053 COMPREHEN METABOLIC PANEL: CPT

## 2023-05-02 PROCEDURE — 36415 COLL VENOUS BLD VENIPUNCTURE: CPT

## 2023-05-02 PROCEDURE — 82746 ASSAY OF FOLIC ACID SERUM: CPT

## 2023-05-02 RX ORDER — FERROUS SULFATE 325(65) MG
325 TABLET ORAL
Qty: 30 TABLET | Refills: 3 | Status: SHIPPED | OUTPATIENT
Start: 2023-05-02 | End: 2024-03-18

## 2023-05-02 RX ORDER — TRETINOIN 0.25 MG/G
CREAM TOPICAL AT BEDTIME
Qty: 45 G | Refills: 1 | Status: SHIPPED | OUTPATIENT
Start: 2023-05-02 | End: 2024-07-19

## 2023-05-02 ASSESSMENT — PAIN SCALES - GENERAL: PAINLEVEL: EXTREME PAIN (8)

## 2023-05-02 NOTE — PROGRESS NOTES
Assessment & Plan     Fatigue, unspecified type  Leticia has been feeling fatigued for quite some time now.  The fatigue is associated with weakness, palpitations, dizziness, and occasional syncopal episodes.  Will draw CBC to assess for anemia, TSH to assess for hypothyroidism, and vitamin D and folate to assess for vitamin deficiencies secondary to decreased p.o. intake.  - CBC with Platelets and Reflex to Iron Studies; Future  - Vitamin D Level; Future  - Comprehensive metabolic panel; Future  - TSH with free T4 reflex; Future  - Folate; Future  - Iron & Iron Binding Capacity  - Ferritin    Lymphadenopathy, submental  Submental lymph node was noticed approximately 1 month ago.  Physical examination revealed that it was nontender to palpation and firm to the touch.  It is unlikely that it is an abscess.  Will assess CRP to determine if there is underlying inflammation.  An MR soft tissue neck with and without contrast is warranted to gather more information about her lymphadenopathy.  - MR Soft Tissue Neck w/o & w Contrast; Future  - CRP inflammation; Future    Migraine with aura and without status migrainosus, not intractable  Leticia had a migraine during the encounter.  She has a history of migraines with aura and has already established care with neurology.  They are managing her migraines with naratriptan.  She needed a refill of ubrogepant, which was sent to the pharmacy.  - ubrogepant (UBRELVY) 50 MG tablet; Take 1-2 tablets ( mg) by mouth at onset of headache (migraine) if needed a second dose may be taken at least 2 hours after the initial dose; MAX, 4 tablets /24 hrs    Gastroesophageal reflux disease without esophagitis  Leticia's GERD symptoms may be responsible for her nausea and loss of appetite. Omeprazole was refilled.  - omeprazole (PRILOSEC) 20 MG DR capsule; Take 1 capsule (20 mg) by mouth daily    Microcytic anemia  CBC with reflex iron studies revealed that Leticia has microcytic anemia secondary  "to iron deficiency.  Iron pills were sent to the pharmacy.  - ferrous sulfate (FEROSUL) 325 (65 Fe) MG tablet; Take 1 tablet (325 mg) by mouth daily (with breakfast)    Acne, unspecified acne type  Refill requested: Refilled.  - tretinoin (RETIN-A) 0.025 % external cream; Apply topically At Bedtime    Return in about 1 month (around 6/2/2023).    Katy Craft DO, MPH  [facility] RENATA Kelly is a 37 year old, presenting for the following health issues:  Headache (Pain scale 8), Dizziness (And fatigue for a few days ), and Loss of Consciousness (Happened April 20th, went to urgent care)        5/2/2023     1:17 PM   Additional Questions   Roomed by sudhir   Accompanied by Sera unger     BRIGITTE Kelly is a 37-year-old female who presents to the clinic with intermittent dizziness, episodes of syncope, weakness, and palpitations for \"a while now\".  She notes that even going to the bathroom is difficult, and she must hold onto the walls while she uses the restroom.  On April 20 during [place], she lost consciousness.  Family is concerned that she is losing weight.  She has not been eating due to low appetite, but drinks tea, juices, and Ensure to remain hydrated.  Dizziness is often exacerbated by positional changes, but she denies nausea, vomiting, and sensations that the room is spinning during these episodes. Leticia reports that she will get nauseous from smelling food.  She currently has a migraine. Denies any chance of pregnancy.  Sister expresses concern that Leticia has been having difficulty with word finding and inattention.  Finally, she complains of a large submental lymph node that appeared about a month ago.    Review of Systems   Constitutional, HEENT, cardiovascular, pulmonary, gi and gu systems are negative, except as otherwise noted.      Objective    /82   Pulse 100   Temp 98.2  F (36.8  C) (Oral)   Resp 16   Ht 1.626 m (5' 4\")   Wt 57.3 kg (126 lb 6.4 oz)   LMP " 04/29/2023 (Exact Date)   SpO2 100%   BMI 21.70 kg/m    Body mass index is 21.7 kg/m .  Physical Exam  Vitals reviewed.   Constitutional:       Appearance: Normal appearance.   HENT:      Head: Normocephalic and atraumatic.      Right Ear: Tympanic membrane normal.      Left Ear: Tympanic membrane normal.      Nose: Nose normal.      Mouth/Throat:      Mouth: Mucous membranes are moist.      Pharynx: Oropharynx is clear.   Eyes:      Extraocular Movements: Extraocular movements intact.      Conjunctiva/sclera: Conjunctivae normal.      Pupils: Pupils are equal, round, and reactive to light.   Neck:      Comments: Large, nontender, firm submental lymph node.  Cardiovascular:      Rate and Rhythm: Regular rhythm. Tachycardia present.      Heart sounds: Normal heart sounds.   Pulmonary:      Effort: Pulmonary effort is normal.      Breath sounds: Normal breath sounds.   Abdominal:      General: There is no distension.      Palpations: Abdomen is soft.      Tenderness: There is no abdominal tenderness.   Musculoskeletal:         General: Normal range of motion.      Cervical back: Normal range of motion and neck supple.      Right lower leg: No edema.      Left lower leg: No edema.   Skin:     General: Skin is warm and dry.   Neurological:      Mental Status: She is alert and oriented to person, place, and time.      Motor: Weakness (3-4/5 muscle strength in bilateral upper and lower extremities) present.      Comments: Nystagmus, but difficult to assess whether it is horizontal or vertical due to patient discomfort secondary to migraine.        ----- Service Performed and Documented by Resident or Fellow ------

## 2023-05-03 LAB — CRP SERPL-MCNC: 14.7 MG/L

## 2023-05-04 LAB — DEPRECATED CALCIDIOL+CALCIFEROL SERPL-MC: 28 UG/L (ref 20–75)

## 2023-05-09 ENCOUNTER — TELEPHONE (OUTPATIENT)
Dept: NEUROLOGY | Facility: CLINIC | Age: 38
End: 2023-05-09
Payer: COMMERCIAL

## 2023-05-09 NOTE — TELEPHONE ENCOUNTER
"Prior Authorization Retail Medication Request     Medication/Dose: naratriptan (AMERGE) 2.5 MG tablet  ICD code (if different than what is on RX):  Previously Tried and Failed:Previous treatment with amitriptyline-side effects, venlafaxine-side effects \"emotional unstable\",Was on topiramate in the past but weight loss  Rescue treatment -ubrelvy -not sure if helped and used acetaminophen helped -not sure if in combination.   Took rizatriptan in the past and would work better than Ubrelvy but was not helpful.   Sumatriptan -works the best of other treatments -took it to much.     Rationale: migraine     Insurance Name:[facility]    Insurance ID: [id number]          Pharmacy Information (if different than what is on RX)  Name:    Phone:   "

## 2023-05-11 ENCOUNTER — TELEPHONE (OUTPATIENT)
Dept: FAMILY MEDICINE | Facility: CLINIC | Age: 38
End: 2023-05-11
Payer: COMMERCIAL

## 2023-05-11 NOTE — PATIENT INSTRUCTIONS
Patient Education   Here is the plan from today's visit    1. Fatigue, unspecified type  - CBC with Platelets and Reflex to Iron Studies; Future  - Vitamin D Level; Future  - Comprehensive metabolic panel; Future  - TSH with free T4 reflex; Future  - Folate; Future  - Iron & Iron Binding Capacity  - Ferritin    2. Migraine with aura and without status migrainosus, not intractable  - ubrogepant (UBRELVY) 50 MG tablet; Take 1-2 tablets ( mg) by mouth at onset of headache (migraine) if needed a second dose may be taken at least 2 hours after the initial dose; MAX, 4 tablets /24 hrs  Dispense: 16 tablet; Refill: 9    3. Lymphadenopathy, submental  - MR Soft Tissue Neck w/o & w Contrast; Future  - CRP inflammation; Future  - CRP inflammation    4. Gastroesophageal reflux disease without esophagitis  - omeprazole (PRILOSEC) 20 MG DR capsule; Take 1 capsule (20 mg) by mouth daily  Dispense: 90 capsule; Refill: 0    5. Microcytic anemia  - ferrous sulfate (FEROSUL) 325 (65 Fe) MG tablet; Take 1 tablet (325 mg) by mouth daily (with breakfast)  Dispense: 30 tablet; Refill: 3    6. Acne, unspecified acne type  - tretinoin (RETIN-A) 0.025 % external cream; Apply topically At Bedtime  Dispense: 45 g; Refill: 1    Please call or return to clinic if your symptoms don't go away.    Follow up plan  Return in about 1 month (around 6/2/2023).    Thank you for coming to [facility] today.  Lab Testing:  **If you had lab testing today and your results are reassuring or normal they will be mailed to you or sent through [employer] within 7 days.   **If the lab tests need quick action we will call you with the results.  **If you are having labs done on a different day, please call [phone] to schedule at [facility] or [phone] for other [facility] locations. Labs do not offer walk-in appointments.  The phone number we will call with results is # [phone] (home) . If this is not the best number  please call our clinic and change the number.  Medication Refills:  If you need any refills please call your pharmacy and they will contact us.   If you need to  your refill at a new pharmacy, please contact the new pharmacy directly. The new pharmacy will help you get your medications transferred faster.   Scheduling:  If you have any concerns about today's visit or wish to schedule another appointment please call our office during normal business hours [phone] (8-5:00 M-F). If you can no longer make a scheduled visit, please cancel via [employer] or call us to cancel.   If a referral was made to an [facility] specialty provider and you do not get a call from central scheduling, please refer to directions on your visit summary or call our office during normal business hours for assistance.   If a Mammogram was ordered for you at the Breast Center call [phone] to schedule or change your appointment.  If you had an XRay/CT/Ultrasound/MRI ordered the number is [phone] to schedule or change your radiology appointment.   [facility] has limited ultrasound appointments available on Wednesdays, if you would like your ultrasound at [facility], please call [phone] to schedule.   Medical Concerns:  If you have urgent medical concerns please call [phone] at any time of the day.    Katy Craft, DO

## 2023-05-11 NOTE — TELEPHONE ENCOUNTER
[facility] phone call message- patient requesting a refill:    Full Medication Name: Nausea Med      Pharmacy confirmed as       [id number] - [place], [place] - [address] AT [address]  [address] [address]  Phone: [phone] Fax: [phone]      : Yes    Additional Comments: Patient stated that Dr Craft was supposed to prescribe nausea medication.      OK to leave a message on voice mail? Yes    Primary language: Nepalese      needed? No    Call taken on May 11, 2023 at 10:55 AM by Joseph Enamorado

## 2023-05-12 DIAGNOSIS — R11.0 NAUSEA: Primary | ICD-10-CM

## 2023-05-12 RX ORDER — ONDANSETRON 4 MG/1
4 TABLET, ORALLY DISINTEGRATING ORAL EVERY 8 HOURS PRN
Qty: 30 TABLET | Refills: 0 | Status: SHIPPED | OUTPATIENT
Start: 2023-05-12 | End: 2024-07-19

## 2023-05-15 NOTE — TELEPHONE ENCOUNTER
PA Initiation    Medication: naratriptan (AMERGE) 2.5 MG tablet   Insurance Company: [employer] - Phone [phone] Fax [phone]  Pharmacy Filling the Rx: [id number] [place], [place] - [address] AT [facility] [address]  Filling Pharmacy Phone: [phone]  Filling Pharmacy Fax: [phone]  Start Date: 5/15/2023

## 2023-05-16 NOTE — TELEPHONE ENCOUNTER
Prior Authorization Approval    Authorization Effective Date: 4/15/2023  Authorization Expiration Date: 5/15/2024  Medication: naratriptan (AMERGE) 2.5 MG tablet--APPROVED  Approved Dose/Quantity:   Reference #:     Insurance Company: [employer] - Phone [phone] Fax [phone]  Expected CoPay:       CoPay Card Available:      Foundation Assistance Needed:    Which Pharmacy is filling the prescription (Not needed for infusion/clinic administered): [id number] - [facility] [address] AT [address]  Pharmacy Notified: Yes  Patient Notified: Yes **Instructed pharmacy to notify patient when script is ready to /ship.**

## 2023-05-18 ENCOUNTER — HOSPITAL ENCOUNTER (EMERGENCY)
Facility: CLINIC | Age: 38
Discharge: HOME | End: 2023-05-19
Attending: EMERGENCY MEDICINE | Admitting: EMERGENCY MEDICINE
Payer: COMMERCIAL

## 2023-05-18 DIAGNOSIS — G43.909 MIGRAINE WITHOUT STATUS MIGRAINOSUS, NOT INTRACTABLE, UNSPECIFIED MIGRAINE TYPE: ICD-10-CM

## 2023-05-18 LAB
ALBUMIN SERPL BCG-MCNC: 3.7 G/DL (ref 3.5–5.2)
ALP SERPL-CCNC: 60 U/L (ref 35–104)
ALT SERPL W P-5'-P-CCNC: 15 U/L (ref 10–35)
ANION GAP SERPL CALCULATED.3IONS-SCNC: 11 MMOL/L (ref 7–15)
AST SERPL W P-5'-P-CCNC: 17 U/L (ref 10–35)
BASOPHILS # BLD AUTO: 0.1 10E3/UL (ref 0–0.2)
BASOPHILS NFR BLD AUTO: 1 %
BILIRUB SERPL-MCNC: 0.3 MG/DL
BUN SERPL-MCNC: 4.2 MG/DL (ref 6–20)
CALCIUM SERPL-MCNC: 8.9 MG/DL (ref 8.6–10)
CHLORIDE SERPL-SCNC: 108 MMOL/L (ref 98–107)
CREAT SERPL-MCNC: 0.62 MG/DL (ref 0.51–0.95)
DEPRECATED HCO3 PLAS-SCNC: 19 MMOL/L (ref 22–29)
EOSINOPHIL # BLD AUTO: 0 10E3/UL (ref 0–0.7)
EOSINOPHIL NFR BLD AUTO: 0 %
ERYTHROCYTE [DISTWIDTH] IN BLOOD BY AUTOMATED COUNT: 18.7 % (ref 10–15)
GFR SERPL CREATININE-BSD FRML MDRD: >90 ML/MIN/1.73M2
GLUCOSE SERPL-MCNC: 115 MG/DL (ref 70–99)
HCG SERPL QL: NEGATIVE
HCT VFR BLD AUTO: 33.3 % (ref 35–47)
HGB BLD-MCNC: 10.2 G/DL (ref 11.7–15.7)
IMM GRANULOCYTES # BLD: 0 10E3/UL
IMM GRANULOCYTES NFR BLD: 0 %
LYMPHOCYTES # BLD AUTO: 1.3 10E3/UL (ref 0.8–5.3)
LYMPHOCYTES NFR BLD AUTO: 13 %
MCH RBC QN AUTO: 22.1 PG (ref 26.5–33)
MCHC RBC AUTO-ENTMCNC: 30.6 G/DL (ref 31.5–36.5)
MCV RBC AUTO: 72 FL (ref 78–100)
MONOCYTES # BLD AUTO: 0.6 10E3/UL (ref 0–1.3)
MONOCYTES NFR BLD AUTO: 6 %
NEUTROPHILS # BLD AUTO: 7.7 10E3/UL (ref 1.6–8.3)
NEUTROPHILS NFR BLD AUTO: 80 %
NRBC # BLD AUTO: 0 10E3/UL
NRBC BLD AUTO-RTO: 0 /100
PLATELET # BLD AUTO: 332 10E3/UL (ref 150–450)
POTASSIUM SERPL-SCNC: 3.8 MMOL/L (ref 3.4–5.3)
PROT SERPL-MCNC: 6.5 G/DL (ref 6.4–8.3)
RBC # BLD AUTO: 4.61 10E6/UL (ref 3.8–5.2)
SARS-COV-2 RNA RESP QL NAA+PROBE: NEGATIVE
SODIUM SERPL-SCNC: 138 MMOL/L (ref 136–145)
WBC # BLD AUTO: 9.6 10E3/UL (ref 4–11)

## 2023-05-18 PROCEDURE — 258N000003 HC RX IP 258 OP 636: Performed by: EMERGENCY MEDICINE

## 2023-05-18 PROCEDURE — 99284 EMERGENCY DEPT VISIT MOD MDM: CPT | Mod: 25 | Performed by: EMERGENCY MEDICINE

## 2023-05-18 PROCEDURE — 85025 COMPLETE CBC W/AUTO DIFF WBC: CPT | Performed by: EMERGENCY MEDICINE

## 2023-05-18 PROCEDURE — 36415 COLL VENOUS BLD VENIPUNCTURE: CPT | Performed by: EMERGENCY MEDICINE

## 2023-05-18 PROCEDURE — 96374 THER/PROPH/DIAG INJ IV PUSH: CPT | Performed by: EMERGENCY MEDICINE

## 2023-05-18 PROCEDURE — 96375 TX/PRO/DX INJ NEW DRUG ADDON: CPT | Performed by: EMERGENCY MEDICINE

## 2023-05-18 PROCEDURE — 80053 COMPREHEN METABOLIC PANEL: CPT | Performed by: EMERGENCY MEDICINE

## 2023-05-18 PROCEDURE — 99284 EMERGENCY DEPT VISIT MOD MDM: CPT | Performed by: EMERGENCY MEDICINE

## 2023-05-18 PROCEDURE — 96361 HYDRATE IV INFUSION ADD-ON: CPT | Performed by: EMERGENCY MEDICINE

## 2023-05-18 PROCEDURE — C9803 HOPD COVID-19 SPEC COLLECT: HCPCS | Performed by: EMERGENCY MEDICINE

## 2023-05-18 PROCEDURE — 250N000011 HC RX IP 250 OP 636: Performed by: EMERGENCY MEDICINE

## 2023-05-18 PROCEDURE — 258N000003 HC RX IP 258 OP 636

## 2023-05-18 PROCEDURE — 87635 SARS-COV-2 COVID-19 AMP PRB: CPT | Performed by: EMERGENCY MEDICINE

## 2023-05-18 PROCEDURE — 84703 CHORIONIC GONADOTROPIN ASSAY: CPT | Performed by: EMERGENCY MEDICINE

## 2023-05-18 RX ORDER — METOCLOPRAMIDE HYDROCHLORIDE 5 MG/ML
5 INJECTION INTRAMUSCULAR; INTRAVENOUS ONCE
Status: COMPLETED | OUTPATIENT
Start: 2023-05-18 | End: 2023-05-18

## 2023-05-18 RX ORDER — KETOROLAC TROMETHAMINE 15 MG/ML
15 INJECTION, SOLUTION INTRAMUSCULAR; INTRAVENOUS ONCE
Status: COMPLETED | OUTPATIENT
Start: 2023-05-18 | End: 2023-05-18

## 2023-05-18 RX ORDER — SODIUM CHLORIDE 9 MG/ML
INJECTION, SOLUTION INTRAVENOUS
Status: COMPLETED
Start: 2023-05-18 | End: 2023-05-18

## 2023-05-18 RX ORDER — SODIUM CHLORIDE 9 MG/ML
INJECTION, SOLUTION INTRAVENOUS CONTINUOUS
Status: DISCONTINUED | OUTPATIENT
Start: 2023-05-18 | End: 2023-05-19 | Stop reason: HOSPADM

## 2023-05-18 RX ORDER — DIPHENHYDRAMINE HYDROCHLORIDE 50 MG/ML
25 INJECTION INTRAMUSCULAR; INTRAVENOUS ONCE
Status: COMPLETED | OUTPATIENT
Start: 2023-05-18 | End: 2023-05-18

## 2023-05-18 RX ORDER — IBUPROFEN 400 MG/1
400 TABLET, FILM COATED ORAL EVERY 6 HOURS PRN
COMMUNITY

## 2023-05-18 RX ADMIN — DIPHENHYDRAMINE HYDROCHLORIDE 25 MG: 50 INJECTION, SOLUTION INTRAMUSCULAR; INTRAVENOUS at 21:46

## 2023-05-18 RX ADMIN — KETOROLAC TROMETHAMINE 15 MG: 15 INJECTION, SOLUTION INTRAMUSCULAR; INTRAVENOUS at 21:40

## 2023-05-18 RX ADMIN — SODIUM CHLORIDE 1000 ML: 9 INJECTION, SOLUTION INTRAVENOUS at 21:40

## 2023-05-18 RX ADMIN — SODIUM CHLORIDE: 9 INJECTION, SOLUTION INTRAVENOUS at 22:52

## 2023-05-18 RX ADMIN — METOCLOPRAMIDE HYDROCHLORIDE 5 MG: 5 INJECTION INTRAMUSCULAR; INTRAVENOUS at 21:43

## 2023-05-18 ASSESSMENT — ACTIVITIES OF DAILY LIVING (ADL)
ADLS_ACUITY_SCORE: 35
ADLS_ACUITY_SCORE: 35

## 2023-05-19 VITALS
SYSTOLIC BLOOD PRESSURE: 104 MMHG | WEIGHT: 128 LBS | RESPIRATION RATE: 16 BRPM | DIASTOLIC BLOOD PRESSURE: 67 MMHG | BODY MASS INDEX: 21.85 KG/M2 | TEMPERATURE: 98.2 F | OXYGEN SATURATION: 100 % | HEART RATE: 81 BPM | HEIGHT: 64 IN

## 2023-05-19 NOTE — ED TRIAGE NOTES
Patient presents due to migraine headache, nausea, fatigue, vomiting and diarrhea. Symptoms present for 2 days.      Triage Assessment     Row Name 05/18/23 1954       Triage Assessment (Adult)    Airway WDL WDL       Respiratory WDL    Respiratory WDL WDL       Skin Circulation/Temperature WDL    Skin Circulation/Temperature WDL WDL       Cardiac WDL    Cardiac WDL WDL       Peripheral/Neurovascular WDL    Peripheral Neurovascular WDL WDL       Cognitive/Neuro/Behavioral WDL    Cognitive/Neuro/Behavioral WDL X  migraine headache       Venancio Coma Scale    Best Eye Response 4-->(E4) spontaneous    Best Motor Response 6-->(M6) obeys commands    Best Verbal Response 5-->(V5) oriented    [place] Coma Scale Score 15

## 2023-05-19 NOTE — ED PROVIDER NOTES
[facility]    5/18/23      ED Provider Note  [facility]      History     Chief Complaint   Patient presents with     Headache     Patient presents due to migraine headache, nausea, fatigue, vomiting and diarrhea. Symptoms present for 2 days.      The history is provided by the patient and medical records.     Leticia Morales is a 37 year old female with a PMH of chronic migraine with aura (managed by neurology with naratriptan, on ubrogepant 50 mg), GERD (on omeprazole 20 mg), microcytic anemia 2/2 iron deficiency who presents to the ED, with her nephew, for evaluation of headache.  Patient reports worsening headache and notes her medication has not been working since yesterday.  Patient has a history of migraines. Headache is consistent with previous migraines. She reports that her headache is on L temple radiating to the center.  She also notes that lights worsen her headaches.  She endorses nausea, vomiting and diarrhea. She denies fever. No other symptoms noted.         Past Medical History  Past Medical History:   Diagnosis Date     ARDS (adult respiratory distress syndrome) (H) 2010    related to H1N1 infection     H1N1 influenza 2010    prolonged ICU stay, medically induced coma     Iron deficiency anemia      Migraines      Past Surgical History:   Procedure Laterality Date     THORACIC SURGERY      trach     acetaminophen (TYLENOL) 500 MG tablet  ferrous sulfate (FEROSUL) 325 (65 Fe) MG tablet  ibuprofen (ADVIL/MOTRIN) 400 MG tablet  metoclopramide (REGLAN) 10 MG tablet  naratriptan (AMERGE) 2.5 MG tablet  omeprazole (PRILOSEC) 20 MG DR capsule  ondansetron (ZOFRAN ODT) 4 MG ODT tab  phenazopyridine (AZO URINARY PAIN RELIEF) 95 MG tablet  Prenatal Vit-Fe Fumarate-FA (PRENATAL MULTIVITAMIN W/IRON) 27-0.8 MG tablet  tretinoin (RETIN-A) 0.025 % external cream  ubrogepant (UBRELVY) 50 MG tablet      Allergies   Allergen Reactions      "Macrodantin [Nitrofurantoin Macrocrystal] Other (See Comments)     itching     Family History  Family History   Problem Relation Age of Onset     Diabetes Father      Hypertension Father      Cancer Father      Other - See Comments Father         Epistaxis when Angry     Glaucoma Father      Migraines Mother      Social History   Social History     Tobacco Use     Smoking status: Former     Types: Hookah     Smokeless tobacco: Never   Vaping Use     Vaping status: Some Days   Substance Use Topics     Alcohol use: No     Drug use: No      Past medical history, past surgical history, medications, allergies, family history, and social history were reviewed with the patient. No additional pertinent items.      A complete review of systems was performed with pertinent positives and negatives noted in the HPI, and all other systems negative.    Physical Exam   BP: 114/79  Pulse: 83  Temp: 98.4  F (36.9  C)  Resp: 14  Height: 162.6 cm (5' 4\")  Weight: 58.1 kg (128 lb)  SpO2: 100 %  Physical Exam  Vitals and nursing note reviewed.   Constitutional:       General: She is not in acute distress.     Appearance: She is well-developed. She is not diaphoretic.   HENT:      Head: Normocephalic and atraumatic.      Mouth/Throat:      Pharynx: No oropharyngeal exudate.   Eyes:      General: No scleral icterus.        Right eye: No discharge.         Left eye: No discharge.      Pupils: Pupils are equal, round, and reactive to light.   Cardiovascular:      Rate and Rhythm: Normal rate and regular rhythm.      Heart sounds: Normal heart sounds. No murmur heard.     No friction rub. No gallop.   Pulmonary:      Effort: Pulmonary effort is normal. No respiratory distress.      Breath sounds: Normal breath sounds. No wheezing.   Chest:      Chest wall: No tenderness.   Abdominal:      General: Bowel sounds are normal. There is no distension.      Palpations: Abdomen is soft.      Tenderness: There is no abdominal tenderness. "   Musculoskeletal:         General: No tenderness or deformity. Normal range of motion.      Cervical back: Normal range of motion and neck supple.   Skin:     General: Skin is warm and dry.      Coloration: Skin is not pale.      Findings: No erythema or rash.   Neurological:      Mental Status: She is alert and oriented to person, place, and time.      Cranial Nerves: No cranial nerve deficit.           ED Course, Procedures, & Data      Procedures                      No results found for any visits on 05/18/23.  Medications - No data to display  Labs Ordered and Resulted from Time of ED Arrival to Time of ED Departure - No data to display  No orders to display              Assessment & Plan    This is a 37-year-old female with a history of migraines who presents with headache.  Patient regularly takes migraine medication but headache has been refractory to this.  Is otherwise consistent with previous migraines.  She also notes nausea vomiting diarrhea.  Exam demonstrates no acute abnormalities.  Lab work shows no acute abnormalities.  COVID is negative.  Patient was given IV fluids, ketorolac, metoclopramide, and diphenhydramine.  On reevaluation patient is feeling improved.  We will discharge with return precautions.    I have reviewed the nursing notes. I have reviewed the findings, diagnosis, plan and need for follow up with the patient.    New Prescriptions    No medications on file       Final diagnoses:   None     Roslyn PENA, am serving as a trained medical scribe to document services personally performed by Gm Nicole DO, based on the provider's statements to me.     Gm PENA DO, was physically present and have reviewed and verified the accuracy of this note documented by Roslyn Haddad.    Gm Nicole DO.  [facility] EMERGENCY DEPARTMENT  5/18/2023     Gm Nicole DO  05/18/23 [id number]

## 2023-05-20 ENCOUNTER — ANCILLARY PROCEDURE (OUTPATIENT)
Dept: MRI IMAGING | Facility: CLINIC | Age: 38
End: 2023-05-20
Attending: FAMILY MEDICINE
Payer: COMMERCIAL

## 2023-05-20 DIAGNOSIS — R59.0 LYMPHADENOPATHY, SUBMENTAL: ICD-10-CM

## 2023-05-20 PROCEDURE — 70543 MRI ORBT/FAC/NCK W/O &W/DYE: CPT | Mod: GC | Performed by: RADIOLOGY

## 2023-05-20 PROCEDURE — A9585 GADOBUTROL INJECTION: HCPCS | Performed by: RADIOLOGY

## 2023-05-20 RX ORDER — GADOBUTROL 604.72 MG/ML
7.5 INJECTION INTRAVENOUS ONCE
Status: COMPLETED | OUTPATIENT
Start: 2023-05-20 | End: 2023-05-20

## 2023-05-20 RX ADMIN — GADOBUTROL 7.5 ML: 604.72 INJECTION INTRAVENOUS at 08:37

## 2023-05-26 DIAGNOSIS — R59.0 LYMPHADENOPATHY, SUBMENTAL: Primary | ICD-10-CM

## 2023-05-26 DIAGNOSIS — R59.0 LYMPHADENOPATHY, SUBMENTAL: ICD-10-CM

## 2023-05-26 NOTE — PROGRESS NOTES
Outpatient IR Referral  05/26/23      Referring Provider:  Barbara Davis DO in [facility]     IR referral Request:  Lymph node biopsy    ===  Brief History:      prominent cervical lymph nodes with the largest in the right level 1B region     decreased appetite, lightheadedness, anemia     ===  Pertinent Imaging Reviewed:      MR SOFT TISSUE NECK W/O & W CONTRAST 5/20/2023     HISTORY: Lymphadenopathy, submental    FINDINGS:   Prominent level 1A, 1B and level 2 lymph nodes, the largest a right  level 1A lymph node measuring 16 x 13 mm (series 12, image 23).     No focal oral cavity, nasopharyngeal, oropharyngeal, hypopharyngeal,  or glottic mucosal space abnormality.     Salivary glands are within normal limits.     Normal thyroid gland.     Imaged cervical vasculature is within normal limits.     No suspicious osseus lesion. No high grade spinal canal stenosis.     Clear paranasal sinuses and mastoid air cells. The imaged skull base,  intracranial and orbital structures are within normal limits.                                                                   IMPRESSION: Prominent nonspecific upper cervical lymph nodes the  largest in the right level 1B region. These are indeterminant.  Consider biopsy.    ===  Plan:    Request forwarded to neuro-radiology for review, KATELYN Davis, 5/26/23. NR [id number] *[phone].      Haris Ziegler PA-C  Interventional Radiology   IR on-call pager: [phone]    ===  Leticia Morales  [phone]    Authorizing: Barbara Davis DO in [facility]   Priority: Routine: Next available opening  Assign to: HARIS ZIEGLER   Diagnosis: Lymphadenopathy, submental [R59.0]    Order Specific Questions  What is the reason for the IR order request?  Biopsy     What type of biopsy is needed?  Lymph Node     Location of the Lymph Node?  prominent cervical lymph nodes with the largest in the right level 1B region     Patients clinical  information/history?  decreased appetite, lightheadedness, anemia     Is this biopsy procedure for research?  No     Specimen orders should be placed per site protocol  Acknowledge     Is there a specific location the exam needs to be scheduled at?  No specific location required     Call Back #  [phone]     Is the patient pregnant?  No     Is the patient on aspirin, Plavix or blood thinners?  No

## 2023-05-26 NOTE — CONSULTS
"Outpatient IR Referral  05/26/23       UPDATED NOTE 5/26/23    Referring Provider:  Barbara Davis DO in [facility]      IR referral Request:  Lymph node biopsy    ===  Brief History:       prominent cervical lymph nodes with the largest in the right level 1B region     decreased appetite, lightheadedness, anemia      ===  Pertinent Imaging Reviewed:       MR SOFT TISSUE NECK W/O & W CONTRAST 5/20/2023      HISTORY: Lymphadenopathy, submental     FINDINGS:   Prominent level 1A, 1B and level 2 lymph nodes, the largest a right  level 1A lymph node measuring 16 x 13 mm (series 12, image 23).     No focal oral cavity, nasopharyngeal, oropharyngeal, hypopharyngeal,  or glottic mucosal space abnormality.     Salivary glands are within normal limits.     Normal thyroid gland.     Imaged cervical vasculature is within normal limits.     No suspicious osseus lesion. No high grade spinal canal stenosis.     Clear paranasal sinuses and mastoid air cells. The imaged skull base,  intracranial and orbital structures are within normal limits.                                                                   IMPRESSION: Prominent nonspecific upper cervical lymph nodes the  largest in the right level 1B region. These are indeterminant.  Consider biopsy.     ===  Plan:    Case request and iamging reviewed by neuro-radiology Dr. Davis and Dr. Comer.     Approved for US guided submental lymph node biopsy.     Procedure order and surgical pathology order placed.    If requesting provider would like specimen sample sent for anything other than surgical pathology please use the IR order set \"IR RAD Biopsy or Fluid Aspirate Specimens\" to select your necessary diagnostic labs and pend for admission.     If there are labs you desire that are not found in this order set or you have questions regarding specific diagnostic labs please call the associated lab personnel.     No pre-op clinic visit is required.       Haris" Wayne Ziegler PA-C  Interventional Radiology   IR on-call pager: [phone]     ===  Leticia Morales  [id number]    Authorizing: Barbara Davis DO in [facility]   Priority: Routine: Next available opening  Assign to: JAMI ZIEGLER   Diagnosis: Lymphadenopathy, submental [R59.0]    Order Specific Questions  What is the reason for the IR order request?  Biopsy     What type of biopsy is needed?  Lymph Node     Location of the Lymph Node?  prominent cervical lymph nodes with the largest in the right level 1B region     Patients clinical information/history?  decreased appetite, lightheadedness, anemia     Is this biopsy procedure for research?  No     Specimen orders should be placed per site protocol  Acknowledge     Is there a specific location the exam needs to be scheduled at?  No specific location required     Call Back #  [phone]     Is the patient pregnant?  No     Is the patient on aspirin, Plavix or blood thinners?  No

## 2023-05-29 ASSESSMENT — HEADACHE IMPACT TEST (HIT 6)
WHEN YOU HAVE A HEADACHE HOW OFTEN DO YOU WISH YOU COULD LIE DOWN: VERY OFTEN
WHEN YOU HAVE HEADACHES HOW OFTEN IS THE PAIN SEVERE: VERY OFTEN
HOW OFTEN HAVE YOU FELT TOO TIRED TO WORK BECAUSE OF YOUR HEADACHES: VERY OFTEN
HIT6 TOTAL SCORE: 65
HOW OFTEN DID HEADACHS LIMIT CONCENTRATION ON WORK OR DAILY ACTIVITY: SOMETIMES
HOW OFTEN HAVE YOU FELT FED UP OR IRRITATED BECAUSE OF YOUR HEADACHES: VERY OFTEN
HOW OFTEN DO HEADACHES LIMIT YOUR DAILY ACTIVITIES: VERY OFTEN

## 2023-05-30 ENCOUNTER — OFFICE VISIT (OUTPATIENT)
Dept: NEUROLOGY | Facility: CLINIC | Age: 38
End: 2023-05-30
Payer: COMMERCIAL

## 2023-05-30 DIAGNOSIS — G43.709 CHRONIC MIGRAINE WITHOUT AURA WITHOUT STATUS MIGRAINOSUS, NOT INTRACTABLE: Primary | ICD-10-CM

## 2023-05-30 PROCEDURE — 64615 CHEMODENERV MUSC MIGRAINE: CPT | Performed by: PSYCHIATRY & NEUROLOGY

## 2023-05-30 NOTE — PROGRESS NOTES
RASHAAD Kelly DONNA Morales MRN# [id number]   Age: 35 year old YOB: 1985      Botulinum Toxin Procedure     The procedure was explained to the patient. Benefits of the treatment were discussed including headache and migraine reduction. Risks of the procedure were reviewed including but not limited to pain, bruising, bleeding, infection, weakness of muscles injected or those distal to injection. The patient voiced understanding of the risks and benefits. All questions answered and the patient consented to proceed.      Prior to receiving Botox injections the patient reported the following:  Baseline headache frequency:30 days  Baseline headache duration: constant  Baseline MIDAS score: 150  Associated migrainous features:dizziness,      Previous treatment trials:  topiramate  emgality recommended in past but insurance denied.  Sumatriptan  Metoclopramide  doxycycline     Last Botox procedure: 3/3/23 with Ms Williams PETTY  Current migraine frequency: First 8 weeks migraines just around menstrual cycle, last month all the time. One ED visit.   The patient reports she is going to travel to [facility] this summer to seek other treatments for migraine that are not western medicine at the urging of her .   She also notes they are thinking about pregnancy. We reviewed that we could continue with Botox during pregnancy but Ubrelvy should be avoided. Sumatriptan is preferred rescue medicine in pregnant woman considering most pregnancy registry information. Other medicines she uses will also need ot be reviewed with PCP and other healthcare providers.        A 200 unit vial of Botox was diluted with 4ml of 0.9% sodium chloride     Botox lot # and expiration date: SEE MAR for details  0.9% sodium chloride lot # and expiration date: See MAR for details     The following muscles were injected using a 30 gauge needle:  Procerus 1 site 5 units   2 sites (bilat) 10 units  Frontalis 4 sites (bilat) 20  units  Temporalis 8 sites (bilat) 50 units  Trapezius muscles 6 sites (bilat) 40 units  Cervical paraspinals (bilat) 4 sites 20 units  Occipitalis 6 sites (bilat) 30 units     A total of 175 units were injected, 25 units wasted.   The patient tolerated the injections well. I was present for and performed the entire procedure.     Avril Perez MD

## 2023-05-30 NOTE — LETTER
5/30/2023       RE: Leticia Morales  [address]  [place] [address]       Dear Colleague,    Thank you for referring your patient, Leticia Morales, to the [facility] [place] at [facility]. Please see a copy of my visit note below.        Physicians     Leticia Morales MRN# [id number]   Age: 35 year old YOB: 1985      Botulinum Toxin Procedure     The procedure was explained to the patient. Benefits of the treatment were discussed including headache and migraine reduction. Risks of the procedure were reviewed including but not limited to pain, bruising, bleeding, infection, weakness of muscles injected or those distal to injection. The patient voiced understanding of the risks and benefits. All questions answered and the patient consented to proceed.      Prior to receiving Botox injections the patient reported the following:  Baseline headache frequency:30 days  Baseline headache duration: constant  Baseline MIDAS score: 150  Associated migrainous features:dizziness,      Previous treatment trials:  topiramate  emgality recommended in past but insurance denied.  Sumatriptan  Metoclopramide  doxycycline     Last Botox procedure: 3/3/23 with Ms Kramer JOVANNY  Current migraine frequency: First 8 weeks migraines just around menstrual cycle, last month all the time. One ED visit.   The patient reports she is going to travel to [facility] this summer to seek other treatments for migraine that are not western medicine at the urging of her .   She also notes they are thinking about pregnancy. We reviewed that we could continue with Botox during pregnancy but Ubrelvy should be avoided. Sumatriptan is preferred rescue medicine in pregnant woman considering most pregnancy registry information. Other medicines she uses will also need ot be reviewed with PCP and other healthcare providers.        A 200 unit vial of Botox was  Form signed.  But it has been over a year since patient had visit- please outreach to schedule annual physical.    Chiqui Gallegos, NAYELI, APRN, CNP    diluted with 4ml of 0.9% sodium chloride     Botox lot # and expiration date: SEE MAR for details  0.9% sodium chloride lot # and expiration date: See MAR for details     The following muscles were injected using a 30 gauge needle:  Procerus 1 site 5 units   2 sites (bilat) 10 units  Frontalis 4 sites (bilat) 20 units  Temporalis 8 sites (bilat) 50 units  Trapezius muscles 6 sites (bilat) 40 units  Cervical paraspinals (bilat) 4 sites 20 units  Occipitalis 6 sites (bilat) 30 units     A total of 175 units were injected, 25 units wasted.   The patient tolerated the injections well. I was present for and performed the entire procedure.           Again, thank you for allowing me to participate in the care of your patient.      Sincerely,    Avril Perez MD

## 2023-08-08 ENCOUNTER — MYC MEDICAL ADVICE (OUTPATIENT)
Dept: INTERVENTIONAL RADIOLOGY/VASCULAR | Facility: CLINIC | Age: 38
End: 2023-08-08
Payer: COMMERCIAL

## 2023-08-09 ENCOUNTER — TELEPHONE (OUTPATIENT)
Dept: INTERVENTIONAL RADIOLOGY/VASCULAR | Facility: CLINIC | Age: 38
End: 2023-08-09
Payer: COMMERCIAL

## 2023-08-10 ENCOUNTER — APPOINTMENT (OUTPATIENT)
Dept: MEDSURG UNIT | Facility: CLINIC | Age: 38
End: 2023-08-10
Attending: PSYCHIATRY & NEUROLOGY
Payer: COMMERCIAL

## 2023-08-10 ENCOUNTER — HOSPITAL ENCOUNTER (OUTPATIENT)
Facility: CLINIC | Age: 38
Discharge: HOME | End: 2023-08-10
Attending: PSYCHIATRY & NEUROLOGY | Admitting: PSYCHIATRY & NEUROLOGY
Payer: COMMERCIAL

## 2023-08-10 VITALS
SYSTOLIC BLOOD PRESSURE: 110 MMHG | DIASTOLIC BLOOD PRESSURE: 76 MMHG | TEMPERATURE: 97.8 F | RESPIRATION RATE: 20 BRPM | HEART RATE: 100 BPM | OXYGEN SATURATION: 98 %

## 2023-08-10 DIAGNOSIS — R59.0 LYMPHADENOPATHY, SUBMENTAL: ICD-10-CM

## 2023-08-10 LAB
ERYTHROCYTE [DISTWIDTH] IN BLOOD BY AUTOMATED COUNT: 18.1 % (ref 10–15)
HCT VFR BLD AUTO: 34.1 % (ref 35–47)
HGB BLD-MCNC: 10.6 G/DL (ref 11.7–15.7)
HOLD SPECIMEN: NORMAL
INR PPP: 1.11 (ref 0.85–1.15)
MCH RBC QN AUTO: 22.5 PG (ref 26.5–33)
MCHC RBC AUTO-ENTMCNC: 31.1 G/DL (ref 31.5–36.5)
MCV RBC AUTO: 72 FL (ref 78–100)
PLATELET # BLD AUTO: 316 10E3/UL (ref 150–450)
RBC # BLD AUTO: 4.72 10E6/UL (ref 3.8–5.2)
WBC # BLD AUTO: 4.9 10E3/UL (ref 4–11)

## 2023-08-10 PROCEDURE — 999N000142 HC STATISTIC PROCEDURE PREP ONLY

## 2023-08-10 PROCEDURE — 999N000248 HC STATISTIC IV INSERT WITH US BY RN

## 2023-08-10 PROCEDURE — 85027 COMPLETE CBC AUTOMATED: CPT | Performed by: NURSE PRACTITIONER

## 2023-08-10 PROCEDURE — 36415 COLL VENOUS BLD VENIPUNCTURE: CPT | Performed by: NURSE PRACTITIONER

## 2023-08-10 PROCEDURE — 258N000003 HC RX IP 258 OP 636: Performed by: NURSE PRACTITIONER

## 2023-08-10 PROCEDURE — 85610 PROTHROMBIN TIME: CPT | Performed by: NURSE PRACTITIONER

## 2023-08-10 RX ORDER — SODIUM CHLORIDE 9 MG/ML
INJECTION, SOLUTION INTRAVENOUS CONTINUOUS
Status: DISCONTINUED | OUTPATIENT
Start: 2023-08-10 | End: 2023-08-10 | Stop reason: HOSPADM

## 2023-08-10 RX ORDER — LIDOCAINE 40 MG/G
CREAM TOPICAL
Status: DISCONTINUED | OUTPATIENT
Start: 2023-08-10 | End: 2023-08-10 | Stop reason: HOSPADM

## 2023-08-10 RX ADMIN — SODIUM CHLORIDE: 9 INJECTION, SOLUTION INTRAVENOUS at 12:35

## 2023-08-10 ASSESSMENT — ACTIVITIES OF DAILY LIVING (ADL)
ADLS_ACUITY_SCORE: 35

## 2023-08-29 ENCOUNTER — OFFICE VISIT (OUTPATIENT)
Dept: NEUROLOGY | Facility: CLINIC | Age: 38
End: 2023-08-29
Payer: COMMERCIAL

## 2023-08-29 DIAGNOSIS — G43.709 CHRONIC MIGRAINE WITHOUT AURA WITHOUT STATUS MIGRAINOSUS, NOT INTRACTABLE: Primary | ICD-10-CM

## 2023-08-29 PROCEDURE — 64616 CHEMODENERV MUSC NECK DYSTON: CPT | Performed by: PSYCHIATRY & NEUROLOGY

## 2023-08-29 PROCEDURE — 99207 PR CDG-PROCEDURE CHARGE ONLY: CPT | Performed by: PSYCHIATRY & NEUROLOGY

## 2023-08-29 NOTE — LETTER
"8/29/2023       RE: Leticia Morales  [address]  [place] [address]       Dear Colleague,    Thank you for referring your patient, Leticia Morales, to the [facility] [place] at [facility]. Please see a copy of my visit note below.        Physicians     Leticia Morales MRN# [id number]   Age: 35 year old YOB: 1985      Botulinum Toxin Procedure     The procedure was explained to the patient. Benefits of the treatment were discussed including headache and migraine reduction. Risks of the procedure were reviewed including but not limited to pain, bruising, bleeding, infection, weakness of muscles injected or those distal to injection. The patient voiced understanding of the risks and benefits. All questions answered and the patient consented to proceed.      Prior to receiving Botox injections the patient reported the following:  Baseline headache frequency:30 days  Baseline headache duration: constant  Baseline MIDAS score: 150  Associated migrainous features:dizziness,      Previous treatment trials:  topiramate  emgality recommended in past but insurance denied.  Sumatriptan  Metoclopramide  doxycycline     Last Botox procedure: 5/30/2023  Current migraine frequency:  She reports migraines have been \"good\" She reports when she gets a headache she would take naratriptan and within one hour the headache would go away. Only 2 episodes in all of August so far.  No longer has a constant headache.   Dizziness is also better.  She reports no disability from migraines at this time  She is going to [place] for 6 months will try to get Botox in [place] to maintain.        A 200 unit vial of Botox was diluted with 4ml of 0.9% sodium chloride     Botox lot # and expiration date: SEE MAR for details  0.9% sodium chloride lot # and expiration date: See MAR for details     The following muscles were injected using a 30 gauge " needle:  Procerus 1 site 5 units   2 sites (bilat) 10 units  Frontalis 4 sites (bilat) 20 units  Temporalis 8 sites (bilat) 50 units  Trapezius muscles 6 sites (bilat) 40 units  Cervical paraspinals (bilat) 4 sites 20 units  Occipitalis 6 sites (bilat) 30 units     A total of 175 units were injected, 25 units wasted.   The patient tolerated the injections well. I was present for and performed the entire procedure.         Again, thank you for allowing me to participate in the care of your patient.      Sincerely,    Avril Perez MD

## 2023-08-29 NOTE — PROGRESS NOTES
"   RASHAAD Morales MRN# [id number]   Age: 35 year old YOB: 1985      Botulinum Toxin Procedure     The procedure was explained to the patient. Benefits of the treatment were discussed including headache and migraine reduction. Risks of the procedure were reviewed including but not limited to pain, bruising, bleeding, infection, weakness of muscles injected or those distal to injection. The patient voiced understanding of the risks and benefits. All questions answered and the patient consented to proceed.      Prior to receiving Botox injections the patient reported the following:  Baseline headache frequency:30 days  Baseline headache duration: constant  Baseline MIDAS score: 150  Associated migrainous features:dizziness,      Previous treatment trials:  topiramate  emgality recommended in past but insurance denied.  Sumatriptan  Metoclopramide  doxycycline     Last Botox procedure: 5/30/2023  Current migraine frequency:  She reports migraines have been \"good\" She reports when she gets a headache she would take naratriptan and within one hour the headache would go away. Only 2 episodes in all of August so far.  No longer has a constant headache.   Dizziness is also better.  She reports no disability from migraines at this time  She is going to [place] for 6 months will try to get Botox in [place] to maintain.        A 200 unit vial of Botox was diluted with 4ml of 0.9% sodium chloride     Botox lot # and expiration date: SEE MAR for details  0.9% sodium chloride lot # and expiration date: See MAR for details     The following muscles were injected using a 30 gauge needle:  Procerus 1 site 5 units   2 sites (bilat) 10 units  Frontalis 4 sites (bilat) 20 units  Temporalis 8 sites (bilat) 50 units  Trapezius muscles 6 sites (bilat) 40 units  Cervical paraspinals (bilat) 4 sites 20 units  Occipitalis 6 sites (bilat) 30 units     A total of 175 units were injected, 25 units wasted. "   The patient tolerated the injections well. I was present for and performed the entire procedure.     Avril Perez MD

## 2023-09-25 DIAGNOSIS — K21.9 GASTROESOPHAGEAL REFLUX DISEASE WITHOUT ESOPHAGITIS: ICD-10-CM

## 2023-10-31 ENCOUNTER — OFFICE VISIT (OUTPATIENT)
Dept: FAMILY MEDICINE | Facility: CLINIC | Age: 38
End: 2023-10-31
Payer: COMMERCIAL

## 2023-10-31 VITALS
TEMPERATURE: 98.5 F | WEIGHT: 128.2 LBS | RESPIRATION RATE: 198 BRPM | DIASTOLIC BLOOD PRESSURE: 78 MMHG | SYSTOLIC BLOOD PRESSURE: 111 MMHG | OXYGEN SATURATION: 99 % | HEIGHT: 65 IN | BODY MASS INDEX: 21.36 KG/M2 | HEART RATE: 112 BPM

## 2023-10-31 DIAGNOSIS — Z00.00 ROUTINE GENERAL MEDICAL EXAMINATION AT A HEALTH CARE FACILITY: Primary | ICD-10-CM

## 2023-10-31 DIAGNOSIS — Z12.4 CERVICAL CANCER SCREENING: ICD-10-CM

## 2023-10-31 DIAGNOSIS — K21.9 GASTROESOPHAGEAL REFLUX DISEASE WITHOUT ESOPHAGITIS: ICD-10-CM

## 2023-10-31 DIAGNOSIS — G43.109 MIGRAINE WITH AURA AND WITHOUT STATUS MIGRAINOSUS, NOT INTRACTABLE: ICD-10-CM

## 2023-10-31 PROCEDURE — 99395 PREV VISIT EST AGE 18-39: CPT | Mod: GC

## 2023-10-31 RX ORDER — NARATRIPTAN 2.5 MG/1
2.5 TABLET ORAL
Qty: 120 TABLET | Refills: 0 | Status: SHIPPED | OUTPATIENT
Start: 2023-10-31 | End: 2023-11-29

## 2023-10-31 ASSESSMENT — ENCOUNTER SYMPTOMS
PALPITATIONS: 0
DIZZINESS: 1
FEVER: 0
DIARRHEA: 0
FREQUENCY: 0
BREAST MASS: 0
HEMATOCHEZIA: 0
HEADACHES: 1
ARTHRALGIAS: 0
SHORTNESS OF BREATH: 0
JOINT SWELLING: 0
ABDOMINAL PAIN: 0
SORE THROAT: 0
DYSURIA: 0
NAUSEA: 0
CONSTIPATION: 0
WEAKNESS: 1
MYALGIAS: 0
HEMATURIA: 0
NERVOUS/ANXIOUS: 1
PARESTHESIAS: 0
EYE PAIN: 0
HEARTBURN: 1
CHILLS: 0
COUGH: 0

## 2023-10-31 ASSESSMENT — PAIN SCALES - GENERAL: PAINLEVEL: NO PAIN (0)

## 2023-10-31 NOTE — COMMUNITY RESOURCES LIST (ENGLISH)
10/31/2023   [facility]  N/A  For questions about this resource list or additional care needs, please contact your primary care clinic or care manager.  Phone: [phone]   Email: N/A   Address: [address] [place] [address]   Hours: N/A        Transportation       Free or low-cost transportation  1  [facility] Distance: 1.93 miles      In-Person   [address] [place] [address]  Language: English  Hours: Mon - Wed 9:30 AM - 12:00 PM , Mon - Wed 1:00 PM - 2:00 PM Appt. Only  Fees: Free   Phone: (389) 282-6091 Email: info@saintolaf.org Website: http://www.saintolaf.org/     2  [facility] - Free or low-cost transportation Distance: 2.14 miles      In-Person   [address] [place] [address]  Language: English  Hours: Tue 9:30 AM - 11:30 AM , Thu 9:30 AM - 11:30 AM  Fees: Free   Phone: (678) 648-7581 Email: info@Gobble Website: http://www.Gobble/     Transportation to medical appointments  3  [facility]   Family Wellness (AIFW) Distance: 5.33 miles      In-Person   [address] [place] [address]  Language: Tajik, Sami, English, Gujarati, Darren, Khmer, Thai, Amharic, Malay, Kazakh  Hours: Mon - Wed 9:00 AM - 5:00 PM , Thu 12:00 PM - 6:00 PM , Fri 9:00 AM - 5:00 PM , Sun 10:30 AM - 2:00 PM Appt. Only  Fees: Free   Phone: (187) 779-8813 Email: info@sewa-aifw.org Website: https://www.sewa-aifw.org/     4  [facility] [place] and [facility] Distance: 5.58 miles      In-Person   [address] [place] [address]  Language: English  Hours: Mon - Sun Open 24 Hours  Fees: Insurance, Self Pay   Phone: (202) 252-8034 Email: malvin@Medlio Website: https://www.Medlio/midmetro/          Important Numbers & Websites       Emergency Services   911  [facility] Services   [facility]  Poison Control   (890) 865-5743  Suicide Prevention Lifeline   (857) 862-4439  (TALK)  Child Abuse Hotline   (722) 973-4353 (4-A-Child)  Sexual Assault Hotline   (462) 465-8854 (HOPE)  National Runaway Safeline   (881) 676-8368 (RUNAWAY)  All-Options Talkline   (163) 306-8090  Substance Abuse Referral   (341) 493-6098 (HELP)

## 2023-10-31 NOTE — COMMUNITY RESOURCES LIST (ENGLISH)
10/31/2023   [facility]  N/A  For questions about this resource list or additional care needs, please contact your primary care clinic or care manager.  Phone: [phone]   Email: N/A   Address: [address] [place] [address]   Hours: N/A        Transportation       Free or low-cost transportation  1  [facility] Distance: 1.93 miles      In-Person   [address] [place] [address]  Language: English  Hours: Mon - Wed 9:30 AM - 12:00 PM , Mon - Wed 1:00 PM - 2:00 PM Appt. Only  Fees: Free   Phone: (716) 964-5400 Email: info@saintolaf.org Website: http://www.saintolaf.org/     2  [facility] - Free or low-cost transportation Distance: 2.14 miles      In-Person   [address] [place] [address]  Language: English  Hours: Tue 9:30 AM - 11:30 AM , Thu 9:30 AM - 11:30 AM  Fees: Free   Phone: (381) 549-4227 Email: info@[employer] Website: http://www.Above All Software/     Transportation to medical appointments  3  [facility]   Family Wellness (AIFW) Distance: 5.33 miles      In-Person   [address] [place] [address]  Language: Wolof, Nepali, English, Gujarati, Darren, Persian, English, Estonian, Occitan, Wolof  Hours: Mon - Wed 9:00 AM - 5:00 PM , Thu 12:00 PM - 6:00 PM , Fri 9:00 AM - 5:00 PM , Sun 10:30 AM - 2:00 PM Appt. Only  Fees: Free   Phone: (723) 983-6853 Email: info@sewa-aifw.org Website: https://www.sewa-aifw.org/     4  [facility] - [place] and [facility] Distance: 5.58 miles      In-Person   [address] [place] [address]  Language: English  Hours: Mon - Sun Open 24 Hours  Fees: Insurance, Self Pay   Phone: (942) 860-3453 Email: malvin@WiDaPeople Website: https://www.WiDaPeople/midmetro/          Important Numbers & Websites       Emergency Services   911  [facility] Services   [facility]  Poison Control   (359) 965-3118  Suicide Prevention Lifeline   (691) 146-5349  (TALK)  Child Abuse Hotline   (519) 479-9086 (4-A-Child)  Sexual Assault Hotline   (261) 748-7251 (HOPE)  National Runaway Safeline   (618) 137-6211 (RUNAWAY)  All-Options Talkline   (991) 225-5218  Substance Abuse Referral   (375) 919-1720 (HELP)

## 2023-10-31 NOTE — COMMUNITY RESOURCES LIST (PATIENT PREFERRED LANGUAGE)
10/31/2023   [facility]  N/A  Filiberto minor lo smith ama torrie coto , sanjananaomy jsaon howelljun alireza mcmahon  ama joey oneil.  Phone: [phone]   Email: N/A   Address: [address] [place] [address]   Hours: N/A        Saroj       Gaadiid bilaash  ama qiimo jaban  1  Kaniisadda Katooliga  [place] Fogaanta: 1.93 miles      Q ahaan   [address] [place] [address]  Luuqad: Ingiriis  Saacadaha: Isniinta - Arbacada 9:30 AM - 12:00 PM , Isniinta - Arbacada 1:00 PM - 2:00 PM Appt. Scooby Velazquez: Gold   Phone: (943) 583-2269 Email: info@saintolaf.Shopseen Website: http://www.saintolaf.org/     2  [facility] - Gaadiid bilaash  ama qiimo jaban Fogaanta: 2.14 miles      [facility] ahaan   [address] [place] [address]  Luuqad: Ingiriis  Saacadaha: Talaado 9:30 AM - 11:30 AM , Khamiista 9:30 AM - 11:30 AM  Christaarashyada: Bilaash   Phone: (417) 135-5409 Email: info@salena.Shopseen Website: http://www.salena.org/     Saroj james  3  [place] - Fayoqabka Qoyska Hindida Aasiya (AIFW) Fogaanta: 5.33 miles      [facility] ahaan   [address] [place] [address]  Luuqad: Arabic, Carabi, Faaris, Gujarati, Darren, Ingiriis, Occitan, Luxembourgish, Slovak, Urduu  Saacadaha: Isniinta - Arbacada 9:00 AM - 5:00 PM , Khamiista 12:00 PM - 6:00 PM , Jimce 9:00 AM - 5:00 PM , Axad 10:30 AM - 2:00 PM Appt. Scooby Velazquez: Gold   Phone: (819) 464-9295 Email: info@[employer].Shopseen Website: https://www.[employer].org/     4  [facility] Fogaanta: 5.58 miles      [facility]of [facility]   [address] [place] [address]  Crow: Penelope Umana: Annabelle Fernandez [address] [facility]  Caroline: Jayce Porter   Phone: (860) 827-6652 Email: malvin@[email or web address] Website: https://www.Zhihu/shaylee/          Aldenooyiteresa  Bala linton & Milan Caraballo   911  Adeegharjeet Salmeron   [address]  Brian Valentine   [phone]  Lifeline ka silas [place]   (534) 602-3913 (TALK)  Franko Benites   (745) 963-5393 (4-A-Child)  Franko jennings Galcourtney   ([phone] [phone] (HOPE)  National Runaway Safeline   (859) 808-5898 (RUNAWAY)  Franko Roy   ([phone]  Rosemarie Gill   [phone] (HELP)

## 2023-10-31 NOTE — PROGRESS NOTES
SUBJECTIVE:   CC: Leticia is an 37 year old who presents for preventive health visit.     Going to [place] for 3-4 months. She is going for her arranged marriage and thinks that they may start having children soon after the wedding. Migraines are becoming more frequent, but botox injection on 8/29/2023 was helpful at first and lasted about 6 weeks. Medications are no longer helping that much unless she takes ubrevly and nortriptyline at the same time. Historically tried propranolol, amitriptyline, sumatriptan with side effects.   Also has GERD sx, which were well-controlled with omeprazole.    Healthy Habits:     Getting at least 3 servings of Calcium per day:  NO    Bi-annual eye exam:  NO    Dental care twice a year:  NO    Sleep apnea or symptoms of sleep apnea:  Daytime drowsiness    Diet:  Regular (no restrictions)    Frequency of exercise:  None    Taking medications regularly:  No    Barriers to taking medications:  Problems remembering to take them and Side effects    Medication side effects:  Not applicable    Additional concerns today:  Yes    Have you ever done Advance Care Planning? (For example, a Health Directive, POLST, or a discussion with a medical provider or your loved ones about your wishes): No, advance care planning information given to patient to review.  Patient plans to discuss their wishes with loved ones or provider.      Social History     Tobacco Use    Smoking status: Former     Types: Hookah    Smokeless tobacco: Never   Substance Use Topics    Alcohol use: No         10/31/2023     1:02 PM   Alcohol Use   Prescreen: >3 drinks/day or >7 drinks/week? Not Applicable     Reviewed orders with patient.  Reviewed health maintenance and updated orders accordingly - Yes    Breast Cancer Screening:  Any new diagnosis of family breast, ovarian, or bowel cancer? No    FHS-7: Reviewed.    Pertinent mammograms are reviewed under the imaging tab.    History of abnormal Pap smear: NO - age 30- 65 PAP  "every 3 years recommended- declined     Reviewed and updated as needed this visit by clinical staff   Tobacco  Allergies   Problems             Reviewed and updated as needed this visit by Provider      Problems              Review of Systems   Constitutional:  Negative for chills and fever.   HENT:  Negative for congestion, ear pain, hearing loss and sore throat.    Eyes:  Negative for pain and visual disturbance.   Respiratory:  Negative for cough and shortness of breath.    Cardiovascular:  Negative for chest pain, palpitations and peripheral edema.   Gastrointestinal:  Positive for heartburn. Negative for abdominal pain, constipation, diarrhea, hematochezia and nausea.   Breasts:  Negative for tenderness, breast mass and discharge.   Genitourinary:  Negative for dysuria, frequency, genital sores, hematuria, pelvic pain, urgency, vaginal bleeding and vaginal discharge.   Musculoskeletal:  Negative for arthralgias, joint swelling and myalgias.   Skin:  Negative for rash.   Neurological:  Positive for dizziness, weakness and headaches. Negative for paresthesias.   Psychiatric/Behavioral:  Positive for mood changes. The patient is nervous/anxious.       OBJECTIVE:   /78   Pulse 112   Temp 98.5  F (36.9  C)   Resp (!) 198   Ht 1.638 m (5' 4.5\")   Wt 58.2 kg (128 lb 3.2 oz)   LMP 10/24/2023 (Approximate)   SpO2 99%   BMI 21.67 kg/m    Physical Exam  Vitals reviewed.   Constitutional:       Appearance: Normal appearance.   HENT:      Head: Normocephalic and atraumatic.      Nose: Nose normal.      Mouth/Throat:      Mouth: Mucous membranes are moist.      Pharynx: Oropharynx is clear.   Eyes:      Extraocular Movements: Extraocular movements intact.      Conjunctiva/sclera: Conjunctivae normal.   Cardiovascular:      Rate and Rhythm: Normal rate and regular rhythm.      Heart sounds: Normal heart sounds.   Pulmonary:      Effort: Pulmonary effort is normal.      Breath sounds: Normal breath sounds. "   Abdominal:      General: There is no distension.      Palpations: Abdomen is soft.      Tenderness: There is no abdominal tenderness.   Musculoskeletal:         General: Normal range of motion.      Cervical back: Normal range of motion and neck supple.   Skin:     General: Skin is warm and dry.   Neurological:      Mental Status: She is alert and oriented to person, place, and time.       ASSESSMENT/PLAN:   (Z00.00) Routine general medical examination at a health care facility  (primary encounter diagnosis)  Comment: Physical examination was unremarkable. Thoroughly reviewed male and female anatomy and explained the mechanics of sexual intercourse. Discussed different contraceptive options and will include references in after visit summary.  Plan: Follow up in one year    (G43.109) Migraine with aura and without status migrainosus, not intractable  Comment: Refill requested: refilled. Reviewed every other type of migraine medication we could potentially try besides propranolol, amitriptyline, sumatriptan such as verapamil, topiramate, ACE-inhibitors, but these are contraindicated during pregnancy. These contraindications include her current medications of naratriptan and ubrogepant. She is going to miss her Botox appointment with neurology on 11/28/2023, but said she would establish care with a neurologist in [place].  Plan: ubrogepant (UBRELVY) 50 MG tablet, naratriptan (AMERGE) 2.5 MG tablet    (Z12.4) Cervical cancer screening  Comment: Declined. Patient has never been sexually active.    (K21.9) Gastroesophageal reflux disease without esophagitis  Comment: Refill requested: refilled.  Plan: omeprazole (PRILOSEC) 20 MG DR capsule    Patient has been advised of split billing requirements and indicates understanding: Yes    COUNSELING:  Reviewed preventive health counseling, as reflected in patient instructions       Migraine medications that may affect pregnancy       Contraception       Family planning        Safe sex practices/STD prevention       Advance Care Planning    She reports that she has quit smoking. Her smoking use included hookah. She has never used smokeless tobacco.    Katy Craft DO, MPH  [facility]

## 2023-10-31 NOTE — COMMUNITY RESOURCES LIST (ENGLISH)
10/31/2023   [facility] [employer]  N/A  For questions about this resource list or additional care needs, please contact your primary care clinic or care manager.  Phone: [phone]   Email: N/A   Address: [facility] [place] [address]   Hours: N/A        Transportation       Free or low-cost transportation  1  [facility] Distance: 1.93 miles      In-Person   [address] [place] [address]  Language: English  Hours: Mon - Wed 9:30 AM - 12:00 PM , Mon - Wed 1:00 PM - 2:00 PM Appt. Only  Fees: Free   Phone: (667) 263-5500 Email: info@saintolaf.org Website: http://www.saintolaf.org/     2  [facility] - Free or low-cost transportation Distance: 2.14 miles      In-Person   [address] [place] [address]  Language: English  Hours: Tue 9:30 AM - 11:30 AM , Thu 9:30 AM - 11:30 AM  Fees: Free   Phone: (697) 812-3691 Email: info@Ripl Website: http://www.Ripl/     Transportation to medical appointments  3  [facility]   Family Wellness (AIFW) Distance: 5.33 miles      In-Person   [address] [place] [address]  Language: Irish, Albanian, English, Gujarati, Darren, Arabic, Chinese, Korean, Syriac, Turkmen  Hours: Mon - Wed 9:00 AM - 5:00 PM , Thu 12:00 PM - 6:00 PM , Fri 9:00 AM - 5:00 PM , Sun 10:30 AM - 2:00 PM Appt. Only  Fees: Free   Phone: (122) 122-2761 Email: info@sewa-aifw.org Website: https://www.sewa-aifw.org/     4  [facility] Distance: 5.58 miles      In-Person   [address] [place] [address]  Language: English  Hours: Mon - Sun Open 24 Hours  Fees: Insurance, Self Pay   Phone: (369) 771-9048 Email: malvin@ebooxter.com Website: https://www.ebooxter.com/midmetro/          Important Numbers & Websites       Emergency Services   911  [facility]   [facility]  Poison Control   (491) 693-8962  Suicide Prevention Lifeline   (621) 551-4532  (TALK)  Child Abuse Hotline   (392) 657-6846 (4-A-Child)  Sexual Assault Hotline   (200) 366-2067 (HOPE)  National Runaway Safeline   (608) 259-7718 (RUNAWAY)  All-Options Talkline   (187) 375-7609  Substance Abuse Referral   (400) 787-1554 (HELP)

## 2023-10-31 NOTE — COMMUNITY RESOURCES LIST (PATIENT PREFERRED LANGUAGE)
10/31/2023   [facility]  N/A  Filiberto minor lo smith ama torrie coto , sanjananaomy jason howelljun alireza mcmahon  ama joey oneil.  Phone: [phone]   Email: N/A   Address: [facility] [place] [address]   Hours: N/A        Saroj       Gaadiid bilaash  ama qiimo jaban  1  Kaniisadda Katooliga  [place] Fogaanta: 1.93 miles      Q ahaan   [address] [place] [address]  Luuqad: Ingiriis  Saacadaha: Isniinta - Arbacada 9:30 AM - 12:00 PM , Isniinta - Arbacada 1:00 PM - 2:00 PM Appt. Scooby Velazquez: Gold   Phone: (243) 232-9205 Email: info@saintolaf.[employer] Website: http://www.saintolaf.org/     2  [facility] - Gaadiid bilaash  ama qiimo jaban Fogaanta: 2.14 miles      [facility] ahaan   [address] [place] [address]  Luuqad: Ingiriis  Saacadaha: Talaado 9:30 AM - 11:30 AM , Khamiista 9:30 AM - 11:30 AM  Christaarashyada: Bilaash   Phone: [phone]) [phone] Email: info@salean.[employer] Website: http://www.salena.org/     Saroj james  3  Sew[place] - Fayoqabka Qoyska Hindida Aasiya (AIFW) Fogaanta: 5.33 miles      [facility] ahaan   [address] [place] [address]  Luuqad: Lithuanian, Carabi, Faaris, Gujarati, Darren, Ingiriis, Urdu, Kinyarwanda, Korean, Urduu  Saacadaha: Isniinta - Arbacada 9:00 AM - 5:00 PM , Khamiista 12:00 PM - 6:00 PM , Jimce 9:00 AM - 5:00 PM , Axad 10:30 AM - 2:00 PM Appt. Scooby Velazquez: Gold   Phone: (329) 317-9442 Email: info@[facility].[employer] Website: https://www.[facility].org/     4  Mel Grijalva [facility] Fogaanta: 5.58 miles      [facility]of [facility]   [address] [place] [address]  Crow: Penelope Umana: Annabelle Fernandez [address] [facility]  Caroline: Jayce Porter   Phone: [phone] Email: [email or web address] Website: [email or web address]          Aldenooyiteresa  Bala linton & Milan Caraballo   911  Adeegharjeet Salmeron   [address]  Brian Valentine   [phone]  Lif[employer] ka silas stanton   [phone] (TALK)  Franko Benites   [phone] (4-A-Child)  Franko jennings Galcourtney   [phone] (HOPE)  National Runaway Safeline   ([phone] 784-3646 (RUNAWAY)  Franko Roy   [phone]  Rosemarie Gill   [phone] (HELP)

## 2023-10-31 NOTE — COMMUNITY RESOURCES LIST (PATIENT PREFERRED LANGUAGE)
10/31/2023   [facility]  N/A  Filiberto minor lo smith ama torrie coto , sanjananaomy jason howelljun alireza mcmahon  ama joey oneil.  Phone: [phone]   Email: N/A   Address: [facility] [place] [address]   Hours: N/A        Saroj       Gaadiid bilaash  ama qiimo jaban  1  Kaniisadda Katooliga  [place] Fogaanta: 1.93 miles      Qof ahaan   [address] [place] [address]  Luuqad: Ingiriis  Saacadaha: Isniinta - Arbacada 9:30 AM - 12:00 PM , Isniinta - Arbacada 1:00 PM - 2:00 PM Appt. Scooby Velazquez: Gold   Phone: [phone] Email: info@saintolaf.iZumi Bio Website: http://www.saintolaf.org/     2  [facility] Vanesabhavna - Gaadiid bilaash  ama qiimo jaban Fogaanta: 5.33 miles      [facility] ahaan   [address] [place] [address]  Luuqad: Ingiriis  Saacadaha: Talaado 9:30 AM - 11:30 AM , Khamiista 9:30 AM - 11:30 AM  Christaarashyada: Bilaash   Phone: [phone] Email: info@salena.iZumi Bio Website: http://www.salena.org/     Saroj james  3  [place]ew[place] - Fayoqabka Qoyska Hindida Aasiya (AIFW) Fogaanta: 5.58 miles      of ahaan   [address] [place] [address]  Luuqad: Slovenian, Carabi, Faaris, Gujarati, Darren, Ingiriis, Slovenian, Yakut, Syriac, Urduu  Saacadaha: Isniinta - Arbacada 9:00 AM - 5:00 PM , Khamiista 12:00 PM - 6:00 PM , Jimce 9:00 AM - 5:00 PM , Axad 10:30 AM - 2:00 PM Appt. Scooby Velazquez: Gold   Phone: [phone] Email: info@[facility].i[employer]umi Bio Website: https://www.[facility].org/     4  Mel Grijalva [facility] CenterDelia vicki [facility] Fogaanta: 2.14 miles      Qof [facility]   [address] [place] [address]  Crow: Penelope Umana: Annabelle Fernandez [address] [facility]  Caroline: Jayce Porter   Phone: [phone] Email: [email or web address] Website: [email or web address]/          Aldenooyiteresa  Bala linton & Milan Caraballo   911  Adeegharjeet [address]   [address]  Brian Valentine   [phone]  Lifeline ka silas stanton   [phone] (TALK)  Franko Benites   [phone] (4-A-Child)  Franko jennings Galcourtney   [phone] (HOPE)  National Runaway Safeline   (721) 404-6175 (RUNAWAY)  Franko Roy   [phone]  Rosemarie Gill   [phone] (HELP)

## 2023-11-12 NOTE — PATIENT INSTRUCTIONS
Patient Education   Here is the plan from today's visit    1. Routine general medical examination at a health care facility    2. Cervical cancer screening    3. Gastroesophageal reflux disease without esophagitis  - omeprazole (PRILOSEC) 20 MG DR capsule; Take 1 capsule (20 mg) by mouth 2 times daily  Dispense: 240 capsule; Refill: 0    4. Migraine with aura and without status migrainosus, not intractable  - naratriptan (AMERGE) 2.5 MG tablet; Take 1 tablet (2.5 mg) by mouth at onset of headache for migraine May repeat in 4 hours. Max 2 tablets/24 hours.  Dispense: 120 tablet; Refill: 0  - ubrogepant (UBRELVY) 50 MG tablet; Take 1-2 tablets ( mg) by mouth at onset of headache (migraine) if needed a second dose may be taken at least 2 hours after the initial dose; MAX, 4 tablets /24 hrs  Dispense: 90 tablet; Refill: 0    Please call or return to clinic if your symptoms don't go away.    Follow up plan  Return in about 1 year (around 10/31/2024) for Preventive Visit.    Thank you for coming to [facility] today.  Lab Testing:  **If you had lab testing today and your results are reassuring or normal they will be mailed to you or sent through u.sit within 7 days.   **If the lab tests need quick action we will call you with the results.  **If you are having labs done on a different day, please call [phone] to schedule at [facility] or [phone] for other [facility] Outpatient Lab locations. Labs do not offer walk-in appointments.  The phone number we will call with results is # [phone] (home) . If this is not the best number please call our clinic and change the number.  Medication Refills:  If you need any refills please call your pharmacy and they will contact us.   If you need to  your refill at a new pharmacy, please contact the new pharmacy directly. The new pharmacy will help you get your medications transferred faster.   Scheduling:  If you have any concerns about today's  visit or wish to schedule another appointment please call our office during normal business hours [phone] (8-5:00 M-F). If you can no longer make a scheduled visit, please cancel via WomStreet or call us to cancel.   If a referral was made to an [facility] specialty provider and you do not get a call from central scheduling, please refer to directions on your visit summary or call our office during normal business hours for assistance.   If a Mammogram was ordered for you at the Breast Center call [phone] to schedule or change your appointment.  If you had an XRay/CT/Ultrasound/MRI ordered the number is [phone] to schedule or change your radiology appointment.   [facility] has limited ultrasound appointments available on Wednesdays, if you would like your ultrasound at [facility], please call [phone] to schedule.   Medical Concerns:  If you have urgent medical concerns please call [phone] at any time of the day.    Katy Craft, DO       Preventive Health Recommendations  Female Ages 26 - 39  Yearly exam:   See your health care provider every year in order to  Review health changes.   Discuss preventive care.    Review your medicines if you your doctor has prescribed any.    Until age 30: Get a Pap test every three years (more often if you have had an abnormal result).    After age 30: Talk to your doctor about whether you should have a Pap test every 3 years or have a Pap test with HPV screening every 5 years.   You do not need a Pap test if your uterus was removed (hysterectomy) and you have not had cancer.  You should be tested each year for STDs (sexually transmitted diseases), if you're at risk.   Talk to your provider about how often to have your cholesterol checked.  If you are at risk for diabetes, you should have a diabetes test (fasting glucose).  Shots: Get a flu shot each year. Get a tetanus shot every 10 years.   Nutrition:   Eat at least 5 servings of fruits and  vegetables each day.  Eat whole-grain bread, whole-wheat pasta and brown rice instead of white grains and rice.  Get adequate Calcium and Vitamin D.     Lifestyle  Exercise at least 150 minutes a week (30 minutes a day, 5 days of the week). This will help you control your weight and prevent disease.  Limit alcohol to one drink per day.  No smoking.   Wear sunscreen to prevent skin cancer.  See your dentist every six months for an exam and cleaning.

## 2023-11-16 DIAGNOSIS — G43.709 CHRONIC MIGRAINE WITHOUT AURA WITHOUT STATUS MIGRAINOSUS, NOT INTRACTABLE: Primary | ICD-10-CM

## 2023-11-29 ENCOUNTER — OFFICE VISIT (OUTPATIENT)
Dept: FAMILY MEDICINE | Facility: CLINIC | Age: 38
End: 2023-11-29
Payer: COMMERCIAL

## 2023-11-29 VITALS
HEART RATE: 100 BPM | RESPIRATION RATE: 12 BRPM | OXYGEN SATURATION: 99 % | SYSTOLIC BLOOD PRESSURE: 103 MMHG | TEMPERATURE: 98.7 F | DIASTOLIC BLOOD PRESSURE: 74 MMHG

## 2023-11-29 DIAGNOSIS — G43.109 MIGRAINE WITH AURA AND WITHOUT STATUS MIGRAINOSUS, NOT INTRACTABLE: Primary | ICD-10-CM

## 2023-11-29 PROCEDURE — 99214 OFFICE O/P EST MOD 30 MIN: CPT | Mod: GC

## 2023-11-29 RX ORDER — NARATRIPTAN 2.5 MG/1
2.5 TABLET ORAL
Qty: 16 TABLET | Refills: 6 | Status: SHIPPED | OUTPATIENT
Start: 2023-11-29 | End: 2023-12-08

## 2023-11-29 ASSESSMENT — PATIENT HEALTH QUESTIONNAIRE - PHQ9: SUM OF ALL RESPONSES TO PHQ QUESTIONS 1-9: 0

## 2023-11-29 NOTE — PROGRESS NOTES
Assessment & Plan     Migraine with aura and without status migrainosus, not intractable  Patient requested for 4 months of medications for travel on 10/31 office visit but was unable to receive the full prescriptions due to the large amount. Has had worsening symptoms as has not had medications.  Patient was able to fill Naratriptan 16 total tablets but did not receive her ubrelvy. Will provide refills, toninuing medstoday for her routine 16 tablets with refills.   - ubrogepant (UBRELVY) 50 MG tablet  Dispense: 16 tablet; Refill: 6  - naratriptan (AMERGE) 2.5 MG tablet  Dispense: 16 tablet; Refill: 6    Return if symptoms worsen or fail to improve.    Wes Salazar MD  [facility] RENATA Kelly is a 37 year old, presenting for the following health issues:  Migraine (Pt has ahd for 1 week.)        5/2/2023     1:17 PM   Additional Questions   Roomed by sudhir   Accompanied by Sera sister       HPI       Migraines:  - Currently on ubrevly and naratriptan for migraines, effective when taken together   - 10/31 refills provided for naratriptan and ubrevly office visit but did not receive ubrevly from pharmacy  - was given 4 months worth of medication but was not able to fill  - She was only given 16 tablets Naratriptan   - Last dose of naratriptan was yesterday   - [place] but came back early    - Botox schedule with neurology on 11/28/23 but had to reschedule  - Past month has been dealing with increase frequency and intensity   - triggers:   - this month increasing migraines due to lack of ubrevly medication   - before: stress, caffeine (stop using)  - reports poor sleep due to jet leg, she return from [place] (11/7-11/13)      Previous treatment trials:  topiramate  emgality recommended in past but insurance denied.  Sumatriptan  Metoclopramide  Doxycycline  propranolol,   amitriptyline,       Preventative care:  Flu, covid, pap- decline  today       Review of Systems   Constitutional, HEENT, cardiovascular, pulmonary, gi and gu systems are negative, except as otherwise noted.      Objective    /74   Pulse 100   Temp 98.7  F (37.1  C)   Resp 12   LMP 10/24/2023 (Approximate)   SpO2 99%   There is no height or weight on file to calculate BMI.  Physical Exam   GENERAL: healthy, alert and no distress  RESP: lungs clear to auscultation - no rales, rhonchi or wheezes  CV: regular rate and rhythm, normal S1 S2, no S3 or S4, no murmur, click or rub, no peripheral edema and peripheral pulses strong  ABDOMEN: soft, nontender, no hepatosplenomegaly, no masses and bowel sounds normal  NEURO:  mentation intact and speech normal  PSYCH: mentation appears normal, affect normal/bright        ----- Service Performed and Documented by Resident or Fellow ------

## 2023-11-29 NOTE — PATIENT INSTRUCTIONS
Patient Education   Here is the plan from today's visit    1. Migraine with aura and without status migrainosus, not intractable  Refills provided   - ubrogepant (UBRELVY) 50 MG tablet; Take 1-2 tablets ( mg) by mouth at onset of headache (migraine) if needed a second dose may be taken at least 2 hours after the initial dose; MAX, 4 tablets /24 hrs  Dispense: 16 tablet; Refill: 6  - naratriptan (AMERGE) 2.5 MG tablet; Take 1 tablet (2.5 mg) by mouth at onset of headache for migraine May repeat in 4 hours. Max 2 tablets/24 hours.  Dispense: 16 tablet; Refill: 6      Please call or return to clinic if your symptoms don't go away.    Follow up plan  No follow-ups on file.    Thank you for coming to [facility] today.  Lab Testing:  **If you had lab testing today and your results are reassuring or normal they will be mailed to you or sent through appMobi within 7 days.   **If the lab tests need quick action we will call you with the results.  **If you are having labs done on a different day, please call [phone] to schedule at [facility] or [phone] for other [facility] Outpatient Lab locations. Labs do not offer walk-in appointments.  The phone number we will call with results is # [phone] (home) . If this is not the best number please call our clinic and change the number.  Medication Refills:  If you need any refills please call your pharmacy and they will contact us.   If you need to  your refill at a new pharmacy, please contact the new pharmacy directly. The new pharmacy will help you get your medications transferred faster.   Scheduling:  If you have any concerns about today's visit or wish to schedule another appointment please call our office during normal business hours [phone] (8-5:00 M-F). If you can no longer make a scheduled visit, please cancel via appMobi or call us to cancel.   If a referral was made to an [facility] specialty provider and you do not get  a call from central scheduling, please refer to directions on your visit summary or call our office during normal business hours for assistance.   If a Mammogram was ordered for you at the Breast Center call [phone] to schedule or change your appointment.  If you had an XRay/CT/Ultrasound/MRI ordered the number is [phone] to schedule or change your radiology appointment.   [facility] has limited ultrasound appointments available on Wednesdays, if you would like your ultrasound at [facility], please call [phone] to schedule.   Medical Concerns:  If you have urgent medical concerns please call [phone] at any time of the day.    Wes Salazar MD

## 2023-12-07 ASSESSMENT — HEADACHE IMPACT TEST (HIT 6)
WHEN YOU HAVE A HEADACHE HOW OFTEN DO YOU WISH YOU COULD LIE DOWN: ALWAYS
HOW OFTEN DO HEADACHES LIMIT YOUR DAILY ACTIVITIES: VERY OFTEN
WHEN YOU HAVE HEADACHES HOW OFTEN IS THE PAIN SEVERE: VERY OFTEN
HOW OFTEN HAVE YOU FELT TOO TIRED TO WORK BECAUSE OF YOUR HEADACHES: ALWAYS
HIT6 TOTAL SCORE: 70
HOW OFTEN HAVE YOU FELT FED UP OR IRRITATED BECAUSE OF YOUR HEADACHES: VERY OFTEN
HOW OFTEN DID HEADACHS LIMIT CONCENTRATION ON WORK OR DAILY ACTIVITY: VERY OFTEN

## 2023-12-08 ENCOUNTER — TELEPHONE (OUTPATIENT)
Dept: NEUROLOGY | Facility: CLINIC | Age: 38
End: 2023-12-08

## 2023-12-08 ENCOUNTER — VIRTUAL VISIT (OUTPATIENT)
Dept: NEUROLOGY | Facility: CLINIC | Age: 38
End: 2023-12-08
Payer: COMMERCIAL

## 2023-12-08 VITALS — BODY MASS INDEX: 21.85 KG/M2 | WEIGHT: 128 LBS | HEIGHT: 64 IN

## 2023-12-08 DIAGNOSIS — G43.109 MIGRAINE WITH AURA AND WITHOUT STATUS MIGRAINOSUS, NOT INTRACTABLE: ICD-10-CM

## 2023-12-08 DIAGNOSIS — G43.711 INTRACTABLE CHRONIC MIGRAINE WITHOUT AURA AND WITH STATUS MIGRAINOSUS: Primary | ICD-10-CM

## 2023-12-08 DIAGNOSIS — G43.709 CHRONIC MIGRAINE WITHOUT AURA WITHOUT STATUS MIGRAINOSUS, NOT INTRACTABLE: Primary | ICD-10-CM

## 2023-12-08 PROCEDURE — 99213 OFFICE O/P EST LOW 20 MIN: CPT | Mod: 95 | Performed by: NURSE PRACTITIONER

## 2023-12-08 ASSESSMENT — PAIN SCALES - GENERAL: PAINLEVEL: MILD PAIN (2)

## 2023-12-08 NOTE — TELEPHONE ENCOUNTER
"Prior Authorization Retail Medication Request    Medication/Dose: Atogepant 60 MG TABS   Diagnosis and ICD code (if different than what is on RX):  [G43.709]   New/renewal/insurance change PA/secondary ins. PA:  Previously Tried and Failed:    sumatriptan takes almost daily -helps with the pain but has to repeat it again  Ibuprofen   metoclopromide as needed for headache and no side effects   Rizatriptan   Sumatriptan   venlafaxin has started and stopped last week due to side effects\" emotionally unstable\" and \"crying for no reason\"   Amitriptyline-side effects  Topiramate -severe side effects-dizziness and not functioning and fatigued   Gabapentin -side effects -brain fog   or less -would avoid BBB/CCB due to lower BP    Rationale:  migraine    Insurance   Primary: [facility]   Insurance ID:  [id number]    Pharmacy Information (if different than what is on RX)  Name:    Phone:    Fax:  "

## 2023-12-08 NOTE — PROGRESS NOTES
"Leticia is a 37 year old who is being evaluated via a billable video visit.        Subjective   Leticia is a 37 year old, presenting for the following health issues: Headache follow-up  Follow Up  Past medical history significant for arts secondary to age 2 and 2 influenza in 2010 that required hospitalization with tracheostomy, chronic migraines, depression, microcytic anemia 2/2 iron deficiency  Initial headache clinic visit on 5/15/2019, see note for headache and treatment details.  History of headaches for 10+ years.  Sister gets migraine headaches.  Last headache clinic visit on 3/22/2023.  Patient was considering pregnancy around that time so it was decided to avoid any medications with potential pregnancy contraindications.  We discussed to continue with Botox and risks reviewed, naratriptan and Ubrelvy as needed for rescue treatment but can be stopped if pregnancy suspected.  Metoclopramide and Benadryl as needed for migraine symptoms, patient stopped propranolol due to fatigue  Last emergency department visit for acute migraine treatment on 2023 for acute migraine treatment, note reviewed-negative ED evaluation.    Today patient reports that Botox last 2023 with Dr Perez and reported that \"headaches were good\".   Headaches has been increasing past couple months and last Botox did not feel has been as effective as Botox in the past.   Patient reports that she constantly feels she has a headache every day and at least 15 days per month with a severe nausea. Reports that symptoms are typical of her migraine headaches.   Naratriptan and Ubrelvy  use nearly every day and feels she is dependent more on her rescue treatment.   Patient traveled to [facility] for 5 days in November  vs 4 months planned. Had to return sooner because of family  in [place]. Headaches have increased before the travel. Reports that has been under a lot of pressure because got  and divorce within days. Patient feeling better " "stress wise and has a family support here.    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 Has family/community support.   No pregnancy plans now.     Treatments Tried:  prescription medication: sumatriptan takes almost daily -helps with the pain but has to repeat it again  Ibuprofen   metoclopromide as needed for headache and no side effects   Rizatriptan   Sumatriptan   Naratriptan-  venlafaxin has started and stopped last week due to side effects\" emotionally unstable\" and \"crying for no reason\"   Amitriptyline-side effects  Topiramate -severe side effects-dizziness and not functioning and fatigued   Gabapentin -side effects -brain fog   or less -would avoid BBB/CCB due to lower BP    Anemia -stable Hgb 10.6 recently -does not want to take pills and trying to get iron thru food+vit with iron    Last ED visit on 6/15/2021 and note reviewed   Treated with 1L 0.9NS, 10mg IV compazine, 12.5mg IV benadryl and 10mg IV decadron.     Impression   Chronic migraine   Possible rebound headaches   Discussed headache prevention -recommended to optimize prevention   Last brain MRI in 2021 stable. Will wait with imaging for now but certainly can be considered with any new headache symptoms and not responding to treatment.     Plan:  Botox as scheduled on 12/12 and reassessment after next round  Add qulipta 60 mg daily for headache prevention if tolerated and may cause nausea, drowsiness  Rescue treatment -stop naratriptan since it ineffective  May try Ubrelvy 100 mg at headache onset and repeat in 2 hours as needed. Max 2 tabs in 24 hours. Limit use to no more than 9 days per month  Mild-moderate headache may try naproxen 2 tabs every 12 hours with food as needed. Limit use to no more than 9 days per month.   Metoclopramide +benadryl as needed for nausea/vomiting/migraine. May take it " with naproxen if needed and/or ubrely  Follow up in 3 months or sooner if needed if headaches were getting worse.        DATA reviewed     MR BRAIN W/O & W CONTRAST 8/22/2021 9:30 AM     Provided History: Abnormal brain MRI; History of recent  hospitalization.  ICD-10: Abnormal brain MRI; History of recent hospitalization     Comparison: CTV 7/20/2021; MRI brain 7/19/2021, 7/15/2021.     Technique: Multiplanar T1-weighted, axial FLAIR, and susceptibility  images were obtained without intravenous contrast. Following  intravenous gadolinium-based contrast administration, axial  T2-weighted, diffusion, and T1-weighted images (in multiple planes)  were obtained.     Contrast: 5 Gadavist      Findings:  There is no mass effect, midline shift, or evidence of acute  intracranial hemorrhage. Diffuse punctate foci of hemosiderin  deposition in the cerebral subcortical white matter, corpus callosum,  and basal ganglia. Particularly prominent in the anterior bifrontal  lobes and splenium of the corpus callosum. The ventricles are  proportionate to the cerebral sulci. Normal major vascular  intracranial flow-voids. Question small left anterior frontal  convexity arachnoid cyst with minimal mass effect on the underlying  gyrus (series 14,001 image 94).     Postcontrast images demonstrate no abnormal intracranial enhancement.  No residual dural enhancement. Normalized size of pituitary gland, now  measures 9 x 7 mm, previously 11 x 11 mm on comparison 7/19/2021.  Posterior sagittal sinus is concave bilaterally, previously this  appeared convex on comparison 7/19/2021.     No abnormality of the skull marrow signal. The visualized portions of  paranasal sinuses, and mastoid air cells are relatively clear. The  orbits are grossly unremarkable.                                                                      Impression:  1.  Resolution of findings of previous intracranial hypotension, as  seen on comparison 7/29/2021. Previous  findings included enlargement  of the pituitary gland, dural enhancement, and a convex superior  sagittal sinus, all of which are no longer present.  2.  Stable nonspecific findings of prior petechial microhemorrhages  without associated acute signal changes. Favor sequela of prior ARDS  and associated critical illness, less likely diffuse axonal injury or  amyloidosis.     I have personally reviewed the examination and initial interpretation  and I agree with the findings.     ROMERO NARANJO MD         Objective    Vitals - Patient Reported  Pain Score: Mild Pain (2)  Pain Loc: Head (pressure)    Physical Exam   Patient sounds alert and no in apparent acute distress,  mentation appears normal, judgement and insight intact, normal speech, no weakness observed    I discussed all my recommendations with Leticia Morales who verbalizes understanding and comfortable with the plan.     29 minutes spent on the date of the encounter doing video access, chart  review, exam, results review,  meds review, treatment plan, documentation and further activities as noted above    CONNIE Garcia, CNP [facility]  Headache certified  [facility]    Video-Visit Details    Type of service:  Video Visit   Originating Location (pt. Location): Home  Distant Location (provider location):  Off-site  Platform used for Video Visit: Angie

## 2023-12-08 NOTE — NURSING NOTE
,Is the patient currently in the state of [place]? YES    Visit mode:VIDEO    If the visit is dropped, the patient can be reconnected by: VIDEO VISIT: Text to cell phone:   Telephone Information:   Mobile [phone]       Will anyone else be joining the visit? NO  (If patient encounters technical issues they should call [phone]    How would you like to obtain your AVS? MyChart    Are changes needed to the allergy or medication list? No    Reason for visit: Follow Up    Christine HURTADO

## 2023-12-08 NOTE — PATIENT INSTRUCTIONS
Plan:  Botox as scheduled on 12/12 and reassessment after next round   Add qulipta 60 mg daily for headache prevention if tolerated and may cause nausea, drowsiness  Rescue treatment -stop naratriptan   May try Ubrelvy 100 mg at headache onset and repeat in 2 hours as needed. Max 2 tabs in 24 hours. Limit use to no more than 9 days per month  Mild-moderate headache may try naproxen 2 tabs every 12 hours with food as needed. Limit use to no more than 9 days per month.   Metoclopramide +benadryl as needed for nausea/vomiting/migraine. May take it with naproxen if needed and/or ubrely  Follow up in 3 months or sooner if needed if headaches were getting worse.

## 2023-12-08 NOTE — TELEPHONE ENCOUNTER
"Prior Authorization Retail Medication Request    Medication/Dose: ubrogepant (UBRELVY) 100 MG tablet  Diagnosis and ICD code (if different than what is on RX):  [G43.109]   New/renewal/insurance change PA/secondary ins. PA:  Previously Tried and Failed:    sumatriptan takes almost daily -helps with the pain but has to repeat it again  Ibuprofen   metoclopromide as needed for headache and no side effects   Rizatriptan   Sumatriptan   venlafaxin has started and stopped last week due to side effects\" emotionally unstable\" and \"crying for no reason\"   Amitriptyline-side effects  Topiramate -severe side effects-dizziness and not functioning and fatigued   Gabapentin -side effects -brain fog   or less -would avoid BBB/CCB due to lower BP    Rationale:  migraine    Insurance   Primary: [employer]   Insurance ID:  [id number]    Pharmacy Information (if different than what is on RX)  Name:    Phone:    Fax:    "

## 2023-12-08 NOTE — LETTER
"12/8/2023       RE: Leticia Morales  [address]  [place] [address]     Dear Colleague,    Thank you for referring your patient, Leticia Morales, to the [facility] [place] at [facility]. Please see a copy of my visit note below.    Leticia is a 37 year old who is being evaluated via a billable video visit.        Subjective  Leticia is a 37 year old, presenting for the following health issues: Headache follow-up  Follow Up  Past medical history significant for arts secondary to age 2 and 1 influenza in 2010 that required hospitalization with tracheostomy, chronic migraines, depression, microcytic anemia 2/2 iron deficiency  Initial headache clinic visit on 5/15/2019, see note for headache and treatment details.  History of headaches for 10+ years.  Sister gets migraine headaches.  Last headache clinic visit on 3/22/2023.  Patient was considering pregnancy around that time so it was decided to avoid any medications with potential pregnancy contraindications.  We discussed to continue with Botox and risks reviewed, naratriptan and Ubrelvy as needed for rescue treatment but can be stopped if pregnancy suspected.  Metoclopramide and Benadryl as needed for migraine symptoms, patient stopped propranolol due to fatigue  Last emergency department visit for acute migraine treatment on 5/18/2023 for acute migraine treatment, note reviewed-negative ED evaluation.    Today patient reports that Botox last 8/29/2023 with Dr Perez and reported that \"headaches were good\".   Headaches has been increasing past couple months and last Botox did not feel has been as effective as Botox in the past.   Patient reports that she constantly feels she has a headache every day and at least 15 days per month with a severe nausea. Reports that symptoms are typical of her migraine headaches.   Naratriptan and Ubrelvy  use nearly every day and feels she is dependent " "more on her rescue treatment.   Patient traveled to [facility] for 5 days in November  vs 4 months planned. Had to return sooner because of family  in [place]. Headaches have increased before the travel. Reports that has been under a lot of pressure because got  and divorce within days. Patient feeling better stress wise and has a family support here.    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 Has family/community support.   No pregnancy plans now.     Treatments Tried:  prescription medication: sumatriptan takes almost daily -helps with the pain but has to repeat it again  Ibuprofen   metoclopromide as needed for headache and no side effects   Rizatriptan   Sumatriptan   Naratriptan-  venlafaxin has started and stopped last week due to side effects\" emotionally unstable\" and \"crying for no reason\"   Amitriptyline-side effects  Topiramate -severe side effects-dizziness and not functioning and fatigued   Gabapentin -side effects -brain fog   or less -would avoid BBB/CCB due to lower BP    Anemia -stable Hgb 10.6 recently -does not want to take pills and trying to get iron thru food+vit with iron    Last ED visit on 6/15/2021 and note reviewed   Treated with 1L 0.9NS, 10mg IV compazine, 12.5mg IV benadryl and 10mg IV decadron.     Impression   Chronic migraine   Possible rebound headaches   Discussed headache prevention -recommended to optimize prevention   Last brain MRI in  stable. Will wait with imaging for now but certainly can be considered with any new headache symptoms and not responding to treatment.     Plan:  Botox as scheduled on  and reassessment after next round  Add qulipta 60 mg daily for headache prevention if tolerated and may cause nausea, drowsiness  Rescue treatment -stop naratriptan since it ineffective  May try Ubrelvy 100 mg at headache " onset and repeat in 2 hours as needed. Max 2 tabs in 24 hours. Limit use to no more than 9 days per month  Mild-moderate headache may try naproxen 2 tabs every 12 hours with food as needed. Limit use to no more than 9 days per month.   Metoclopramide +benadryl as needed for nausea/vomiting/migraine. May take it with naproxen if needed and/or ubrely  Follow up in 3 months or sooner if needed if headaches were getting worse.        DATA reviewed     MR BRAIN W/O & W CONTRAST 8/22/2021 9:30 AM     Provided History: Abnormal brain MRI; History of recent  hospitalization.  ICD-10: Abnormal brain MRI; History of recent hospitalization     Comparison: CTV 7/20/2021; MRI brain 7/19/2021, 7/15/2021.     Technique: Multiplanar T1-weighted, axial FLAIR, and susceptibility  images were obtained without intravenous contrast. Following  intravenous gadolinium-based contrast administration, axial  T2-weighted, diffusion, and T1-weighted images (in multiple planes)  were obtained.     Contrast: 5 Gadavist      Findings:  There is no mass effect, midline shift, or evidence of acute  intracranial hemorrhage. Diffuse punctate foci of hemosiderin  deposition in the cerebral subcortical white matter, corpus callosum,  and basal ganglia. Particularly prominent in the anterior bifrontal  lobes and splenium of the corpus callosum. The ventricles are  proportionate to the cerebral sulci. Normal major vascular  intracranial flow-voids. Question small left anterior frontal  convexity arachnoid cyst with minimal mass effect on the underlying  gyrus (series 14,001 image 94).     Postcontrast images demonstrate no abnormal intracranial enhancement.  No residual dural enhancement. Normalized size of pituitary gland, now  measures 9 x 7 mm, previously 11 x 11 mm on comparison 7/19/2021.  Posterior sagittal sinus is concave bilaterally, previously this  appeared convex on comparison 7/19/2021.     No abnormality of the skull marrow signal. The  visualized portions of  paranasal sinuses, and mastoid air cells are relatively clear. The  orbits are grossly unremarkable.                                                                      Impression:  1.  Resolution of findings of previous intracranial hypotension, as  seen on comparison 7/29/2021. Previous findings included enlargement  of the pituitary gland, dural enhancement, and a convex superior  sagittal sinus, all of which are no longer present.  2.  Stable nonspecific findings of prior petechial microhemorrhages  without associated acute signal changes. Favor sequela of prior ARDS  and associated critical illness, less likely diffuse axonal injury or  amyloidosis.     I have personally reviewed the examination and initial interpretation  and I agree with the findings.     ROMERO NARANJO MD         Objective   Vitals - Patient Reported  Pain Score: Mild Pain (2)  Pain Loc: Head (pressure)    Physical Exam   Patient sounds alert and no in apparent acute distress,  mentation appears normal, judgement and insight intact, normal speech, no weakness observed    I discussed all my recommendations with Leticia Morales who verbalizes understanding and comfortable with the plan.     29 minutes spent on the date of the encounter doing video access, chart  review, exam, results review,  meds review, treatment plan, documentation and further activities as noted above          Again, thank you for allowing me to participate in the care of your patient.      Sincerely,    CONNIE Meeks CNP

## 2023-12-09 ENCOUNTER — MYC REFILL (OUTPATIENT)
Dept: NEUROLOGY | Facility: CLINIC | Age: 38
End: 2023-12-09
Payer: COMMERCIAL

## 2023-12-09 DIAGNOSIS — G43.109 MIGRAINE WITH AURA AND WITHOUT STATUS MIGRAINOSUS, NOT INTRACTABLE: ICD-10-CM

## 2023-12-11 NOTE — TELEPHONE ENCOUNTER
Rx Authorization:  Requested Medication/ Dose: metoclopramide 10MG tabs  Date last refill ordered: 3/22/23  Quantity ordered: 20 tabs  # refills: 3  Date of last clinic visit with ordering provider: 12/8/23  Date of next clinic visit with ordering provider: 12/12/23  All pertinent protocol data (lab date/result):   Include pertinent information from patients message:

## 2023-12-12 ENCOUNTER — OFFICE VISIT (OUTPATIENT)
Dept: NEUROLOGY | Facility: CLINIC | Age: 38
End: 2023-12-12
Payer: COMMERCIAL

## 2023-12-12 DIAGNOSIS — G43.709 CHRONIC MIGRAINE WITHOUT AURA WITHOUT STATUS MIGRAINOSUS, NOT INTRACTABLE: Primary | ICD-10-CM

## 2023-12-12 PROCEDURE — 64615 CHEMODENERV MUSC MIGRAINE: CPT | Performed by: PSYCHIATRY & NEUROLOGY

## 2023-12-12 NOTE — LETTER
2023       RE: Leticia Morales  [address]  [place] [address]       Dear Colleague,    Thank you for referring your patient, Leticia Morales, to the [facility] [place] at [facility]. Please see a copy of my visit note below.        Botulinum Toxin Procedure Note    Leticia Morales  [id number]    Ordering provider: Juan Velasco CNP    The procedure was explained to the patient. Benefits of the treatment were discussed including headache and migraine reduction. Risks of the procedure were reviewed including but not limited to pain, bruising, bleeding, infection, weakness of muscles injected or those distal to injection. The patient voiced understanding of the risks and benefits. All questions answered and the patient consented to proceed.     Prior to receiving Botox injections the patient reported the following:  Baseline headache frequency:30 days  Baseline headache duration: constant  Baseline MIDAS score: 150  Associated migrainous features: dizziness, nausea    Previous treatment trials:  topiramate  Emgality recommended in past but insurance denied.  Sumatriptan  Metoclopramide  Doxycycline  Atogepant- ordered, not received    Last Botox procedure: 2023  Current migraine frequency: She has had a high stress event. Wedding and . Made migraines worse recently. Overall she reports on average 15 days a month with nausea, 30 days a month of headache  Current migraine duration: constant    Functional improvements since starting botulinum toxin treatments: She had less severe migraine so she could still function (work, travel)    Last Neurology office visit: 2023 Ms Rod GAGAN will be starting Qulipta    A 200 unit vial of Botox was diluted with 4ml of 0.9% sodium chloride    Botox lot # and expiration date: See MAR for details  0.9% sodium chloride lot # and expiration date: See MAR for details  Procerus 1  site 5 units   2 sites (bilat) 10 units  Frontalis 4 sites (bilat) 20 units  **Temporalis 8 sites (bilat) 50 units  **Trapezius muscles 6 sites (bilat) 40 units  Cervical paraspinals (bilat) 4 sites 20 units  Occipitalis 6 sites (bilat) 30 units     A total of 175 units were injected, 25 units wasted.     The following muscles were injected using a 30 gauge needle:    The patient tolerated the injections well. I was present for and performed the entire procedure.         Again, thank you for allowing me to participate in the care of your patient.      Sincerely,    Avril Perez MD

## 2023-12-12 NOTE — PROGRESS NOTES
Botulinum Toxin Procedure Note    Leticia Morales  [id number]    Ordering provider: Juan Velasco CNP    The procedure was explained to the patient. Benefits of the treatment were discussed including headache and migraine reduction. Risks of the procedure were reviewed including but not limited to pain, bruising, bleeding, infection, weakness of muscles injected or those distal to injection. The patient voiced understanding of the risks and benefits. All questions answered and the patient consented to proceed.     Prior to receiving Botox injections the patient reported the following:  Baseline headache frequency:30 days  Baseline headache duration: constant  Baseline MIDAS score: 150  Associated migrainous features: dizziness, nausea    Previous treatment trials:  topiramate  Emgality recommended in past but insurance denied.  Sumatriptan  Metoclopramide  Doxycycline  Atogepant- ordered, not received    Last Botox procedure: 2023  Current migraine frequency: She has had a high stress event. Wedding and . Made migraines worse recently. Overall she reports on average 15 days a month with nausea, 30 days a month of headache  Current migraine duration: constant    Functional improvements since starting botulinum toxin treatments: She had less severe migraine so she could still function (work, travel)    Last Neurology office visit: 2023 Ms Velasco GAGAN will be starting Qulipta    A 200 unit vial of Botox was diluted with 4ml of 0.9% sodium chloride    Botox lot # and expiration date: See MAR for details  0.9% sodium chloride lot # and expiration date: See MAR for details  Procerus 1 site 5 units   2 sites (bilat) 10 units  Frontalis 4 sites (bilat) 20 units  **Temporalis 8 sites (bilat) 50 units  **Trapezius muscles 6 sites (bilat) 40 units  Cervical paraspinals (bilat) 4 sites 20 units  Occipitalis 6 sites (bilat) 30 units     A total of 175 units were injected, 25 units wasted.     The  following muscles were injected using a 30 gauge needle:    The patient tolerated the injections well. I was present for and performed the entire procedure.     Avril Perez MD

## 2023-12-13 ENCOUNTER — TELEPHONE (OUTPATIENT)
Dept: NEUROLOGY | Facility: CLINIC | Age: 38
End: 2023-12-13
Payer: COMMERCIAL

## 2023-12-13 RX ORDER — FREMANEZUMAB-VFRM 225 MG/1.5ML
225 INJECTION SUBCUTANEOUS
Qty: 1.5 ML | Refills: 11 | Status: SHIPPED | OUTPATIENT
Start: 2023-12-13 | End: 2024-05-20

## 2023-12-13 NOTE — TELEPHONE ENCOUNTER
"Prior Authorization Retail Medication Request    Medication/Dose: Fremanezumab-vfrm (AJOVY) 225 MG/1.5ML SOAJ  Diagnosis and ICD code (if different than what is on RX):  [G43.711]   New/renewal/insurance change PA/secondary ins. PA:  Previously Tried and Failed:    sumatriptan takes almost daily -helps with the pain but has to repeat it again  Ibuprofen   metoclopromide as needed for headache and no side effects   Rizatriptan   Sumatriptan   venlafaxin has started and stopped last week due to side effects\" emotionally unstable\" and \"crying for no reason\"   Amitriptyline-side effects  Topiramate -severe side effects-dizziness and not functioning and fatigued   Gabapentin -side effects -brain fog   or less -would avoid BBB/CCB due to lower BP  Emgality tried 5/15/2019-10/16/2019  Qulipta - denied by insurance due to not trialing Ajovy     Rationale:  migraine     Insurance   Primary: [facility]             Insurance ID:  [id number]     Pharmacy Information (if different than what is on RX)  Name:    Phone:    Fax:    "

## 2023-12-13 NOTE — TELEPHONE ENCOUNTER
Ajovy ordered. Please let patient know of potential side effects Side effects-allergic reaction or pain in the injection side or redness in the injection side. Unknown side effects with a long term use. Pregnancy is contraindicated.   Thank you

## 2023-12-13 NOTE — TELEPHONE ENCOUNTER
Prior Authorization Not Needed per Insurance    Medication: UBRELVY 100 MG PO TABS  Insurance Company: [employer] Non-Specialty PA's - Phone [phone] Fax [phone]  Expected CoPay: $    Pharmacy Filling the Rx: [id number] [address] AT [facility] OF [facility] & [address]  Pharmacy Notified: Yes  Patient Notified: **Instructed pharmacy to notify patient when script is ready to /ship.**      PA not needed with dose increase. Drug was previously approved until 5/1/24.

## 2023-12-13 NOTE — TELEPHONE ENCOUNTER
PRIOR AUTHORIZATION DENIED    Medication: QULIPTA 60 MG PO TABS  Insurance Company: [employer] Non-Specialty PA's - Phone [phone] Fax [phone]  Denial Date: 12/13/2023  Denial Reason(s):           Appeal Information:         Patient Notified: No

## 2023-12-13 NOTE — TELEPHONE ENCOUNTER
Central Prior Authorization Team   Phone: [phone]    PA Initiation    Medication: QULIPTA 60 MG PO TABS  Insurance Company: [employer] Non-Specialty PA's - Phone [phone] Fax [phone]  Pharmacy Filling the Rx: [facility] #[id number] [place], [place] - [address] AT [facility] OF [address]  Filling Pharmacy Phone: [phone]  Filling Pharmacy Fax:    Start Date: 12/13/2023

## 2023-12-14 RX ORDER — METOCLOPRAMIDE 10 MG/1
10 TABLET ORAL EVERY 6 HOURS PRN
Qty: 20 TABLET | Refills: 3 | Status: SHIPPED | OUTPATIENT
Start: 2023-12-14 | End: 2024-04-19

## 2023-12-17 NOTE — TELEPHONE ENCOUNTER
Central Prior Authorization Team   Phone: [phone]    PA Initiation    Medication: AJOVY 225 MG/1.5ML SC SOAJ  Insurance Company: [employer] Non-Specialty PA's - Phone [phone] Fax [phone]  Pharmacy Filling the Rx: [id number] [address] AT [facility] OF [address]  Filling Pharmacy Phone: [phone]  Filling Pharmacy Fax:    Start Date: 12/17/2023

## 2023-12-19 NOTE — TELEPHONE ENCOUNTER
Prior Authorization Approval    Medication: AJOVY 225 MG/1.5ML SC SOAJ  Authorization Effective Date: 11/17/2023  Authorization Expiration Date: 12/16/2024  Approved Dose/Quantity: 1.5/30  Reference #:     Insurance Company: [employer] Non-Specialty PA's - Phone [phone] Fax [phone]  Expected CoPay: $    CoPay Card Available:      Financial Assistance Needed:   Which Pharmacy is filling the prescription: [facility] #[id number] - [facility] [address] AT [address]  Pharmacy Notified: Yes  Patient Notified: YEs

## 2023-12-26 ENCOUNTER — TELEPHONE (OUTPATIENT)
Dept: NEUROLOGY | Facility: CLINIC | Age: 38
End: 2023-12-26
Payer: COMMERCIAL

## 2023-12-26 DIAGNOSIS — G43.709 CHRONIC MIGRAINE WITHOUT AURA WITHOUT STATUS MIGRAINOSUS, NOT INTRACTABLE: Primary | ICD-10-CM

## 2023-12-26 RX ORDER — METHYLPREDNISOLONE 4 MG
TABLET, DOSE PACK ORAL
Qty: 21 TABLET | Refills: 0 | Status: SHIPPED | OUTPATIENT
Start: 2023-12-26 | End: 2024-04-19

## 2023-12-26 NOTE — TELEPHONE ENCOUNTER
Health Call Center    Phone Message    May a detailed message be left on voicemail: yes     Reason for Call: Medication Question or concern regarding medication   Prescription Clarification  Name of Medication:   naratriptan (AMERGE) 2.5 MG tablet     Prescribing Provider:   Silke Velasco     Pharmacy:   [facility] #[id number] [facility] [address] AT [facility] OF [facility] & [address]        What on the order needs clarification?   Patient is requesting a refill on the discontinued medication listed above. Patient states that she is having more frequent headaches and needs a rescue medication.     Action Taken: Other: Neurology    Travel Screening: Not Applicable

## 2023-12-26 NOTE — TELEPHONE ENCOUNTER
Pt stating that her migraines have been severe where she had to miss school today. Typical migraine and denies any new symptoms. Has tried Ubrelvy (repeated doses), Reglan + Benadryl + Naproxen with no relief. Naratriptan was discontinued 12/8 due to frequent use  and having no effect. Pt has already used 9 Ubrelvy this month. I indicated that Ajovy has been approved and to start this asap to optimize prevention. However, pt requiring rescue medication.

## 2024-03-08 ENCOUNTER — TELEPHONE (OUTPATIENT)
Dept: NEUROLOGY | Facility: CLINIC | Age: 39
End: 2024-03-08
Payer: COMMERCIAL

## 2024-03-08 NOTE — TELEPHONE ENCOUNTER
"Prior Authorization Retail Medication Request - RENEWAL     Medication/Dose: ubrogepant (UBRELVY) 100 MG tablet  Diagnosis and ICD code (if different than what is on RX):  [G43.109]   New/renewal/insurance change PA/secondary ins. PA:  Previously Tried and Failed:    sumatriptan takes almost daily -helps with the pain but has to repeat it again  Ibuprofen   metoclopromide as needed for headache and no side effects   Rizatriptan   Sumatriptan   venlafaxin has started and stopped last week due to side effects\" emotionally unstable\" and \"crying for no reason\"   Amitriptyline-side effects  Topiramate -severe side effects-dizziness and not functioning and fatigued   Gabapentin -side effects -brain fog   or less -would avoid BBB/CCB due to lower BP     Rationale:  migraine     Insurance   Primary: [employer]             Insurance ID:  [id number]     Pharmacy Information (if different than what is on RX)  Name:    Phone:    Fax:        "

## 2024-03-18 ENCOUNTER — OFFICE VISIT (OUTPATIENT)
Dept: FAMILY MEDICINE | Facility: CLINIC | Age: 39
End: 2024-03-18
Payer: COMMERCIAL

## 2024-03-18 VITALS
TEMPERATURE: 97.7 F | OXYGEN SATURATION: 98 % | RESPIRATION RATE: 14 BRPM | SYSTOLIC BLOOD PRESSURE: 116 MMHG | HEIGHT: 64 IN | HEART RATE: 100 BPM | DIASTOLIC BLOOD PRESSURE: 83 MMHG | BODY MASS INDEX: 21.97 KG/M2

## 2024-03-18 DIAGNOSIS — E44.1 MILD PROTEIN-CALORIE MALNUTRITION (H): ICD-10-CM

## 2024-03-18 DIAGNOSIS — R19.7 DIARRHEA OF PRESUMED INFECTIOUS ORIGIN: Primary | ICD-10-CM

## 2024-03-18 DIAGNOSIS — D50.9 MICROCYTIC ANEMIA: ICD-10-CM

## 2024-03-18 DIAGNOSIS — E55.9 VITAMIN D DEFICIENCY: ICD-10-CM

## 2024-03-18 LAB — HOLD SPECIMEN: NORMAL

## 2024-03-18 PROCEDURE — 83550 IRON BINDING TEST: CPT

## 2024-03-18 PROCEDURE — 80053 COMPREHEN METABOLIC PANEL: CPT

## 2024-03-18 PROCEDURE — 82728 ASSAY OF FERRITIN: CPT

## 2024-03-18 PROCEDURE — 85027 COMPLETE CBC AUTOMATED: CPT

## 2024-03-18 PROCEDURE — 36415 COLL VENOUS BLD VENIPUNCTURE: CPT

## 2024-03-18 PROCEDURE — 83540 ASSAY OF IRON: CPT

## 2024-03-18 PROCEDURE — 99214 OFFICE O/P EST MOD 30 MIN: CPT | Mod: GC

## 2024-03-18 RX ORDER — CHOLECALCIFEROL (VITAMIN D3) 50 MCG
1 TABLET ORAL DAILY
Qty: 90 TABLET | Refills: 3 | Status: SHIPPED | OUTPATIENT
Start: 2024-03-18 | End: 2024-07-19

## 2024-03-18 RX ORDER — FERROUS SULFATE 325(65) MG
325 TABLET ORAL EVERY OTHER DAY
Qty: 30 TABLET | Refills: 3 | Status: SHIPPED | OUTPATIENT
Start: 2024-03-18 | End: 2024-07-19

## 2024-03-18 RX ORDER — LOPERAMIDE HYDROCHLORIDE 2 MG/1
2 TABLET ORAL 4 TIMES DAILY PRN
Qty: 30 TABLET | Refills: 0 | Status: SHIPPED | OUTPATIENT
Start: 2024-03-18 | End: 2024-04-19

## 2024-03-18 ASSESSMENT — PATIENT HEALTH QUESTIONNAIRE - PHQ9: SUM OF ALL RESPONSES TO PHQ QUESTIONS 1-9: 3

## 2024-03-18 NOTE — PATIENT INSTRUCTIONS
Patient Education   Here is the plan from today's visit    1. Diarrhea of presumed infectious origin  Come back to have stool test done if symptoms not better after 14 days of diarrhea.  - Enteric Bacteria and Virus Panel by FLORENCIO Stool; Future  - C. difficile Toxin B PCR with reflex to C. difficile Antigen and Toxins A/B EIA; Future    2. Microcytic anemia  Start taking iron again  - ferrous sulfate (FEROSUL) 325 (65 Fe) MG tablet; Take 1 tablet (325 mg) by mouth every other day  Dispense: 30 tablet; Refill: 3    3. Vitamin D deficiency  refill  - vitamin D3 (CHOLECALCIFEROL) 50 mcg (2000 units) tablet; Take 1 tablet (50 mcg) by mouth daily  Dispense: 90 tablet; Refill: 3    4. Mild protein-calorie malnutrition (H24)  Recommend regular meals          Please call or return to clinic if your symptoms don't go away.    Follow up plan  Return in about 4 weeks (around 4/15/2024) for Follow up, Routine preventive, with me.    Thank you for coming to [facility] today.  Lab Testing:  **If you had lab testing today and your results are reassuring or normal they will be mailed to you or sent through [employer] within 7 days.   **If the lab tests need quick action we will call you with the results.  **If you are having labs done on a different day, please call [phone] to schedule at [facility] or [phone] for other [facility] locations. Labs do not offer walk-in appointments.  The phone number we will call with results is # [phone] (home) . If this is not the best number please call our clinic and change the number.  Medication Refills:  If you need any refills please call your pharmacy and they will contact us.   If you need to  your refill at a new pharmacy, please contact the new pharmacy directly. The new pharmacy will help you get your medications transferred faster.   Scheduling:  If you have any concerns about today's visit or wish to schedule another appointment please call  our office during normal business hours [phone] (8-5:00 M-F). If you can no longer make a scheduled visit, please cancel via [facility] or call us to cancel.   If a referral was made to an [facility] specialty provider and you do not get a call from central scheduling, please refer to directions on your visit summary or call our office during normal business hours for assistance.   If a Mammogram was ordered for you at the Breast Center call [phone] to schedule or change your appointment.  If you had an XRay/CT/Ultrasound/MRI ordered the number is [phone] to schedule or change your radiology appointment.   [facility] has limited ultrasound appointments available on Wednesdays, if you would like your ultrasound at [facility], please call [phone] to schedule.   Medical Concerns:  If you have urgent medical concerns please call [phone] at any time of the day.    Mable Valdivia MD

## 2024-03-18 NOTE — PROGRESS NOTES
Assessment & Plan     Leticia is a 38 year old patient who presents for the following concerns:    Diarrhea of presumed infectious origin  9 days of watery non-bloody diarrhea with no fever or abdominal pain, no healthcare exposures, and no abx in the last month unlikely to be C. Diff or other toxin producing bacteria. Therefore, safe for symptomatic management with immodium and fluids. Gave ED precautions including fever, bloody diarrhea, black stools, or severe abdominal pain. Ordered stool tests including C. diff to be done if patient is not improving by the end of this week (14 days from symptom onset).  -     Comprehensive metabolic panel; Future  -     CBC with Platelets and Reflex to Iron Studies; Future  -     Iron & Iron Binding Capacity  -     Ferritin  -     Enteric Bacteria and Virus Panel by FLORENCIO Stool; Future  -     C. difficile Toxin B PCR with reflex to C. difficile Antigen and Toxins A/B EIA; Future    Microcytic anemia  Chronic, based on chart review. Out of iron, will re prescribe today.  - ferrous sulfate (FEROSUL) 325 (65 Fe) MG tablet  Dispense: 30 tablet; Refill: 3    Vitamin D deficiency  Vit D 23 when last checked. Refill.   - vitamin D3 (CHOLECALCIFEROL) 50 mcg (2000 units) tablet  Dispense: 90 tablet; Refill: 3    Mild protein-calorie malnutrition (H24)  Based on chart review. Reccommended against fasting.        Orders Placed This Encounter   Procedures    Comprehensive metabolic panel    CBC with Platelets and Reflex to Iron Studies    Extra Green Top (Lithium Heparin) Tube    Iron & Iron Binding Capacity    Ferritin       Post Medication Reconciliation Status: medications reconciled and changed, per note/orders    Return in about 4 weeks (around 4/15/2024) for Follow up, Routine preventive, with me.    Mable Valdivia MD  [facility]    Subjective         3/18/2024     3:54 PM   Additional Questions   Roomed by harshal         3/18/2024    Information     "services provided? No       This is a 38 year old patient, presenting for the following health concerns:    Diarrhea (Pt has had diarrhea for two days and a headache.)  Having urge to defecate at least once per hour  9th day of illness  Lightheaded  Headache  Color is black and green  Has been fasting but not today  No nausea or vomiting  No sick contacts      Review of Systems   Constitutional, HEENT, cardiovascular, pulmonary, gi and gu systems are negative, except as otherwise noted.      Objective    /83   Pulse 100   Temp 97.7  F (36.5  C)   Resp 14   Ht 1.626 m (5' 4\")   SpO2 98%   BMI 21.97 kg/m    Wt Readings from Last 4 Encounters:   12/08/23 58.1 kg (128 lb)   10/31/23 58.2 kg (128 lb 3.2 oz)   05/18/23 58.1 kg (128 lb)   05/02/23 57.3 kg (126 lb 6.4 oz)       Physical Exam    General: Alert and oriented, in no acute distress.  CV: No cyanosis or pallor, warm and well perfused.  Respiratory: No respiratory distress, no accessory muscle use.  Psychiatric: Mood and affect appear normal.       Results from last visit:  Admission on 08/10/2023, Discharged on 08/10/2023   Component Date Value Ref Range Status    INR 08/10/2023 1.11  0.85 - 1.15 Final    WBC Count 08/10/2023 4.9  4.0 - 11.0 10e3/uL Final    RBC Count 08/10/2023 4.72  3.80 - 5.20 10e6/uL Final    Hemoglobin 08/10/2023 10.6 (L)  11.7 - 15.7 g/dL Final    Hematocrit 08/10/2023 34.1 (L)  35.0 - 47.0 % Final    MCV 08/10/2023 72 (L)  78 - 100 fL Final    MCH 08/10/2023 22.5 (L)  26.5 - 33.0 pg Final    MCHC 08/10/2023 31.1 (L)  31.5 - 36.5 g/dL Final    RDW 08/10/2023 18.1 (H)  10.0 - 15.0 % Final    Platelet Count 08/10/2023 316  150 - 450 10e3/uL Final    Hold Specimen 08/10/2023 [facility]   Final       Mable Valdivia, MD on 3/18/2024 at 4:01 PM    ----- Service Performed and Documented by Resident or Fellow ------            .  "

## 2024-03-19 LAB
ALBUMIN SERPL BCG-MCNC: 4.2 G/DL (ref 3.5–5.2)
ALP SERPL-CCNC: 60 U/L (ref 40–150)
ALT SERPL W P-5'-P-CCNC: 6 U/L (ref 0–50)
ANION GAP SERPL CALCULATED.3IONS-SCNC: 11 MMOL/L (ref 7–15)
AST SERPL W P-5'-P-CCNC: 14 U/L (ref 0–45)
BILIRUB SERPL-MCNC: 0.2 MG/DL
BUN SERPL-MCNC: 7.4 MG/DL (ref 6–20)
CALCIUM SERPL-MCNC: 9.1 MG/DL (ref 8.6–10)
CHLORIDE SERPL-SCNC: 110 MMOL/L (ref 98–107)
CREAT SERPL-MCNC: 0.63 MG/DL (ref 0.51–0.95)
DEPRECATED HCO3 PLAS-SCNC: 18 MMOL/L (ref 22–29)
EGFRCR SERPLBLD CKD-EPI 2021: >90 ML/MIN/1.73M2
ERYTHROCYTE [DISTWIDTH] IN BLOOD BY AUTOMATED COUNT: 20.2 % (ref 10–15)
FERRITIN SERPL-MCNC: 6 NG/ML (ref 6–175)
GLUCOSE SERPL-MCNC: 102 MG/DL (ref 70–99)
HCT VFR BLD AUTO: 30.2 % (ref 35–47)
HGB BLD-MCNC: 9.5 G/DL (ref 11.7–15.7)
IRON BINDING CAPACITY (ROCHE): 330 UG/DL (ref 240–430)
IRON SATN MFR SERPL: 5 % (ref 15–46)
IRON SERPL-MCNC: 16 UG/DL (ref 37–145)
MCH RBC QN AUTO: 21.5 PG (ref 26.5–33)
MCHC RBC AUTO-ENTMCNC: 31.5 G/DL (ref 31.5–36.5)
MCV RBC AUTO: 69 FL (ref 78–100)
PLATELET # BLD AUTO: 304 10E3/UL (ref 150–450)
POTASSIUM SERPL-SCNC: 3.4 MMOL/L (ref 3.4–5.3)
PROT SERPL-MCNC: 6.8 G/DL (ref 6.4–8.3)
RBC # BLD AUTO: 4.41 10E6/UL (ref 3.8–5.2)
SODIUM SERPL-SCNC: 139 MMOL/L (ref 135–145)
WBC # BLD AUTO: 4.9 10E3/UL (ref 4–11)

## 2024-03-20 NOTE — TELEPHONE ENCOUNTER
Retail Pharmacy Prior Authorization Team   Phone: [phone]    PA Initiation    Medication: UBRELVY 100 MG PO TABS  Insurance Company: [employer] - Phone [phone] Fax [phone]  Pharmacy Filling the Rx: [facility] #[id number] [facility] [address] AT [facility] OF [address]  Filling Pharmacy Phone: [phone]  Filling Pharmacy Fax:    Start Date: 3/20/2024

## 2024-03-20 NOTE — RESULT ENCOUNTER NOTE
"Results communicated to patient via [facility]    Triage RN: could you call the patient and relay the following?   Summary: Her iron is quite low and she should make sure to take her iron supplement    \"Your iron deficiency has gotten worse since last time it was checked. Your hemoglobin is low, which can cause low energy, shortness of breath, difficulty tolerating exercise, and dizziness.     I'd recommend taking you iron and coming back to have your levels rechecked in 1 month, also to discuss why your iron is so low and make sure we're not missing anything.     Please let me know if you have any questions or concerns.     Take care,  Mable Valdivia MD\""

## 2024-03-20 NOTE — TELEPHONE ENCOUNTER
Prior Authorization Approval    Medication: UBRELVY 100 MG PO TABS  Authorization Effective Date: 3/20/2024  Authorization Expiration Date: 3/20/2025  Approved Dose/Quantity:   Reference #:     Insurance Company: [employer] - Phone [phone] Fax [phone]  Expected CoPay: $    CoPay Card Available:      Financial Assistance Needed:   Which Pharmacy is filling the prescription: [facility] #[id number] [facility] [address] AT [address]  Pharmacy Notified: No-PA Renewal Only  Patient Notified: No-PA Renewal Only

## 2024-03-21 ENCOUNTER — TELEPHONE (OUTPATIENT)
Dept: FAMILY MEDICINE | Facility: CLINIC | Age: 39
End: 2024-03-21
Payer: COMMERCIAL

## 2024-03-21 NOTE — TELEPHONE ENCOUNTER
"----- Message from Mable Valdivia MD sent at 3/20/2024 10:07 AM CDT -----  Results communicated to patient via [facility]    Triage RN: could you call the patient and relay the following?   Summary: Her iron is quite low and she should make sure to take her iron supplement    \"Your iron deficiency has gotten worse since last time it was checked. Your hemoglobin is low, which can cause low energy, shortness of breath, difficulty tolerating exercise, and dizziness.     I'd recommend taking you iron and coming back to have your levels rechecked in 1 month, also to discuss why your iron is so low and make sure we're not missing anything.     Please let me know if you have any questions or concerns.     Take care,  Mable Valdivia MD\"  "

## 2024-03-22 NOTE — TELEPHONE ENCOUNTER
Called patient with [facility] , patient expressed understanding of lab results but says that pharmacy did not receive the prescriptions that were sent.    I let patient know that I would call the pharmacy and find out status, she said I could respond with My Chart.    Patient scheduled for follow up appointment with Dr. Valdivia.    Called pharmacy they said they do have the scripts and they are ready to be picked up, notified patient via My Chart.    Selene Clinton RN

## 2024-03-26 ENCOUNTER — OFFICE VISIT (OUTPATIENT)
Dept: NEUROLOGY | Facility: CLINIC | Age: 39
End: 2024-03-26
Payer: COMMERCIAL

## 2024-03-26 DIAGNOSIS — G43.709 CHRONIC MIGRAINE WITHOUT AURA WITHOUT STATUS MIGRAINOSUS, NOT INTRACTABLE: Primary | ICD-10-CM

## 2024-03-26 PROCEDURE — 64615 CHEMODENERV MUSC MIGRAINE: CPT | Performed by: PSYCHIATRY & NEUROLOGY

## 2024-03-26 NOTE — LETTER
3/26/2024       RE: Leticia Morales  [address]  [place] [address]     Dear Colleague,    Thank you for referring your patient, Leticia Morales, to the [facility] [place] at [facility]. Please see a copy of my visit note below.    Botulinum Toxin Procedure Note     Leticia Morales  [id number]     Ordering provider: Juan Velasco CNP     The procedure was explained to the patient. Benefits of the treatment were discussed including headache and migraine reduction. Risks of the procedure were reviewed including but not limited to pain, bruising, bleeding, infection, weakness of muscles injected or those distal to injection. The patient voiced understanding of the risks and benefits. All questions answered and the patient consented to proceed.      Prior to receiving Botox injections the patient reported the following:  Baseline headache frequency:30 days  Baseline headache duration: constant  Baseline MIDAS score: 150  Associated migrainous features: dizziness, nausea     Previous treatment trials:  topiramate  Emgality recommended in past but insurance denied.  Sumatriptan  Metoclopramide  Doxycycline  Atogepant- ordered, not received     Last Botox procedure: 12/12/2023  Current migraine frequency: 1 day a month with current Ajovy and Botox. She has not had wear off despite going 3 weeks late on Botox. Just one migraine last week  Current migraine duration: constant     Functional improvements since starting botulinum toxin treatments: She had less severe migraine so she could still function (work, travel)     Last Neurology office visit: 12/8/2023 Ms Rod FARAH      A 200 unit vial of Botox was diluted with 4ml of 0.9% sodium chloride     Botox lot # and expiration date: See MAR for details  0.9% sodium chloride lot # and expiration date: See MAR for details  Procerus 1 site 5 units   2 sites (bilat) 10  units  Frontalis 4 sites (bilat) 20 units  **Temporalis 8 sites (bilat) 50 units  **Trapezius muscles 6 sites (bilat) 40 units  Cervical paraspinals (bilat) 4 sites 20 units  Occipitalis 6 sites (bilat) 30 units     A total of 175 units were injected, 25 units wasted.      The patient tolerated the injections well. I was present for and performed the entire procedure.          Again, thank you for allowing me to participate in the care of your patient.      Sincerely,    Avril Perez MD

## 2024-03-26 NOTE — Clinical Note
She is late by 3 weeks for botox and still no wear off worsening, I did one more round but will cancel future appt, she might be ok on Ajovy alone at this time. She was informed of my plan, emphasized that she needs to be compliant with Ajovy even if feeling better.

## 2024-03-26 NOTE — PROGRESS NOTES
Botulinum Toxin Procedure Note     Leticia Morales  [id number]     Ordering provider: Juan Velasco CNP     The procedure was explained to the patient. Benefits of the treatment were discussed including headache and migraine reduction. Risks of the procedure were reviewed including but not limited to pain, bruising, bleeding, infection, weakness of muscles injected or those distal to injection. The patient voiced understanding of the risks and benefits. All questions answered and the patient consented to proceed.      Prior to receiving Botox injections the patient reported the following:  Baseline headache frequency:30 days  Baseline headache duration: constant  Baseline MIDAS score: 150  Associated migrainous features: dizziness, nausea     Previous treatment trials:  topiramate  Emgality recommended in past but insurance denied.  Sumatriptan  Metoclopramide  Doxycycline  Atogepant- ordered, not received     Last Botox procedure: 12/12/2023  Current migraine frequency: 1 day a month with current Ajovy and Botox. She has not had wear off despite going 3 weeks late on Botox. Just one migraine last week  Current migraine duration: constant     Functional improvements since starting botulinum toxin treatments: She had less severe migraine so she could still function (work, travel)     Last Neurology office visit: 12/8/2023 Ms Rod FARAH      A 200 unit vial of Botox was diluted with 4ml of 0.9% sodium chloride     Botox lot # and expiration date: See MAR for details  0.9% sodium chloride lot # and expiration date: See MAR for details  Procerus 1 site 5 units   2 sites (bilat) 10 units  Frontalis 4 sites (bilat) 20 units  **Temporalis 8 sites (bilat) 50 units  **Trapezius muscles 6 sites (bilat) 40 units  Cervical paraspinals (bilat) 4 sites 20 units  Occipitalis 6 sites (bilat) 30 units     A total of 175 units were injected, 25 units wasted.      The patient tolerated the injections well. I was present  for and performed the entire procedure.      Avril Perez MD

## 2024-04-19 ENCOUNTER — VIRTUAL VISIT (OUTPATIENT)
Dept: NEUROLOGY | Facility: CLINIC | Age: 39
End: 2024-04-19
Payer: COMMERCIAL

## 2024-04-19 VITALS — HEIGHT: 64 IN | WEIGHT: 121 LBS | BODY MASS INDEX: 20.66 KG/M2

## 2024-04-19 DIAGNOSIS — G43.109 MIGRAINE WITH AURA AND WITHOUT STATUS MIGRAINOSUS, NOT INTRACTABLE: Primary | ICD-10-CM

## 2024-04-19 DIAGNOSIS — G43.709 CHRONIC MIGRAINE WITHOUT AURA WITHOUT STATUS MIGRAINOSUS, NOT INTRACTABLE: ICD-10-CM

## 2024-04-19 PROCEDURE — 99212 OFFICE O/P EST SF 10 MIN: CPT | Mod: 95 | Performed by: NURSE PRACTITIONER

## 2024-04-19 RX ORDER — SUMATRIPTAN 100 MG/1
100 TABLET, FILM COATED ORAL
Qty: 12 TABLET | Refills: 11 | Status: SHIPPED | OUTPATIENT
Start: 2024-04-19 | End: 2024-05-20

## 2024-04-19 ASSESSMENT — MIGRAINE DISABILITY ASSESSMENT (MIDAS)
HOW MANY DAYS DID YOU MISS WORK OR SCHOOL BECAUSE OF HEADACHES: 15
TOTAL SCORE: 75
HOW MANY DAYS IN THE PAST 3 MONTHS HAVE YOU HAD A HEADACHE: 15
HOW MANY DAYS WAS YOUR PRODUCTIVITY CUT IN HALF BECAUSE OF HEADACHES: 15
HOW MANY DAYS WAS HOUSEWORK PRODUCTIVITY CUT IN HALF DUE TO HEADACHES: 15
HOW MANY DAYS DID YOU NOT DO HOUSEWORK BECAUSE OF HEADACHES: 15
HOW OFTEN WERE SOCIAL ACTIVITIES MISSED DUE TO HEADACHES: 15

## 2024-04-19 ASSESSMENT — HEADACHE IMPACT TEST (HIT 6): HIT6 TOTAL SCORE: 0

## 2024-04-19 ASSESSMENT — PAIN SCALES - GENERAL: PAINLEVEL: EXTREME PAIN (9)

## 2024-04-19 NOTE — PROGRESS NOTES
"Leticia is a 38 year old who is being evaluated via a billable video visit.      Subjective   Leticia is a 38 year old, presenting for the following health issues:headache   Follow Up  Last Headache Clinic visit 12/08/2023, see note for details  Last Botox 3/26/2024  Headaches are better with Ajovy migraines far apart and not as much migraines as used to be. Migraine frequency March 25-today -about 3 migraines. Most severe on April 4th but that day was bed in general.   No side effects with Ajovy.   Ubrelvy does not seem to help as a rescue alone but in combination with ibuprofen -pain is better -pain down from 7-9/10 down to 6/10.   Metoclopromide as needed for nausea -helps but heart pounds. Ondansetron helps with nausea and no side effects.   Patient is interested in stopping Botox and see if Ajovy workes better.   Needs better rescue treatment   Sumatriptan worked well but was taken to much and stopped it. Migraines are rare now and needs better rescue treatment.     Impression:  Chronic migraine     Treatment plan reviewed with the patient:  To continue Ajovy for migraine prevention  Hold off Botox for now  Rescue treatment   Retrial of sumatriptan 100 mg at migraine onset. Ok to take it the same day with ubrelvy but at least 2 hours apart. Better to use either ubrelvy or sumatriptan-chose one. Limit both use to no more than 9 days per month.   Ibuprofen 600-800 mg every 8 hours as needed for mild-moderate headache and limit use to no more than 14 days per month. Ok to take it the same day with sumatriptan or ubrelvy.   Ondansetron as needed for nausea   Follow up in 3-5 months or sooner if needed     Treatments Tried:  prescription medication: sumatriptan takes almost daily -helps with the pain but has to repeat it again  Ibuprofen   metoclopromide as needed for headache and no side effects   Rizatriptan   Sumatriptan   Naratriptan-  venlafaxin has started and stopped last week due to side effects\" emotionally " "unstable\" and \"crying for no reason\"   Amitriptyline-side effects  Topiramate -severe side effects-dizziness and not functioning and fatigued   Gabapentin -side effects -brain fog   or less -would avoid BBB/CCB due to lower BP     Anemia -stable Hgb 10.6 recently -does not want to take pills and trying to get iron thru food+vit with iron     Last ED visit on 6/15/2021 and note reviewed   Treated with 1L 0.9NS, 10mg IV compazine, 12.5mg IV benadryl and 10mg IV decadron.     SH:  Works as a        Objective    Vitals - Patient Reported  Pain Score: Extreme Pain (9)  Pain Loc: Head    Physical Exam   Patient is alert and no in apparent acute distress,  mentation appears normal, judgement and insight intact, normal speech, no weakness observed    I discussed all my recommendations with Leticia Morales who verbalizes understanding and comfortable with the plan.      18 minutes spent on the date of the encounter doing video access, chart  review,  meds review, treatment plan, documentation and further activities as noted above    CONNIE Garcia, CNP [facility]  Headache certified  [facility]    Video-Visit Details    Type of service:  Video Visit   Originating Location (pt. Location): Home  Distant Location (provider location):  Off-site  Platform used for Video Visit: Angie"

## 2024-04-19 NOTE — LETTER
4/19/2024       RE: Leticia Morales  [address]  [place] [address]       Dear Colleague,    Thank you for referring your patient, Leticia Morales, to the [facility] [place] at [facility]. Please see a copy of my visit note below.    Leticia is a 38 year old who is being evaluated via a billable video visit.      Subjective  Leticia is a 38 year old, presenting for the following health issues:headache   Follow Up  Last Headache Clinic visit 12/08/2023, see note for details  Last Botox 3/26/2024  Headaches are better with Ajovy migraines far apart and not as much migraines as used to be. Migraine frequency March 25-today -about 3 migraines. Most severe on April 4th but that day was bed in general.   No side effects with Ajovy.   Ubrelvy does not seem to help as a rescue alone but in combination with ibuprofen -pain is better -pain down from 7-9/10 down to 6/10.   Metoclopromide as needed for nausea -helps but heart pounds. Ondansetron helps with nausea and no side effects.   Patient is interested in stopping Botox and see if Ajovy workes better.   Needs better rescue treatment   Sumatriptan worked well but was taken to much and stopped it. Migraines are rare now and needs better rescue treatment.     Impression:  Chronic migraine     Treatment plan reviewed with the patient:  To continue Ajovy for migraine prevention  Hold off Botox for now  Rescue treatment   Retrial of sumatriptan 100 mg at migraine onset. Ok to take it the same day with ubrelvy but at least 2 hours apart. Better to use either ubrelvy or sumatriptan-chose one. Limit both use to no more than 9 days per month.   Ibuprofen 600-800 mg every 8 hours as needed for mild-moderate headache and limit use to no more than 14 days per month. Ok to take it the same day with sumatriptan or ubrelvy.   Ondansetron as needed for nausea   Follow up in 3-5 months or sooner if needed  "    Treatments Tried:  prescription medication: sumatriptan takes almost daily -helps with the pain but has to repeat it again  Ibuprofen   metoclopromide as needed for headache and no side effects   Rizatriptan   Sumatriptan   Naratriptan-  venlafaxin has started and stopped last week due to side effects\" emotionally unstable\" and \"crying for no reason\"   Amitriptyline-side effects  Topiramate -severe side effects-dizziness and not functioning and fatigued   Gabapentin -side effects -brain fog   or less -would avoid BBB/CCB due to lower BP     Anemia -stable Hgb 10.6 recently -does not want to take pills and trying to get iron thru food+vit with iron     Last ED visit on 6/15/2021 and note reviewed   Treated with 1L 0.9NS, 10mg IV compazine, 12.5mg IV benadryl and 10mg IV decadron.     SH:  Works as a      Objective   Vitals - Patient Reported  Pain Score: Extreme Pain (9)  Pain Loc: Head    Physical Exam   Patient is alert and no in apparent acute distress,  mentation appears normal, judgement and insight intact, normal speech, no weakness observed    I discussed all my recommendations with Leticia Morales who verbalizes understanding and comfortable with the plan.      18 minutes spent on the date of the encounter doing video access, chart  review,  meds review, treatment plan, documentation and further activities as noted above    Again, thank you for allowing me to participate in the care of your patient.      Sincerely,    CONNIE Meeks CNP  "

## 2024-04-19 NOTE — NURSING NOTE
Is the patient currently in the state of [place]? YES    Visit mode:VIDEO    If the visit is dropped, the patient can be reconnected by: VIDEO VISIT: Text to cell phone:   Telephone Information:   Mobile [phone]       Will anyone else be joining the visit? NO  (If patient encounters technical issues they should call [phone] :[phone])    How would you like to obtain your AVS? MyChart    Are changes needed to the allergy or medication list? No    Are refills needed on medications prescribed by this physician? NO    Reason for visit: Follow Up    Christine HURTADO     Name (if in person): n/a    ID # (if video/phone): n/a  Language: Somalii  Agency: n/a  Phone Number: n/a  Method of Interpretation: Video  Patient agrees to use  Services: No

## 2024-04-19 NOTE — PATIENT INSTRUCTIONS
Plan:  To continue Ajovy for migraine prevention  Hold off Botox for now  Rescue treatment   Retrial of sumatriptan 100 mg at migraine onset. Ok to take it the same day with ubrelvy but at least 2 hours apart. Better to use either ubrelvy or sumatriptan-chose one. Limit both use to no more than 9 days per month.   Ibuprofen 600-800 mg every 8 hours as needed for mild-moderate headache and limit use to no more than 14 days per month. Ok to take it the same day with sumatriptan or ubrelvy.   Ondansetron as needed for nausea   Follow up in 3-5 months or sooner if needed

## 2024-04-25 ENCOUNTER — TELEPHONE (OUTPATIENT)
Dept: NEUROLOGY | Facility: CLINIC | Age: 39
End: 2024-04-25
Payer: COMMERCIAL

## 2024-04-25 ENCOUNTER — MYC MEDICAL ADVICE (OUTPATIENT)
Dept: NEUROLOGY | Facility: CLINIC | Age: 39
End: 2024-04-25
Payer: COMMERCIAL

## 2024-04-25 DIAGNOSIS — G43.711 INTRACTABLE CHRONIC MIGRAINE WITHOUT AURA AND WITH STATUS MIGRAINOSUS: ICD-10-CM

## 2024-04-25 NOTE — TELEPHONE ENCOUNTER
Left Voicemail (1st Attempt) and Sent Mychart (1st Attempt) for the patient to call back and schedule the following:    Appointment type: 3 month follow up  Provider: Silke Velasco  Return date: July 2024  Specialty phone number: [phone]  Additional appointment(s) needed:   Additonal Notes:

## 2024-04-25 NOTE — TELEPHONE ENCOUNTER
Prior Authorization Retail Medication Request    Medication/Dose: SUMAtriptan (IMITREX) 100 MG tablet  Diagnosis and ICD code (if different than what is on RX):    New/renewal/insurance change PA/secondary ins. PA:  Previously Tried and Failed:    Ibuprofen   metoclopromide as needed for headache and no side effects   Rizatriptan   Naratriptan  Ubrelvy    Rationale:  acute migraine    Insurance   Primary: [facility]  Insurance ID:  [id number]    Pharmacy Information (if different than what is on RX)  Name:    Phone:    Fax:

## 2024-05-10 NOTE — TELEPHONE ENCOUNTER
PA Initiation    Medication: SUMATRIPTAN SUCCINATE 100 MG PO TABS  Insurance Company: [facility] - Phone [phone] Fax [phone]  Pharmacy Filling the Rx: [facility] #[id number] - [facility] [address] AT [facility] [address]  Filling Pharmacy Phone:    Filling Pharmacy Fax:    Start Date: 5/10/2024    KCYVCH

## 2024-05-13 NOTE — TELEPHONE ENCOUNTER
Prior Authorization Not Needed per Insurance    Medication: SUMATRIPTAN SUCCINATE 100 MG PO TABS  Insurance Company: [employer] - Phone [phone] Fax [phone]  Expected CoPay: $    Pharmacy Filling the Rx: [facility] #[id number] - [facility] [address] AT [facility] OF [facility] [address]  Pharmacy Notified: yes  Patient Notified: pharmacy will notify pt

## 2024-05-20 ENCOUNTER — TELEPHONE (OUTPATIENT)
Dept: NEUROLOGY | Facility: CLINIC | Age: 39
End: 2024-05-20
Payer: COMMERCIAL

## 2024-05-20 DIAGNOSIS — K21.9 GASTROESOPHAGEAL REFLUX DISEASE WITHOUT ESOPHAGITIS: ICD-10-CM

## 2024-05-20 DIAGNOSIS — G43.711 INTRACTABLE CHRONIC MIGRAINE WITHOUT AURA AND WITH STATUS MIGRAINOSUS: ICD-10-CM

## 2024-05-20 RX ORDER — FREMANEZUMAB-VFRM 225 MG/1.5ML
225 INJECTION SUBCUTANEOUS
Qty: 1.5 ML | Refills: 11 | Status: CANCELLED | OUTPATIENT
Start: 2024-05-20

## 2024-05-20 RX ORDER — FREMANEZUMAB-VFRM 225 MG/1.5ML
225 INJECTION SUBCUTANEOUS
Qty: 1.5 ML | Refills: 11 | Status: SHIPPED | OUTPATIENT
Start: 2024-05-20

## 2024-05-20 NOTE — TELEPHONE ENCOUNTER
Central Prior Authorization Team   Phone: [phone]    PA Initiation    Medication: Ajovy 225mg  Insurance Company: [employer] - Phone [phone] Fax [phone]  Pharmacy Filling the Rx: [facility] #[id number] [place], [place] - [address]  Filling Pharmacy Phone: [phone]  Filling Pharmacy Fax:    Start Date: 5/20/2024

## 2024-05-20 NOTE — TELEPHONE ENCOUNTER
Prior Authorization Approval    Authorization Effective Date: 5/20/2024  Authorization Expiration Date: approved for lifetime  Medication: Ajovy 225mg  Approved Dose/Quantity:   Reference #:     Insurance Company: [employer] - Phone [phone] Fax [phone]  Expected CoPay:       CoPay Card Available:      Foundation Assistance Needed:    Which Pharmacy is filling the prescription (Not needed for infusion/clinic administered): [id number] [address]  Pharmacy Notified:  yes  Patient Notified:  yes- Pharmacy will contact patient when ready to /ship

## 2024-05-20 NOTE — TELEPHONE ENCOUNTER
"Prior Authorization Retail Medication Request     Medication/Dose: Fremanezumab-vfrm (AJOVY) 225 MG/1.5ML SOAJ  Diagnosis and ICD code (if different than what is on RX):  [G43.711]   New/renewal/insurance change PA/secondary ins. PA:  Previously Tried and Failed:    sumatriptan takes almost daily -helps with the pain but has to repeat it again  Ibuprofen   metoclopromide as needed for headache and no side effects   Rizatriptan   Sumatriptan   venlafaxin has started and stopped last week due to side effects\" emotionally unstable\" and \"crying for no reason\"   Amitriptyline-side effects  Topiramate -severe side effects-dizziness and not functioning and fatigued   Gabapentin -side effects -brain fog   or less -would avoid BBB/CCB due to lower BP  Emgality tried 5/15/2019-10/16/2019  Qulipta - denied by insurance due to not trialing Ajovy     Rationale:  migraine     Insurance   Primary: [facility]             Insurance ID:  [id number]     Pharmacy Information (if different than what is on RX)  Name:    Phone:    Fax:        "

## 2024-05-20 NOTE — TELEPHONE ENCOUNTER
"Request for medication refill:    omeprazole (PRILOSEC) 20 MG DR capsule     Providers if patient needs an appointment and you are willing to give a one month supply please refill for one month and  send a letter/MyChart using \".SMILLIMITEDREFILL\" .smillimited and route chart to \"[facility] \" (Giving one month refill in non controlled medications is strongly recommended before denial)    If refill has been denied, meaning absolutely no refills without visit, please complete the smart phrase \".smirxrefuse\" and route it to the \"[facility] MED REFILLS\"  pool to inform the patient and the pharmacy.    Manan Dumont MA     "

## 2024-05-22 NOTE — TELEPHONE ENCOUNTER
LVM - advising her to call back if symptoms have not resolved after taking Sumatriptan. Let her know that Ajovy has been approved as well.   
Pt asking for a call back to speak about the side effect she is having from the SUMAtriptan (IMITREX).  Please call Leticia at # [phone], to discuss further.  
Relayed recommendations. Will check back in a few days to see how she is doing.   
Spoke to Leticia, she states that she took Sumatriptan on 5/19 and experienced heart palpitations, increased anxiety, chest pain, and weakness in her legs. Symptoms have mostly resolved today, but is now itchy. She just took Benadryl (1 pill, she does not know exact dose). Advised to call 911 if she experiences any airway swelling, difficulty breathing. Advised to stop sumatriptan and do not repeat dosing - added to list of allergies. She does not report this happening with any other triptan or sumatriptan when she previously took this. Order discontinued.     She currently has Ubrelvy as a rescue alternative. States she has been experiencing issues with receiving Ajovy. PA effective - wanting new Rx sent to [facility] [address] of [address]. Order queued.   
67

## 2024-06-24 ENCOUNTER — TELEPHONE (OUTPATIENT)
Dept: NEUROLOGY | Facility: CLINIC | Age: 39
End: 2024-06-24
Payer: COMMERCIAL

## 2024-06-24 ENCOUNTER — HOSPITAL ENCOUNTER (OUTPATIENT)
Facility: CLINIC | Age: 39
Setting detail: OBSERVATION
Discharge: HOME | End: 2024-06-25
Attending: EMERGENCY MEDICINE | Admitting: INTERNAL MEDICINE
Payer: COMMERCIAL

## 2024-06-24 DIAGNOSIS — R53.81 MALAISE: ICD-10-CM

## 2024-06-24 DIAGNOSIS — G43.709 CHRONIC MIGRAINE WITHOUT AURA WITHOUT STATUS MIGRAINOSUS, NOT INTRACTABLE: Primary | ICD-10-CM

## 2024-06-24 DIAGNOSIS — R51.9 INTRACTABLE HEADACHE, UNSPECIFIED CHRONICITY PATTERN, UNSPECIFIED HEADACHE TYPE: ICD-10-CM

## 2024-06-24 DIAGNOSIS — E87.20 LACTIC ACIDOSIS: ICD-10-CM

## 2024-06-24 PROCEDURE — 96375 TX/PRO/DX INJ NEW DRUG ADDON: CPT | Performed by: EMERGENCY MEDICINE

## 2024-06-24 PROCEDURE — 96365 THER/PROPH/DIAG IV INF INIT: CPT | Performed by: EMERGENCY MEDICINE

## 2024-06-24 PROCEDURE — 99285 EMERGENCY DEPT VISIT HI MDM: CPT | Performed by: EMERGENCY MEDICINE

## 2024-06-24 PROCEDURE — 93005 ELECTROCARDIOGRAM TRACING: CPT | Performed by: EMERGENCY MEDICINE

## 2024-06-24 PROCEDURE — 96361 HYDRATE IV INFUSION ADD-ON: CPT | Performed by: EMERGENCY MEDICINE

## 2024-06-24 PROCEDURE — 99285 EMERGENCY DEPT VISIT HI MDM: CPT | Mod: 25 | Performed by: EMERGENCY MEDICINE

## 2024-06-24 ASSESSMENT — COLUMBIA-SUICIDE SEVERITY RATING SCALE - C-SSRS
6. HAVE YOU EVER DONE ANYTHING, STARTED TO DO ANYTHING, OR PREPARED TO DO ANYTHING TO END YOUR LIFE?: NO
1. IN THE PAST MONTH, HAVE YOU WISHED YOU WERE DEAD OR WISHED YOU COULD GO TO SLEEP AND NOT WAKE UP?: NO
2. HAVE YOU ACTUALLY HAD ANY THOUGHTS OF KILLING YOURSELF IN THE PAST MONTH?: NO

## 2024-06-24 NOTE — TELEPHONE ENCOUNTER
[facility] Call Center    Phone Message    May a detailed message be left on voicemail: yes     Reason for Call: Medication Question or concern regarding medication   Prescription Clarification  Name of Medication:   ubrogepant (UBRELVY) 100 MG tablet    Prescribing Provider:   Silke Velasco     Pharmacy:   [facility] #[id number] [place], [place] - [address]       What on the order needs clarification? Pt is requesting an alternate rescue medication. Pt states that its to soon to  the medication listed above- per pharmacy.  Pt currently experiencing bad migraine since last night.     Please advise    Action Taken: Other: Neurology    Travel Screening: Not Applicable

## 2024-06-24 NOTE — LETTER
[facility]  [address] [id number] [phone]      2024    Leticia Morales  [address]   [place] [address]  [phone] (home)     : 1985      To Whom it may concern:    Leticia Mroales was hospitalized  for an illness that is expected to resolved by Thursday, . Please excuse her from work -. She may return to work  without restrictions.     Please reach out with questions.     Sincerely,    Sil Bear MD

## 2024-06-25 VITALS
RESPIRATION RATE: 18 BRPM | HEIGHT: 64 IN | BODY MASS INDEX: 21.34 KG/M2 | SYSTOLIC BLOOD PRESSURE: 109 MMHG | HEART RATE: 85 BPM | OXYGEN SATURATION: 100 % | DIASTOLIC BLOOD PRESSURE: 72 MMHG | WEIGHT: 125 LBS | TEMPERATURE: 98.4 F

## 2024-06-25 PROBLEM — R53.81 MALAISE: Status: ACTIVE | Noted: 2024-06-25

## 2024-06-25 PROBLEM — R51.9 INTRACTABLE HEADACHE, UNSPECIFIED CHRONICITY PATTERN, UNSPECIFIED HEADACHE TYPE: Status: ACTIVE | Noted: 2024-06-25

## 2024-06-25 PROBLEM — E87.20 LACTIC ACIDOSIS: Status: ACTIVE | Noted: 2024-06-25

## 2024-06-25 LAB
ALBUMIN SERPL BCG-MCNC: 3.9 G/DL (ref 3.5–5.2)
ALBUMIN UR-MCNC: 30 MG/DL
ALP SERPL-CCNC: 63 U/L (ref 40–150)
ALT SERPL W P-5'-P-CCNC: 6 U/L (ref 0–50)
ANION GAP SERPL CALCULATED.3IONS-SCNC: 17 MMOL/L (ref 7–15)
ANION GAP SERPL CALCULATED.3IONS-SCNC: 8 MMOL/L (ref 7–15)
APPEARANCE UR: CLEAR
AST SERPL W P-5'-P-CCNC: 14 U/L (ref 0–45)
ATRIAL RATE - MUSE: 76 BPM
B-OH-BUTYR SERPL-SCNC: <0.18 MMOL/L
B-OH-BUTYR SERPL-SCNC: NORMAL MMOL/L
BASOPHILS # BLD AUTO: 0 10E3/UL (ref 0–0.2)
BASOPHILS # BLD AUTO: 0.1 10E3/UL (ref 0–0.2)
BASOPHILS NFR BLD AUTO: 0 %
BASOPHILS NFR BLD AUTO: 1 %
BILIRUB SERPL-MCNC: 0.2 MG/DL
BILIRUB UR QL STRIP: NEGATIVE
BUN SERPL-MCNC: 6 MG/DL (ref 6–20)
BUN SERPL-MCNC: 9.1 MG/DL (ref 6–20)
CALCIUM SERPL-MCNC: 7.7 MG/DL (ref 8.6–10)
CALCIUM SERPL-MCNC: 9.1 MG/DL (ref 8.6–10)
CHLORIDE SERPL-SCNC: 105 MMOL/L (ref 98–107)
CHLORIDE SERPL-SCNC: 115 MMOL/L (ref 98–107)
COLOR UR AUTO: YELLOW
CREAT SERPL-MCNC: 0.73 MG/DL (ref 0.51–0.95)
CREAT SERPL-MCNC: 0.78 MG/DL (ref 0.51–0.95)
DEPRECATED HCO3 PLAS-SCNC: 18 MMOL/L (ref 22–29)
DEPRECATED HCO3 PLAS-SCNC: 20 MMOL/L (ref 22–29)
DIASTOLIC BLOOD PRESSURE - MUSE: NORMAL MMHG
EGFRCR SERPLBLD CKD-EPI 2021: >90 ML/MIN/1.73M2
EGFRCR SERPLBLD CKD-EPI 2021: >90 ML/MIN/1.73M2
EOSINOPHIL # BLD AUTO: 0.1 10E3/UL (ref 0–0.7)
EOSINOPHIL # BLD AUTO: 0.1 10E3/UL (ref 0–0.7)
EOSINOPHIL NFR BLD AUTO: 1 %
EOSINOPHIL NFR BLD AUTO: 1 %
ERYTHROCYTE [DISTWIDTH] IN BLOOD BY AUTOMATED COUNT: 18.5 % (ref 10–15)
ERYTHROCYTE [DISTWIDTH] IN BLOOD BY AUTOMATED COUNT: 18.5 % (ref 10–15)
FLUAV RNA SPEC QL NAA+PROBE: NEGATIVE
FLUBV RNA RESP QL NAA+PROBE: NEGATIVE
GLUCOSE SERPL-MCNC: 119 MG/DL (ref 70–99)
GLUCOSE SERPL-MCNC: 94 MG/DL (ref 70–99)
GLUCOSE UR STRIP-MCNC: NEGATIVE MG/DL
HCG SERPL QL: NEGATIVE
HCT VFR BLD AUTO: 24.2 % (ref 35–47)
HCT VFR BLD AUTO: 26.9 % (ref 35–47)
HGB BLD-MCNC: 7.1 G/DL (ref 11.7–15.7)
HGB BLD-MCNC: 8.1 G/DL (ref 11.7–15.7)
HGB UR QL STRIP: NEGATIVE
IMM GRANULOCYTES # BLD: 0 10E3/UL
IMM GRANULOCYTES # BLD: 0 10E3/UL
IMM GRANULOCYTES NFR BLD: 0 %
IMM GRANULOCYTES NFR BLD: 0 %
INTERPRETATION ECG - MUSE: NORMAL
KETONES UR STRIP-MCNC: ABNORMAL MG/DL
LACTATE SERPL-SCNC: 0.7 MMOL/L (ref 0.7–2)
LACTATE SERPL-SCNC: 2.5 MMOL/L (ref 0.7–2)
LEUKOCYTE ESTERASE UR QL STRIP: NEGATIVE
LYMPHOCYTES # BLD AUTO: 1.3 10E3/UL (ref 0.8–5.3)
LYMPHOCYTES # BLD AUTO: 2.5 10E3/UL (ref 0.8–5.3)
LYMPHOCYTES NFR BLD AUTO: 17 %
LYMPHOCYTES NFR BLD AUTO: 33 %
MCH RBC QN AUTO: 19.9 PG (ref 26.5–33)
MCH RBC QN AUTO: 20.2 PG (ref 26.5–33)
MCHC RBC AUTO-ENTMCNC: 29.3 G/DL (ref 31.5–36.5)
MCHC RBC AUTO-ENTMCNC: 30.1 G/DL (ref 31.5–36.5)
MCV RBC AUTO: 67 FL (ref 78–100)
MCV RBC AUTO: 68 FL (ref 78–100)
MONOCYTES # BLD AUTO: 0.6 10E3/UL (ref 0–1.3)
MONOCYTES # BLD AUTO: 0.7 10E3/UL (ref 0–1.3)
MONOCYTES NFR BLD AUTO: 8 %
MONOCYTES NFR BLD AUTO: 9 %
MUCOUS THREADS #/AREA URNS LPF: PRESENT /LPF
NEUTROPHILS # BLD AUTO: 4.2 10E3/UL (ref 1.6–8.3)
NEUTROPHILS # BLD AUTO: 5.8 10E3/UL (ref 1.6–8.3)
NEUTROPHILS NFR BLD AUTO: 57 %
NEUTROPHILS NFR BLD AUTO: 73 %
NITRATE UR QL: NEGATIVE
NRBC # BLD AUTO: 0 10E3/UL
NRBC # BLD AUTO: 0 10E3/UL
NRBC BLD AUTO-RTO: 0 /100
NRBC BLD AUTO-RTO: 0 /100
P AXIS - MUSE: 42 DEGREES
PH UR STRIP: 6 [PH] (ref 5–7)
PLATELET # BLD AUTO: 326 10E3/UL (ref 150–450)
PLATELET # BLD AUTO: 388 10E3/UL (ref 150–450)
POTASSIUM SERPL-SCNC: 3.4 MMOL/L (ref 3.4–5.3)
POTASSIUM SERPL-SCNC: 3.9 MMOL/L (ref 3.4–5.3)
PR INTERVAL - MUSE: 106 MS
PROT SERPL-MCNC: 7 G/DL (ref 6.4–8.3)
QRS DURATION - MUSE: 74 MS
QT - MUSE: 398 MS
QTC - MUSE: 447 MS
R AXIS - MUSE: 39 DEGREES
RBC # BLD AUTO: 3.56 10E6/UL (ref 3.8–5.2)
RBC # BLD AUTO: 4.01 10E6/UL (ref 3.8–5.2)
RBC URINE: 0 /HPF
RSV RNA SPEC NAA+PROBE: NEGATIVE
SARS-COV-2 RNA RESP QL NAA+PROBE: NEGATIVE
SODIUM SERPL-SCNC: 141 MMOL/L (ref 135–145)
SODIUM SERPL-SCNC: 142 MMOL/L (ref 135–145)
SP GR UR STRIP: 1.04 (ref 1–1.03)
SQUAMOUS EPITHELIAL: 8 /HPF
SYSTOLIC BLOOD PRESSURE - MUSE: NORMAL MMHG
T AXIS - MUSE: 37 DEGREES
TSH SERPL DL<=0.005 MIU/L-ACNC: 3.32 UIU/ML (ref 0.3–4.2)
UROBILINOGEN UR STRIP-MCNC: NORMAL MG/DL
VENTRICULAR RATE- MUSE: 76 BPM
WBC # BLD AUTO: 7.4 10E3/UL (ref 4–11)
WBC # BLD AUTO: 7.9 10E3/UL (ref 4–11)
WBC URINE: 1 /HPF

## 2024-06-25 PROCEDURE — 87637 SARSCOV2&INF A&B&RSV AMP PRB: CPT | Performed by: EMERGENCY MEDICINE

## 2024-06-25 PROCEDURE — 36415 COLL VENOUS BLD VENIPUNCTURE: CPT | Performed by: EMERGENCY MEDICINE

## 2024-06-25 PROCEDURE — 87040 BLOOD CULTURE FOR BACTERIA: CPT | Performed by: EMERGENCY MEDICINE

## 2024-06-25 PROCEDURE — 83605 ASSAY OF LACTIC ACID: CPT

## 2024-06-25 PROCEDURE — 83605 ASSAY OF LACTIC ACID: CPT | Performed by: EMERGENCY MEDICINE

## 2024-06-25 PROCEDURE — 81001 URINALYSIS AUTO W/SCOPE: CPT | Performed by: EMERGENCY MEDICINE

## 2024-06-25 PROCEDURE — 85025 COMPLETE CBC W/AUTO DIFF WBC: CPT

## 2024-06-25 PROCEDURE — 99222 1ST HOSP IP/OBS MODERATE 55: CPT | Mod: AI | Performed by: STUDENT IN AN ORGANIZED HEALTH CARE EDUCATION/TRAINING PROGRAM

## 2024-06-25 PROCEDURE — 258N000003 HC RX IP 258 OP 636: Mod: JZ | Performed by: EMERGENCY MEDICINE

## 2024-06-25 PROCEDURE — 250N000013 HC RX MED GY IP 250 OP 250 PS 637: Performed by: EMERGENCY MEDICINE

## 2024-06-25 PROCEDURE — 84443 ASSAY THYROID STIM HORMONE: CPT | Performed by: EMERGENCY MEDICINE

## 2024-06-25 PROCEDURE — 80053 COMPREHEN METABOLIC PANEL: CPT | Performed by: EMERGENCY MEDICINE

## 2024-06-25 PROCEDURE — 36415 COLL VENOUS BLD VENIPUNCTURE: CPT

## 2024-06-25 PROCEDURE — 85025 COMPLETE CBC W/AUTO DIFF WBC: CPT | Performed by: EMERGENCY MEDICINE

## 2024-06-25 PROCEDURE — 84703 CHORIONIC GONADOTROPIN ASSAY: CPT | Performed by: EMERGENCY MEDICINE

## 2024-06-25 PROCEDURE — 82010 KETONE BODYS QUAN: CPT | Performed by: EMERGENCY MEDICINE

## 2024-06-25 PROCEDURE — 250N000011 HC RX IP 250 OP 636: Mod: JZ | Performed by: EMERGENCY MEDICINE

## 2024-06-25 PROCEDURE — G0378 HOSPITAL OBSERVATION PER HR: HCPCS

## 2024-06-25 RX ORDER — METOCLOPRAMIDE HYDROCHLORIDE 5 MG/ML
10 INJECTION INTRAMUSCULAR; INTRAVENOUS ONCE
Status: COMPLETED | OUTPATIENT
Start: 2024-06-25 | End: 2024-06-25

## 2024-06-25 RX ORDER — PROCHLORPERAZINE 25 MG
25 SUPPOSITORY, RECTAL RECTAL EVERY 12 HOURS PRN
Status: DISCONTINUED | OUTPATIENT
Start: 2024-06-25 | End: 2024-06-25 | Stop reason: HOSPADM

## 2024-06-25 RX ORDER — DIPHENHYDRAMINE HCL 25 MG
25 CAPSULE ORAL ONCE
Status: COMPLETED | OUTPATIENT
Start: 2024-06-25 | End: 2024-06-25

## 2024-06-25 RX ORDER — PIPERACILLIN SODIUM, TAZOBACTAM SODIUM 3; .375 G/15ML; G/15ML
3.38 INJECTION, POWDER, LYOPHILIZED, FOR SOLUTION INTRAVENOUS ONCE
Status: COMPLETED | OUTPATIENT
Start: 2024-06-25 | End: 2024-06-25

## 2024-06-25 RX ORDER — IBUPROFEN 200 MG
400 TABLET ORAL EVERY 6 HOURS PRN
Status: DISCONTINUED | OUTPATIENT
Start: 2024-06-25 | End: 2024-06-25 | Stop reason: HOSPADM

## 2024-06-25 RX ORDER — AMOXICILLIN 250 MG
2 CAPSULE ORAL 2 TIMES DAILY PRN
Status: DISCONTINUED | OUTPATIENT
Start: 2024-06-25 | End: 2024-06-25 | Stop reason: HOSPADM

## 2024-06-25 RX ORDER — ACETAMINOPHEN 650 MG/1
650 SUPPOSITORY RECTAL EVERY 4 HOURS PRN
Status: DISCONTINUED | OUTPATIENT
Start: 2024-06-25 | End: 2024-06-25 | Stop reason: HOSPADM

## 2024-06-25 RX ORDER — AMOXICILLIN 250 MG
1 CAPSULE ORAL 2 TIMES DAILY PRN
Status: DISCONTINUED | OUTPATIENT
Start: 2024-06-25 | End: 2024-06-25 | Stop reason: HOSPADM

## 2024-06-25 RX ORDER — ONDANSETRON 4 MG/1
4 TABLET, ORALLY DISINTEGRATING ORAL EVERY 6 HOURS PRN
Status: DISCONTINUED | OUTPATIENT
Start: 2024-06-25 | End: 2024-06-25 | Stop reason: HOSPADM

## 2024-06-25 RX ORDER — CALCIUM CARBONATE 500 MG/1
1000 TABLET, CHEWABLE ORAL 4 TIMES DAILY PRN
Status: DISCONTINUED | OUTPATIENT
Start: 2024-06-25 | End: 2024-06-25 | Stop reason: HOSPADM

## 2024-06-25 RX ORDER — PANTOPRAZOLE SODIUM 40 MG/1
40 TABLET, DELAYED RELEASE ORAL
Status: DISCONTINUED | OUTPATIENT
Start: 2024-06-25 | End: 2024-06-25 | Stop reason: HOSPADM

## 2024-06-25 RX ORDER — LIDOCAINE 40 MG/G
CREAM TOPICAL
Status: DISCONTINUED | OUTPATIENT
Start: 2024-06-25 | End: 2024-06-25 | Stop reason: HOSPADM

## 2024-06-25 RX ORDER — SUMATRIPTAN 5 MG/1
1 SPRAY NASAL PRN
Status: CANCELLED | OUTPATIENT
Start: 2024-06-25

## 2024-06-25 RX ORDER — PROCHLORPERAZINE MALEATE 5 MG
10 TABLET ORAL EVERY 6 HOURS PRN
Status: DISCONTINUED | OUTPATIENT
Start: 2024-06-25 | End: 2024-06-25 | Stop reason: HOSPADM

## 2024-06-25 RX ORDER — KETOROLAC TROMETHAMINE 15 MG/ML
15 INJECTION, SOLUTION INTRAMUSCULAR; INTRAVENOUS ONCE
Status: COMPLETED | OUTPATIENT
Start: 2024-06-25 | End: 2024-06-25

## 2024-06-25 RX ORDER — ONDANSETRON 2 MG/ML
4 INJECTION INTRAMUSCULAR; INTRAVENOUS EVERY 6 HOURS PRN
Status: DISCONTINUED | OUTPATIENT
Start: 2024-06-25 | End: 2024-06-25 | Stop reason: HOSPADM

## 2024-06-25 RX ORDER — SUMATRIPTAN 20 MG/1
1 SPRAY NASAL PRN
Qty: 4 EACH | Refills: 0 | Status: SHIPPED | OUTPATIENT
Start: 2024-06-25 | End: 2024-07-19

## 2024-06-25 RX ORDER — SODIUM CHLORIDE 9 MG/ML
INJECTION, SOLUTION INTRAVENOUS
Status: COMPLETED
Start: 2024-06-25 | End: 2024-06-25

## 2024-06-25 RX ORDER — ACETAMINOPHEN 325 MG/1
650 TABLET ORAL EVERY 4 HOURS PRN
Status: DISCONTINUED | OUTPATIENT
Start: 2024-06-25 | End: 2024-06-25 | Stop reason: HOSPADM

## 2024-06-25 RX ADMIN — METOCLOPRAMIDE HYDROCHLORIDE 10 MG: 5 INJECTION INTRAMUSCULAR; INTRAVENOUS at 00:37

## 2024-06-25 RX ADMIN — PROCHLORPERAZINE EDISYLATE 10 MG: 5 INJECTION INTRAMUSCULAR; INTRAVENOUS at 01:58

## 2024-06-25 RX ADMIN — SODIUM CHLORIDE 1000 ML: 9 INJECTION, SOLUTION INTRAVENOUS at 01:23

## 2024-06-25 RX ADMIN — SODIUM CHLORIDE 1000 ML: 9 INJECTION, SOLUTION INTRAVENOUS at 00:32

## 2024-06-25 RX ADMIN — PIPERACILLIN AND TAZOBACTAM 3.38 G: 3; .375 INJECTION, POWDER, LYOPHILIZED, FOR SOLUTION INTRAVENOUS at 04:09

## 2024-06-25 RX ADMIN — KETOROLAC TROMETHAMINE 15 MG: 15 INJECTION, SOLUTION INTRAMUSCULAR; INTRAVENOUS at 00:34

## 2024-06-25 RX ADMIN — DIPHENHYDRAMINE HYDROCHLORIDE 25 MG: 25 CAPSULE ORAL at 01:57

## 2024-06-25 ASSESSMENT — ACTIVITIES OF DAILY LIVING (ADL)
ADLS_ACUITY_SCORE: 35
ADLS_ACUITY_SCORE: 22
ADLS_ACUITY_SCORE: 35
ADLS_ACUITY_SCORE: 22
ADLS_ACUITY_SCORE: 35

## 2024-06-25 NOTE — H&P
[facility]    History and Physical - [facility] Medicine Service       Date of Admission:  6/24/2024    Assessment & Plan      Leticia Morales is a 38 year old female admitted on 6/24/2024. She has a history of migraine, GERD, PTSD and is admitted to [facility] for migraine, myalgias, and elevated lactic acid to 2.5 (since resolved).    #Migraine, intractable, resolved  #Chronic migraine without aura  Chronic issue, follows with [facility] - last visit 4/19/2024 with Silke Velasco. Had trouble filling her abortive medication and is overdue for her fremanezumab (Ajovy) preventive medication. Last botox was in March 2024. She felt the migraine coming on over the last week but is feeling better after receiving ketorolac, IVF and antiemetics in the ED. She requests to be discharged today.   - PRN antiemetics available in the meantime    #Lactic acidosis, resolved  #Myalgias, improved  #Chills, resolved  #Dizziness   Reported symptoms over the last few days. Workup from the ED shows no COVID, RSV, flu, no leukocytosis, no UTI, clear lungs on exam without cough. LA 2.5 improved to 0.7 with IVF. She has not eaten since lunch yesterday.   May have a viral syndrome but low concern for serious illness  - plan for breakfast, possible discharge later today since lab abnormalities have resolved.   -BCX 6/25: in process - low concern bacteremia    #Microcyctic anemia, chronic  #Iron deficiency  HGB 8.1 on admission, decreased to 7.1 with IVF. Chronic issue, though usually BL more in the 9 range. Iron quite low on prior iron studies, with ferritin of 6 in 2024. Denies hematemesis, blood in stools. Suspect drop dilutional.   -Daily CBC while here  -Monitor for signs of bleeding  -Continue PO iron  -PCP follow up - consider IV iron         Observation Goals: -diagnostic tests and consults completed and resulted, -vital signs normal or at patient baseline, -tolerating oral intake to  maintain hydration, -adequate pain control on oral analgesics, Nurse to notify provider when observation goals have been met and patient is ready for discharge.  Diet: Regular Diet Adult  Diet  DVT Prophylaxis: Low Risk/Ambulatory with no VTE prophylaxis indicated  Hill Catheter: Not present  Fluids: s/p NS bolus - PO  Lines: None     Cardiac Monitoring: None  Code Status: Full Code    Clinically Significant Risk Factors Present on Admission          # Hypocalcemia: Lowest Ca = 7.7 mg/dL in last 2 days, will monitor and replace as appropriate            # Anemia: based on hgb <11                      Disposition Plan      Expected Discharge Date: 06/25/2024        Discharge Comments: Early AM admission for migraine and abnormal labs. Anticipate will be able to discharge later today.        The patient's care was discussed with the Attending Physician, Dr. Chu .      Sil Bear MD  [facility]  [facility]  Securely message with Vocera (more info)  Text page via [facility] Paging/Directory   See signed in provider for up to date coverage information  ______________________________________________________________________    Chief Complaint   Migraine, chills    History is obtained from the patient    History of Present Illness   Leticia Morales is a 38 year old female with a history of migraine without aura, gastroesophageal reflux disease without esophagitis and PTSD presents to the ED with primary complaint of headaches and feeling unwell.      Reports this AM her migraine from overnight has resolved, she just feels tired and a little dizzy. Last meal yesterday lunch. Denies vision changes, sore throat, cough, CP, SOB, abdominal pain, diarrhea, blood in stool, swelling. Has not spent signiticant time out side -- no bug bites. No recent travel.    Would like to discharge so she can go back to work - she works virtually from home.    Notes she has  no migraine meds at home -- ran out. Is overdue for monthly Ajovy injection.     VS - stable, afeb.   Labs - HCO3 20, LA 2.5--> 0.7, hgb 8.1 --> 7.1 (MCV 68). Ketones normal, WBC normal, negative COVID/flu/RSV. Bcx collected  Imaging - none  Interventions - benadryl (0200), toradol (0030), reglan (0030), zosyn (0400), compazine (0200), NS bolus 1 L (0130)    Past Medical History    Past Medical History:   Diagnosis Date    ARDS (adult respiratory distress syndrome) (H) 2010    related to H1N1 infection    H1N1 influenza 2010    prolonged ICU stay, medically induced coma    Iron deficiency anemia     Migraines        Past Surgical History   Past Surgical History:   Procedure Laterality Date    THORACIC SURGERY      trach       Prior to Admission Medications   Prior to Admission Medications   Prescriptions Last Dose Informant Patient Reported? Taking?   Fremanezumab-vfrm (AJOVY) 225 MG/1.5ML SOAJ   No No   Sig: Inject 225 mg Subcutaneous every 30 days   Prenatal Vit-Fe Fumarate-FA (PRENATAL MULTIVITAMIN W/IRON) 27-0.8 MG tablet  Self No No   Sig: Take 1 tablet by mouth daily   acetaminophen (TYLENOL) 500 MG tablet  Self No No   Sig: Take 2 tablets (1,000 mg) by mouth every 6 hours as needed for mild pain   ferrous sulfate (FEROSUL) 325 (65 Fe) MG tablet   No No   Sig: Take 1 tablet (325 mg) by mouth every other day   ibuprofen (ADVIL/MOTRIN) 400 MG tablet  Self Yes No   Sig: Take 400 mg by mouth every 6 hours as needed for moderate pain   omeprazole (PRILOSEC) 20 MG DR capsule   No No   Sig: TAKE 1 CAPSULE (20 MG) BY MOUTH 2 TIMES DAILY.   ondansetron (ZOFRAN ODT) 4 MG ODT tab  Self No No   Sig: Take 1 tablet (4 mg) by mouth every 8 hours as needed for nausea   tretinoin (RETIN-A) 0.025 % external cream  Self No No   Sig: Apply topically At Bedtime   ubrogepant (UBRELVY) 100 MG tablet   No No   Sig: Take 1 tablet (100 mg) by mouth at onset of headache (may repeat in 2 hours as needed. Max 2 tabs in 24 hours)    vitamin D3 (CHOLECALCIFEROL) 50 mcg (2000 units) tablet   No No   Sig: Take 1 tablet (50 mcg) by mouth daily      Facility-Administered Medications Last Administration Doses Remaining   Botulinum Toxin Type A (BOTOX) 200 units injection 175 Units 3/26/2024 12:04 PM               Physical Exam   Vital Signs: Temp: 98.4  F (36.9  C) Temp src: Oral BP: 109/72 Pulse: 85   Resp: 18 SpO2: 100 % O2 Device: None (Room air)    Weight: 125 lbs 0 oz  Physical Exam  Constitutional:       General: She is not in acute distress.     Appearance: She is not ill-appearing, toxic-appearing or diaphoretic.   Eyes:      Conjunctiva/sclera: Conjunctivae normal.   Cardiovascular:      Rate and Rhythm: Normal rate and regular rhythm.      Pulses: Normal pulses.      Heart sounds: Normal heart sounds.   Pulmonary:      Effort: Pulmonary effort is normal.      Breath sounds: Normal breath sounds. No wheezing or rales.   Abdominal:      General: Abdomen is flat. Bowel sounds are normal.      Palpations: Abdomen is soft.   Musculoskeletal:      Cervical back: Neck supple.      Right lower leg: No edema.      Left lower leg: No edema.   Lymphadenopathy:      Cervical: No cervical adenopathy.   Skin:     General: Skin is warm and dry.   Neurological:      General: No focal deficit present.      Mental Status: She is alert. Mental status is at baseline.      Cranial Nerves: No cranial nerve deficit.   Psychiatric:         Mood and Affect: Mood normal.         Behavior: Behavior normal.       Medical Decision Making       Please see A&P for additional details of medical decision making.      Data   ------------------------- PAST 24 HR DATA REVIEWED -----------------------------------------------    I have personally reviewed the following data over the past 24 hrs:    7.4  \   7.1 (L)   / 326     141 115 (H) 6.0 /  94   3.9 18 (L) 0.73 \     ALT: 6 AST: 14 AP: 63 TBILI: 0.2   ALB: 3.9 TOT PROTEIN: 7.0 LIPASE: N/A     TSH: 3.32 T4: N/A A1C: N/A      Procal: N/A CRP: N/A Lactic Acid: 0.7

## 2024-06-25 NOTE — ED TRIAGE NOTES
Triage Assessment (Adult)       Row Name 06/24/24 6949          Triage Assessment    Airway WDL WDL        Respiratory WDL    Respiratory WDL WDL        Skin Circulation/Temperature WDL    Skin Circulation/Temperature WDL WDL        Cardiac WDL    Cardiac WDL WDL        Peripheral/Neurovascular WDL    Peripheral Neurovascular WDL WDL        Cognitive/Neuro/Behavioral WDL    Cognitive/Neuro/Behavioral WDL WDL

## 2024-06-25 NOTE — DISCHARGE SUMMARY
[facility]  Discharge Summary - Medicine & Pediatrics       Date of Admission:  6/24/2024  Date of Discharge:  6/25/2024  Discharging Provider: Drew Chu DO  Discharge Service: [facility] Medicine Service    Discharge Diagnoses   #Migraine, intractable, resolved  #Chronic migraine without aura  #Lactic acidosis, resolved  #Myalgias, improved  #Chills, resolved  #Dizziness, resolved  #Microcyctic anemia, chronic  #Iron deficiency      Clinically Significant Risk Factors          Follow-ups Needed After Discharge   Follow-up Appointments     Adult [facility] Follow-up and recommended labs and tests      Follow up with primary care provider, Md Rey Cope, within 10-14   days, for hospital follow- up. Recommend repeat CBC and consider IV iron infusions if needed.    Follow up with neurology within the next month if possible.    Appointments on [facility] and/or [facility] (with [facility] or [facility]   provider or service). Call [phone] if you haven't heard regarding   these appointments within 7 days of discharge.         Unresulted Labs Ordered in the Past 30 Days of this Admission       Date and Time Order Name Status Description    6/25/2024  2:54 AM Blood Culture Peripheral Blood In process     6/25/2024  2:54 AM Blood Culture Peripheral Blood In process         These results will be followed up by hospital team if needed.     Discharge Disposition   Discharged to home  Condition at discharge: Stable    Hospital Course   Leticia Morales is a 38 year old female admitted on 6/24/2024. She has a history of migraine, GERD, PTSD and was admitted to [facility] overnight for migraine, myalgias, and elevated lactic acid to 2.5 (since resolved).     #Migraine, intractable, resolved  #Chronic migraine without aura  Chronic issue, follows with [facility] - last visit 4/19/2024 with Silke Velasco. Had trouble filling her abortive medication and is overdue for her fremanezumab  (Ajovy) preventive medication. Last botox was in March 2024. She felt the migraine coming on over the last week but is feeling better after receiving ketorolac, IVF and antiemetics in the ED. She requests to be discharged today.   - Discharged with rx for nasal sumatriptan - has not used before, willing to try  - Will route to neuro team to help with scripts above     #Lactic acidosis, resolved  #Myalgias, improved  #Chills, resolved  #Dizziness, resolved  Reported symptoms over the last few days. Workup showed no COVID, RSV, flu, no leukocytosis, no UTI, clear lungs on exam without cough. LA 2.5 improved to 0.7 with IVF. She had not eaten since lunch 6/24; felt improved with breakfast this AM. May have a viral syndrome but low concern for serious illness    #Microcyctic anemia, chronic  #Iron deficiency  HGB 8.1 on admission, decreased to 7.1 with IVF. Chronic issue, though usually BL more in the 9 range. Iron quite low on prior iron studies, with ferritin of 6 in 2024. Reports poor adherence to PO iron. Denies hematemesis, blood in stools, current menses, persistent dizziness. First 2 days of menses can be heavy. No concern for acute occult bleed.   - Follow up at [facility] within 1-2 weeks - repeat CBC, iron studies. Consider IV iron      Consultations This Hospital Stay   None    Code Status   Full Code       The patient was discussed with Dr. Chu.    [facility]  [facility] MED SURG  Formerly [facility], [facility] [address]  Phone: [phone]  Fax: [phone]  ______________________________________________________________________    Physical Exam   Vital Signs: Temp: 98.4  F (36.9  C) Temp src: Oral BP: 109/72 Pulse: 85   Resp: 18 SpO2: 100 % O2 Device: None (Room air)    Weight: 125 lbs 0 oz  Physical Exam  Constitutional:       General: She is not in acute distress.     Appearance: She is not ill-appearing, toxic-appearing or diaphoretic.    Eyes:      Conjunctiva/sclera: Conjunctivae normal.   Cardiovascular:      Rate and Rhythm: Normal rate and regular rhythm.      Pulses: Normal pulses.      Heart sounds: Normal heart sounds.   Pulmonary:      Effort: Pulmonary effort is normal.      Breath sounds: Normal breath sounds. No wheezing or rales.   Abdominal:      General: Abdomen is flat. Bowel sounds are normal.      Palpations: Abdomen is soft.   Musculoskeletal:      Cervical back: Neck supple.      Right lower leg: No edema.      Left lower leg: No edema.   Lymphadenopathy:      Cervical: No cervical adenopathy.   Skin:     General: Skin is warm and dry.   Neurological:      General: No focal deficit present.      Mental Status: She is alert. Mental status is at baseline.      Cranial Nerves: No cranial nerve deficit.   Psychiatric:         Mood and Affect: Mood normal.         Behavior: Behavior normal.           Primary Care Physician   Md Rey Cope    Discharge Orders      Reason for your hospital stay    You were hospitalized overnight for migraine and feeling ill. Your blood tests showed dehydration but improved before you left with fluids in the IV.     Activity    Your activity upon discharge: activity as tolerated     Adult [facility] Follow-up and recommended labs and tests    Follow up with primary care provider, Md Rey Cope, within 10-14 days, for hospital follow- up. No follow up labs or test are needed.     Follow up with neurology within the next month if possible.    Appointments on [facility] and/or [facility] (with [facility] or [facility] provider or service). Call [phone] if you haven't heard regarding these appointments within 7 days of discharge.     Diet    Follow this diet upon discharge: Regular       Significant Results and Procedures   Most Recent 3 CBC's:  Recent Labs   Lab Test 06/25/24  0645 06/25/24  0028 03/18/24  1624   WBC 7.4 7.9 4.9   HGB 7.1* 8.1* 9.5*   MCV 68* 67* 69*    388 304     Most Recent 3  BMP's:  Recent Labs   Lab Test 06/25/24  0645 06/25/24  0028 03/18/24  1624    142 139   POTASSIUM 3.9 3.4 3.4   CHLORIDE 115* 105 110*   CO2 18* 20* 18*   BUN 6.0 9.1 7.4   CR 0.73 0.78 0.63   ANIONGAP 8 17* 11   SAMUEL 7.7* 9.1 9.1   GLC 94 119* 102*     Most Recent 2 LFT's:  Recent Labs   Lab Test 06/25/24  0028 03/18/24  1624   AST 14 14   ALT 6 6   ALKPHOS 63 60   BILITOTAL 0.2 0.2       Discharge Medications   Current Discharge Medication List        START taking these medications    Details   SUMAtriptan (IMITREX) 20 MG/ACT nasal spray Spray 1 spray in nostril as needed for migraine May repeat in 2 hours. Max 2 sprays/24 hours.  Qty: 4 each, Refills: 0    Associated Diagnoses: Chronic migraine without aura without status migrainosus, not intractable           CONTINUE these medications which have NOT CHANGED    Details   acetaminophen (TYLENOL) 500 MG tablet Take 2 tablets (1,000 mg) by mouth every 6 hours as needed for mild pain  Qty: 180 tablet, Refills: 0    Associated Diagnoses: Cough; Intractable tension-type headache, unspecified chronicity pattern; Fever and chills      ferrous sulfate (FEROSUL) 325 (65 Fe) MG tablet Take 1 tablet (325 mg) by mouth every other day  Qty: 30 tablet, Refills: 3    Associated Diagnoses: Microcytic anemia      Fremanezumab-vfrm (AJOVY) 225 MG/1.5ML SOAJ Inject 225 mg Subcutaneous every 30 days  Qty: 1.5 mL, Refills: 11    Associated Diagnoses: Intractable chronic migraine without aura and with status migrainosus      ibuprofen (ADVIL/MOTRIN) 400 MG tablet Take 400 mg by mouth every 6 hours as needed for moderate pain      omeprazole (PRILOSEC) 20 MG DR capsule TAKE 1 CAPSULE (20 MG) BY MOUTH 2 TIMES DAILY.  Qty: 240 capsule, Refills: 0    Associated Diagnoses: Gastroesophageal reflux disease without esophagitis      ondansetron (ZOFRAN ODT) 4 MG ODT tab Take 1 tablet (4 mg) by mouth every 8 hours as needed for nausea  Qty: 30 tablet, Refills: 0    Associated Diagnoses:  Nausea      Prenatal Vit-Fe Fumarate-FA (PRENATAL MULTIVITAMIN W/IRON) 27-0.8 MG tablet Take 1 tablet by mouth daily  Qty: 90 tablet, Refills: 3    Associated Diagnoses: Healthcare maintenance      tretinoin (RETIN-A) 0.025 % external cream Apply topically At Bedtime  Qty: 45 g, Refills: 1    Associated Diagnoses: Acne, unspecified acne type      ubrogepant (UBRELVY) 100 MG tablet Take 1 tablet (100 mg) by mouth at onset of headache (may repeat in 2 hours as needed. Max 2 tabs in 24 hours)  Qty: 16 tablet, Refills: 11    Associated Diagnoses: Migraine with aura and without status migrainosus, not intractable      vitamin D3 (CHOLECALCIFEROL) 50 mcg (2000 units) tablet Take 1 tablet (50 mcg) by mouth daily  Qty: 90 tablet, Refills: 3    Associated Diagnoses: Vitamin D deficiency           Allergies   Allergies   Allergen Reactions    Macrodantin [Nitrofurantoin Macrocrystal] Other (See Comments)     itching    Sumatriptan Anxiety, Palpitations and Itching

## 2024-06-25 NOTE — PROGRESS NOTES
6MS DISCHARGE    D: Patient discharged to home at 11:45 AM. Patient accompanied by patient transport and family member.    I: All discharge medications and instructions reviewed with patient. Patient instructed to seek care if experiencing worsening symptoms. Other phone numbers to call with questions or concerns after discharge reviewed. PIV removed. Education completed.    A: Patient verbalized understanding of discharge medications and instructions. Prescribed home medications given to patient.    P: Patient to follow-up within 10-14 days with primary care provider.

## 2024-06-25 NOTE — ED PROVIDER NOTES
[facility] EMERGENCY DEPARTMENT ([facility])    6/24/24      ED PROVIDER NOTE       History     Chief Complaint   Patient presents with    Fever      Started few hours ago, chills. Tylenol 3-4 pm    Headache     Migraine - started yesterday - getting worse     HPI  Leticia Morales is a 38 year old female with a history of migraine without aura, abnormal MRI of cervical spine, gastroesophageal reflux disease without esophagitis and PTSD presents to the ED with primary complaint of headaches and feeling unwell.  She states she does have frequent migraines, developed migraine headache which is primarily on the left retrobulbar left temporal region yesterday.  She states been constant.  She states that although it feels like a migraine, she is having other symptoms that make her think perhaps not a migraine.  She states that in addition to the headaches today she started to feel chills, some malaise and myalgias.  She states that she had a subjective fever but did not check her temp.  She states that 8 to 9 hours ago she did take a dose of Tylenol.  No recent trauma.  No recent travel.  No blurred vision or slurred speech.  No numbness, tingling, focal weakness.  No stuffy nose, sore throat, cough, dysuria.  No other specific complaints.    This part of the medical record was transcribed by Shu Cheatham Medical Scribe, from a dictation done by Rosa Causey MD.      Past Medical History  Past Medical History:   Diagnosis Date    ARDS (adult respiratory distress syndrome) (H) 2010    related to H1N1 infection    H1N1 influenza 2010    prolonged ICU stay, medically induced coma    Iron deficiency anemia     Migraines      Past Surgical History:   Procedure Laterality Date    THORACIC SURGERY      trach     acetaminophen (TYLENOL) 500 MG tablet  ferrous sulfate (FEROSUL) 325 (65 Fe) MG tablet  Fremanezumab-vfrm (AJOVY) 225 MG/1.5ML SOAJ  ibuprofen (ADVIL/MOTRIN) 400 MG tablet  omeprazole (PRILOSEC) 20 MG   "capsule  ondansetron (ZOFRAN ODT) 4 MG ODT tab  Prenatal Vit-Fe Fumarate-FA (PRENATAL MULTIVITAMIN W/IRON) 27-0.8 MG tablet  tretinoin (RETIN-A) 0.025 % external cream  ubrogepant (UBRELVY) 100 MG tablet  vitamin D3 (CHOLECALCIFEROL) 50 mcg (2000 units) tablet      Allergies   Allergen Reactions    Macrodantin [Nitrofurantoin Macrocrystal] Other (See Comments)     itching    Sumatriptan Anxiety, Palpitations and Itching     Family History  Family History   Problem Relation Age of Onset    Diabetes Father     Hypertension Father     Cancer Father     Other - See Comments Father         Epistaxis when Angry    Glaucoma Father     Migraines Mother      Social History   Social History     Tobacco Use    Smoking status: Former     Types: Hookah    Smokeless tobacco: Never   Vaping Use    Vaping status: Some Days   Substance Use Topics    Alcohol use: No    Drug use: No      A medically appropriate review of systems was performed with pertinent positives and negatives noted in the HPI, and all other systems negative.    Physical Exam   BP: 105/72  Pulse: 102  Temp: 98.4  F (36.9  C)  Resp: 18  Height: 162.6 cm (5' 4\")  Weight: 56.7 kg (125 lb)  SpO2: 98 %  Physical Exam  Constitutional:       Appearance: Normal appearance. She is not toxic-appearing.      Comments: mildly tremulous   HENT:      Head: Atraumatic.      Mouth/Throat:      Mouth: Mucous membranes are moist.   Eyes:      General: No scleral icterus.     Conjunctiva/sclera: Conjunctivae normal.   Cardiovascular:      Rate and Rhythm: Normal rate.      Heart sounds: Normal heart sounds.   Pulmonary:      Effort: No respiratory distress.      Breath sounds: Normal breath sounds.   Abdominal:      Palpations: Abdomen is soft.      Tenderness: There is no abdominal tenderness.   Musculoskeletal:         General: No deformity.      Cervical back: Neck supple.   Skin:     General: Skin is warm.      Findings: No rash.   Neurological:      Mental Status: She is alert " and oriented to person, place, and time.      Cranial Nerves: No cranial nerve deficit.      Sensory: No sensory deficit.      Motor: No weakness.      Coordination: Coordination normal.           ED Course, Procedures, & Data      Procedures           Elevated lactate but does not meet SIRS criteria. Zosyn given after blood cultures given possibility of early sepsis. 2 L IV fluids given.        Results for orders placed or performed during the hospital encounter of 06/24/24   Comprehensive metabolic panel     Status: Abnormal   Result Value Ref Range    Sodium 142 135 - 145 mmol/L    Potassium 3.4 3.4 - 5.3 mmol/L    Carbon Dioxide (CO2) 20 (L) 22 - 29 mmol/L    Anion Gap 17 (H) 7 - 15 mmol/L    Urea Nitrogen 9.1 6.0 - 20.0 mg/dL    Creatinine 0.78 0.51 - 0.95 mg/dL    GFR Estimate >90 >60 mL/min/1.73m2    Calcium 9.1 8.6 - 10.0 mg/dL    Chloride 105 98 - 107 mmol/L    Glucose 119 (H) 70 - 99 mg/dL    Alkaline Phosphatase 63 40 - 150 U/L    AST 14 0 - 45 U/L    ALT 6 0 - 50 U/L    Protein Total 7.0 6.4 - 8.3 g/dL    Albumin 3.9 3.5 - 5.2 g/dL    Bilirubin Total 0.2 <=1.2 mg/dL   UA with Microscopic     Status: Abnormal   Result Value Ref Range    Color Urine Yellow Colorless, Straw, Light Yellow, Yellow    Appearance Urine Clear Clear    Glucose Urine Negative Negative mg/dL    Bilirubin Urine Negative Negative    Ketones Urine Trace (A) Negative mg/dL    Specific Gravity Urine 1.040 (H) 1.003 - 1.035    Blood Urine Negative Negative    pH Urine 6.0 5.0 - 7.0    Protein Albumin Urine 30 (A) Negative mg/dL    Urobilinogen Urine Normal Normal, 2.0 mg/dL    Nitrite Urine Negative Negative    Leukocyte Esterase Urine Negative Negative    Mucus Urine Present (A) None Seen /LPF    RBC Urine 0 <=2 /HPF    WBC Urine 1 <=5 /HPF    Squamous Epithelials Urine 8 (H) <=1 /HPF   Symptomatic Influenza A/B, RSV, & SARS-CoV2 PCR (COVID-19) Nose     Status: Normal    Specimen: Nose; Swab   Result Value Ref Range    Influenza A PCR  Negative Negative    Influenza B PCR Negative Negative    RSV PCR Negative Negative    SARS CoV2 PCR Negative Negative    Narrative    Testing was performed using the Xpert Xpress CoV2/Flu/RSV Assay on the [employer] GeneXpert Instrument. This test should be ordered for the detection of SARS-CoV-2, influenza, and RSV viruses in individuals who meet clinical and/or epidemiological criteria. Test performance is unknown in asymptomatic patients. This test is for in vitro diagnostic use under the FDA EUA for laboratories certified under CLIA to perform high or moderate complexity testing. This test has not been FDA cleared or approved. A negative result does not rule out the presence of PCR inhibitors in the specimen or target RNA in concentration below the limit of detection for the assay. If only one viral target is positive but coinfection with multiple targets is suspected, the sample should be re-tested with another FDA cleared, approved, or authorized test, if coinfection would change clinical management. This test was validated by the [facility]. These laboratories are certified under the Clinical Laboratory Improvement Amendments of 1988 (CLIA-88) as qualified to perform high complexity laboratory testing.   HCG qualitative pregnancy (blood)     Status: Normal   Result Value Ref Range    hCG Serum Qualitative Negative Negative   CBC with platelets and differential     Status: Abnormal   Result Value Ref Range    WBC Count 7.9 4.0 - 11.0 10e3/uL    RBC Count 4.01 3.80 - 5.20 10e6/uL    Hemoglobin 8.1 (L) 11.7 - 15.7 g/dL    Hematocrit 26.9 (L) 35.0 - 47.0 %    MCV 67 (L) 78 - 100 fL    MCH 20.2 (L) 26.5 - 33.0 pg    MCHC 30.1 (L) 31.5 - 36.5 g/dL    RDW 18.5 (H) 10.0 - 15.0 %    Platelet Count 388 150 - 450 10e3/uL    % Neutrophils 73 %    % Lymphocytes 17 %    % Monocytes 8 %    % Eosinophils 1 %    % Basophils 1 %    % Immature Granulocytes 0 %    NRBCs per 100 WBC 0 <1 /100    Absolute  Neutrophils 5.8 1.6 - 8.3 10e3/uL    Absolute Lymphocytes 1.3 0.8 - 5.3 10e3/uL    Absolute Monocytes 0.6 0.0 - 1.3 10e3/uL    Absolute Eosinophils 0.1 0.0 - 0.7 10e3/uL    Absolute Basophils 0.1 0.0 - 0.2 10e3/uL    Absolute Immature Granulocytes 0.0 <=0.4 10e3/uL    Absolute NRBCs 0.0 10e3/uL   Ketone Beta-Hydroxybutyrate Quantitative     Status: None   Result Value Ref Range    Ketone (Beta-Hydroxybutyrate) Quantitative     Lactic acid whole blood     Status: Abnormal   Result Value Ref Range    Lactic Acid 2.5 (H) 0.7 - 2.0 mmol/L   Ketone Beta-Hydroxybutyrate Quantitative     Status: Normal   Result Value Ref Range    Ketone (Beta-Hydroxybutyrate) Quantitative <0.18 <=0.30 mmol/L   TSH with free T4 reflex     Status: Normal   Result Value Ref Range    TSH 3.32 0.30 - 4.20 uIU/mL   CBC with platelets differential     Status: Abnormal    Narrative    The following orders were created for panel order CBC with platelets differential.  Procedure                               Abnormality         Status                     ---------                               -----------         ------                     CBC with platelets and d...[[id number]]  Abnormal            Final result                 Please view results for these tests on the individual orders.   CBC with Platelets & Differential     Status: None ()    Narrative    The following orders were created for panel order CBC with Platelets & Differential.  Procedure                               Abnormality         Status                     ---------                               -----------         ------                     CBC with platelets and d...[[id number]]                                                   Please view results for these tests on the individual orders.     Medications   lidocaine 1 % 0.1-1 mL (has no administration in time range)   lidocaine (LMX4) cream (has no administration in time range)   sodium chloride (PF) 0.9% PF flush 3 mL (3 mLs  Intracatheter Not Given 6/25/24 0641)   sodium chloride (PF) 0.9% PF flush 3 mL (has no administration in time range)   senna-docusate (SENOKOT-S/PERICOLACE) 8.6-50 MG per tablet 1 tablet (has no administration in time range)     Or   senna-docusate (SENOKOT-S/PERICOLACE) 8.6-50 MG per tablet 2 tablet (has no administration in time range)   calcium carbonate (TUMS) chewable tablet 1,000 mg (has no administration in time range)   acetaminophen (TYLENOL) tablet 650 mg (has no administration in time range)     Or   acetaminophen (TYLENOL) Suppository 650 mg (has no administration in time range)   ketorolac (TORADOL) injection 15 mg (15 mg Intravenous $Given 6/25/24 0034)   metoclopramide (REGLAN) injection 10 mg (10 mg Intravenous $Given 6/25/24 0037)   sodium chloride 0.9 % infusion (0 mLs  Stopped 6/25/24 0122)   sodium chloride 0.9% BOLUS 1,000 mL (0 mLs Intravenous Stopped 6/25/24 0245)   prochlorperazine (COMPAZINE) injection 10 mg (10 mg Intravenous $Given 6/25/24 0158)   diphenhydrAMINE (BENADRYL) capsule 25 mg (25 mg Oral $Given 6/25/24 0157)   piperacillin-tazobactam (ZOSYN) 3.375 g vial to attach to  mL bag (0 g Intravenous Stopped 6/25/24 0442)     Labs Ordered and Resulted from Time of ED Arrival to Time of ED Departure   COMPREHENSIVE METABOLIC PANEL - Abnormal       Result Value    Sodium 142      Potassium 3.4      Carbon Dioxide (CO2) 20 (*)     Anion Gap 17 (*)     Urea Nitrogen 9.1      Creatinine 0.78      GFR Estimate >90      Calcium 9.1      Chloride 105      Glucose 119 (*)     Alkaline Phosphatase 63      AST 14      ALT 6      Protein Total 7.0      Albumin 3.9      Bilirubin Total 0.2     ROUTINE UA WITH MICROSCOPIC - Abnormal    Color Urine Yellow      Appearance Urine Clear      Glucose Urine Negative      Bilirubin Urine Negative      Ketones Urine Trace (*)     Specific Gravity Urine 1.040 (*)     Blood Urine Negative      pH Urine 6.0      Protein Albumin Urine 30 (*)      Urobilinogen Urine Normal      Nitrite Urine Negative      Leukocyte Esterase Urine Negative      Mucus Urine Present (*)     RBC Urine 0      WBC Urine 1      Squamous Epithelials Urine 8 (*)    CBC WITH PLATELETS AND DIFFERENTIAL - Abnormal    WBC Count 7.9      RBC Count 4.01      Hemoglobin 8.1 (*)     Hematocrit 26.9 (*)     MCV 67 (*)     MCH 20.2 (*)     MCHC 30.1 (*)     RDW 18.5 (*)     Platelet Count 388      % Neutrophils 73      % Lymphocytes 17      % Monocytes 8      % Eosinophils 1      % Basophils 1      % Immature Granulocytes 0      NRBCs per 100 WBC 0      Absolute Neutrophils 5.8      Absolute Lymphocytes 1.3      Absolute Monocytes 0.6      Absolute Eosinophils 0.1      Absolute Basophils 0.1      Absolute Immature Granulocytes 0.0      Absolute NRBCs 0.0     LACTIC ACID WHOLE BLOOD - Abnormal    Lactic Acid 2.5 (*)    INFLUENZA A/B, RSV, & SARS-COV2 PCR - Normal    Influenza A PCR Negative      Influenza B PCR Negative      RSV PCR Negative      SARS CoV2 PCR Negative     HCG QUALITATIVE PREGNANCY - Normal    hCG Serum Qualitative Negative     KETONE BETA-HYDROXYBUTYRATE QUANTITATIVE, RAPID - Normal    Ketone (Beta-Hydroxybutyrate) Quantitative <0.18     TSH WITH FREE T4 REFLEX - Normal    TSH 3.32     KETONE BETA-HYDROXYBUTYRATE QUANTITATIVE, RAPID    Ketone (Beta-Hydroxybutyrate) Quantitative       LACTIC ACID WHOLE BLOOD   BASIC METABOLIC PANEL   CBC WITH PLATELETS AND DIFFERENTIAL   BLOOD CULTURE   BLOOD CULTURE     No orders to display          Critical care was not performed.     Medical Decision Making  The patient's presentation was of moderate complexity (an acute illness with systemic symptoms).    The patient's evaluation involved:  review of external note(s) from 2 sources (previous notes)  review of 3+ test result(s) ordered prior to this encounter (previous results)  ordering and/or review of 3+ test(s) in this encounter (see separate area of note for details)    The patient's  management necessitated high risk (a decision regarding hospitalization).    Assessment & Plan    The patient states that her headache feels like a migraine, but she has other symptoms which are not typical for migraine.  She states she had subjective fevers, feels shaky.  She did take Tylenol but it was greater than 8 hours prior to coming in here, is afebrile here-though slightly tachycardic.  Her neck is supple, and I do not have high suspicion for bacterial meningitis.  She denies any recent travel.  No trauma.  Neurologic exam is nonfocal.  She does not have any localizing symptoms to point towards potential source of infection.  I did obtain a urinalysis which was negative for sign of infection as well as COVID/influenza/RSV which was also negative.  She has no respiratory symptoms.  She was given Reglan, Toradol, IV fluids-and on repeat evaluation she states she is continue to feel poorly, continue to feel shaky.  After 2 L of fluid, as I did note that she had an anion gap acidosis on her blood work, I did order ketones, lactate, TSH.  There was a delay in the labs, though ketones did come back positive at 2.5, despite the fact that she had already had 2 L of IV fluid.  She continues to feel poorly.  I am concerned about this, did give a dose of Zosyn empirically for potential early sepsis.  Blood cultures were sent prior to the Zosyn.  Of note, hemoglobin is downtrending.  The patient denies any black or bloody stools, recent sign of bleeding.  Denies particularly heavy menses.  Nothing by history or exam to suggest active bleeding.  It is not entirely clear what is causing her to feel poorly.  However, I do think that given that she continues to feel bad, have a lactic acidosis, shaky, we will bring her in for observation.  The patient is agreeable to the plan.  I did speak with the hospitalist who does agree to except the patient.    Dictation Disclaimer: Some of this Note has been completed with  voice-recognition dictation software. Although errors are generally corrected real-time, there is the potential for a rare error to be present in the completed chart.      I have reviewed the nursing notes. I have reviewed the findings, diagnosis, plan and need for follow up with the patient.    New Prescriptions    No medications on file       Final diagnoses:   Intractable headache, unspecified chronicity pattern, unspecified headache type   Lactic acidosis   Malaise       Rosa Causey  [facility] EMERGENCY DEPARTMENT  6/24/2024     Rosa Causey MD  06/25/24 0651       Rosa Causey MD  06/25/24 0717

## 2024-06-25 NOTE — TELEPHONE ENCOUNTER
Patient is currently in the ED observation for fever,headache.  She was given Reglan, Toradol, IV fluids-

## 2024-06-25 NOTE — PROGRESS NOTES
6MS ADMISSION    D: Patient admitted from [place] ED via stretcher cart for lactic acidosis/headache/malaise.     I: Upon arrival to the unit patient was oriented to room, unit, and call light. Patient s height, weight, and vital signs were obtained. Allergies reviewed and allergy band applied. Provider notified of patient s arrival on the unit. Adult AVS completed. Head to toe assessment completed. Education assessment completed. Care plan initiated.    A: Vital signs stable upon admission. Patient denies pain at this time Two RN skin assessment completed. Second RN was Elin HARDY No significant skin findings.    P: Continue to monitor patient s status and intervene as needed. Continue with plan of care. Notify provider with any concerns or changes in patient status.

## 2024-06-26 ENCOUNTER — TELEPHONE (OUTPATIENT)
Dept: FAMILY MEDICINE | Facility: CLINIC | Age: 39
End: 2024-06-26
Payer: COMMERCIAL

## 2024-06-26 ENCOUNTER — TELEPHONE (OUTPATIENT)
Dept: NEUROLOGY | Facility: CLINIC | Age: 39
End: 2024-06-26
Payer: COMMERCIAL

## 2024-06-26 NOTE — TELEPHONE ENCOUNTER
CC attempted to call patient to schedule hospital follow up appointment. LVM with direct number for call back,     Jacobo Daley  Care Coordinator- [facility]  [phone]

## 2024-06-26 NOTE — TELEPHONE ENCOUNTER
Called patient per message from Silke Velasco,   Patient was recently admitted and said that she was not able to get her ubrelvy and Ajovy. Please follow up and see where is the problem. Both prescriptions are active. She cannot be overdue. If cost -I can see her to talk about other options.   Thank you,   Silke SAAVEDRA for patient via [employer]  letting her know I was calling from Silke Velasco's office and will send her a [place] message. Left contact information for the clinic and encouraged patient to call with questions or concerns.  Nancy Rutledge, RN 6/26/2024 3:09 PM

## 2024-06-30 LAB
BACTERIA BLD CULT: NO GROWTH
BACTERIA BLD CULT: NO GROWTH

## 2024-07-01 ENCOUNTER — TELEPHONE (OUTPATIENT)
Dept: NEUROLOGY | Facility: CLINIC | Age: 39
End: 2024-07-01
Payer: COMMERCIAL

## 2024-07-01 NOTE — TELEPHONE ENCOUNTER
----- Message from Silke Velasco sent at 6/26/2024  8:21 AM CDT -----  Regarding: Ubrelvy and Ajovy  Patient was recently admitted and said that she was not able to get her ubrelvy and Ajovy. Please follow up and see where is the problem. Both prescriptions are active. She cannot be overdue. If cost -I can see her to talk about other options.   Thank you,  Silke

## 2024-07-19 ENCOUNTER — TELEPHONE (OUTPATIENT)
Dept: NEUROLOGY | Facility: CLINIC | Age: 39
End: 2024-07-19

## 2024-07-19 ENCOUNTER — OFFICE VISIT (OUTPATIENT)
Dept: FAMILY MEDICINE | Facility: CLINIC | Age: 39
End: 2024-07-19
Payer: COMMERCIAL

## 2024-07-19 VITALS
WEIGHT: 136.2 LBS | HEART RATE: 111 BPM | OXYGEN SATURATION: 98 % | HEIGHT: 65 IN | DIASTOLIC BLOOD PRESSURE: 76 MMHG | RESPIRATION RATE: 16 BRPM | TEMPERATURE: 98.3 F | SYSTOLIC BLOOD PRESSURE: 111 MMHG | BODY MASS INDEX: 22.69 KG/M2

## 2024-07-19 DIAGNOSIS — R11.0 NAUSEA: ICD-10-CM

## 2024-07-19 DIAGNOSIS — D50.8 IRON DEFICIENCY ANEMIA SECONDARY TO INADEQUATE DIETARY IRON INTAKE: Primary | ICD-10-CM

## 2024-07-19 DIAGNOSIS — D50.9 MICROCYTIC ANEMIA: ICD-10-CM

## 2024-07-19 DIAGNOSIS — G43.709 CHRONIC MIGRAINE WITHOUT AURA WITHOUT STATUS MIGRAINOSUS, NOT INTRACTABLE: ICD-10-CM

## 2024-07-19 DIAGNOSIS — G43.109 MIGRAINE WITH AURA AND WITHOUT STATUS MIGRAINOSUS, NOT INTRACTABLE: ICD-10-CM

## 2024-07-19 LAB
BASOPHILS # BLD AUTO: 0.1 10E3/UL (ref 0–0.2)
BASOPHILS NFR BLD AUTO: 1 %
EOSINOPHIL # BLD AUTO: 0 10E3/UL (ref 0–0.7)
EOSINOPHIL NFR BLD AUTO: 0 %
ERYTHROCYTE [DISTWIDTH] IN BLOOD BY AUTOMATED COUNT: 18.9 % (ref 10–15)
HCT VFR BLD AUTO: 31.8 % (ref 35–47)
HGB BLD-MCNC: 9.3 G/DL (ref 11.7–15.7)
IMM GRANULOCYTES # BLD: 0 10E3/UL
IMM GRANULOCYTES NFR BLD: 0 %
LYMPHOCYTES # BLD AUTO: 2.6 10E3/UL (ref 0.8–5.3)
LYMPHOCYTES NFR BLD AUTO: 25 %
MCH RBC QN AUTO: 19.5 PG (ref 26.5–33)
MCHC RBC AUTO-ENTMCNC: 29.2 G/DL (ref 31.5–36.5)
MCV RBC AUTO: 67 FL (ref 78–100)
MONOCYTES # BLD AUTO: 0.9 10E3/UL (ref 0–1.3)
MONOCYTES NFR BLD AUTO: 9 %
NEUTROPHILS # BLD AUTO: 6.9 10E3/UL (ref 1.6–8.3)
NEUTROPHILS NFR BLD AUTO: 65 %
PLATELET # BLD AUTO: 488 10E3/UL (ref 150–450)
RBC # BLD AUTO: 4.78 10E6/UL (ref 3.8–5.2)
WBC # BLD AUTO: 10.6 10E3/UL (ref 4–11)

## 2024-07-19 PROCEDURE — 83550 IRON BINDING TEST: CPT

## 2024-07-19 PROCEDURE — 85025 COMPLETE CBC W/AUTO DIFF WBC: CPT

## 2024-07-19 PROCEDURE — 36415 COLL VENOUS BLD VENIPUNCTURE: CPT

## 2024-07-19 PROCEDURE — 83540 ASSAY OF IRON: CPT

## 2024-07-19 PROCEDURE — 99214 OFFICE O/P EST MOD 30 MIN: CPT | Mod: GC

## 2024-07-19 PROCEDURE — 82728 ASSAY OF FERRITIN: CPT

## 2024-07-19 RX ORDER — ONDANSETRON 4 MG/1
4 TABLET, ORALLY DISINTEGRATING ORAL EVERY 8 HOURS PRN
Qty: 30 TABLET | Refills: 0 | Status: SHIPPED | OUTPATIENT
Start: 2024-07-19

## 2024-07-19 RX ORDER — FERROUS SULFATE 325(65) MG
325 TABLET ORAL EVERY OTHER DAY
Qty: 30 TABLET | Refills: 3 | Status: SHIPPED | OUTPATIENT
Start: 2024-07-19

## 2024-07-19 ASSESSMENT — PATIENT HEALTH QUESTIONNAIRE - PHQ9
SUM OF ALL RESPONSES TO PHQ QUESTIONS 1-9: 16
10. IF YOU CHECKED OFF ANY PROBLEMS, HOW DIFFICULT HAVE THESE PROBLEMS MADE IT FOR YOU TO DO YOUR WORK, TAKE CARE OF THINGS AT HOME, OR GET ALONG WITH OTHER PEOPLE: NOT DIFFICULT AT ALL
SUM OF ALL RESPONSES TO PHQ QUESTIONS 1-9: 16

## 2024-07-19 NOTE — TELEPHONE ENCOUNTER
[facility] Call Center    Phone Message    May a detailed message be left on voicemail: yes     Reason for Call: Symptoms or Concerns     If patient has red-flag symptoms, warm transfer to triage line    Current symptom or concern:Migraine for the past month.  Pt states mediation has not provided any relief     Symptoms have been present for:  2 month(s)    Has patient previously been seen for this? Yes    By Silke Velasco    [facility] [address] [place] [address]  Phone [phone]    Are there any new or worsening symptoms? No    Action Taken: Message routed to:  [facility] ([facility] Neurology     Travel Screening: Not Applicable     Date of Service:

## 2024-07-19 NOTE — PROGRESS NOTES
Assessment & Plan     Migraine with aura and without status migrainosus, not intractable  Patient was admitted for observation on 6/24 with worsening migraines after she was having trouble filling her abortive medication and was overdue for her fremanezumab (Ajovy) preventive medication. Patient reports she was having increasing migraine intensity for about a week prior to her hospitalization but did feel better after receiving ketorolac, IVF and antiemetics in the ED. She was discharge the following day and recommended to follow up with Neurology to get her prescriptions fill. Patient has not follow up with Neurology but mentions she made an appointment for 8/14. She has been using her abortive medication without relief and still has not been able to fill her Ajovy medication. Per chart review, it seems that Neurology has made multiple attempts to call the patient but have not been able to reach her. Discuss with Leticia that I recommend she call Neurology for their assistance to filling her Ajovy medications. She will work on calling Neurology today. I asked her to call our clinic if she require any additional support.       Iron deficiency anemia secondary to inadequate dietary iron intake  Microcytic anemia  HGB 8.1 at hospital admission, decreased to 7.1 with IVF at discharge.  Patient reports poor adherence to PO iron. Denies hematemesis, blood in stools, current menses, persistent dizziness.   This has been a  chronic issue, though usually BL Hgb in the 9 range. Iron quite low on prior iron studies, with ferritin of 6 in 3/19. Currently, patient endorsing symptoms of lightheadedness, and occasional sensation of feeling like fainting/weakness. CBC repeated in clinic today with Hgb improved to 9.3. Will check iron and Ferritin also. Recommend patient to restart iron supplement. Discuss with her about establishing a routine for taking her medications as this will help her headaches also. Patient reports  understanding and will plan to take her medication more routinely.   - ferrous sulfate (FEROSUL) 325 (65 Fe) MG tablet  Dispense: 30 tablet; Refill: 3  - Iron and iron binding capacity  - Ferritin  - CBC with platelets differential  - Iron and iron binding capacity  - Ferritin  - CBC with platelets differential    Nausea  - ondansetron (ZOFRAN ODT) 4 MG ODT tab  Dispense: 30 tablet; Refill: 0      Return if symptoms worsen or fail to improve.    Nj Kelly is a 38 year old, presenting for the following health issues:  Follow Up (Hospital follow up from last month )        7/19/2024     1:31 PM   Additional Questions   Roomed by Rafalk   Accompanied by self         7/19/2024    Information    services provided? No        HPI     Hospital Follow-up Visit:    Hospital/Nursing Home/IP Rehab Facility: [facility]  Date of Admission: 6/24  Date of Discharge: 6/25  Reason(s) for Admission:  admitted to OBS overnight for migraine, myalgias, and elevated lactic acid to 2.5      Was the patient in the ICU or did the patient experience delirium during hospitalization?  No  Do you have any other stressors you would like to discuss with your provider? No    Problems taking medications regularly:  None  Medication changes since discharge: None  Problems adhering to non-medication therapy:  None    Summary of hospitalization:  [facility] hospital discharge summary reviewed    Admitted to OBS overnight for migraine, myalgias, and elevated lactic acid to 2.5. Headaches have been an chronic issue and is follows by [facility] clinic - last visit 4/19/2024 with Silke Velasco. Had trouble filling her abortive medication and is overdue for her fremanezumab (Ajovy) preventive medication. Last botox was in March 2024. Patient reports she was feeling increase intensity of her migraine about a week prior to hospitalization but did feel better after receiving ketorolac,  "IVF and antiemetics in the ED      Elevated lactic acid. Reported myalgia, chills and dizziness prior to ED visit. Workup showed no COVID, RSV, flu, no leukocytosis, no UTI, clear lungs on exam without cough. LA 2.5 improved to 0.7 with IVF. She had not eaten since lunch 6/24; felt improved with breakfast the following day. May have a viral syndrome but low concern for serious illness. Symptoms resolved prior to discharge. Patient request to discharge after one night in the hospital as her symptoms improve. It was recommended that she follow up with Neurology to obtain her medications but has not yet been able to follow up.        #Microcyctic anemia, chronic  #Iron deficiency  HGB 8.1 at admission, decreased to 7.1 with IVF at discharge.  Patient reports poor adherence to PO iron. Denies hematemesis, blood in stools, current menses, persistent dizziness. Currently, having symptoms of lightheadedness, and occasional sensation of feeling like fainting/weakness.       Diagnostic Tests/Treatments reviewed.  Follow up needed: Neurology [facility] clinic 8/14  Other Healthcare Providers Involved in Patient s Care:          Neurology, Headache Clinic   Update since discharge: stable.     Plan of care communicated with patient         Review of Systems  Constitutional, HEENT, cardiovascular, pulmonary, gi and gu systems are negative, except as otherwise noted.      Objective    /76   Pulse 111   Temp 98.3  F (36.8  C) (Oral)   Resp 16   Ht 1.638 m (5' 4.5\")   Wt 61.8 kg (136 lb 3.2 oz)   LMP 07/12/2024 (Approximate)   SpO2 98%   BMI 23.02 kg/m    Body mass index is 23.02 kg/m .  Physical Exam   GENERAL: alert and no distress  EYES: Eyes grossly normal to inspection, PERRL and conjunctivae and sclerae normal  HENT: ear canals and TM's normal, nose and mouth without ulcers or lesions  NECK: no adenopathy, no asymmetry, masses, or scars  RESP: lungs clear to auscultation - no rales, rhonchi or wheezes  CV: regular rate " and rhythm, normal S1 S2, no S3 or S4, no murmur, click or rub, no peripheral edema  ABDOMEN: soft, nontender, no hepatosplenomegaly, no masses and bowel sounds normal  MS: no gross musculoskeletal defects noted, no edema  SKIN: no suspicious lesions or rashes  NEURO: Normal strength and tone, mentation intact and speech normal  PSYCH: mentation appears normal, affect normal/bright    Reviewed results for hospitalization on 6/24-6/25        Signed Electronically by: Wes Salazar MD

## 2024-07-20 LAB
FERRITIN SERPL-MCNC: 6 NG/ML (ref 6–175)
IRON BINDING CAPACITY (ROCHE): 405 UG/DL (ref 240–430)
IRON SATN MFR SERPL: 4 % (ref 15–46)
IRON SERPL-MCNC: 15 UG/DL (ref 37–145)

## 2024-07-22 RX ORDER — METHYLPREDNISOLONE 4 MG
TABLET, DOSE PACK ORAL
Qty: 21 TABLET | Refills: 0 | Status: SHIPPED | OUTPATIENT
Start: 2024-07-22 | End: 2024-08-14

## 2024-07-22 NOTE — TELEPHONE ENCOUNTER
Tried calling Leticia to discuss her message of migraine symptoms.  I had to leave a voice message asking for a call back.  I will also send her a my chart message,

## 2024-07-22 NOTE — TELEPHONE ENCOUNTER
I spoke to Leticia about her migraine.  She states that she has had a migraine for two months and her medication is not helping.  She has not been able to get her Ajovy from her pharmacy even though it has been approved and has missed June and July.  She states that she has been taking Ubrelvy 100 mg and repeating the dose but it just doesn't work.  She is  reporting   dizziness,fatigue and pressure in her eyes but these are all normal symptoms when she gets a migraine.  She has also been taking Zofran which helps the nausea but not the headache.    I called her pharmacy and they will get her Ajovy ready for her today.  They ran it through insurance and it went through.    I will update Silke,  Message sent

## 2024-07-22 NOTE — TELEPHONE ENCOUNTER
[facility] Call Center    Phone Message    May a detailed message be left on voicemail: yes     Reason for Call: Pt states she was returning a call and message from Berenice. Pt sates she is available all day via phone.    Please contact Pt at [phone]    Action Taken: Message routed to:  [facility] ([facility] Neurology     Travel Screening: Not Applicable

## 2024-07-22 NOTE — TELEPHONE ENCOUNTER
Start Ajovy. I refilled medrol dose pack to try it to see if it helps with breaking migraine cycle and start of Ajovy. Should not take any NSAIDs when she takes Medrol dose pack. Should let us know if any concerns with steroids in the past  It looks like she was on reglan in the past-might help with nausea and headache-I could refill it for her.

## 2024-07-23 NOTE — PATIENT INSTRUCTIONS
Patient Education   Here is the plan from today's visit    1. Migraine with aura and without status migrainosus, not intractable  Follow up with Neurology for assistance for medication refills. Contact our clinic if you need any assistance.     2. Iron deficiency anemia secondary to inadequate dietary iron intake  Start taking iron supplements.   - Iron and iron binding capacity; Future  - Ferritin; Future  - CBC with platelets differential; Future  - Iron and iron binding capacity  - Ferritin  - CBC with platelets differential    3. Nausea  - ondansetron (ZOFRAN ODT) 4 MG ODT tab; Take 1 tablet (4 mg) by mouth every 8 hours as needed for nausea  Dispense: 30 tablet; Refill: 0    4. Microcytic anemia  - ferrous sulfate (FEROSUL) 325 (65 Fe) MG tablet; Take 1 tablet (325 mg) by mouth every other day  Dispense: 30 tablet; Refill: 3        Please call or return to clinic if your symptoms don't go away.    Follow up plan  Return if symptoms worsen or fail to improve.    Thank you for coming to [facility] today.  Lab Testing:  **If you had lab testing today and your results are reassuring or normal they will be mailed to you or sent through [employer] within 7 days.   **If the lab tests need quick action we will call you with the results.  **If you are having labs done on a different day, please call [phone] to schedule at [facility] or [phone] for other [facility] locations. Labs do not offer walk-in appointments.  The phone number we will call with results is # [phone] (home) . If this is not the best number please call our clinic and change the number.  Medication Refills:  If you need any refills please call your pharmacy and they will contact us.   If you need to  your refill at a new pharmacy, please contact the new pharmacy directly. The new pharmacy will help you get your medications transferred faster.   Scheduling:  If you have any concerns about today's visit or  wish to schedule another appointment please call our office during normal business hours [phone] (8-5:00 M-F). If you can no longer make a scheduled visit, please cancel via [employer] or call us to cancel.   If a referral was made to an [facility] specialty provider and you do not get a call from central scheduling, please refer to directions on your visit summary or call our office during normal business hours for assistance.   If a Mammogram was ordered for you at the Breast Center call [phone] to schedule or change your appointment.  If you had an XRay/CT/Ultrasound/MRI ordered the number is [phone] to schedule or change your radiology appointment.   [facility] has limited ultrasound appointments available on Wednesdays, if you would like your ultrasound at [facility], please call [phone] to schedule.   Medical Concerns:  If you have urgent medical concerns please call [phone] at any time of the day.    Wes Salazar MD

## 2024-08-14 ENCOUNTER — VIRTUAL VISIT (OUTPATIENT)
Dept: NEUROLOGY | Facility: CLINIC | Age: 39
End: 2024-08-14
Payer: COMMERCIAL

## 2024-08-14 DIAGNOSIS — G43.109 MIGRAINE WITH AURA AND WITHOUT STATUS MIGRAINOSUS, NOT INTRACTABLE: Primary | ICD-10-CM

## 2024-08-14 DIAGNOSIS — G43.709 CHRONIC MIGRAINE WITHOUT AURA WITHOUT STATUS MIGRAINOSUS, NOT INTRACTABLE: ICD-10-CM

## 2024-08-14 PROCEDURE — 99212 OFFICE O/P EST SF 10 MIN: CPT | Mod: 95 | Performed by: NURSE PRACTITIONER

## 2024-08-14 PROCEDURE — G2211 COMPLEX E/M VISIT ADD ON: HCPCS | Performed by: NURSE PRACTITIONER

## 2024-08-14 ASSESSMENT — MIGRAINE DISABILITY ASSESSMENT (MIDAS)
HOW MANY DAYS WAS HOUSEWORK PRODUCTIVITY CUT IN HALF DUE TO HEADACHES: 30
ON A SCALE FROM 0-10 ON AVERAGE HOW PAINFUL WERE HEADACHES: 9
TOTAL SCORE: 150
HOW MANY DAYS DID YOU NOT DO HOUSEWORK BECAUSE OF HEADACHES: 30
HOW OFTEN WERE SOCIAL ACTIVITIES MISSED DUE TO HEADACHES: 30
HOW MANY DAYS IN THE PAST 3 MONTHS HAVE YOU HAD A HEADACHE: 30
HOW MANY DAYS DID YOU MISS WORK OR SCHOOL BECAUSE OF HEADACHES: 30
HOW MANY DAYS WAS YOUR PRODUCTIVITY CUT IN HALF BECAUSE OF HEADACHES: 30

## 2024-08-14 ASSESSMENT — HEADACHE IMPACT TEST (HIT 6): HIT6 TOTAL SCORE: 0

## 2024-08-14 NOTE — PATIENT INSTRUCTIONS
Acute Treatment:  -For acute treatment of mild headache, take ibuprofen  as needed. Do not exceed more than 14 days per month to avoid medication overuse.  -For acute treatment of moderate to severe headache, At onset of headache, place 1 tablet (75 mg) on or under the tongue. Maximum of 1 tablet every 24 hours. Side effects -nausea   Other options -retrial of naratriptan for rescue treatment     -For headache related nausea, or as a rescue medicine for headache, take ondansetron as needed.     Preventive Treatment:  -For headache prevention, Ajovy    Follow up in 6 months if stable or sooner if needed

## 2024-08-14 NOTE — NURSING NOTE
Current patient location: [address]   [place] [address]    Is the patient currently in the state of [place]? YES    Visit mode:VIDEO    If the visit is dropped, the patient can be reconnected by: TELEPHONE VISIT: Phone number:   Telephone Information:   Mobile [phone]       Will anyone else be joining the visit? NO  (If patient encounters technical issues they should call [phone])    How would you like to obtain your AVS? MyChart    Are changes needed to the allergy or medication list? Pt stated no med changes    Are refills needed on medications prescribed by this physician? NO    Rooming Documentation:  Assigned questionnaire(s) completed      Reason for visit: Video Visit (3 month follow-up )    Kathrine HURTADO

## 2024-08-14 NOTE — LETTER
8/14/2024       RE: Leticia Morales  [address]  [place] [address]     Dear Colleague,    Thank you for referring your patient, Leticia Morales, to the [facility] [place] at [facility]. Please see a copy of my visit note below.    Virtual Visit Details    Type of service:  Video Visit   Originating Location (pt. Location): Home  Distant Location (provider location):  On-site  Platform used for Video Visit: [facility] [employer] [facility]    Headache Neurology Progress Note  August 14, 2024      Assessment/Plan:   Leticia Morales is a 38 year old   Chronic migraine   Needs better rescue treatment     Acute Treatment:  -For acute treatment of mild headache, take ibuprofen  as needed. Do not exceed more than 14 days per month to avoid medication overuse.  -For acute treatment of moderate to severe headache, At onset of headache, place 1 tablet (75 mg) on or under the tongue. Maximum of 1 tablet every 24 hours. Side effects -nausea   Other options -retrial of naratriptan for rescue treatment     -For headache related nausea, or as a rescue medicine for headache, take ondansetron as needed.     Preventive Treatment:  -For headache prevention, Antony    Follow up in 6 months if stable or sooner if needed     The longitudinal plan of care for Leticia was addressed during this visit. Due to the added complexity in care, I will continue to support Leticia in the subsequent management of chronic migraines and with the ongoing continuity of care of this condition(s).    14 minutes spent on the date of the encounter doing video access, chart  review, results review,  meds review, treatment plan, documentation and further activities as noted above    CONNIE Garcia, CNP [facility]      Subjective:    Leticia Morales returns for follow up of headache     Antony has been working and does it every month. No side  "effects.   Every 2 weeks severe migraine lasting a couple days. Ubrelvy 100 mg is not working. Patient repeats the dose but no benefit.   Re-tried sumatriptan 100 mg and cause \"sickness\" \"body heaviness\", nausea, short of breath. Did not retry smaller dose because got \"scarred\"    Ondansetron helps with nausea and no side effects     Treatments Tried:    Ibuprofen   metoclopromide as needed for headache and no side effects   Rizatriptan   Sumatriptan -side effects with retrying   Naratriptan-  Ubrelvy 100 mg dose ineffective  Metoclopromide as needed for nausea -helps but heart pounds   venlafaxin has started and stopped last week due to side effects\" emotionally unstable\" and \"crying for no reason\"   Amitriptyline-side effects  Topiramate -severe side effects-dizziness and not functioning and fatigued   Gabapentin -side effects -brain fog  Medrol dose pack -did not help   Botox stopped in March of 2024 -because Ajovy seemed help more.    or less -would avoid BBB/CCB due to lower BP     Objective:    Vitals: LMP 07/12/2024 (Approximate)   General: Cooperative, NAD  Neurologic:  Mental Status: Fully alert, attentive and oriented. Speech clear and fluent.   Cranial Nerves: Facial movements symmetric.   Motor: No abnormal movements.      Pertinent Investigations:            3/12/2024     2:25 PM 4/19/2024     8:23 AM 8/14/2024     8:15 AM   HIT-6   When you have headaches, how often is the pain severe 0 0 0   How often do headaches limit your ability to do usual daily activities including household work, work, school, or social activities? 0 0 0   When you have a headache, how often do you wish you could lie down? 0 0 0   In the past 4 weeks, how often have you felt too tired to do work or daily activities because of your headaches 0 0 0   In the past 4 weeks, how often have you felt fed up or irritated because of your headaches 0 0 0   In the past 4 weeks, how often did headaches limit your ability to " concentrate on work or daily activities 0 0 0   HIT-6 Total Score 0 0 0           12/7/2023     6:59 AM 4/19/2024     8:26 AM 8/14/2024     8:16 AM   MIDAS - in the past three months:   On how many days did you miss work or school because of your headaches? 45 15 30   How many days was your productivity at work or school reduced by half or more because of your headaches? 45 15 30   On how many days did you not do household work because of your headaches? 30 15 30   How many days was your productivity in household work reduced by half or more because of your headaches? 45 15 30   On how many days did you miss family, social, or leisure activities because of your headaches? 45 15 30   On how many days did you have a headache? 30 15 30   On a scale of 0-10, on average how painful were these headaches? 8  9   MIDAS Score 210 (IV - Severe Disability) 75 (IV - Severe Disability) 150 (IV - Severe Disability)          Again, thank you for allowing me to participate in the care of your patient.      Sincerely,    CONNIE Meeks CNP

## 2024-08-14 NOTE — PROGRESS NOTES
"Virtual Visit Details    Type of service:  Video Visit   Originating Location (pt. Location): Home  Distant Location (provider location):  On-site  Platform used for Video Visit: [facility]    Headache Neurology Progress Note  August 14, 2024      Assessment/Plan:   Leticia Morales is a 38 year old   Chronic migraine   Needs better rescue treatment     Acute Treatment:  -For acute treatment of mild headache, take ibuprofen  as needed. Do not exceed more than 14 days per month to avoid medication overuse.  -For acute treatment of moderate to severe headache, At onset of headache, place 1 tablet (75 mg) on or under the tongue. Maximum of 1 tablet every 24 hours. Side effects -nausea   Other options -retrial of naratriptan for rescue treatment     -For headache related nausea, or as a rescue medicine for headache, take ondansetron as needed.     Preventive Treatment:  -For headache prevention, Elvisy    Follow up in 6 months if stable or sooner if needed     The longitudinal plan of care for Leticia was addressed during this visit. Due to the added complexity in care, I will continue to support Leticia in the subsequent management of chronic migraines and with the ongoing continuity of care of this condition(s).    14 minutes spent on the date of the encounter doing video access, chart  review, results review,  meds review, treatment plan, documentation and further activities as noted above    CONNIE Garcia, CNP [facility]      Subjective:    Leticia Morales returns for follow up of headache     Ajovdarwin has been working and does it every month. No side effects.   Every 2 weeks severe migraine lasting a couple days. Ubrelvy 100 mg is not working. Patient repeats the dose but no benefit.   Re-tried sumatriptan 100 mg and cause \"sickness\" \"body heaviness\", nausea, short of breath. Did not retry smaller dose because got \"scarred\"    Ondansetron helps with nausea and no " "side effects     Treatments Tried:    Ibuprofen   metoclopromide as needed for headache and no side effects   Rizatriptan   Sumatriptan -side effects with retrying   Naratriptan-  Ubrelvy 100 mg dose ineffective  Metoclopromide as needed for nausea -helps but heart pounds   venlafaxin has started and stopped last week due to side effects\" emotionally unstable\" and \"crying for no reason\"   Amitriptyline-side effects  Topiramate -severe side effects-dizziness and not functioning and fatigued   Gabapentin -side effects -brain fog  Medrol dose pack -did not help   Botox stopped in March of 2024 -because Ajovy seemed help more.    or less -would avoid BBB/CCB due to lower BP     Objective:    Vitals: LMP 07/12/2024 (Approximate)   General: Cooperative, NAD  Neurologic:  Mental Status: Fully alert, attentive and oriented. Speech clear and fluent.   Cranial Nerves: Facial movements symmetric.   Motor: No abnormal movements.      Pertinent Investigations:            3/12/2024     2:25 PM 4/19/2024     8:23 AM 8/14/2024     8:15 AM   HIT-6   When you have headaches, how often is the pain severe 0 0 0   How often do headaches limit your ability to do usual daily activities including household work, work, school, or social activities? 0 0 0   When you have a headache, how often do you wish you could lie down? 0 0 0   In the past 4 weeks, how often have you felt too tired to do work or daily activities because of your headaches 0 0 0   In the past 4 weeks, how often have you felt fed up or irritated because of your headaches 0 0 0   In the past 4 weeks, how often did headaches limit your ability to concentrate on work or daily activities 0 0 0   HIT-6 Total Score 0 0 0           12/7/2023     6:59 AM 4/19/2024     8:26 AM 8/14/2024     8:16 AM   MIDAS - in the past three months:   On how many days did you miss work or school because of your headaches? 45 15 30   How many days was your productivity at work or school " reduced by half or more because of your headaches? 45 15 30   On how many days did you not do household work because of your headaches? 30 15 30   How many days was your productivity in household work reduced by half or more because of your headaches? 45 15 30   On how many days did you miss family, social, or leisure activities because of your headaches? 45 15 30   On how many days did you have a headache? 30 15 30   On a scale of 0-10, on average how painful were these headaches? 8  9   MIDAS Score 210 (IV - Severe Disability) 75 (IV - Severe Disability) 150 (IV - Severe Disability)

## 2024-08-20 ENCOUNTER — TELEPHONE (OUTPATIENT)
Dept: NEUROLOGY | Facility: CLINIC | Age: 39
End: 2024-08-20
Payer: COMMERCIAL

## 2024-08-20 NOTE — TELEPHONE ENCOUNTER
Patient confirmed scheduled appointment:  Date: 2/14/2025  Time: 8:00 am  Visit type: Return Headache  Provider: Silke Velasco  Location: virtual  Testing/imaging: NA  Additional notes: 6 mo follow up    Deepti Freedman on 8/20/2024 at 11:59 AM

## 2024-10-08 DIAGNOSIS — R11.0 NAUSEA: ICD-10-CM

## 2024-10-09 RX ORDER — ONDANSETRON 4 MG/1
4 TABLET, ORALLY DISINTEGRATING ORAL EVERY 8 HOURS PRN
Qty: 30 TABLET | Refills: 0 | Status: SHIPPED | OUTPATIENT
Start: 2024-10-09

## 2024-10-09 NOTE — TELEPHONE ENCOUNTER
"Request for medication refill:    ondansetron (ZOFRAN ODT) 4 MG ODT tab    Providers if patient needs an appointment and you are willing to give a one month supply please refill for one month and  send a letter/MyChart using \".SMILLIMITEDREFILL\" .smillimited and route chart to \"P SMI \" (Giving one month refill in non controlled medications is strongly recommended before denial)    If refill has been denied, meaning absolutely no refills without visit, please complete the smart phrase \".smirxrefuse\" and route it to the \"P SMI MED REFILLS\"  pool to inform the patient and the pharmacy.    Roslyn Domínguez MA      "

## 2024-10-10 ENCOUNTER — PATIENT OUTREACH (OUTPATIENT)
Dept: CARE COORDINATION | Facility: CLINIC | Age: 39
End: 2024-10-10
Payer: COMMERCIAL

## 2024-10-24 ENCOUNTER — PATIENT OUTREACH (OUTPATIENT)
Dept: CARE COORDINATION | Facility: CLINIC | Age: 39
End: 2024-10-24
Payer: COMMERCIAL

## 2024-11-06 ENCOUNTER — MYC MEDICAL ADVICE (OUTPATIENT)
Dept: NEUROLOGY | Facility: CLINIC | Age: 39
End: 2024-11-06
Payer: COMMERCIAL

## 2024-11-06 ENCOUNTER — TELEPHONE (OUTPATIENT)
Dept: NEUROLOGY | Facility: CLINIC | Age: 39
End: 2024-11-06
Payer: COMMERCIAL

## 2024-11-06 DIAGNOSIS — G43.109 MIGRAINE WITH AURA AND WITHOUT STATUS MIGRAINOSUS, NOT INTRACTABLE: Primary | ICD-10-CM

## 2024-11-06 RX ORDER — ELETRIPTAN HYDROBROMIDE 20 MG/1
20-40 TABLET, FILM COATED ORAL
Qty: 18 TABLET | Refills: 3 | Status: SHIPPED | OUTPATIENT
Start: 2024-11-06

## 2024-11-06 RX ORDER — METOCLOPRAMIDE 5 MG/1
5 TABLET ORAL EVERY 6 HOURS PRN
Qty: 20 TABLET | Refills: 0 | Status: SHIPPED | OUTPATIENT
Start: 2024-11-06

## 2024-11-06 NOTE — TELEPHONE ENCOUNTER
Please get more info about her meds-how many days did she take her meds-nurtec and ubrelvy?   We could try eletriptan -if she does not feel ubrelvy or nurtec working-we can discontinue them.  Nausea -we can retry metoclopromide with or without benadryl-she tried metoclopromide in the past without side effects .   She can inject Ajovy today if she has it available.     I sent prescriptions for eletriptan rescue treatment-its a triptan similar but different a little bit from her other triptans she tried and metoclopromide for nausea/vomiting.   Should let us know how she feels.     Thank you

## 2024-11-06 NOTE — TELEPHONE ENCOUNTER
[facility]    Phone Message    May a detailed message be left on voicemail: yes     Reason for Call: Symptoms or Concerns     If patient has red-flag symptoms, warm transfer to triage line    Current symptom or concern: Pt says her headaches have gotten worse, she has some nausea and eye pressure,  and the current medication isn't working.    Symptoms have been present for:  2 day(s)    Has patient previously been seen for this? Yes    By : Silke Velasco APRN CNP     Date: 08/14/2024    Are there any new or worsening symptoms? Yes: The Nausea and eye pressure.    Action Taken: Message routed to:  [facility] Neurology    Travel Screening: Not Applicable     Date of Service:

## 2024-11-06 NOTE — TELEPHONE ENCOUNTER
Headache Crisis 2024  Onset: 4 day(s) ago  Description:                Type: Migraine headache- Throbbing pain.                  Location: bilateral in the occipital area                  Duration:  4 days                  Pain scale ratin/10                 Are headaches getting more intense or more frequent: YES  More intense if not staying on top of tylenol and ibuprofen.   Is this headache similar to previous headaches? Yes     Are you experiencing any new or different symptoms? No     Accompanying Signs & Symptoms:   Fever: No  Nausea or vomiting: YES  Dizziness: No  Numbness: No  Weakness: No  Visual changes: Left eye vision a little blurry.  Medications tried and outcome:  Ibuprofen (Advil, Motrin), Tylenol, and Nurtec, Ubrelvy with minor relief.    Pt states that her migraine is causing pressure behind her eyes and she is experiencing nausea. She states that her medications are helping some but not completely taking care of her headaches. She is hoping to get something for relief.

## 2024-11-07 NOTE — TELEPHONE ENCOUNTER
WILVERM for the pt to discuss treatment plan from Juan. Requested a call back if she would like to discuss. Also sent my chart message.

## 2024-11-15 ENCOUNTER — OFFICE VISIT (OUTPATIENT)
Dept: FAMILY MEDICINE | Facility: CLINIC | Age: 39
End: 2024-11-15
Payer: COMMERCIAL

## 2024-11-15 VITALS
OXYGEN SATURATION: 99 % | BODY MASS INDEX: 23.64 KG/M2 | DIASTOLIC BLOOD PRESSURE: 81 MMHG | TEMPERATURE: 98.7 F | RESPIRATION RATE: 12 BRPM | WEIGHT: 141.9 LBS | HEIGHT: 65 IN | HEART RATE: 108 BPM | SYSTOLIC BLOOD PRESSURE: 113 MMHG

## 2024-11-15 DIAGNOSIS — L03.012 ACUTE PARONYCHIA OF LEFT THUMB: Primary | ICD-10-CM

## 2024-11-15 PROBLEM — E46 PROTEIN-CALORIE MALNUTRITION (H): Status: RESOLVED | Noted: 2021-08-12 | Resolved: 2024-11-15

## 2024-11-15 RX ORDER — BACITRACIN ZINC 500 [USP'U]/G
OINTMENT TOPICAL 3 TIMES DAILY
Qty: 30 G | Refills: 0 | Status: SHIPPED | OUTPATIENT
Start: 2024-11-15

## 2024-11-15 RX ORDER — NAPROXEN 500 MG/1
500 TABLET ORAL 2 TIMES DAILY WITH MEALS
Qty: 30 TABLET | Refills: 0 | Status: SHIPPED | OUTPATIENT
Start: 2024-11-15

## 2024-11-15 RX ORDER — CHLORHEXIDINE GLUCONATE 40 MG/ML
SOLUTION TOPICAL 3 TIMES DAILY
Qty: 532 ML | Refills: 0 | Status: SHIPPED | OUTPATIENT
Start: 2024-11-15

## 2024-11-15 ASSESSMENT — PATIENT HEALTH QUESTIONNAIRE - PHQ9
SUM OF ALL RESPONSES TO PHQ QUESTIONS 1-9: 11
10. IF YOU CHECKED OFF ANY PROBLEMS, HOW DIFFICULT HAVE THESE PROBLEMS MADE IT FOR YOU TO DO YOUR WORK, TAKE CARE OF THINGS AT HOME, OR GET ALONG WITH OTHER PEOPLE: NOT DIFFICULT AT ALL
SUM OF ALL RESPONSES TO PHQ QUESTIONS 1-9: 11

## 2024-11-15 NOTE — PROGRESS NOTES
"  Assessment & Plan     Acute paronychia of left thumb  Lateral left thumb with small paronychia, pain beginning yesterday but patient reporting extreme pain now. Does not have signs of abscess but has very small amount of pus at upper corner of nail. Will treat with TID or QID cycles of warm water + chlorhexadine soaks for 10-15 min, dry, then apply bacitracin. Pain plan: tylenol 1000 mg QID and naproxen 500 mg BID. If continues to have significant pain, would consider transitioning to oral antibiotics.   - bacitracin 500 UNIT/GM external ointment; Apply topically 3 times daily.  - naproxen (NAPROSYN) 500 MG tablet; Take 1 tablet (500 mg) by mouth 2 times daily (with meals).  - chlorhexidine (HIBICLENS) 4 % solution; Apply topically 3 times daily.    No follow-ups on file.    Nj Kelly is a 38 year old, presenting for the following health issues:  Pain (Left thumb possible infection)      11/15/2024     3:01 PM   Additional Questions   Roomed by justen   Accompanied by self         11/15/2024    Information    services provided? No        HPI   Pain on lateral left thumb started yesterday  Now increased swelling  Ibuprofen 600-800 mg q6h kind of helps but still \"hyperaware\" of it. Tylenol 1000 mg q6h  Pain is intolerable when the ibuprofen wears off  Nail biter, thinks that ingrown nail or something is infected  Noticed pus this morning   Running it under warm water did not help much but feels like it helps blood flow          Objective    /81   Pulse 108   Temp 98.7  F (37.1  C) (Oral)   Resp 12   Ht 1.638 m (5' 4.5\")   Wt 64.4 kg (141 lb 14.4 oz)   LMP 11/12/2024 (Exact Date)   SpO2 99%   BMI 23.98 kg/m    Body mass index is 23.98 kg/m .  Physical Exam  Vitals reviewed.   Constitutional:       General: She is not in acute distress.     Appearance: Normal appearance. She is not ill-appearing or diaphoretic.   HENT:      Head: Normocephalic and atraumatic.   Eyes:      " General: No scleral icterus.     Conjunctiva/sclera: Conjunctivae normal.   Cardiovascular:      Rate and Rhythm: Normal rate.   Pulmonary:      Effort: Pulmonary effort is normal.   Skin:     General: Skin is warm and dry.      Comments: Left thumb: medial paronychia of left thumb with very, very small pus pocket at upper corner of nail. No fluctuance. Mild swelling of left lateral thumb compared to right. No significant erythema. Extreme tenderness to palpation of the area, reporting pain that goes all the way up to her shoulder on the left side.  2+ radial pulse, has full sensation and range of motion of left thumb and fingers   Neurological:      General: No focal deficit present.      Mental Status: She is alert.          Signed Electronically by: Matt Arriaga MD  Answers submitted by the patient for this visit:  Patient Health Questionnaire (Submitted on 11/15/2024)  If you checked off any problems, how difficult have these problems made it for you to do your work, take care of things at home, or get along with other people?: Not difficult at all  PHQ9 TOTAL SCORE: 11

## 2024-12-05 DIAGNOSIS — R11.0 NAUSEA: ICD-10-CM

## 2024-12-05 RX ORDER — ONDANSETRON 4 MG/1
4 TABLET, ORALLY DISINTEGRATING ORAL EVERY 8 HOURS PRN
Qty: 90 TABLET | Refills: 3 | Status: SHIPPED | OUTPATIENT
Start: 2024-12-05

## 2024-12-23 ENCOUNTER — TELEPHONE (OUTPATIENT)
Dept: FAMILY MEDICINE | Facility: CLINIC | Age: 39
End: 2024-12-23
Payer: COMMERCIAL

## 2024-12-23 NOTE — TELEPHONE ENCOUNTER
[facility] phone call message- general phone call:    Reason for call: Patient states that she has been coughing for the past 2 weeks and she is starting to wheeze and wants to know if she has a virus. Clinic is booked until January, if MAK is ok send back to  otherwise please call patient back.    Return call needed: Yes    OK to leave a message on voice mail? Yes    Primary language: Gabonese      needed? No    Call taken on December 23, 2024 at 1:37 PM by Subha Najera

## 2024-12-27 ENCOUNTER — VIRTUAL VISIT (OUTPATIENT)
Dept: FAMILY MEDICINE | Facility: CLINIC | Age: 39
End: 2024-12-27
Payer: COMMERCIAL

## 2024-12-27 DIAGNOSIS — R06.2 WHEEZING: ICD-10-CM

## 2024-12-27 DIAGNOSIS — R30.0 BURNING WITH URINATION: ICD-10-CM

## 2024-12-27 DIAGNOSIS — R05.2 SUBACUTE COUGH: Primary | ICD-10-CM

## 2024-12-27 DIAGNOSIS — N39.3 STRESS INCONTINENCE OF URINE: ICD-10-CM

## 2024-12-27 DIAGNOSIS — K21.9 GASTROESOPHAGEAL REFLUX DISEASE WITHOUT ESOPHAGITIS: ICD-10-CM

## 2024-12-27 RX ORDER — SULFAMETHOXAZOLE AND TRIMETHOPRIM 800; 160 MG/1; MG/1
1 TABLET ORAL 2 TIMES DAILY
Qty: 6 TABLET | Refills: 0 | Status: SHIPPED | OUTPATIENT
Start: 2024-12-27 | End: 2024-12-30

## 2024-12-27 RX ORDER — ALBUTEROL SULFATE 90 UG/1
2 INHALANT RESPIRATORY (INHALATION) EVERY 6 HOURS PRN
Qty: 18 G | Refills: 0 | Status: SHIPPED | OUTPATIENT
Start: 2024-12-27

## 2024-12-27 ASSESSMENT — PATIENT HEALTH QUESTIONNAIRE - PHQ9
SUM OF ALL RESPONSES TO PHQ QUESTIONS 1-9: 9
10. IF YOU CHECKED OFF ANY PROBLEMS, HOW DIFFICULT HAVE THESE PROBLEMS MADE IT FOR YOU TO DO YOUR WORK, TAKE CARE OF THINGS AT HOME, OR GET ALONG WITH OTHER PEOPLE: SOMEWHAT DIFFICULT
SUM OF ALL RESPONSES TO PHQ QUESTIONS 1-9: 9

## 2024-12-27 NOTE — PROGRESS NOTES
Leticia is a 38 year old who is being evaluated via a billable video visit.    How would you like to obtain your AVS? MyChart  If the video visit is dropped, the invitation should be resent by: Text to cell phone: [phone]  Will anyone else be joining your video visit? No  Answers submitted by the patient for this visit:  Patient Health Questionnaire (Submitted on 12/27/2024)  If you checked off any problems, how difficult have these problems made it for you to do your work, take care of things at home, or get along with other people?: Somewhat difficult  PHQ9 TOTAL SCORE: 9      Assessment & Plan     Subacute cough  Wheezing  Patient has dry cough for 2-3 weeks with subjective wheezing and chest pressure. Has been taking OTC cough suppressants and syrups which has been helpful but patient stopped because of the extended duration. Based on video visit capabilities, recommended patient be seen in clinic early next week for exam with possible chest xray and pertussis testing if clinically indicated, or in UC/ED over the weekend if worsening. Recommended patient continuing taking OTC cough medications and prescribed albuterol inhaler to help with uncontrolled coughing during the day.as difficult to assess if pt is truly having wheezing based on history alone.   - albuterol (PROAIR HFA/PROVENTIL HFA/VENTOLIN HFA) 108 (90 Base) MCG/ACT inhaler; Inhale 2 puffs into the lungs every 6 hours as needed for shortness of breath, wheezing or cough.    Burning with urination  Stress incontinence of urine  Patient has history of UTIs and started having pain and burning with urination two days ago. Also reports urinary incontinence with cough. Based on history of pansensitive E. Coli urine culture and listed macrobid allergy, will treat empirically with Bactrim BID for 3 days.   - sulfamethoxazole-trimethoprim (BACTRIM DS) 800-160 MG tablet; Take 1 tablet by mouth 2 times daily for 3 days.    Gastroesophageal reflux disease  "without esophagitis  Needs refill, didn't discuss symptoms today.   - omeprazole (PRILOSEC) 20 MG DR capsule; Take 1 capsule (20 mg) by mouth 2 times daily.      Follow up in clinic on Monday or Tuesday. If getting worse over the weekend, go to urgent care or the emergency department.     Nj Kelly is a 38 year old, presenting for the following health issues:  Cough (Cough)      12/27/2024     9:46 AM   Additional Questions   Roomed by Willie   Accompanied by Self         12/27/2024    Information    services provided? No     Video Start Time: 10:05 AM    HPI     Cough for 2-3 weeks  - dry cough, wheezing, chest pain occasionally  - wheezing at rest, feels like short of breath when laying down, \"makes weird sound in chest\", no swelling in legs  - chest pain feels like pressure  - coughs when moving around  - has vomited after coughing so much  - taking OTC cough suppressants and syrups, hasn't taken it today because she feels like she is taking it too much, has been trying lots of OTC meds and home remedies, mucinex helped open chest  - sick contacts -> niece and nephew who go to school and had cough and fever   - no history of asthma but has had bronchitis, pneumonia, viruses in the past -> doesn't feel like that because its more dry cough and hard when laying down because she feels wheezy     UTI  - symptoms started 2 days ago  - has accidents when coughing so bought diapers, this is a new problem  - hurts to pee, feels burning  - has history of UTI and feels the same way   - no blood in urine   - feels like she has a fever, no thermometer at home         Objective           Vitals:  No vitals were obtained today due to virtual visit.    Physical Exam   GENERAL: alert and no distress, able to speak in full sentences without getting short of breath but some occasional coughing  RESP: coughing heard throughout visit  NEURO: Mentation and speech appropriate for age.  PSYCH: Appropriate " affect, tone, and pace of words        Video-Visit Details    Type of service:  Video Visit   Video End Time:10:52 AM  Originating Location (pt. Location): Home    Distant Location (provider location):  On-site  Platform used for Video Visit: Angie  Signed Electronically by: MD Jett Jeffriestlyn Jt, MS3  [employer]  December 27, 2024 10:22 AM     Preceptor Attestation:   I was present with the medical student who participated in the service and in the documentation of this note. I have verified the history and personally performed the physical exam and medical decision making. I have verified the content of the note, which accurately reflects my assessment of the patient and the plan of care.   Supervising Physician:  Carina Novoa MD.

## 2024-12-27 NOTE — PROGRESS NOTES
Preceptor Attestation:    I discussed the patient with the resident and evaluated the patient in person. I have verified the content of the note, which accurately reflects my assessment of the patient and the plan of care.   Supervising Physician:  Colin Gates MD.   No Vaccines Administered.

## 2024-12-27 NOTE — PATIENT INSTRUCTIONS
- Antibiotics twice per day for 3 days  - Can continue with mucinex for now if it feels like it's helping  - Albuterol inhaler just in case you're having uncontrolled breathing symptoms.     Want you to come back to have an in-person appointment early next week. If you start feeling worse over the weekend, go to an urgent care or emergency room.

## 2024-12-29 ENCOUNTER — HOSPITAL ENCOUNTER (EMERGENCY)
Facility: CLINIC | Age: 39
Discharge: HOME | End: 2024-12-29
Attending: STUDENT IN AN ORGANIZED HEALTH CARE EDUCATION/TRAINING PROGRAM | Admitting: STUDENT IN AN ORGANIZED HEALTH CARE EDUCATION/TRAINING PROGRAM
Payer: COMMERCIAL

## 2024-12-29 ENCOUNTER — APPOINTMENT (OUTPATIENT)
Dept: GENERAL RADIOLOGY | Facility: CLINIC | Age: 39
End: 2024-12-29
Attending: STUDENT IN AN ORGANIZED HEALTH CARE EDUCATION/TRAINING PROGRAM
Payer: COMMERCIAL

## 2024-12-29 VITALS
HEIGHT: 64 IN | RESPIRATION RATE: 14 BRPM | HEART RATE: 112 BPM | DIASTOLIC BLOOD PRESSURE: 79 MMHG | WEIGHT: 141 LBS | BODY MASS INDEX: 24.07 KG/M2 | OXYGEN SATURATION: 96 % | TEMPERATURE: 98.3 F | SYSTOLIC BLOOD PRESSURE: 115 MMHG

## 2024-12-29 DIAGNOSIS — J06.9 UPPER RESPIRATORY TRACT INFECTION, UNSPECIFIED TYPE: ICD-10-CM

## 2024-12-29 LAB
FLUAV RNA SPEC QL NAA+PROBE: NEGATIVE
FLUBV RNA RESP QL NAA+PROBE: NEGATIVE
RSV RNA SPEC NAA+PROBE: NEGATIVE
SARS-COV-2 RNA RESP QL NAA+PROBE: NEGATIVE

## 2024-12-29 PROCEDURE — 99285 EMERGENCY DEPT VISIT HI MDM: CPT | Mod: 25 | Performed by: STUDENT IN AN ORGANIZED HEALTH CARE EDUCATION/TRAINING PROGRAM

## 2024-12-29 PROCEDURE — 93005 ELECTROCARDIOGRAM TRACING: CPT | Performed by: STUDENT IN AN ORGANIZED HEALTH CARE EDUCATION/TRAINING PROGRAM

## 2024-12-29 PROCEDURE — 87637 SARSCOV2&INF A&B&RSV AMP PRB: CPT | Performed by: STUDENT IN AN ORGANIZED HEALTH CARE EDUCATION/TRAINING PROGRAM

## 2024-12-29 PROCEDURE — 99284 EMERGENCY DEPT VISIT MOD MDM: CPT | Performed by: STUDENT IN AN ORGANIZED HEALTH CARE EDUCATION/TRAINING PROGRAM

## 2024-12-29 PROCEDURE — 71046 X-RAY EXAM CHEST 2 VIEWS: CPT

## 2024-12-29 PROCEDURE — 93010 ELECTROCARDIOGRAM REPORT: CPT | Performed by: STUDENT IN AN ORGANIZED HEALTH CARE EDUCATION/TRAINING PROGRAM

## 2024-12-29 RX ORDER — BENZONATATE 100 MG/1
100 CAPSULE ORAL 3 TIMES DAILY PRN
Qty: 12 CAPSULE | Refills: 0 | Status: SHIPPED | OUTPATIENT
Start: 2024-12-29 | End: 2025-01-02

## 2024-12-29 ASSESSMENT — COLUMBIA-SUICIDE SEVERITY RATING SCALE - C-SSRS
1. IN THE PAST MONTH, HAVE YOU WISHED YOU WERE DEAD OR WISHED YOU COULD GO TO SLEEP AND NOT WAKE UP?: NO
2. HAVE YOU ACTUALLY HAD ANY THOUGHTS OF KILLING YOURSELF IN THE PAST MONTH?: NO
6. HAVE YOU EVER DONE ANYTHING, STARTED TO DO ANYTHING, OR PREPARED TO DO ANYTHING TO END YOUR LIFE?: NO

## 2024-12-29 ASSESSMENT — ACTIVITIES OF DAILY LIVING (ADL)
ADLS_ACUITY_SCORE: 50

## 2024-12-29 NOTE — DISCHARGE INSTRUCTIONS
You are seen in the emergency room and evaluated for cough  Your chest x-ray and labs were reassuring  Discharging home with a medication for cough, but if anything gets worse please return immediately to the emergency department  Please follow-up with your primary care doctor as scheduled in the next 1 to 2 days.

## 2024-12-29 NOTE — ED PROVIDER NOTES
"ED Provider Note  [facility]      History     Chief Complaint   Patient presents with    Cough     HPI  Leticia Morales is a 39 year old female with a history of migraine, GERD, PTSD  who presents to the emergency department for coughing and throat discomfort  Reports that around 2 weeks ago she developed an upper respiratory infection with congestion and a cough, but that she has a continued cough for the last week and a half after the original infection has improved  Reports that she is coughing so much occasionally that she vomits, and has had some throat pain afterwards  Reports mild chest pressure while coughing  Denies fevers, chills, dysuria, abdominal pain          Physical Exam   BP: 115/79  Pulse: 112  Temp: 98.3  F (36.8  C)  Resp: 14  Height: 162.6 cm (5' 4\")  Weight: 64 kg (141 lb)  SpO2: 96 %  Physical Exam  Constitutional:       General: She is not in acute distress.     Appearance: Normal appearance. She is not diaphoretic.   HENT:      Head: Atraumatic.      Mouth/Throat:      Mouth: Mucous membranes are moist.   Eyes:      General: No scleral icterus.     Conjunctiva/sclera: Conjunctivae normal.   Cardiovascular:      Rate and Rhythm: Normal rate.      Heart sounds: Normal heart sounds.   Pulmonary:      Effort: No respiratory distress.      Breath sounds: Normal breath sounds. No stridor. No wheezing, rhonchi or rales.      Comments: Coughed twice while in exam, not consistent with pertussis or whooping cough  Chest:      Chest wall: No tenderness.   Abdominal:      General: Abdomen is flat.   Musculoskeletal:      Cervical back: Neck supple.   Skin:     General: Skin is warm.      Findings: No rash.   Neurological:      Mental Status: She is alert.           ED Course, Procedures, & Data      Procedures            EKG Interpretation:      Interpreted by Giles Gandara MD  Time reviewed: 1259  Symptoms at time of EKG: chest pressure   Rhythm: sinus tachycardia  Rate: 103 " bpm  Axis: normal  Ectopy: none  Conduction: normal  ST Segments/ T Waves: No ST-T wave changes  Q Waves: none  Comparison to prior: No old EKG available    Clinical Impression: normal sinus with no acute ischemia           No results found for any visits on 12/29/24.  Medications - No data to display  Labs Ordered and Resulted from Time of ED Arrival to Time of ED Departure - No data to display  No orders to display          Critical care was not performed.     Medical Decision Making  The patient's presentation was of moderate complexity (an undiagnosed new problem with uncertain prognosis).    The patient's evaluation involved:  review of external note(s) from 3+ sources (see separate area of note for details)  review of 3+ test result(s) ordered prior to this encounter (see separate area of note for details)  strong consideration of a test (CT chest) that was ultimately deferred  ordering and/or review of 3+ test(s) in this encounter (see separate area of note for details)    The patient's management necessitated moderate risk (during review of labs, chest x-ray, coordination of care, consideration of CT scan, disposition home).    Assessment & Plan    Respiratory panel read is reassuring, and chest x-ray reassuring as well  Patient denied chest pain or shortness of breath, but was worried about her cough that she had after an upper respiratory infection as well as some pain in her throat after coughing  As she denies pleuritic chest pain, denies shortness of breath, but is very worried about whooping cough, but is only minimally coughing in the room, does not have a physical exam consistent with whooping cough had a discussion with her and we will defer testing for pertussis  Although I considered D-dimer, CT PE, for PE, patient without pleuritic chest pain, not hypoxic, pulse improved after sitting and patient appears very anxious, at this point although I considered PE testing, will defer at this point  Less  likely bacterial pneumonia given reassuring vital signs and reassuring chest x-ray  Prescribe patient Nanette Hu for cough, and patient has primary care visit on Tuesday which I encouraged her to go to, but if she has new or worsening symptoms in the meantime I encouraged her to return immediately to the emergency department.    I have reviewed the nursing notes. I have reviewed the findings, diagnosis, plan and need for follow up with the patient.    New Prescriptions    No medications on file       Final diagnoses:   None       Giles Gandara MD on 12/29/2024 at 3:08 PM   [facility] EMERGENCY DEPARTMENT  12/29/2024     Giles Gandara MD  12/29/24 [id number]

## 2024-12-30 LAB
ATRIAL RATE - MUSE: 103 BPM
DIASTOLIC BLOOD PRESSURE - MUSE: NORMAL MMHG
INTERPRETATION ECG - MUSE: NORMAL
P AXIS - MUSE: 62 DEGREES
PR INTERVAL - MUSE: 122 MS
QRS DURATION - MUSE: 76 MS
QT - MUSE: 352 MS
QTC - MUSE: 461 MS
R AXIS - MUSE: 29 DEGREES
SYSTOLIC BLOOD PRESSURE - MUSE: NORMAL MMHG
T AXIS - MUSE: 35 DEGREES
VENTRICULAR RATE- MUSE: 103 BPM

## 2025-01-05 ENCOUNTER — HEALTH MAINTENANCE LETTER (OUTPATIENT)
Age: 40
End: 2025-01-05

## 2025-01-15 DIAGNOSIS — R05.2 SUBACUTE COUGH: ICD-10-CM

## 2025-01-15 NOTE — TELEPHONE ENCOUNTER
[facility] phone call message- patient requesting a refill:    Full Medication Name: albuterol (PROAIR HFA/PROVENTIL HFA/VENTOLIN HFA)   Dose: 108 (90 Base) MCG/ACT inhaler   Full Medication Name: benzonatate (TESSALON)   Dose: 100 MG capsule     Pharmacy confirmed as [id number] - [place], [place] - [address] [place] [address] [address] [facility] [address]  Phone: [phone] Fax: [phone]: Yes    Additional Comments: Patient is requesting this medication benzonatate (TESSALON) that she got in the ED she states that she is still having a cough.     OK to leave a message on voice mail? Yes    Primary language: Salvadorean      needed? No    Call taken on January 15, 2025 at 1:36 PM by Subha Najera

## 2025-01-15 NOTE — TELEPHONE ENCOUNTER
"Last seen 12/27/24    Request for medication refill:    albuterol (PROAIR HFA/PROVENTIL HFA/VENTOLIN HFA) 108 (90 Base) MCG/ACT inhaler     Providers if patient needs an appointment and you are willing to give a one month supply please refill for one month and  send a letter/MyChart using \".SMILLIMITEDREFILL\" .smillimited and route chart to \"P SMI \" (Giving one month refill in non controlled medications is strongly recommended before denial)    If refill has been denied, meaning absolutely no refills without visit, please complete the smart phrase \".smirxrefuse\" and route it to the \"P SMI MED REFILLS\"  pool to inform the patient and the pharmacy.    Keyla Yates RN     "

## 2025-01-16 RX ORDER — ALBUTEROL SULFATE 90 UG/1
2 INHALANT RESPIRATORY (INHALATION) EVERY 6 HOURS PRN
Qty: 18 G | Refills: 0 | Status: SHIPPED | OUTPATIENT
Start: 2025-01-16

## 2025-02-11 ASSESSMENT — MIGRAINE DISABILITY ASSESSMENT (MIDAS)
HOW OFTEN WERE SOCIAL ACTIVITIES MISSED DUE TO HEADACHES: 40
HOW MANY DAYS WAS HOUSEWORK PRODUCTIVITY CUT IN HALF DUE TO HEADACHES: 40
ON A SCALE FROM 0-10 ON AVERAGE HOW PAINFUL WERE HEADACHES: 9
HOW MANY DAYS WAS YOUR PRODUCTIVITY CUT IN HALF BECAUSE OF HEADACHES: 40
HOW MANY DAYS DID YOU NOT DO HOUSEWORK BECAUSE OF HEADACHES: 40
TOTAL SCORE: 200
HOW MANY DAYS IN THE PAST 3 MONTHS HAVE YOU HAD A HEADACHE: 40
HOW MANY DAYS DID YOU MISS WORK OR SCHOOL BECAUSE OF HEADACHES: 40

## 2025-02-11 ASSESSMENT — HEADACHE IMPACT TEST (HIT 6)
HIT6 TOTAL SCORE: 70
HOW OFTEN HAVE YOU FELT TOO TIRED TO WORK BECAUSE OF YOUR HEADACHES: VERY OFTEN
WHEN YOU HAVE A HEADACHE HOW OFTEN DO YOU WISH YOU COULD LIE DOWN: ALWAYS
WHEN YOU HAVE HEADACHES HOW OFTEN IS THE PAIN SEVERE: VERY OFTEN
HOW OFTEN HAVE YOU FELT FED UP OR IRRITATED BECAUSE OF YOUR HEADACHES: ALWAYS
HOW OFTEN DO HEADACHES LIMIT YOUR DAILY ACTIVITIES: VERY OFTEN
HOW OFTEN DID HEADACHS LIMIT CONCENTRATION ON WORK OR DAILY ACTIVITY: VERY OFTEN

## 2025-02-14 ENCOUNTER — VIRTUAL VISIT (OUTPATIENT)
Dept: NEUROLOGY | Facility: CLINIC | Age: 40
End: 2025-02-14

## 2025-02-14 DIAGNOSIS — G43.109 MIGRAINE WITH AURA AND WITHOUT STATUS MIGRAINOSUS, NOT INTRACTABLE: ICD-10-CM

## 2025-02-14 DIAGNOSIS — G43.709 CHRONIC MIGRAINE WITHOUT AURA WITHOUT STATUS MIGRAINOSUS, NOT INTRACTABLE: Primary | ICD-10-CM

## 2025-02-14 PROCEDURE — 98005 SYNCH AUDIO-VIDEO EST LOW 20: CPT | Performed by: NURSE PRACTITIONER

## 2025-02-14 RX ORDER — ELETRIPTAN HYDROBROMIDE 40 MG/1
40 TABLET, FILM COATED ORAL
Qty: 12 TABLET | Refills: 3 | Status: SHIPPED | OUTPATIENT
Start: 2025-02-14

## 2025-02-14 RX ORDER — METOCLOPRAMIDE 5 MG/1
5-10 TABLET ORAL EVERY 6 HOURS PRN
Qty: 20 TABLET | Refills: 3 | Status: SHIPPED | OUTPATIENT
Start: 2025-02-14

## 2025-02-14 ASSESSMENT — PAIN SCALES - GENERAL: PAINLEVEL_OUTOF10: SEVERE PAIN (8)

## 2025-02-14 NOTE — LETTER
2/14/2025       RE: Leticia Morales  [address]  [place] [address]     Dear Colleague,    Thank you for referring your patient, Leticia Morales, to the [facility] [place] at . Please see a copy of my visit note below.    Virtual Visit Details  Type of service:  Video Visit   Originating Location (pt. Location): Home  Distant Location (provider location):  Off-site  Platform used for Video Visit: [facility]    Headache Neurology Progress Note  February 14, 2025      Assessment/Plan:   Leticia Morales is a 39 year old   Headaches worsen in the past 3 months possible due to viral illness/influenza and post viral illness recovery. Blurry vision transient and has a fog in her eyes.   Brain MRI suggested but will wait and see if her post flue recovery. Will order brain MRI if persists or getting worse.   Would recommend to get an updated eye exam   Acute Treatment:  -For acute treatment of mild headache, take ibuprofen or acetaminophen as needed. Do not exceed more than 14 days per month to avoid medication overuse.  -For acute treatment of moderate to severe headache, take eletriptan 40 mg at the onset of headache, with a repeat dose in two hours if needed. Do not exceed more than 9 days per month to avoid medication overuse.  Nurtec as needed and can repeat in 24 hours if needed  -For headache related nausea, or as a rescue medicine for headache, take metoclopromide with benadryl for nausea/vomiting and severe migraine  as needed.     Preventive Treatment:  -For headache prevention, continue Ajovy  Potentially retrying Botox in the future  if headaches were not getting better     Follow up in 3 months if stable or sooner if needed if headaches persist or getting worse     23 minutes spent on the date of the encounter doing video  access, chart  review,  results review,  meds review,  "treatment plan, documentation and further activities as noted above    The longitudinal plan of care for Leticia was addressed during this visit. Due to the added complexity in care, I will continue to support Leticia in the subsequent management of this condition(s) and with the ongoing continuity of care of this condition(s).    CONNIE Garcia, CNP [facility]        Subjective:    Leticia Morales returns for follow up of headaches   Headache today nurtec not always help and takes longer to work. Nurtec helps with certain migraines but not all of them. Took eletriptan 10 minutes ago 20 mg only. Recommended to try eletriptan 40 mg at migraine onset.   Metoclopromide recommended to add to migraine rescue treatment along with benadryl.   Ajovy -helps and less frequent migraines. Getting 7 migraines/month vs almost daily. Patient feels that Botox was better because migraines were not as painful. No side effects with Ajovy.   Patient would like to add Botox back because it was better in combination with Ajovy but will wait  Headaches worsen since getting influenza in Dec. Reports that getting better but just the cought.       Treatments Tried:    Ibuprofen   metoclopromide as needed for headache and no side effects   Rizatriptan   Sumatriptan -side effects with retrying   Naratriptan-  Ubrelvy 100 mg dose ineffective  Metoclopromide as needed for nausea -helps but heart pounds   venlafaxin has started and stopped last week due to side effects\" emotionally unstable\" and \"crying for no reason\"   Amitriptyline-side effects  Topiramate -severe side effects-dizziness and not functioning and fatigued   Gabapentin -side effects -brain fog  Medrol dose pack -did not help   Botox stopped in March of 2024 -because Ajovy seemed help more.    or less -would avoid BBB/CCB due to lower BP  Objective:    Vitals: There were no vitals taken for this visit.  General: Cooperative, NAD  8/10 migraine and getting " better with eletriptan.   Neurologic:  Mental Status: Fully alert, attentive and oriented. Speech clear and fluent.   Cranial Nerves: Facial movements symmetric.   Motor: No abnormal movements.      Pertinent Investigations:     MR BRAIN W/O & W CONTRAST 8/22/2021 9:30 AM     Provided History: Abnormal brain MRI; History of recent  hospitalization.  ICD-10: Abnormal brain MRI; History of recent hospitalization     Comparison: CTV 7/20/2021; MRI brain 7/19/2021, 7/15/2021.     Technique: Multiplanar T1-weighted, axial FLAIR, and susceptibility  images were obtained without intravenous contrast. Following  intravenous gadolinium-based contrast administration, axial  T2-weighted, diffusion, and T1-weighted images (in multiple planes)  were obtained.     Contrast: 5 Gadavist      Findings:  There is no mass effect, midline shift, or evidence of acute  intracranial hemorrhage. Diffuse punctate foci of hemosiderin  deposition in the cerebral subcortical white matter, corpus callosum,  and basal ganglia. Particularly prominent in the anterior bifrontal  lobes and splenium of the corpus callosum. The ventricles are  proportionate to the cerebral sulci. Normal major vascular  intracranial flow-voids. Question small left anterior frontal  convexity arachnoid cyst with minimal mass effect on the underlying  gyrus (series 14,001 image 94).     Postcontrast images demonstrate no abnormal intracranial enhancement.  No residual dural enhancement. Normalized size of pituitary gland, now  measures 9 x 7 mm, previously 11 x 11 mm on comparison 7/19/2021.  Posterior sagittal sinus is concave bilaterally, previously this  appeared convex on comparison 7/19/2021.     No abnormality of the skull marrow signal. The visualized portions of  paranasal sinuses, and mastoid air cells are relatively clear. The  orbits are grossly unremarkable.                                                                      Impression:  1.  Resolution of  findings of previous intracranial hypotension, as  seen on comparison 7/29/2021. Previous findings included enlargement  of the pituitary gland, dural enhancement, and a convex superior  sagittal sinus, all of which are no longer present.  2.  Stable nonspecific findings of prior petechial microhemorrhages  without associated acute signal changes. Favor sequela of prior ARDS  and associated critical illness, less likely diffuse axonal injury or  amyloidosis.     I have personally reviewed the examination and initial interpretation  and I agree with the findings.     ROMERO NARANJO MD            4/19/2024     8:23 AM 8/14/2024     8:15 AM 2/11/2025    10:07 AM   HIT-6   When you have headaches, how often is the pain severe 0 0 11   How often do headaches limit your ability to do usual daily activities including household work, work, school, or social activities? 0 0 11   When you have a headache, how often do you wish you could lie down? 0 0 13   In the past 4 weeks, how often have you felt too tired to do work or daily activities because of your headaches 0 0 11   In the past 4 weeks, how often have you felt fed up or irritated because of your headaches 0 0 13   In the past 4 weeks, how often did headaches limit your ability to concentrate on work or daily activities 0 0 11   HIT-6 Total Score 0 0 70        Patient-reported           4/19/2024     8:26 AM 8/14/2024     8:16 AM 2/11/2025    10:09 AM   MIDAS - in the past three months:   On how many days did you miss work or school because of your headaches? 15 30 40   How many days was your productivity at work or school reduced by half or more because of your headaches? 15 30 40   On how many days did you not do household work because of your headaches? 15 30 40   How many days was your productivity in household work reduced by half or more because of your headaches? 15 30 40   On how many days did you miss family, social, or leisure activities because of your  headaches? 15 30 40   On how many days did you have a headache? 15 30 40   On a scale of 0-10, on average how painful were these headaches?  9 9   MIDAS Score 75 (IV - Severe Disability) 150 (IV - Severe Disability) 200 (IV - Severe Disability)          Again, thank you for allowing me to participate in the care of your patient.      Sincerely,    CONNIE Meeks CNP

## 2025-02-14 NOTE — PROGRESS NOTES
Virtual Visit Details  Type of service:  Video Visit   Originating Location (pt. Location): Home  Distant Location (provider location):  Off-site  Platform used for Video Visit: [facility]    Headache Neurology Progress Note  February 14, 2025      Assessment/Plan:   Leticia Morales is a 39 year old   Headaches worsen in the past 3 months possible due to viral illness/influenza and post viral illness recovery. Blurry vision transient and has a fog in her eyes.   Brain MRI suggested but will wait and see if her post flue recovery. Will order brain MRI if persists or getting worse.   Would recommend to get an updated eye exam   Acute Treatment:  -For acute treatment of mild headache, take ibuprofen or acetaminophen as needed. Do not exceed more than 14 days per month to avoid medication overuse.  -For acute treatment of moderate to severe headache, take eletriptan 40 mg at the onset of headache, with a repeat dose in two hours if needed. Do not exceed more than 9 days per month to avoid medication overuse.  Nurtec as needed and can repeat in 24 hours if needed  -For headache related nausea, or as a rescue medicine for headache, take metoclopromide with benadryl for nausea/vomiting and severe migraine  as needed.     Preventive Treatment:  -For headache prevention, continue Ajovy  Potentially retrying Botox in the future  if headaches were not getting better     Follow up in 3 months if stable or sooner if needed if headaches persist or getting worse     23 minutes spent on the date of the encounter doing video  access, chart  review,  results review,  meds review, treatment plan, documentation and further activities as noted above    The longitudinal plan of care for Leticia was addressed during this visit. Due to the added complexity in care, I will continue to support Leticia in the subsequent management of this condition(s) and with the ongoing continuity of care of this  "condition(s).    CONNIE Garcia, CNP [facility]        Subjective:    Leticia Morales returns for follow up of headaches   Headache today nurtec not always help and takes longer to work. Nurtec helps with certain migraines but not all of them. Took eletriptan 10 minutes ago 20 mg only. Recommended to try eletriptan 40 mg at migraine onset.   Metoclopromide recommended to add to migraine rescue treatment along with benadryl.   Ajovy -helps and less frequent migraines. Getting 7 migraines/month vs almost daily. Patient feels that Botox was better because migraines were not as painful. No side effects with Ajovy.   Patient would like to add Botox back because it was better in combination with Ajovy but will wait  Headaches worsen since getting influenza in Dec. Reports that getting better but just the cought.       Treatments Tried:    Ibuprofen   metoclopromide as needed for headache and no side effects   Rizatriptan   Sumatriptan -side effects with retrying   Naratriptan-  Ubrelvy 100 mg dose ineffective  Metoclopromide as needed for nausea -helps but heart pounds   venlafaxin has started and stopped last week due to side effects\" emotionally unstable\" and \"crying for no reason\"   Amitriptyline-side effects  Topiramate -severe side effects-dizziness and not functioning and fatigued   Gabapentin -side effects -brain fog  Medrol dose pack -did not help   Botox stopped in March of 2024 -because Ajovy seemed help more.    or less -would avoid BBB/CCB due to lower BP  Objective:    Vitals: There were no vitals taken for this visit.  General: Cooperative, NAD  8/10 migraine and getting better with eletriptan.   Neurologic:  Mental Status: Fully alert, attentive and oriented. Speech clear and fluent.   Cranial Nerves: Facial movements symmetric.   Motor: No abnormal movements.      Pertinent Investigations:     MR BRAIN W/O & W CONTRAST 8/22/2021 9:30 AM     Provided History: Abnormal brain " MRI; History of recent  hospitalization.  ICD-10: Abnormal brain MRI; History of recent hospitalization     Comparison: CTV 7/20/2021; MRI brain 7/19/2021, 7/15/2021.     Technique: Multiplanar T1-weighted, axial FLAIR, and susceptibility  images were obtained without intravenous contrast. Following  intravenous gadolinium-based contrast administration, axial  T2-weighted, diffusion, and T1-weighted images (in multiple planes)  were obtained.     Contrast: 5 Gadavist      Findings:  There is no mass effect, midline shift, or evidence of acute  intracranial hemorrhage. Diffuse punctate foci of hemosiderin  deposition in the cerebral subcortical white matter, corpus callosum,  and basal ganglia. Particularly prominent in the anterior bifrontal  lobes and splenium of the corpus callosum. The ventricles are  proportionate to the cerebral sulci. Normal major vascular  intracranial flow-voids. Question small left anterior frontal  convexity arachnoid cyst with minimal mass effect on the underlying  gyrus (series 14,001 image 94).     Postcontrast images demonstrate no abnormal intracranial enhancement.  No residual dural enhancement. Normalized size of pituitary gland, now  measures 9 x 7 mm, previously 11 x 11 mm on comparison 7/19/2021.  Posterior sagittal sinus is concave bilaterally, previously this  appeared convex on comparison 7/19/2021.     No abnormality of the skull marrow signal. The visualized portions of  paranasal sinuses, and mastoid air cells are relatively clear. The  orbits are grossly unremarkable.                                                                      Impression:  1.  Resolution of findings of previous intracranial hypotension, as  seen on comparison 7/29/2021. Previous findings included enlargement  of the pituitary gland, dural enhancement, and a convex superior  sagittal sinus, all of which are no longer present.  2.  Stable nonspecific findings of prior petechial  microhemorrhages  without associated acute signal changes. Favor sequela of prior ARDS  and associated critical illness, less likely diffuse axonal injury or  amyloidosis.     I have personally reviewed the examination and initial interpretation  and I agree with the findings.     ROMERO NARANJO MD            4/19/2024     8:23 AM 8/14/2024     8:15 AM 2/11/2025    10:07 AM   HIT-6   When you have headaches, how often is the pain severe 0 0 11   How often do headaches limit your ability to do usual daily activities including household work, work, school, or social activities? 0 0 11   When you have a headache, how often do you wish you could lie down? 0 0 13   In the past 4 weeks, how often have you felt too tired to do work or daily activities because of your headaches 0 0 11   In the past 4 weeks, how often have you felt fed up or irritated because of your headaches 0 0 13   In the past 4 weeks, how often did headaches limit your ability to concentrate on work or daily activities 0 0 11   HIT-6 Total Score 0 0 70        Patient-reported           4/19/2024     8:26 AM 8/14/2024     8:16 AM 2/11/2025    10:09 AM   MIDAS - in the past three months:   On how many days did you miss work or school because of your headaches? 15 30 40   How many days was your productivity at work or school reduced by half or more because of your headaches? 15 30 40   On how many days did you not do household work because of your headaches? 15 30 40   How many days was your productivity in household work reduced by half or more because of your headaches? 15 30 40   On how many days did you miss family, social, or leisure activities because of your headaches? 15 30 40   On how many days did you have a headache? 15 30 40   On a scale of 0-10, on average how painful were these headaches?  9 9   MIDAS Score 75 (IV - Severe Disability) 150 (IV - Severe Disability) 200 (IV - Severe Disability)

## 2025-02-14 NOTE — PATIENT INSTRUCTIONS
Acute Treatment:  -For acute treatment of mild headache, take ibuprofen or acetaminophen as needed. Do not exceed more than 14 days per month to avoid medication overuse.  -For acute treatment of moderate to severe headache, take eletriptan 40 mg at the onset of headache, with a repeat dose in two hours if needed. Do not exceed more than 9 days per month to avoid medication overuse.  Nurtec as needed and can repeat in 24 hours if needed  -For headache related nausea, or as a rescue medicine for headache, take metoclopromide with benadryl for nausea/vomiting and severe migraine  as needed.     Preventive Treatment:  -For headache prevention, continue Ajovy  Potentially getting Botox back if headaches were getting better     Follow up in 3 months if stable or sooner if needed if headaches persist or getting worse

## 2025-02-14 NOTE — NURSING NOTE
Current patient location: [address]   [place] [address]    Is the patient currently in the state of [place]? YES    Visit mode: VIDEO    If the visit is dropped, the patient can be reconnected by:VIDEO VISIT: Text to cell phone:   Telephone Information:   Mobile [phone]       Will anyone else be joining the visit? NO  (If patient encounters technical issues they should call [phone])    Are changes needed to the allergy or medication list? Pt stated no changes to allergies and Pt stated no med changes    Are refills needed on medications prescribed by this physician? NO    Rooming Documentation:  Questionnaire(s) completed    Reason for visit: CHEMA Smart VVF

## 2025-02-17 ENCOUNTER — TELEPHONE (OUTPATIENT)
Dept: NEUROLOGY | Facility: CLINIC | Age: 40
End: 2025-02-17
Payer: COMMERCIAL

## 2025-02-17 NOTE — TELEPHONE ENCOUNTER
Left Voicemail (1st Attempt) for the patient to call back and schedule the following:    Appointment type: RETURN HEADACHE  Provider: JAUN JEWELL   Return date: 5/14/25  Specialty phone number: [phone]  Additional appointment(s) needed: N/A  Additonal Notes: N/A

## 2025-02-19 NOTE — TELEPHONE ENCOUNTER
Patient confirmed scheduled appointment:  Date: 05/23/2025  Time: 8:30AM  Visit type: RETURN HEADACHE  Provider: JAUN JEWELL  Location: VIRTUAL  Testing/imaging: N/A  Additional notes: 3 month follow-up    [facility] sent a refill request for Pantoprazole. Pt was last seen 2/2020- PPI will be refill for 1 month.    PSR please set up F/U OV.

## 2025-03-27 ENCOUNTER — PATIENT OUTREACH (OUTPATIENT)
Dept: FAMILY MEDICINE | Facility: CLINIC | Age: 40
End: 2025-03-27
Payer: COMMERCIAL

## 2025-03-27 DIAGNOSIS — K21.9 GASTROESOPHAGEAL REFLUX DISEASE WITHOUT ESOPHAGITIS: ICD-10-CM

## 2025-03-27 RX ORDER — OMEPRAZOLE 20 MG/1
20 CAPSULE, DELAYED RELEASE ORAL 2 TIMES DAILY
Qty: 240 CAPSULE | Refills: 0 | Status: SHIPPED | OUTPATIENT
Start: 2025-03-27

## 2025-03-27 NOTE — TELEPHONE ENCOUNTER
Patient Quality Outreach    Patient is due for the following:   Cervical Cancer Screening - PAP Needed    Action(s) Taken:   Schedule a office visit for cervical cancer screening Adult Preventative    Type of outreach:    Sent [email or web address] message.    Questions for provider review:    None         Barbara Perry RN

## 2025-03-27 NOTE — TELEPHONE ENCOUNTER
"Request for medication refill:    Medication Name: omeprazole (PRILOSEC) 20 MG DR capsule     Providers if patient needs an appointment and you are willing to give a one month supply please refill for one month and  send a MyChart using \".SMILLIMITEDREFILL\" .Or route chart to \"P [facility] \" . To call patient and inform of limited refill and providers request to make an appointment. (Giving one month refill in non controlled medications is strongly recommended before denial)    If refill has been denied, meaning absolutely no refills without visit, please complete the smart phrase \".SMIRXREFUSE\" and route it to the \"P [facility] MED REFILLS\"  pool to inform the patient and the pharmacy.    Selene Clinton RN      "

## 2025-03-31 ENCOUNTER — TELEPHONE (OUTPATIENT)
Dept: FAMILY MEDICINE | Facility: CLINIC | Age: 40
End: 2025-03-31

## 2025-03-31 ENCOUNTER — OFFICE VISIT (OUTPATIENT)
Dept: FAMILY MEDICINE | Facility: CLINIC | Age: 40
End: 2025-03-31
Payer: COMMERCIAL

## 2025-03-31 VITALS
OXYGEN SATURATION: 100 % | BODY MASS INDEX: 24.07 KG/M2 | HEIGHT: 64 IN | SYSTOLIC BLOOD PRESSURE: 103 MMHG | HEART RATE: 89 BPM | RESPIRATION RATE: 15 BRPM | TEMPERATURE: 98.3 F | DIASTOLIC BLOOD PRESSURE: 72 MMHG | WEIGHT: 141 LBS

## 2025-03-31 DIAGNOSIS — J06.9 VIRAL URI: Primary | ICD-10-CM

## 2025-03-31 DIAGNOSIS — R05.3 CHRONIC COUGH: ICD-10-CM

## 2025-03-31 PROCEDURE — 3074F SYST BP LT 130 MM HG: CPT

## 2025-03-31 PROCEDURE — 99213 OFFICE O/P EST LOW 20 MIN: CPT | Mod: GC

## 2025-03-31 PROCEDURE — 3078F DIAST BP <80 MM HG: CPT

## 2025-03-31 PROCEDURE — 1126F AMNT PAIN NOTED NONE PRSNT: CPT

## 2025-03-31 RX ORDER — GUAIFENESIN/DEXTROMETHORPHAN 100-10MG/5
10 SYRUP ORAL EVERY 4 HOURS PRN
Qty: 118 ML | Refills: 0 | Status: SHIPPED | OUTPATIENT
Start: 2025-03-31

## 2025-03-31 ASSESSMENT — PATIENT HEALTH QUESTIONNAIRE - PHQ9
10. IF YOU CHECKED OFF ANY PROBLEMS, HOW DIFFICULT HAVE THESE PROBLEMS MADE IT FOR YOU TO DO YOUR WORK, TAKE CARE OF THINGS AT HOME, OR GET ALONG WITH OTHER PEOPLE: NOT DIFFICULT AT ALL
SUM OF ALL RESPONSES TO PHQ QUESTIONS 1-9: 0
SUM OF ALL RESPONSES TO PHQ QUESTIONS 1-9: 0

## 2025-03-31 ASSESSMENT — PAIN SCALES - GENERAL: PAINLEVEL_OUTOF10: NO PAIN (0)

## 2025-03-31 NOTE — PATIENT INSTRUCTIONS
Patient Education   Here is the plan from today's visit    2. Chronic cough  [facility] Vaporub  Cough drops--[place]  Robitussin  - guaiFENesin-dextromethorphan (ROBITUSSIN DM) 100-10 MG/5ML syrup; Take 10 mLs by mouth every 4 hours as needed for cough.  Dispense: 118 mL; Refill: 0  - General PFT Lab (Please always keep checked); Future  - Pulmonary Function Test; Future      Please call or return to clinic if your symptoms don't go away. Follow up after PFT's are completed.    Follow up plan  Return After PFT's are completed.    Thank you for coming to [facility] today.  Lab Testing:  **If you had lab testing today and your results are reassuring or normal they will be mailed to you or sent through "[employer] within 7 days.   **If the lab tests need quick action we will call you with the results.  **If you are having labs done on a different day, please call [phone] to schedule at [facility] or [phone] for other [facility] locations. Labs do not offer walk-in appointments.  The phone number we will call with results is # [phone] (home) . If this is not the best number please call our clinic and change the number.  Medication Refills:  If you need any refills please call your pharmacy and they will contact us.   If you need to  your refill at a new pharmacy, please contact the new pharmacy directly. The new pharmacy will help you get your medications transferred faster.   Scheduling:  If you have any concerns about today's visit or wish to schedule another appointment please call our office during normal business hours [phone] (8-5:00 M-F). If you can no longer make a scheduled visit, please cancel via "[employer] or call us to cancel.   If a referral was made to an [facility] specialty provider and you do not get a call from central scheduling, please refer to directions on your visit summary or call our office during normal business hours for assistance.   If a  Mammogram was ordered for you at the Breast Center call [phone] to schedule or change your appointment.  If you had an XRay/CT/Ultrasound/MRI ordered the number is [phone] to schedule or change your radiology appointment.   [facility] has limited ultrasound appointments available on Wednesdays, if you would like your ultrasound at [facility], please call [phone] to schedule.   Medical Concerns:  If you have urgent medical concerns please call [phone] at any time of the day.    Chiquita Barber MD

## 2025-03-31 NOTE — PROGRESS NOTES
"  Assessment & Plan     Viral URI  Chronic cough  Patient presenting with acute on chronic cough. Reassuringly vitally stable, patient well appearing, lungs CTAB, cough present. Likely viral URI however patient does endorse months now of intermittent cough. Symptomatic cares for acute URI, however may benefit from asthma workup with PFTs considering utility of albuterol in treating her sxs. No wheeze on exam.  - guaiFENesin-dextromethorphan (ROBITUSSIN DM) 100-10 MG/5ML syrup; Take 10 mLs by mouth every 4 hours as needed for cough.  - General PFT Lab (Please always keep checked); Future  - Pulmonary Function Test; Future    Follow up after PFT's completed to review results.      Subjective   Leticia is a 39 year old, presenting for the following health issues:  congestion (Chest congestion, cough, chills patient states this started on Fri 03/28/2025)      3/31/2025     4:26 PM   Additional Questions   Roomed by Robin   Accompanied by Antonio -         3/31/2025    Information    services provided? No     HPI      First seen 12/23/24 for URI sxs, cough and wheeze. No hx of asthma. Patient reports a hx of H1N1 that required her to be intubated and on a ventilator many years ago.    12/27 was seen virtually, at that time had been taking OTC cough suppressants and syrup at that time. She was prescribed an albuterol inhaler at this visit.    Patient reports that she has had a chronic cough since this visit in December. Did have a period where things improved but now back to where they were. She finds the albuterol helpful. Subjective fever at night however afebrile in office today. No congestion, sore throat, sneezing, itchy eyes. No pets in the home. Has hx of seasonal allergies and GERD. Endorses iron taste in mouth, especially after coughing while lying down. Endorses sense of chest heaviness.        Objective    /72   Pulse 89   Temp 98.3  F (36.8  C) (Oral)   Resp 15   Ht 1.626 m (5' 4\")   " Wt 64 kg (141 lb)   SpO2 100%   BMI 24.20 kg/m    Body mass index is 24.2 kg/m .  Physical Exam   Constitutional: awake, alert, cooperative, no apparent distress, and appears stated age  Eyes: Lids and lashes normal, pupils equal, round and reactive to light, extra ocular muscles intact, sclera clear, conjunctiva normal  ENT: normocepalic, without obvious abnormality  Hematologic / Lymphatic: no cervical lymphadenopathy and no supraclavicular lymphadenopathy  Respiratory: No increased work of breathing, good air exchange, clear to auscultation bilaterally, no crackles or wheezing  Cardiovascular: Normal apical impulse, regular rate and rhythm, normal S1 and S2, no S3 or S4, and no murmur noted  GI: soft, non-distended and non-tender  Musculoskeletal: no lower extremity pitting edema present  full range of motion noted  tone is normal          Signed Electronically by: Chiquita Barber MD  {Email feedback regarding this note to [email or web address]   :[id number]}  Answers submitted by the patient for this visit:  Patient Health Questionnaire (Submitted on 3/31/2025)  If you checked off any problems, how difficult have these problems made it for you to do your work, take care of things at home, or get along with other people?: Not difficult at all  PHQ9 TOTAL SCORE: 0

## 2025-04-01 ENCOUNTER — MYC REFILL (OUTPATIENT)
Dept: FAMILY MEDICINE | Facility: CLINIC | Age: 40
End: 2025-04-01
Payer: COMMERCIAL

## 2025-04-01 DIAGNOSIS — R05.2 SUBACUTE COUGH: ICD-10-CM

## 2025-04-02 NOTE — TELEPHONE ENCOUNTER
"Request for medication refill:    Medication Name:  albuterol (PROAIR HFA/PROVENTIL HFA/VENTOLIN HFA) 108 (90 Base) MCG/ACT inhaler    Providers if patient needs an appointment and you are willing to give a one month supply please refill for one month and  send a MyChart using \".SMILLIMITEDREFILL\" .Or route chart to \"P SMI \" . To call patient and inform of limited refill and providers request to make an appointment. (Giving one month refill in non controlled medications is strongly recommended before denial)    If refill has been denied, meaning absolutely no refills without visit, please complete the smart phrase \".SMIRXREFUSE\" and route it to the \"P SMI MED REFILLS\"  pool to inform the patient and the pharmacy.    Willie Gutierrez MA      "

## 2025-04-03 RX ORDER — ALBUTEROL SULFATE 90 UG/1
2 INHALANT RESPIRATORY (INHALATION) EVERY 6 HOURS PRN
Qty: 18 G | Refills: 0 | Status: SHIPPED | OUTPATIENT
Start: 2025-04-03

## 2025-05-30 ENCOUNTER — APPOINTMENT (OUTPATIENT)
Dept: CT IMAGING | Facility: CLINIC | Age: 40
End: 2025-05-30
Attending: PHYSICIAN ASSISTANT

## 2025-05-30 ENCOUNTER — APPOINTMENT (OUTPATIENT)
Dept: GENERAL RADIOLOGY | Facility: CLINIC | Age: 40
End: 2025-05-30
Attending: PHYSICIAN ASSISTANT

## 2025-05-30 ENCOUNTER — HOSPITAL ENCOUNTER (EMERGENCY)
Facility: CLINIC | Age: 40
Discharge: HOME | End: 2025-05-30
Attending: FAMILY MEDICINE | Admitting: FAMILY MEDICINE

## 2025-05-30 VITALS
RESPIRATION RATE: 15 BRPM | SYSTOLIC BLOOD PRESSURE: 117 MMHG | TEMPERATURE: 98 F | HEART RATE: 97 BPM | HEIGHT: 64 IN | WEIGHT: 139.8 LBS | DIASTOLIC BLOOD PRESSURE: 55 MMHG | OXYGEN SATURATION: 100 % | BODY MASS INDEX: 23.87 KG/M2

## 2025-05-30 DIAGNOSIS — R05.3 CHRONIC COUGH: ICD-10-CM

## 2025-05-30 DIAGNOSIS — K76.9 LIVER LESION: ICD-10-CM

## 2025-05-30 DIAGNOSIS — Z75.8 DOES NOT HAVE PRIMARY CARE PROVIDER: ICD-10-CM

## 2025-05-30 LAB
ALBUMIN SERPL BCG-MCNC: 3.9 G/DL (ref 3.5–5.2)
ALP SERPL-CCNC: 69 U/L (ref 40–150)
ALT SERPL W P-5'-P-CCNC: 7 U/L (ref 0–50)
ANION GAP SERPL CALCULATED.3IONS-SCNC: 15 MMOL/L (ref 7–15)
AST SERPL W P-5'-P-CCNC: 16 U/L (ref 0–45)
ATRIAL RATE - MUSE: 106 BPM
BASOPHILS # BLD AUTO: 0.1 10E3/UL (ref 0–0.2)
BASOPHILS NFR BLD AUTO: 1 %
BILIRUB SERPL-MCNC: 0.3 MG/DL
BUN SERPL-MCNC: 10.7 MG/DL (ref 6–20)
C PNEUM DNA SPEC QL NAA+PROBE: NOT DETECTED
CALCIUM SERPL-MCNC: 9.3 MG/DL (ref 8.8–10.4)
CHLORIDE SERPL-SCNC: 106 MMOL/L (ref 98–107)
CREAT SERPL-MCNC: 0.72 MG/DL (ref 0.51–0.95)
D DIMER PPP FEU-MCNC: 0.46 UG/ML FEU (ref 0–0.5)
DIASTOLIC BLOOD PRESSURE - MUSE: NORMAL MMHG
EGFRCR SERPLBLD CKD-EPI 2021: >90 ML/MIN/1.73M2
EOSINOPHIL # BLD AUTO: 0.6 10E3/UL (ref 0–0.7)
EOSINOPHIL NFR BLD AUTO: 7 %
ERYTHROCYTE [DISTWIDTH] IN BLOOD BY AUTOMATED COUNT: 19.3 % (ref 10–15)
FLUAV H1 2009 PAND RNA SPEC QL NAA+PROBE: NOT DETECTED
FLUAV H1 RNA SPEC QL NAA+PROBE: NOT DETECTED
FLUAV H3 RNA SPEC QL NAA+PROBE: NOT DETECTED
FLUAV RNA SPEC QL NAA+PROBE: NEGATIVE
FLUAV RNA SPEC QL NAA+PROBE: NOT DETECTED
FLUBV RNA RESP QL NAA+PROBE: NEGATIVE
FLUBV RNA SPEC QL NAA+PROBE: NOT DETECTED
GLUCOSE SERPL-MCNC: 117 MG/DL (ref 70–99)
HADV DNA SPEC QL NAA+PROBE: NOT DETECTED
HCG SERPL QL: NEGATIVE
HCO3 SERPL-SCNC: 18 MMOL/L (ref 22–29)
HCOV PNL SPEC NAA+PROBE: NOT DETECTED
HCT VFR BLD AUTO: 32.6 % (ref 35–47)
HGB BLD-MCNC: 9.8 G/DL (ref 11.7–15.7)
HMPV RNA SPEC QL NAA+PROBE: NOT DETECTED
HPIV1 RNA SPEC QL NAA+PROBE: NOT DETECTED
HPIV2 RNA SPEC QL NAA+PROBE: NOT DETECTED
HPIV3 RNA SPEC QL NAA+PROBE: NOT DETECTED
HPIV4 RNA SPEC QL NAA+PROBE: NOT DETECTED
IMM GRANULOCYTES # BLD: 0 10E3/UL
IMM GRANULOCYTES NFR BLD: 0 %
INTERPRETATION ECG - MUSE: NORMAL
LYMPHOCYTES # BLD AUTO: 2.3 10E3/UL (ref 0.8–5.3)
LYMPHOCYTES NFR BLD AUTO: 26 %
M PNEUMO DNA SPEC QL NAA+PROBE: NOT DETECTED
MCH RBC QN AUTO: 20.6 PG (ref 26.5–33)
MCHC RBC AUTO-ENTMCNC: 30.1 G/DL (ref 31.5–36.5)
MCV RBC AUTO: 69 FL (ref 78–100)
MONOCYTES # BLD AUTO: 1 10E3/UL (ref 0–1.3)
MONOCYTES NFR BLD AUTO: 11 %
NEUTROPHILS # BLD AUTO: 4.8 10E3/UL (ref 1.6–8.3)
NEUTROPHILS NFR BLD AUTO: 55 %
NRBC # BLD AUTO: 0 10E3/UL
NRBC BLD AUTO-RTO: 0 /100
P AXIS - MUSE: 64 DEGREES
PLATELET # BLD AUTO: 454 10E3/UL (ref 150–450)
POTASSIUM SERPL-SCNC: 3.8 MMOL/L (ref 3.4–5.3)
PR INTERVAL - MUSE: 96 MS
PROT SERPL-MCNC: 7.6 G/DL (ref 6.4–8.3)
QRS DURATION - MUSE: 72 MS
QT - MUSE: 340 MS
QTC - MUSE: 451 MS
R AXIS - MUSE: 30 DEGREES
RBC # BLD AUTO: 4.76 10E6/UL (ref 3.8–5.2)
RSV RNA SPEC NAA+PROBE: NEGATIVE
RSV RNA SPEC QL NAA+PROBE: NOT DETECTED
RSV RNA SPEC QL NAA+PROBE: NOT DETECTED
RV+EV RNA SPEC QL NAA+PROBE: NOT DETECTED
SARS-COV-2 RNA RESP QL NAA+PROBE: NEGATIVE
SODIUM SERPL-SCNC: 139 MMOL/L (ref 135–145)
SYSTOLIC BLOOD PRESSURE - MUSE: NORMAL MMHG
T AXIS - MUSE: 12 DEGREES
TROPONIN T SERPL HS-MCNC: <6 NG/L
VENTRICULAR RATE- MUSE: 106 BPM
WBC # BLD AUTO: 8.8 10E3/UL (ref 4–11)

## 2025-05-30 PROCEDURE — 36415 COLL VENOUS BLD VENIPUNCTURE: CPT | Performed by: PHYSICIAN ASSISTANT

## 2025-05-30 PROCEDURE — 71046 X-RAY EXAM CHEST 2 VIEWS: CPT | Mod: 26 | Performed by: RADIOLOGY

## 2025-05-30 PROCEDURE — 250N000011 HC RX IP 250 OP 636: Performed by: PHYSICIAN ASSISTANT

## 2025-05-30 PROCEDURE — 99284 EMERGENCY DEPT VISIT MOD MDM: CPT | Mod: 25 | Performed by: FAMILY MEDICINE

## 2025-05-30 PROCEDURE — 85379 FIBRIN DEGRADATION QUANT: CPT | Performed by: PHYSICIAN ASSISTANT

## 2025-05-30 PROCEDURE — 96374 THER/PROPH/DIAG INJ IV PUSH: CPT | Mod: 59 | Performed by: FAMILY MEDICINE

## 2025-05-30 PROCEDURE — 96375 TX/PRO/DX INJ NEW DRUG ADDON: CPT | Performed by: FAMILY MEDICINE

## 2025-05-30 PROCEDURE — 93005 ELECTROCARDIOGRAM TRACING: CPT | Performed by: FAMILY MEDICINE

## 2025-05-30 PROCEDURE — 84703 CHORIONIC GONADOTROPIN ASSAY: CPT | Performed by: PHYSICIAN ASSISTANT

## 2025-05-30 PROCEDURE — 99285 EMERGENCY DEPT VISIT HI MDM: CPT | Performed by: FAMILY MEDICINE

## 2025-05-30 PROCEDURE — 71275 CT ANGIOGRAPHY CHEST: CPT | Mod: 26 | Performed by: RADIOLOGY

## 2025-05-30 PROCEDURE — 71275 CT ANGIOGRAPHY CHEST: CPT

## 2025-05-30 PROCEDURE — 85025 COMPLETE CBC W/AUTO DIFF WBC: CPT | Performed by: PHYSICIAN ASSISTANT

## 2025-05-30 PROCEDURE — 80053 COMPREHEN METABOLIC PANEL: CPT | Performed by: PHYSICIAN ASSISTANT

## 2025-05-30 PROCEDURE — 87637 SARSCOV2&INF A&B&RSV AMP PRB: CPT | Performed by: PHYSICIAN ASSISTANT

## 2025-05-30 PROCEDURE — 87486 CHLMYD PNEUM DNA AMP PROBE: CPT | Performed by: PHYSICIAN ASSISTANT

## 2025-05-30 PROCEDURE — 250N000009 HC RX 250: Performed by: PHYSICIAN ASSISTANT

## 2025-05-30 PROCEDURE — 71046 X-RAY EXAM CHEST 2 VIEWS: CPT

## 2025-05-30 PROCEDURE — 93010 ELECTROCARDIOGRAM REPORT: CPT | Performed by: FAMILY MEDICINE

## 2025-05-30 PROCEDURE — 84484 ASSAY OF TROPONIN QUANT: CPT | Performed by: PHYSICIAN ASSISTANT

## 2025-05-30 PROCEDURE — 250N000011 HC RX IP 250 OP 636: Mod: JZ | Performed by: PHYSICIAN ASSISTANT

## 2025-05-30 RX ORDER — ONDANSETRON 2 MG/ML
4 INJECTION INTRAMUSCULAR; INTRAVENOUS ONCE
Status: DISCONTINUED | OUTPATIENT
Start: 2025-05-30 | End: 2025-05-30

## 2025-05-30 RX ORDER — ALBUTEROL SULFATE 90 UG/1
2 INHALANT RESPIRATORY (INHALATION) EVERY 6 HOURS PRN
Qty: 18 G | Refills: 0 | Status: SHIPPED | OUTPATIENT
Start: 2025-05-30

## 2025-05-30 RX ORDER — KETOROLAC TROMETHAMINE 15 MG/ML
15 INJECTION, SOLUTION INTRAMUSCULAR; INTRAVENOUS ONCE
Status: COMPLETED | OUTPATIENT
Start: 2025-05-30 | End: 2025-05-30

## 2025-05-30 RX ORDER — IOPAMIDOL 755 MG/ML
53 INJECTION, SOLUTION INTRAVASCULAR ONCE
Status: COMPLETED | OUTPATIENT
Start: 2025-05-30 | End: 2025-05-30

## 2025-05-30 RX ORDER — DIPHENHYDRAMINE HYDROCHLORIDE 50 MG/ML
25 INJECTION, SOLUTION INTRAMUSCULAR; INTRAVENOUS ONCE
Status: COMPLETED | OUTPATIENT
Start: 2025-05-30 | End: 2025-05-30

## 2025-05-30 RX ORDER — METOCLOPRAMIDE HYDROCHLORIDE 5 MG/ML
5 INJECTION INTRAMUSCULAR; INTRAVENOUS ONCE
Status: COMPLETED | OUTPATIENT
Start: 2025-05-30 | End: 2025-05-30

## 2025-05-30 RX ADMIN — SODIUM CHLORIDE, PRESERVATIVE FREE 84 ML: 5 INJECTION INTRAVENOUS at 19:39

## 2025-05-30 RX ADMIN — METOCLOPRAMIDE HYDROCHLORIDE 5 MG: 5 INJECTION INTRAMUSCULAR; INTRAVENOUS at 17:39

## 2025-05-30 RX ADMIN — DIPHENHYDRAMINE HYDROCHLORIDE 25 MG: 50 INJECTION, SOLUTION INTRAMUSCULAR; INTRAVENOUS at 17:38

## 2025-05-30 RX ADMIN — KETOROLAC TROMETHAMINE 15 MG: 15 INJECTION, SOLUTION INTRAMUSCULAR; INTRAVENOUS at 17:39

## 2025-05-30 RX ADMIN — IOPAMIDOL 53 ML: 755 INJECTION, SOLUTION INTRAVENOUS at 19:39

## 2025-05-30 ASSESSMENT — ACTIVITIES OF DAILY LIVING (ADL)
ADLS_ACUITY_SCORE: 50

## 2025-05-30 ASSESSMENT — COLUMBIA-SUICIDE SEVERITY RATING SCALE - C-SSRS
1. IN THE PAST MONTH, HAVE YOU WISHED YOU WERE DEAD OR WISHED YOU COULD GO TO SLEEP AND NOT WAKE UP?: NO
6. HAVE YOU EVER DONE ANYTHING, STARTED TO DO ANYTHING, OR PREPARED TO DO ANYTHING TO END YOUR LIFE?: NO
2. HAVE YOU ACTUALLY HAD ANY THOUGHTS OF KILLING YOURSELF IN THE PAST MONTH?: NO

## 2025-05-30 NOTE — ED PROVIDER NOTES
ED Provider Note  [facility]      History     Chief Complaint   Patient presents with    Cough    Wheezing    Fever     HPI    Leticia Morales is a 39 year old female past medical history significant for PTSD, adjustment disorder, history of adult respiratory distress syndrome, who presents to the emergency room with concerns for cough wheezing and fever.    Patient presents with her cousin.  She notes that she has had ongoing dry cough for the last 6 months, and over the last 1 month she has had increasing symptoms of positional dyspnea, midsternal chest pain worse with cough as well as subjective fevers without any measured fevers.  She notes she is also had some mild nasal congestion and runny nose with her symptoms.  She tells me her cough has been dry is not productive denies any hemoptysis with this.  She states she has had some coughing to the point of emesis at the end of last year has not had any recent episodes of throwing up.  Denies abdominal pain urinary symptoms concern for pregnancy.  She does not have a formal diagnosis of asthma however her primary care does suspect possible asthma she has referral out to pulmonology otherwise not yet followed up for PFTs.  She has a butyryl inhaler which she has been using and states this sometimes does help.  She admits to vaping and has been vaping intermittently over the last few weeks.  She denies any history of PE or DVT, lower extremity edema or pain, hormone use, recent surgeries or immobilizations.    Past Medical History  Past Medical History:   Diagnosis Date    ARDS (adult respiratory distress syndrome) (H) 2010    related to H1N1 infection    H1N1 influenza 2010    prolonged ICU stay, medically induced coma    Iron deficiency anemia     Migraines      Past Surgical History:   Procedure Laterality Date    THORACIC SURGERY      trach     albuterol (PROAIR HFA/PROVENTIL HFA/VENTOLIN HFA) 108 (90 Base) MCG/ACT inhaler  acetaminophen  "(TYLENOL) 500 MG tablet  albuterol (PROAIR HFA/PROVENTIL HFA/VENTOLIN HFA) 108 (90 Base) MCG/ACT inhaler  bacitracin 500 UNIT/GM external ointment  chlorhexidine (HIBICLENS) 4 % solution  eletriptan (RELPAX) 40 MG tablet  ferrous sulfate (FEROSUL) 325 (65 Fe) MG tablet  Fremanezumab-vfrm (AJOVY) 225 MG/1.5ML SOAJ  guaiFENesin-dextromethorphan (ROBITUSSIN DM) 100-10 MG/5ML syrup  ibuprofen (ADVIL/MOTRIN) 400 MG tablet  metoclopramide (REGLAN) 5 MG tablet  omeprazole (PRILOSEC) 20 MG DR capsule  ondansetron (ZOFRAN ODT) 4 MG ODT tab  rimegepant (NURTEC) 75 MG ODT tablet      Allergies   Allergen Reactions    Macrodantin [Nitrofurantoin Macrocrystal] Other (See Comments)     itching    Sumatriptan Anxiety, Palpitations and Itching     Family History  Family History   Problem Relation Age of Onset    Diabetes Father     Hypertension Father     Cancer Father     Other - See Comments Father         Epistaxis when Angry    Glaucoma Father     Migraines Mother      Social History   Social History     Tobacco Use    Smoking status: Former     Types: Hookah     Passive exposure: Past    Smokeless tobacco: Never   Vaping Use    Vaping status: Some Days   Substance Use Topics    Alcohol use: No    Drug use: No      A medically appropriate review of systems was performed with pertinent positives and negatives noted in the HPI, and all other systems negative.    Physical Exam   BP: 116/80  Pulse: 110  Temp: 98.1  F (36.7  C)  Resp: 16  Height: 162.6 cm (5' 4\")  Weight: 63.4 kg (139 lb 12.8 oz)  SpO2: 99 %  Physical Exam      GENERAL APPEARANCE: The patient is well developed, well appearing, and in no acute distress.  HEAD:  Normocephalic.  EENT: Voice normal.  Uvula without exudates.  NECK: Trachea is midline.  Uvula midline without exudates  LUNGS: Faint Tory wheeze noted in the right upper lung field.  Bases clear.  No increased work of breathing.  HEART: Regular rate and normal rhythm.   ABDOMEN: Soft, flat, and benign. No " mass, tenderness, guarding, or rebound.  EXTREMITIES: No cyanosis, clubbing, or edema.  NEUROLOGIC: No focal sensory or motor deficits are noted.  PSYCHIATRIC: The patient is awake, alert.  Appropriate mood and affect.  SKIN: Warm, dry, and well perfused.      ED Course, Procedures, & Data    EKG 5/30/2025: Sinus tachycardia with short KY interval, ventricular rate 106 QTc 451.  On my interpretation there is tachycardia present.  No visible ST elevation or depression to suggest ischemia.     Results for orders placed or performed during the hospital encounter of 05/30/25   Chest XR,  PA & LAT     Status: None    Narrative    Chest 2 views    INDICATION: Dyspnea and cough    COMPARISON: 12/29/2024    Heart size and shape normal. Lungs and pulmonary vasculature are  normal. Bony structures grossly normal.      Impression    IMPRESSION: Negative    ANA JIMENEZ MD         SYSTEM ID:  [id number]   CT Chest Pulmonary Embolism w Contrast     Status: None    Narrative    EXAMINATION: CT CHEST PULMONARY EMBOLISM W CONTRAST on 5/30/2025 4:34  PM.    INDICATION: 6 months of dry cough, new dyspnea subjective fevers  evaluate PE atypical pneumonia other unclear etiology        COMPARISON: Chest abdomen pelvis CT 6/15/2021.    TECHNIQUE: CT imaging obtained through the chest with contrast.  Coronal and axial MIP reformatted images obtained. Three-dimensional  (3D) post-processed angiographic images were reconstructed, archived  to PACS and used in interpretation of this study.     CONTRAST: 53 ml isovue 370 IV    FINDINGS:    Lines and tubes: None.    No central filling defect consistent with a pulmonary embolism. .      Mediastinum: No thyroid nodules. Central tracheobronchial tree is  patent. Normal heart size, specifically no right heart enlargement. No  pericardial effusion. Moderate coronary artery calcifications. . No  mediastinal or hilar lymphadenopathy.  No aortic aneurysm. The  ascending thoracic aorta and main  pulmonary artery nondilated. Normal  3 vessel arch.    Lungs: Clear tracheobronchial tree. No focal consolidation. No pleural  effusion. No pneumothorax. Sub-3 mm solid pulmonary nodule in the left  upper lobe (series 9, image 63).    Bones and soft tissues: No suspicious bone findings. Chronic bilateral  rib fractures are again noted.    Upper abdomen: Multiple small hypodense hepatic lesions, most likely  cysts. These appear increased in number and a right hepatic lobe  superior segment lesion appears of increased in size approximately 5  mm to 9 mm (7/208).      Impression    IMPRESSION:   1. No central filling defect consistent with a pulmonary embolism.   2. Multiple small hepatic hypodensities most likely cysts, which  appear increased in number and the largest appears slightly increased  in size from 4 years ago. Follow-up abdomen CT with contrast within 6  months.    In the event of a positive result for acute pulmonary embolism  resulting in right heart strain, consider calling the   [facility] patient placement ([phone]) for PERT (Pulmonary Embolism  Response Team) Activation      I have personally reviewed the examination and initial interpretation  and I agree with the findings.    ANA JIMENEZ MD         SYSTEM ID:  [id number]   Comprehensive metabolic panel     Status: Abnormal   Result Value Ref Range    Sodium 139 135 - 145 mmol/L    Potassium 3.8 3.4 - 5.3 mmol/L    Carbon Dioxide (CO2) 18 (L) 22 - 29 mmol/L    Anion Gap 15 7 - 15 mmol/L    Urea Nitrogen 10.7 6.0 - 20.0 mg/dL    Creatinine 0.72 0.51 - 0.95 mg/dL    GFR Estimate >90 >60 mL/min/1.73m2    Calcium 9.3 8.8 - 10.4 mg/dL    Chloride 106 98 - 107 mmol/L    Glucose 117 (H) 70 - 99 mg/dL    Alkaline Phosphatase 69 40 - 150 U/L    AST 16 0 - 45 U/L    ALT 7 0 - 50 U/L    Protein Total 7.6 6.4 - 8.3 g/dL    Albumin 3.9 3.5 - 5.2 g/dL    Bilirubin Total 0.3 <=1.2 mg/dL   Troponin T, High Sensitivity     Status: Normal   Result Value Ref  Range    Troponin T, High Sensitivity <6 <=14 ng/L   Influenza A/B, RSV and SARS-CoV2 PCR (COVID-19) Nasopharyngeal     Status: Normal    Specimen: Nasopharyngeal; Swab   Result Value Ref Range    Influenza A PCR Negative Negative    Influenza B PCR Negative Negative    RSV PCR Negative Negative    SARS CoV2 PCR Negative Negative    Narrative    Testing was performed using the Xpert Xpress CoV2/Flu/RSV Assay on the [employer] GeneXpert Instrument. This test should be ordered for the detection of SARS-CoV2, influenza, and RSV viruses in individuals with signs and symptoms of respiratory tract infection. This test is for in vitro diagnostic use under the [place] FDA for laboratories certified under CLIA to perform high or moderate complexity testing. This test has been [place] FDA cleared. A negative result does not rule out the presence of PCR inhibitors in the specimen or target RNA in concentration below the limit of detection for the assay. If only one viral target is positive but coinfection with multiple targets is suspected, the sample should be re-tested with another FDA cleared, approved, or authorized test, if coninfection would change clinical management. This test was validated by the [facility] [employer]. These laboratories are certified under the Clinical Laboratory Improvement Amendments of 1988 (CLIA-88) as qualified to perfom high complexity laboratory testing.   HCG qualitative pregnancy (blood)     Status: Normal   Result Value Ref Range    hCG Serum Qualitative Negative Negative   D dimer quantitative     Status: Normal   Result Value Ref Range    D-Dimer Quantitative 0.46 0.00 - 0.50 ug/mL FEU    Narrative    This D-dimer assay is intended for use in conjunction with a clinical pretest probability assessment model to exclude pulmonary embolism (PE) and deep venous thrombosis (DVT) in outpatients suspected of PE or DVT. The cut-off value is 0.50 ug/mL FEU.   CBC with platelets and differential      Status: Abnormal   Result Value Ref Range    WBC Count 8.8 4.0 - 11.0 10e3/uL    RBC Count 4.76 3.80 - 5.20 10e6/uL    Hemoglobin 9.8 (L) 11.7 - 15.7 g/dL    Hematocrit 32.6 (L) 35.0 - 47.0 %    MCV 69 (L) 78 - 100 fL    MCH 20.6 (L) 26.5 - 33.0 pg    MCHC 30.1 (L) 31.5 - 36.5 g/dL    RDW 19.3 (H) 10.0 - 15.0 %    Platelet Count 454 (H) 150 - 450 10e3/uL    % Neutrophils 55 %    % Lymphocytes 26 %    % Monocytes 11 %    % Eosinophils 7 %    % Basophils 1 %    % Immature Granulocytes 0 %    NRBCs per 100 WBC 0 <1 /100    Absolute Neutrophils 4.8 1.6 - 8.3 10e3/uL    Absolute Lymphocytes 2.3 0.8 - 5.3 10e3/uL    Absolute Monocytes 1.0 0.0 - 1.3 10e3/uL    Absolute Eosinophils 0.6 0.0 - 0.7 10e3/uL    Absolute Basophils 0.1 0.0 - 0.2 10e3/uL    Absolute Immature Granulocytes 0.0 <=0.4 10e3/uL    Absolute NRBCs 0.0 10e3/uL   EKG 12 lead     Status: None   Result Value Ref Range    Systolic Blood Pressure  mmHg    Diastolic Blood Pressure  mmHg    Ventricular Rate 106 BPM    Atrial Rate 106 BPM    IA Interval 96 ms    QRS Duration 72 ms     ms    QTc 451 ms    P Axis 64 degrees    R AXIS 30 degrees    T Axis 12 degrees    Interpretation ECG       Sinus tachycardia with short IA  Otherwise normal ECG  Unconfirmed report - interpretation of this ECG is computer generated - see medical record for final interpretation  Confirmed by - EMERGENCY ROOM, PHYSICIAN ([id number]),  BEN JACKSON ([id number]) on 5/30/2025 3:35:08 PM     Respiratory Panel PCR     Status: Normal    Specimen: Nasopharyngeal; Swab   Result Value Ref Range    Adenovirus Not Detected Not Detected    Coronavirus Not Detected Not Detected    Human Metapneumovirus Not Detected Not Detected    Human Rhin/Enterovirus Not Detected Not Detected    Influenza A Not Detected Not Detected    Influenza A, H1 Not Detected Not Detected    Influenza A 2009 H1N1 Not Detected Not Detected    Influenza A, H3 Not Detected Not Detected    Influenza B Not Detected Not  Detected    Parainfluenza Virus 1 Not Detected Not Detected    Parainfluenza Virus 2 Not Detected Not Detected    Parainfluenza Virus 3 Not Detected Not Detected    Parainfluenza Virus 4 Not Detected Not Detected    Respiratory Syncytial Virus A Not Detected Not Detected    Respiratory Syncytial Virus B Not Detected Not Detected    Chlamydia Pneumoniae Not Detected Not Detected    Mycoplasma Pneumoniae Not Detected Not Detected    Narrative    The [employer] Respiratory Panel is a qualitative nucleic acid, multiplex, in vitro diagnostic test for the simultaneous detection and identification of multiple respiratory viral and bacterial nucleic acids in nasopharyngeal swabs collected in viral transport media from individual exhibiting signs and symptoms of respiratory infection. The assay has received FDA approval for the testing of nasopharyngeal (NP) swabs only. This test is used for clinical purposes and should not be regarded as investigational or for research. This laboratory is certified under the Clinical Laboratory Improvement Amendments of 1988 (CLIA-88) as qualified to perform high complexity clinical laboratory testing.   CBC with platelets differential     Status: Abnormal    Narrative    The following orders were created for panel order CBC with platelets differential.  Procedure                               Abnormality         Status                     ---------                               -----------         ------                     CBC with platelets and ...[[id number]]  Abnormal            Final result                 Please view results for these tests on the individual orders.     Medications   ketorolac (TORADOL) injection 15 mg (15 mg Intravenous $Given 5/30/25 1739)   metoclopramide (REGLAN) injection 5 mg (5 mg Intravenous $Given 5/30/25 1739)   diphenhydrAMINE (BENADRYL) injection 25 mg (25 mg Intravenous $Given 5/30/25 1738)   iopamidol (ISOVUE-370) solution 53 mL (53 mLs Intravenous $Given  5/30/25 1939)   sodium chloride 0.9 % bag for CT scan flush (84 mLs Intravenous $Given 5/30/25 1939)     Labs Ordered and Resulted from Time of ED Arrival to Time of ED Departure   COMPREHENSIVE METABOLIC PANEL - Abnormal       Result Value    Sodium 139      Potassium 3.8      Carbon Dioxide (CO2) 18 (*)     Anion Gap 15      Urea Nitrogen 10.7      Creatinine 0.72      GFR Estimate >90      Calcium 9.3      Chloride 106      Glucose 117 (*)     Alkaline Phosphatase 69      AST 16      ALT 7      Protein Total 7.6      Albumin 3.9      Bilirubin Total 0.3     CBC WITH PLATELETS AND DIFFERENTIAL - Abnormal    WBC Count 8.8      RBC Count 4.76      Hemoglobin 9.8 (*)     Hematocrit 32.6 (*)     MCV 69 (*)     MCH 20.6 (*)     MCHC 30.1 (*)     RDW 19.3 (*)     Platelet Count 454 (*)     % Neutrophils 55      % Lymphocytes 26      % Monocytes 11      % Eosinophils 7      % Basophils 1      % Immature Granulocytes 0      NRBCs per 100 WBC 0      Absolute Neutrophils 4.8      Absolute Lymphocytes 2.3      Absolute Monocytes 1.0      Absolute Eosinophils 0.6      Absolute Basophils 0.1      Absolute Immature Granulocytes 0.0      Absolute NRBCs 0.0     TROPONIN T, HIGH SENSITIVITY - Normal    Troponin T, High Sensitivity <6     INFLUENZA A/B, RSV AND SARS-COV2 PCR - Normal    Influenza A PCR Negative      Influenza B PCR Negative      RSV PCR Negative      SARS CoV2 PCR Negative     HCG QUALITATIVE PREGNANCY - Normal    hCG Serum Qualitative Negative     D DIMER QUANTITATIVE - Normal    D-Dimer Quantitative 0.46     RESPIRATORY PANEL PCR - Normal    Adenovirus Not Detected      Coronavirus Not Detected      Human Metapneumovirus Not Detected      Human Rhin/Enterovirus Not Detected      Influenza A Not Detected      Influenza A, H1 Not Detected      Influenza A 2009 H1N1 Not Detected      Influenza A, H3 Not Detected      Influenza B Not Detected      Parainfluenza Virus 1 Not Detected      Parainfluenza Virus 2 Not  Detected      Parainfluenza Virus 3 Not Detected      Parainfluenza Virus 4 Not Detected      Respiratory Syncytial Virus A Not Detected      Respiratory Syncytial Virus B Not Detected      Chlamydia Pneumoniae Not Detected      Mycoplasma Pneumoniae Not Detected       CT Chest Pulmonary Embolism w Contrast   Final Result   IMPRESSION:    1. No central filling defect consistent with a pulmonary embolism.    2. Multiple small hepatic hypodensities most likely cysts, which   appear increased in number and the largest appears slightly increased   in size from 4 years ago. Follow-up abdomen CT with contrast within 6   months.      In the event of a positive result for acute pulmonary embolism   resulting in right heart strain, consider calling the    [facility] patient placement ([phone]) for PERT (Pulmonary Embolism   Response Team) Activation         I have personally reviewed the examination and initial interpretation   and I agree with the findings.      ANA JIMENEZ MD            SYSTEM ID:  [id number]      Chest XR,  PA & LAT   Final Result   IMPRESSION: Negative      ANA JIMENEZ MD            SYSTEM ID:  [id number]             Critical care was not performed.     Medical Decision Making  The patient's presentation was of moderate complexity (an undiagnosed new problem with uncertain prognosis).    The patient's evaluation involved:  review of external note(s) from 3+ sources (see separate area of note for details)  review of 3+ test result(s) ordered prior to this encounter (see separate area of note for details)  ordering and/or review of 3+ test(s) in this encounter (see separate area of note for details)    The patient's management necessitated moderate risk (prescription drug management including medications given in the ED) and moderate risk (IV contrast administration).    Assessment & Plan    This is a 31-year-old female present with concerns for 6 months of ongoing dry cough, with subjective  fevers, increasing coughing and positional dyspnea over the last 1 month.  On presentation here she is tachycardic to 110, afebrile and normoxic.  On exam she has inspiratory wheezing noted to the right upper lung field with bases clear.  She has soft abdomen does not appear toxic.  Clinical history and exam consistent with broad differential including possible pneumonia, viral syndrome, bronchitis.  Considered PE as well with duration of cough and no other obvious findings with prior workups.  She may have underlying asthma as well as scheduled to see pulmonology in the outpatient setting.  Plan for routine laboratory studies CBC chemistry will initiate troponin as well evaluate ACS, EKG chest x-ray D-dimer as patient is low risk [place].  End cannot be excluded using PERC score with her tachycardia.    Labs reviewed.  CBC without leukocytosis patient Nema at 9.8 consistent with prior.  Chemistry within normal limits.  Troponin normal suggest against ACS.  COVID and respiratory panel both negative.  EKG shows sinus tachycardia without findings for ischemia.  Chest x-ray clear.  D-dimer negative.  Past medical history with no obvious cause at this point for clinical symptoms I did discuss with patient obtaining CTA to further evaluate possible atypical infection versus PE versus other unclear cause for ongoing cough and patient is agreeable to this.  In discussing this with her she mentions she is starting to experience headache symptoms similar to prior migraines.  She normally takes a triptan at home although did not take 1 today.  Will give Toradol Reglan and Benadryl as she notes these have helped in the past.    CT PE shows no findings for pulmonary embolism or pneumonia or other etiology to explain patient's chronic cough.  Radiologist does note several liver lesions thought to be possible cysts with recommendations of repeat evaluation in 6 months.    Discussed these results with the patient.  At this time  certainly she may have under lying asthma however with reassuring evaluation here does not appear to have any infectious process or life-threatening etiology to explain her chronic cough.  She notes she ran out of her albuterol and I did give her a refill.  She states she has been on benzonatate in the past without much improvement of her symptoms do not feel at this time this would be of much benefit.  She did note she has not yet follow-up with pulmonology is requesting repeat referral which I did place along with referral to primary care she notes she does not have a primary care provider.  We discussed strict return precautions.  Patient has no other questions or concerns at this time.  Red flag signs were addressed, and they were in agreement with the patient care plan provided.    Patient discussed with attending physician , who agrees with my plan of care.    I have reviewed the nursing notes. I have reviewed the findings, diagnosis, plan and need for follow up with the patient.    Discharge Medication List as of 5/30/2025  8:29 PM        START taking these medications    Details   !! albuterol (PROAIR HFA/PROVENTIL HFA/VENTOLIN HFA) 108 (90 Base) MCG/ACT inhaler Inhale 2 puffs into the lungs every 6 hours as needed for shortness of breath, wheezing or cough., Disp-18 g, R-0, [facility] may dispense brand covered by insurance (Proair, or proventil or ventolin or generic albuterol inhaler)       !! - Potential duplicate medications found. Please discuss with provider.          Final diagnoses:   Chronic cough   Liver lesion   Does not have primary care provider       SONDRA Amador  [facility] EMERGENCY DEPARTMENT  5/30/2025     Sadie Betancourt PA-C  05/30/25 2043    --    ED Attending Physician Attestation    I Rico Morris MD, cared for this patient with the Advanced Practice Provider (MILENA). I personally provided a substantive portion of the care for this patient,  including approving the care plan for the number and complexity of problems addressed and taking responsibility related to the risk of complications and/or morbidity or mortality of patient management. Please see the MILENA's documentation for full details.          Rico Morris MD  Emergency Medicine     This note was created at least in part by the use of dragon voice dictation system. Inadvertent typographical errors may still exist.  Rico Morris MD.  Patient evaluated in the emergency department during the COVID-19 pandemic period. Careful attention to patients safety was addressed throughout the evaluation. Evaluation and treatment management was initiated with disposition made efficiently and appropriate as possible to minimize any risk of potential exposure to patient during this evaluation.           Rico Morris MD  05/31/25 0922

## 2025-05-30 NOTE — ED TRIAGE NOTES
"Triage Assessment & Note:    /80   Pulse 110   Temp 98.1  F (36.7  C) (Oral)   Resp 16   Ht 1.626 m (5' 4\")   Wt 63.4 kg (139 lb 12.8 oz)   SpO2 99%   BMI 24.00 kg/m        Patient presents with: Pt comes ambulatory to triage with reports of cough, wheezing, and off and on fevers. No reports of travel.     Home Treatments/Remedies: home medications    Febrile / Afebrile: afebrile in triage    Duration of C/o: 1+ mo    Sharon Croft RN  May 30, 2025            "

## 2025-05-31 NOTE — DISCHARGE INSTRUCTIONS
Here in the emergency room we discussed that you have a reassuring evaluation.  Your COVID and flu test are negative.  Your basic laboratory studies including heart enzyme test are normal.  We obtained a CT scan of your chest which shows no findings for blood clots or pneumonia.  We also discussed that there is an incidental finding of likely liver cysts noted with recommendations of following up to have these rechecked in 6 months with primary care.    You may have underlying undiagnosed asthma.  I placed a referral to follow-up with pulmonology and they should be calling you.  You given paper prescription for albuterol inhaler and recommend using this as directed as needed for cough.  I will also place a referral to follow-up with primary care as well as you do not have a primary care provider.    If you develop any new or worsening symptoms, is important to return right away to the emergency department for further evaluation and management.

## 2025-06-10 DIAGNOSIS — G43.109 MIGRAINE WITH AURA AND WITHOUT STATUS MIGRAINOSUS, NOT INTRACTABLE: ICD-10-CM

## 2025-06-10 RX ORDER — ELETRIPTAN HYDROBROMIDE 40 MG/1
40 TABLET, FILM COATED ORAL
Qty: 12 TABLET | Refills: 6 | Status: SHIPPED | OUTPATIENT
Start: 2025-06-10

## 2025-06-10 NOTE — TELEPHONE ENCOUNTER
[facility] Task Note    TASK    Neurology Rx Refill  Medication eletriptan (RELPAX) 40 MG tablet    Dose Take 1 tablet (40 mg) by mouth at onset of headache for migraine (may repeat 40 mg  in 2 hours as needed. Max 80 mg in 24 hours).    Last refill ordered (m/d/y) 02/14/2025   Last quantity ordered 12   Last # refills 3   Last clinic visit with ordering provider (m/d/y) 02/14/2025   Next clinic visit with ordering provider (m/d/y) None Scheduled   All pertinent protocol data (lab date/result)    Pertinent information from patient's message        FOLLOW-UP      ADDITIONAL COMMENTS      Hilary Murillo

## 2025-07-31 ENCOUNTER — PATIENT OUTREACH (OUTPATIENT)
Dept: FAMILY MEDICINE | Facility: CLINIC | Age: 40
End: 2025-07-31

## 2025-07-31 NOTE — TELEPHONE ENCOUNTER
Patient Quality Outreach    Patient is due for the following:   Cervical Cancer Screening - PAP Needed  Chlamydia Screening    Action(s) Taken:   Schedule a Adult Preventative     Type of outreach:    Phone, left message for patient/parent to call back.    Questions for provider review:    None       Barbara ROSALES CNM, [facility]

## (undated) RX ORDER — SODIUM CHLORIDE 9 MG/ML
INJECTION, SOLUTION INTRAVENOUS
Status: DISPENSED
Start: 2023-08-10